# Patient Record
Sex: FEMALE | Race: WHITE | NOT HISPANIC OR LATINO | Employment: FULL TIME | URBAN - METROPOLITAN AREA
[De-identification: names, ages, dates, MRNs, and addresses within clinical notes are randomized per-mention and may not be internally consistent; named-entity substitution may affect disease eponyms.]

---

## 2017-05-23 PROBLEM — F31.9 BIPOLAR I DISORDER: Status: ACTIVE | Noted: 2017-05-23

## 2019-05-13 ENCOUNTER — OFFICE VISIT (OUTPATIENT)
Dept: FAMILY MEDICINE | Facility: CLINIC | Age: 44
End: 2019-05-13
Payer: COMMERCIAL

## 2019-05-13 VITALS
TEMPERATURE: 98 F | SYSTOLIC BLOOD PRESSURE: 94 MMHG | HEIGHT: 63 IN | OXYGEN SATURATION: 99 % | DIASTOLIC BLOOD PRESSURE: 54 MMHG | WEIGHT: 113.75 LBS | BODY MASS INDEX: 20.16 KG/M2 | HEART RATE: 79 BPM

## 2019-05-13 DIAGNOSIS — D64.9 ANEMIA, UNSPECIFIED TYPE: Primary | ICD-10-CM

## 2019-05-13 DIAGNOSIS — F31.9 BIPOLAR I DISORDER: ICD-10-CM

## 2019-05-13 DIAGNOSIS — G40.909 SEIZURE DISORDER: ICD-10-CM

## 2019-05-13 DIAGNOSIS — R53.82 CHRONIC FATIGUE: ICD-10-CM

## 2019-05-13 PROBLEM — F41.1 ANXIETY STATE: Status: ACTIVE | Noted: 2019-05-13

## 2019-05-13 PROBLEM — G25.0 ESSENTIAL TREMOR: Status: ACTIVE | Noted: 2019-05-13

## 2019-05-13 PROBLEM — G56.00 CARPAL TUNNEL SYNDROME: Status: ACTIVE | Noted: 2019-05-13

## 2019-05-13 PROBLEM — H81.399 PERIPHERAL VERTIGO: Status: ACTIVE | Noted: 2019-05-13

## 2019-05-13 PROCEDURE — 99204 OFFICE O/P NEW MOD 45 MIN: CPT | Mod: S$GLB,,, | Performed by: INTERNAL MEDICINE

## 2019-05-13 PROCEDURE — 99999 PR PBB SHADOW E&M-EST. PATIENT-LVL III: ICD-10-PCS | Mod: PBBFAC,,, | Performed by: INTERNAL MEDICINE

## 2019-05-13 PROCEDURE — 99999 PR PBB SHADOW E&M-EST. PATIENT-LVL III: CPT | Mod: PBBFAC,,, | Performed by: INTERNAL MEDICINE

## 2019-05-13 PROCEDURE — 99204 PR OFFICE/OUTPT VISIT, NEW, LEVL IV, 45-59 MIN: ICD-10-PCS | Mod: S$GLB,,, | Performed by: INTERNAL MEDICINE

## 2019-05-13 NOTE — PROGRESS NOTES
Patient ID: Angela Trascher Lejeune     Chief Complaint:   Chief Complaint   Patient presents with    Establish Primary Care    Weight Loss    Alopecia    Fatigue        HPI: New pt to me but has been seen at [facility] in past. Numerous c/o today- chief of which is fatigue and muscle weakness x 3 months coinciding w/ 30 lbs unintentional weight loss over same period (has gained 10 lbs back), cold feeling, vertigo w/ presyncope, racing heart just lying in bed, gets recurrent shooting nerve pain in B feet, diffuse muscle aches after taking a shower, hair falling out. 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 Has a very stressful job too but can't take Clonazepam due to h/o addiction. Echo for Split S2?     Review of Systems   Constitutional: Positive for fatigue.   HENT: Negative.    Eyes: Negative.    Respiratory: Negative.    Cardiovascular: Negative.    Gastrointestinal: Negative.    Endocrine: Positive for cold intolerance.   Genitourinary: Negative.    Musculoskeletal: Negative.    Skin: Negative.    Allergic/Immunologic: Negative.    Neurological: Positive for weakness.   Hematological: Negative.    Psychiatric/Behavioral: Negative.           Objective:      Physical Exam   Physical Exam   Constitutional: She is oriented to person, place, and time. She appears well-developed and well-nourished.   pale   HENT:   Head: Normocephalic and atraumatic.   Nose: Nose normal.   Mouth/Throat: Oropharynx is clear and moist.   Eyes: Pupils are equal, round, and reactive to light. Conjunctivae and EOM are normal.   Neck: Normal range of motion. Neck supple.   Cardiovascular: Normal rate, regular rhythm and intact distal pulses.   Split S2   Pulmonary/Chest: Effort normal and breath sounds normal.   Abdominal: Soft. Bowel sounds are normal.   Musculoskeletal: Normal range of motion.   Neurological: She is alert and oriented to person, place, and time.   Skin: Skin is warm and dry. Capillary refill takes less than 2 seconds.  "  Psychiatric: She has a normal mood and affect. Her behavior is normal. Judgment and thought content normal.   Nursing note and vitals reviewed.         Vitals:   Vitals:    05/13/19 1410   BP: (!) 94/54   Pulse: 79   Temp: 97.8 °F (36.6 °C)       Assessment:       Patient Active Problem List    Diagnosis Date Noted    Seizure disorder 05/13/2019    Peripheral vertigo 05/13/2019    Carpal tunnel syndrome 05/13/2019    Anxiety state 05/13/2019    Essential tremor 05/13/2019    Fatigue     Bipolar I disorder 05/23/2017          Plan:       Mikaela was seen today for establish primary care, weight loss, alopecia and fatigue.    Diagnoses and all orders for this visit:    Bipolar I disorder  Controlled     Seizure disorder  No grand mal seizures x 10 years     Chronic fatigue  -     CBC auto differential; Future  -     Comprehensive metabolic panel; Future  -     Hemoglobin A1c; Future  -     Lipid panel; Future  -     T3, free; Future  -     T4, free; Future  -     TSH; Future  -     B-12 binding capacity; Future     Update 5/16: We finally got in touch w/ pt and informed her of her critical anemia. She is doing "ok" and I don't think she needs to go to hospital. We will check iron studies and likely set up for IV Fereheme x 2. She has had a blood transfusion in past before/ after her Hyst due to blood loss, but I want to minimize her exposure to blood products.          "

## 2019-05-15 ENCOUNTER — TELEPHONE (OUTPATIENT)
Dept: FAMILY MEDICINE | Facility: CLINIC | Age: 44
End: 2019-05-15

## 2019-05-15 NOTE — TELEPHONE ENCOUNTER
[facility] [phone]    Patient with critical H&H 4.2 and 16.6

## 2019-05-17 ENCOUNTER — TELEPHONE (OUTPATIENT)
Dept: FAMILY MEDICINE | Facility: CLINIC | Age: 44
End: 2019-05-17

## 2019-05-17 NOTE — TELEPHONE ENCOUNTER
----- Message from Meagan Clifton sent at 5/17/2019 11:30 AM CDT -----  Contact: Patient  Type:  Patient Returning Call    Who Called:  Patient  Who Left Message for Patient:  Hannah  Does the patient know what this is regarding?:  Test results  Best Call Back Number:  [phone]

## 2019-05-20 ENCOUNTER — LAB VISIT (OUTPATIENT)
Dept: LAB | Facility: HOSPITAL | Age: 44
End: 2019-05-20
Attending: INTERNAL MEDICINE
Payer: COMMERCIAL

## 2019-05-20 ENCOUNTER — OFFICE VISIT (OUTPATIENT)
Dept: FAMILY MEDICINE | Facility: CLINIC | Age: 44
End: 2019-05-20
Payer: COMMERCIAL

## 2019-05-20 VITALS
HEIGHT: 63 IN | HEART RATE: 108 BPM | BODY MASS INDEX: 20.2 KG/M2 | DIASTOLIC BLOOD PRESSURE: 56 MMHG | TEMPERATURE: 98 F | SYSTOLIC BLOOD PRESSURE: 118 MMHG | OXYGEN SATURATION: 99 % | WEIGHT: 114 LBS

## 2019-05-20 DIAGNOSIS — D64.9 ANEMIA, UNSPECIFIED TYPE: Primary | ICD-10-CM

## 2019-05-20 DIAGNOSIS — R53.82 CHRONIC FATIGUE: ICD-10-CM

## 2019-05-20 DIAGNOSIS — D64.9 ANEMIA, UNSPECIFIED TYPE: ICD-10-CM

## 2019-05-20 PROCEDURE — 99213 PR OFFICE/OUTPT VISIT, EST, LEVL III, 20-29 MIN: ICD-10-PCS | Mod: S$GLB,,, | Performed by: INTERNAL MEDICINE

## 2019-05-20 PROCEDURE — 3008F PR BODY MASS INDEX (BMI) DOCUMENTED: ICD-10-PCS | Mod: CPTII,S$GLB,, | Performed by: INTERNAL MEDICINE

## 2019-05-20 PROCEDURE — 99999 PR PBB SHADOW E&M-EST. PATIENT-LVL III: CPT | Mod: PBBFAC,,, | Performed by: INTERNAL MEDICINE

## 2019-05-20 PROCEDURE — 83540 ASSAY OF IRON: CPT

## 2019-05-20 PROCEDURE — 82728 ASSAY OF FERRITIN: CPT

## 2019-05-20 PROCEDURE — 85025 COMPLETE CBC W/AUTO DIFF WBC: CPT

## 2019-05-20 PROCEDURE — 99213 OFFICE O/P EST LOW 20 MIN: CPT | Mod: S$GLB,,, | Performed by: INTERNAL MEDICINE

## 2019-05-20 PROCEDURE — 99999 PR PBB SHADOW E&M-EST. PATIENT-LVL III: ICD-10-PCS | Mod: PBBFAC,,, | Performed by: INTERNAL MEDICINE

## 2019-05-20 PROCEDURE — 36415 COLL VENOUS BLD VENIPUNCTURE: CPT | Mod: PO

## 2019-05-20 PROCEDURE — 3008F BODY MASS INDEX DOCD: CPT | Mod: CPTII,S$GLB,, | Performed by: INTERNAL MEDICINE

## 2019-05-20 NOTE — PROGRESS NOTES
Patient ID: Angela Trascher Lejeune     Chief Complaint:   Chief Complaint   Patient presents with    1 Week follow up        HPI: Follow up for fatigue. Extensive labs reviewed and she is profoundly anemic. Need to check iron studies and then likely needs Fereheme. 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 She had a colon in 2010 too. Plans to refer back too Dr. Martinez for another colon.     Review of Systems   Constitutional: Positive for fatigue.   HENT: Negative.    Eyes: Negative.    Respiratory: Negative.    Cardiovascular: Negative.    Gastrointestinal: Negative.    Endocrine: Negative.    Genitourinary: Negative.    Musculoskeletal: Negative.    Skin: Negative.    Allergic/Immunologic: Negative.    Neurological: Positive for dizziness.   Hematological: Negative.    Psychiatric/Behavioral: Negative.    All other systems reviewed and are negative.         Objective:      Physical Exam   Physical Exam   Constitutional: She is oriented to person, place, and time. She appears well-developed and well-nourished.   HENT:   Head: Normocephalic and atraumatic.   Nose: Nose normal.   Mouth/Throat: Oropharynx is clear and moist.   Eyes: Pupils are equal, round, and reactive to light. Conjunctivae and EOM are normal.   Neck: Normal range of motion. Neck supple.   Cardiovascular: Normal rate, regular rhythm, normal heart sounds and intact distal pulses.   Pulmonary/Chest: Effort normal and breath sounds normal.   Abdominal: Soft. Bowel sounds are normal.   Musculoskeletal: Normal range of motion.   Neurological: She is alert and oriented to person, place, and time.   Skin: Skin is warm and dry. Capillary refill takes less than 2 seconds.   Psychiatric: She has a normal mood and affect. Her behavior is normal. Judgment and thought content normal.   Nursing note and vitals reviewed.         Vitals:   Vitals:    05/20/19 1619   BP: (!) 118/56   Pulse: 108   Temp: 98.2 °F (36.8 °C)        Assessment:       Patient Active Problem List    Diagnosis Date Noted    Anemia 05/20/2019    Seizure disorder 05/13/2019    Peripheral vertigo 05/13/2019    Carpal tunnel syndrome 05/13/2019    Anxiety state 05/13/2019    Essential tremor 05/13/2019    Fatigue     Bipolar I disorder 05/23/2017          Plan:         Mikaela was seen today for 1 week follow up.    Diagnoses and all orders for this visit:    Anemia, unspecified type  -     Ambulatory referral to Gastroenterology  Iron studies today  May need Fereheme  Refer back to [facility]     Chronic fatigue  Monitor

## 2019-05-21 ENCOUNTER — TELEPHONE (OUTPATIENT)
Dept: FAMILY MEDICINE | Facility: CLINIC | Age: 44
End: 2019-05-21

## 2019-05-21 DIAGNOSIS — D50.8 OTHER IRON DEFICIENCY ANEMIA: Primary | ICD-10-CM

## 2019-05-21 LAB
BASOPHILS # BLD AUTO: 0.02 K/UL (ref 0–0.2)
BASOPHILS NFR BLD: 0.1 % (ref 0–1.9)
DIFFERENTIAL METHOD: ABNORMAL
EOSINOPHIL # BLD AUTO: 0.2 K/UL (ref 0–0.5)
EOSINOPHIL NFR BLD: 1.4 % (ref 0–8)
ERYTHROCYTE [DISTWIDTH] IN BLOOD BY AUTOMATED COUNT: 17.1 % (ref 11.5–14.5)
FERRITIN SERPL-MCNC: 5 NG/ML (ref 20–300)
HCT VFR BLD AUTO: 16 % (ref 37–48.5)
HGB BLD-MCNC: 4.1 G/DL (ref 12–16)
IMM GRANULOCYTES # BLD AUTO: 0.06 K/UL (ref 0–0.04)
IMM GRANULOCYTES NFR BLD AUTO: 0.4 % (ref 0–0.5)
IRON SERPL-MCNC: 15 UG/DL (ref 30–160)
LYMPHOCYTES # BLD AUTO: 1.7 K/UL (ref 1–4.8)
LYMPHOCYTES NFR BLD: 12.4 % (ref 18–48)
MCH RBC QN AUTO: 21.6 PG (ref 27–31)
MCHC RBC AUTO-ENTMCNC: 25.6 G/DL (ref 32–36)
MCV RBC AUTO: 84 FL (ref 82–98)
MONOCYTES # BLD AUTO: 0.8 K/UL (ref 0.3–1)
MONOCYTES NFR BLD: 5.8 % (ref 4–15)
NEUTROPHILS # BLD AUTO: 10.7 K/UL (ref 1.8–7.7)
NEUTROPHILS NFR BLD: 79.9 % (ref 38–73)
NRBC BLD-RTO: 0 /100 WBC
PLATELET # BLD AUTO: 507 K/UL (ref 150–350)
PMV BLD AUTO: 10.6 FL (ref 9.2–12.9)
RBC # BLD AUTO: 1.9 M/UL (ref 4–5.4)
SATURATED IRON: 4 % (ref 20–50)
TOTAL IRON BINDING CAPACITY: 411 UG/DL (ref 250–450)
TRANSFERRIN SERPL-MCNC: 278 MG/DL (ref 200–375)
WBC # BLD AUTO: 13.36 K/UL (ref 3.9–12.7)

## 2019-05-21 NOTE — TELEPHONE ENCOUNTER
I was informed by lab early this am about her critical low hb.    I spoke to patient early this am about her hb being 4.1.    Patient not interested in blood transfusion but keen on iron transfusion.      Patient is symptomatic I.e fatigue, dizziness, sob on exertion and difficulty in concentrating.

## 2019-05-21 NOTE — TELEPHONE ENCOUNTER
----- Message from Ofelia Tripp sent at 5/21/2019 12:12 PM CDT -----  Contact: patient  Type:  Patient Returning Call    Who Called:  patient  Who Left Message for Patient:  claude  Does the patient know what this is regarding?:    Best Call Back Number:  [phone]  Additional Information:

## 2019-05-22 PROBLEM — D50.8 IRON DEFICIENCY ANEMIA SECONDARY TO INADEQUATE DIETARY IRON INTAKE: Status: ACTIVE | Noted: 2019-05-22

## 2019-06-03 ENCOUNTER — OFFICE VISIT (OUTPATIENT)
Dept: FAMILY MEDICINE | Facility: CLINIC | Age: 44
End: 2019-06-03
Payer: COMMERCIAL

## 2019-06-03 VITALS
TEMPERATURE: 98 F | SYSTOLIC BLOOD PRESSURE: 102 MMHG | DIASTOLIC BLOOD PRESSURE: 70 MMHG | OXYGEN SATURATION: 99 % | HEIGHT: 63 IN | HEART RATE: 91 BPM | BODY MASS INDEX: 19.18 KG/M2 | WEIGHT: 108.25 LBS

## 2019-06-03 DIAGNOSIS — D64.9 LOW HEMATOCRIT: ICD-10-CM

## 2019-06-03 DIAGNOSIS — D64.9 LOW HEMOGLOBIN: ICD-10-CM

## 2019-06-03 DIAGNOSIS — D64.9 ANEMIA, UNSPECIFIED TYPE: Primary | ICD-10-CM

## 2019-06-03 DIAGNOSIS — D50.8 IRON DEFICIENCY ANEMIA SECONDARY TO INADEQUATE DIETARY IRON INTAKE: ICD-10-CM

## 2019-06-03 PROBLEM — N17.9 AKI (ACUTE KIDNEY INJURY): Status: ACTIVE | Noted: 2019-06-03

## 2019-06-03 PROBLEM — E87.29 HYPERCHLOREMIC METABOLIC ACIDOSIS: Status: ACTIVE | Noted: 2019-06-03

## 2019-06-03 PROBLEM — E87.6 HYPOKALEMIA: Status: ACTIVE | Noted: 2019-06-03

## 2019-06-03 PROCEDURE — 3008F PR BODY MASS INDEX (BMI) DOCUMENTED: ICD-10-PCS | Mod: CPTII,S$GLB,, | Performed by: PHYSICIAN ASSISTANT

## 2019-06-03 PROCEDURE — 99999 PR PBB SHADOW E&M-EST. PATIENT-LVL III: ICD-10-PCS | Mod: PBBFAC,,, | Performed by: PHYSICIAN ASSISTANT

## 2019-06-03 PROCEDURE — 99214 PR OFFICE/OUTPT VISIT, EST, LEVL IV, 30-39 MIN: ICD-10-PCS | Mod: S$GLB,,, | Performed by: PHYSICIAN ASSISTANT

## 2019-06-03 PROCEDURE — 99999 PR PBB SHADOW E&M-EST. PATIENT-LVL III: CPT | Mod: PBBFAC,,, | Performed by: PHYSICIAN ASSISTANT

## 2019-06-03 PROCEDURE — 99214 OFFICE O/P EST MOD 30 MIN: CPT | Mod: S$GLB,,, | Performed by: PHYSICIAN ASSISTANT

## 2019-06-03 PROCEDURE — 3008F BODY MASS INDEX DOCD: CPT | Mod: CPTII,S$GLB,, | Performed by: PHYSICIAN ASSISTANT

## 2019-06-03 RX ORDER — MECLIZINE HCL 12.5 MG 12.5 MG/1
TABLET ORAL
COMMUNITY
End: 2019-06-25 | Stop reason: ALTCHOICE

## 2019-06-03 NOTE — PROGRESS NOTES
"Subjective:      Patient ID: Angela Trascher Lejeune is a 44 y.o. female.    Chief Complaint: Anemia (patient says following 2 iron infusion she is starting to feeling like she previously felt SOB, weak, no appetite says she's lost 4 pounds in the last week, extreme thirst, muscle pains and dizziness.)    Patient is new to me, here today for symptomatic anemia   Patient with h/o iron deficiency anemia  CBC on 5/20 reveal critical Hgb and Hct  Recently treated outpatient for iron infusions on 5/22 and 5/29 and had very mild improvement for 2-3days, but now feels the same way she did prior to infusion   Today patient is feeling SOB, weak, no appetite, muscles aches and dizziness  Patient also reports significant unexpected weight loss over the past few months     Blood transfusion in 2010 following surgery    Anemia   Presents for initial visit. Symptoms include light-headedness, malaise/fatigue and weight loss (4lbs in the last week). There has been no abdominal pain, bruising/bleeding easily or fever.     Review of Systems   Constitutional: Positive for appetite change (decreased, "everything tastes bad"), malaise/fatigue, unexpected weight change (30lbs weight loss in 5mths) and weight loss (4lbs in the last week). Negative for chills, diaphoresis and fever.   HENT: Negative for congestion, rhinorrhea and sore throat.    Eyes: Positive for visual disturbance (black dots).   Respiratory: Positive for shortness of breath. Negative for cough and wheezing.    Gastrointestinal: Negative for abdominal pain, blood in stool, constipation, diarrhea, nausea and vomiting.   Endocrine: Positive for cold intolerance and polydipsia.   Genitourinary: Negative for dysuria, frequency and hematuria.   Musculoskeletal: Positive for arthralgias and myalgias.   Skin: Negative for color change, rash and wound.   Neurological: Positive for dizziness, weakness, light-headedness and headaches.   Hematological: Does not bruise/bleed easily. " "      Objective:   /70 (BP Location: Left arm, Patient Position: Sitting)   Pulse 91   Temp 97.9 °F (36.6 °C) (Oral)   Ht 5' 3" (1.6 m)   Wt 49.1 kg (108 lb 3.9 oz)   SpO2 99%   BMI 19.17 kg/m²   Physical Exam   Constitutional: She appears well-developed and well-nourished. She has a sickly appearance. She does not appear ill. No distress.   Patient frail   HENT:   Head: Normocephalic and atraumatic.   Cardiovascular: Normal rate, regular rhythm and normal heart sounds.   No murmur heard.  Pulmonary/Chest: Effort normal and breath sounds normal. No respiratory distress. She has no decreased breath sounds.   Skin: Skin is warm, dry and intact. No rash noted. There is pallor.   Psychiatric: She has a normal mood and affect. Her speech is normal and behavior is normal. Thought content normal.     Assessment:      1. Anemia, unspecified type    2. Iron deficiency anemia secondary to inadequate dietary iron intake    3. Low hemoglobin    4. Low hematocrit       Plan:   Anemia, unspecified type    Iron deficiency anemia secondary to inadequate dietary iron intake    Low hemoglobin    Low hematocrit    To ED for additional eval for symptomatic anemia  Patient's  to drive her to [facility]"

## 2019-06-04 PROBLEM — D50.9 IRON DEFICIENCY ANEMIA: Status: ACTIVE | Noted: 2019-06-04

## 2019-06-04 PROBLEM — N30.00 ACUTE CYSTITIS: Status: ACTIVE | Noted: 2019-06-04

## 2019-06-04 PROBLEM — N83.202 LEFT OVARIAN CYST: Status: ACTIVE | Noted: 2019-06-04

## 2019-06-04 PROBLEM — K59.00 CONSTIPATION: Status: ACTIVE | Noted: 2019-06-04

## 2019-06-05 ENCOUNTER — TELEPHONE (OUTPATIENT)
Dept: FAMILY MEDICINE | Facility: CLINIC | Age: 44
End: 2019-06-05

## 2019-06-05 PROBLEM — R82.71 ASYMPTOMATIC BACTERIURIA: Status: ACTIVE | Noted: 2019-06-04

## 2019-06-05 NOTE — TELEPHONE ENCOUNTER
Spoke with patient. She will keep her appointment on 6/24/19. She does not need to be seen earlier.

## 2019-06-05 NOTE — TELEPHONE ENCOUNTER
----- Message from Vivi Clements sent at 6/5/2019  3:46 PM CDT -----  Type:  Sooner Apoointment Request    Caller is requesting a sooner appointment.  Caller declined first available appointment listed below.  Caller will not accept being placed on the waitlist and is requesting a message be sent to doctor.    Name of Caller:  Cindy Lucero   When is the first available appointment? 08/07/19  Symptoms:  Needs to schedule a hospital follow up d/c 06/05/19 due to amenia  Best Call Back Number:  Patient [phone]  Additional Information:

## 2019-06-07 ENCOUNTER — PATIENT OUTREACH (OUTPATIENT)
Dept: ADMINISTRATIVE | Facility: HOSPITAL | Age: 44
End: 2019-06-07

## 2019-06-07 NOTE — PROGRESS NOTES
Health Maintenance Due   Topic Date Due    TETANUS VACCINE  03/05/1991    Mammogram  08/31/2016     Chart review complete/scrubbed 06/07/2019  Not in Links 06/07/2019  Future Appointments   Date Time Provider Department Center   6/24/2019  1:40 PM Yehuda Lund MD [place]

## 2019-06-13 ENCOUNTER — TELEPHONE (OUTPATIENT)
Dept: HEMATOLOGY/ONCOLOGY | Facility: CLINIC | Age: 44
End: 2019-06-13

## 2019-06-13 NOTE — TELEPHONE ENCOUNTER
appt made for pt   ----- Message from Reece Oliveros sent at 6/13/2019  4:49 PM CDT -----  Type:  Sooner Apoointment Request    Caller is requesting a sooner appointment.  Caller declined first available appointment listed below.  Caller will not accept being placed on the waitlist and is requesting a message be sent to doctor.    Name of Caller:  Patient  When is the first available appointment?  Na  Best Call Back Number:  [phone]  Additional Information:  New patient appointment

## 2019-06-24 ENCOUNTER — LAB VISIT (OUTPATIENT)
Dept: LAB | Facility: HOSPITAL | Age: 44
End: 2019-06-24
Attending: INTERNAL MEDICINE
Payer: COMMERCIAL

## 2019-06-24 ENCOUNTER — OFFICE VISIT (OUTPATIENT)
Dept: FAMILY MEDICINE | Facility: CLINIC | Age: 44
End: 2019-06-24
Payer: COMMERCIAL

## 2019-06-24 VITALS
SYSTOLIC BLOOD PRESSURE: 110 MMHG | WEIGHT: 110.69 LBS | OXYGEN SATURATION: 100 % | HEIGHT: 64 IN | BODY MASS INDEX: 18.9 KG/M2 | DIASTOLIC BLOOD PRESSURE: 70 MMHG | HEART RATE: 94 BPM

## 2019-06-24 DIAGNOSIS — D50.8 IRON DEFICIENCY ANEMIA SECONDARY TO INADEQUATE DIETARY IRON INTAKE: ICD-10-CM

## 2019-06-24 DIAGNOSIS — D64.9 FATIGUE ASSOCIATED WITH ANEMIA: ICD-10-CM

## 2019-06-24 DIAGNOSIS — D50.8 OTHER IRON DEFICIENCY ANEMIA: Primary | ICD-10-CM

## 2019-06-24 DIAGNOSIS — E87.29 HYPERCHLOREMIC METABOLIC ACIDOSIS: ICD-10-CM

## 2019-06-24 DIAGNOSIS — E53.8 VITAMIN B 12 DEFICIENCY: ICD-10-CM

## 2019-06-24 LAB
BASOPHILS # BLD AUTO: 0.16 K/UL (ref 0–0.2)
BASOPHILS NFR BLD: 2.1 % (ref 0–1.9)
DIFFERENTIAL METHOD: ABNORMAL
EOSINOPHIL # BLD AUTO: 0.4 K/UL (ref 0–0.5)
EOSINOPHIL NFR BLD: 5.2 % (ref 0–8)
ERYTHROCYTE [DISTWIDTH] IN BLOOD BY AUTOMATED COUNT: 18.4 % (ref 11.5–14.5)
HCT VFR BLD AUTO: 33.4 % (ref 37–48.5)
HGB BLD-MCNC: 11 G/DL (ref 12–16)
LYMPHOCYTES # BLD AUTO: 2 K/UL (ref 1–4.8)
LYMPHOCYTES NFR BLD: 27.1 % (ref 18–48)
MCH RBC QN AUTO: 30.6 PG (ref 27–31)
MCHC RBC AUTO-ENTMCNC: 32.9 G/DL (ref 32–36)
MCV RBC AUTO: 93 FL (ref 82–98)
MONOCYTES # BLD AUTO: 0.6 K/UL (ref 0.3–1)
MONOCYTES NFR BLD: 7.6 % (ref 4–15)
NEUTROPHILS # BLD AUTO: 4.4 K/UL (ref 1.8–7.7)
NEUTROPHILS NFR BLD: 58 % (ref 38–73)
PLATELET # BLD AUTO: 153 K/UL (ref 150–350)
PLATELET BLD QL SMEAR: ABNORMAL
PMV BLD AUTO: 11.7 FL (ref 9.2–12.9)
RBC # BLD AUTO: 3.6 M/UL (ref 4–5.4)
WBC # BLD AUTO: 7.53 K/UL (ref 3.9–12.7)

## 2019-06-24 PROCEDURE — 99213 OFFICE O/P EST LOW 20 MIN: CPT | Mod: 25,S$GLB,, | Performed by: INTERNAL MEDICINE

## 2019-06-24 PROCEDURE — 3008F PR BODY MASS INDEX (BMI) DOCUMENTED: ICD-10-PCS | Mod: CPTII,S$GLB,, | Performed by: INTERNAL MEDICINE

## 2019-06-24 PROCEDURE — 36415 COLL VENOUS BLD VENIPUNCTURE: CPT | Mod: PO

## 2019-06-24 PROCEDURE — 99213 PR OFFICE/OUTPT VISIT, EST, LEVL III, 20-29 MIN: ICD-10-PCS | Mod: 25,S$GLB,, | Performed by: INTERNAL MEDICINE

## 2019-06-24 PROCEDURE — 99999 PR PBB SHADOW E&M-EST. PATIENT-LVL III: ICD-10-PCS | Mod: PBBFAC,,, | Performed by: INTERNAL MEDICINE

## 2019-06-24 PROCEDURE — 99999 PR PBB SHADOW E&M-EST. PATIENT-LVL III: CPT | Mod: PBBFAC,,, | Performed by: INTERNAL MEDICINE

## 2019-06-24 PROCEDURE — 3008F BODY MASS INDEX DOCD: CPT | Mod: CPTII,S$GLB,, | Performed by: INTERNAL MEDICINE

## 2019-06-24 PROCEDURE — 85025 COMPLETE CBC W/AUTO DIFF WBC: CPT | Mod: PO

## 2019-06-24 NOTE — PROGRESS NOTES
Patient ID: Angela Trascher Lejeune     Chief Complaint:   Chief Complaint   Patient presents with    1 month follow up        HPI: F/u for iron deficiency anemia likely due to malabsorption +/- slowly bleeding PUD and h/o Crohn's. She got Feraheme x 2 and PRBC's x 1 at [email or web address] EGD= 2 non-bleeding ulcers (stomach and duodenum). Also dx'd w/ Hyperchloremic ametabolic acidosis (that wasn't present before Feraheme) and started on oral bicarb. Will get F/u labs and see Dr. Zhu soon. May need IV iron again. Still has some fatigue but looks less pale.     Review of Systems   Constitutional: Negative.    HENT: Negative.    Eyes: Negative.    Respiratory: Negative.    Cardiovascular: Negative.    Gastrointestinal: Negative.    Endocrine: Negative.    Genitourinary: Negative.    Musculoskeletal: Negative.    Skin: Negative.    Allergic/Immunologic: Negative.    Neurological: Negative.    Hematological: Negative.    Psychiatric/Behavioral: Negative.    All other systems reviewed and are negative.         Objective:      Physical Exam   Physical Exam   Constitutional: She is oriented to person, place, and time. She appears well-developed and well-nourished.   Less pale than at last OV.   HENT:   Head: Normocephalic and atraumatic.   Nose: Nose normal.   Mouth/Throat: Oropharynx is clear and moist.   Eyes: Pupils are equal, round, and reactive to light. Conjunctivae and EOM are normal.   Neck: Normal range of motion. Neck supple.   Cardiovascular: Normal rate, regular rhythm, normal heart sounds and intact distal pulses.   Pulmonary/Chest: Effort normal and breath sounds normal.   Abdominal: Soft. Bowel sounds are normal.   Musculoskeletal: Normal range of motion.   Neurological: She is alert and oriented to person, place, and time.   Skin: Skin is warm and dry. Capillary refill takes less than 2 seconds.   Psychiatric: She has a normal mood and affect. Her behavior is normal. Judgment and thought content normal.   Nursing  note and vitals reviewed.         Vitals:   Vitals:    06/24/19 1349   BP: 110/70   Pulse: 94       Assessment:       Patient Active Problem List    Diagnosis Date Noted    Vitamin B 12 deficiency 06/24/2019    Fatigue associated with anemia 06/24/2019    Constipation 06/04/2019    Iron deficiency anemia 06/04/2019    Left ovarian cyst 06/04/2019    Asymptomatic bacteriuria 06/04/2019    Symptomatic anemia 06/03/2019    Hyperchloremic metabolic acidosis 06/03/2019    JULIET (acute kidney injury) 06/03/2019    Hypokalemia 06/03/2019    Iron deficiency anemia secondary to inadequate dietary iron intake 05/22/2019    Anemia 05/20/2019    Seizure disorder 05/13/2019    Peripheral vertigo 05/13/2019    Carpal tunnel syndrome 05/13/2019    Anxiety state 05/13/2019    Essential tremor 05/13/2019    Fatigue     Bipolar I disorder 05/23/2017          Plan:        Mikaela was seen today for 1 month follow up.    Diagnoses and all orders for this visit:    Other iron deficiency anemia  Repeat labs soon. Will see Dr. Zhu soon    Vitamin B 12 deficiency  OTC Vit B 12 1000 mcg / day     Fatigue associated with anemia  Monitor     Hyperchloremic metabolic Acidosis  Cont Bicab and monitor

## 2019-06-25 ENCOUNTER — OFFICE VISIT (OUTPATIENT)
Dept: HEMATOLOGY/ONCOLOGY | Facility: CLINIC | Age: 44
End: 2019-06-25
Payer: COMMERCIAL

## 2019-06-25 VITALS
HEART RATE: 81 BPM | WEIGHT: 110.44 LBS | TEMPERATURE: 98 F | SYSTOLIC BLOOD PRESSURE: 103 MMHG | BODY MASS INDEX: 18.85 KG/M2 | HEIGHT: 64 IN | DIASTOLIC BLOOD PRESSURE: 68 MMHG | RESPIRATION RATE: 18 BRPM

## 2019-06-25 DIAGNOSIS — K27.9 PUD (PEPTIC ULCER DISEASE): ICD-10-CM

## 2019-06-25 DIAGNOSIS — D50.0 IRON DEFICIENCY ANEMIA DUE TO CHRONIC BLOOD LOSS: Primary | ICD-10-CM

## 2019-06-25 DIAGNOSIS — E53.8 B12 DEFICIENCY: ICD-10-CM

## 2019-06-25 DIAGNOSIS — G40.909 SEIZURE DISORDER: ICD-10-CM

## 2019-06-25 DIAGNOSIS — E53.8 VITAMIN B 12 DEFICIENCY: ICD-10-CM

## 2019-06-25 DIAGNOSIS — D64.9 SYMPTOMATIC ANEMIA: ICD-10-CM

## 2019-06-25 PROCEDURE — 3008F BODY MASS INDEX DOCD: CPT | Mod: CPTII,S$GLB,, | Performed by: INTERNAL MEDICINE

## 2019-06-25 PROCEDURE — 99999 PR PBB SHADOW E&M-EST. PATIENT-LVL III: ICD-10-PCS | Mod: PBBFAC,,, | Performed by: INTERNAL MEDICINE

## 2019-06-25 PROCEDURE — 99999 PR PBB SHADOW E&M-EST. PATIENT-LVL III: CPT | Mod: PBBFAC,,, | Performed by: INTERNAL MEDICINE

## 2019-06-25 PROCEDURE — 3008F PR BODY MASS INDEX (BMI) DOCUMENTED: ICD-10-PCS | Mod: CPTII,S$GLB,, | Performed by: INTERNAL MEDICINE

## 2019-06-25 PROCEDURE — 99214 OFFICE O/P EST MOD 30 MIN: CPT | Mod: S$GLB,,, | Performed by: INTERNAL MEDICINE

## 2019-06-25 PROCEDURE — 99214 PR OFFICE/OUTPT VISIT, EST, LEVL IV, 30-39 MIN: ICD-10-PCS | Mod: S$GLB,,, | Performed by: INTERNAL MEDICINE

## 2019-06-25 NOTE — LETTER
June 25, 2019      Seven Larry MD  [facility] [place] 2[facility] [facility] [address]           [facility] [place] [address] [address]  Phone: [phone]  Fax: [phone]          Patient: Angela Trascher Lejeune   MR Number: [id number]   YOB: 1975   Date of Visit: 6/25/2019       Dear Dr. Seven Larry:    Thank you for referring Angela Lejeune to me for evaluation. Attached you will find relevant portions of my assessment and plan of care.    If you have questions, please do not hesitate to call me. I look forward to following Angela Lejeune along with you.    Sincerely,    Mirtha Zhu MD    Enclosure  CC:  No Recipients    If you would like to receive this communication electronically, please contact [email or web address] or [phone] to request more information on [employer] access.    For providers and/or their staff who would like to refer a patient to [facility], please contact us through our one-stop-shop provider referral line, [facility], at [phone].    If you feel you have received this communication in error or would no longer like to receive these types of communications, please e-mail [email or web address]

## 2019-06-25 NOTE — PROGRESS NOTES
Subjective:       Patient ID: Angela Trascher Lejeune is a 44 y.o. female.    Chief Complaint: crohns dz, part of intestines removed, pt  had severe jeffrey hgb of 4.0 had 2 iron infusions before then prbc in hospital bleeding pud scope done by DR de la cruz   [facility] of [facility] after a seizure in [facility]    Hair has fallen out she feels better since hosiptal stay  HPI:     Social History     Socioeconomic History    Marital status:      Spouse name: Not on file    Number of children: Not on file    Years of education: Not on file    Highest education level: Not on file   Occupational History    Not on file   Social Needs    Financial resource strain: Not on file    Food insecurity:     Worry: Not on file     Inability: Not on file    Transportation needs:     Medical: Not on file     Non-medical: Not on file   Tobacco Use    Smoking status: Former Smoker     Packs/day: 0.50     Years: 6.00     Pack years: 3.00     Types: Cigarettes     Last attempt to quit: 5/10/2011     Years since quittin.1    Smokeless tobacco: Never Used   Substance and Sexual Activity    Alcohol use: Not Currently    Drug use: Not on file    Sexual activity: Yes     Partners: Male   Lifestyle    Physical activity:     Days per week: Not on file     Minutes per session: Not on file    Stress: Not on file   Relationships    Social connections:     Talks on phone: Not on file     Gets together: Not on file     Attends Worship service: Not on file     Active member of club or organization: Not on file     Attends meetings of clubs or organizations: Not on file     Relationship status: Not on file   Other Topics Concern    Not on file   Social History Narrative    Not on file     Family History   Adopted: Yes     Past Surgical History:   Procedure Laterality Date    APPENDECTOMY      BRAIN SURGERY  2009    fx skull due to seizure with 2 pins    CHOLECYSTECTOMY      EGD  (ESOPHAGOGASTRODUODENOSCOPY) Left 6/5/2019    Per[facility]    FEMUR FRACTURE SURGERY Right 2006    HYSTERECTOMY      laparoscopy for endometriosis      x5    OOPHORECTOMY      right ovary removed only    TONSILLECTOMY       Past Medical History:   Diagnosis Date    Abnormal Pap smear     + HPV    Bipolar 1 disorder     Crohn's disease     Encounter for blood transfusion 2010    Fatigue     H/O ETOH abuse     Iron deficiency anemia 2019    Seizures        Current Outpatient Medications:     ARIPiprazole (ABILIFY) 5 MG Tab, TAKE ONE TABLET BY MOUTH ONCE DAILY, Disp: 30 tablet, Rfl: 0    lamotrigine (LAMICTAL) 150 MG Tab, Take 150 mg by mouth once daily. , Disp: , Rfl:     pantoprazole (PROTONIX) 40 MG tablet, Take 1 tablet (40 mg total) by mouth 2 (two) times daily., Disp: 60 tablet, Rfl: 1    propranolol (INDERAL) 20 MG tablet, Take 20 mg by mouth once daily. , Disp: , Rfl:     multivitamin capsule, Take 1 capsule by mouth once daily., Disp: , Rfl:     sodium bicarbonate 650 MG tablet, Take 1 tablet (650 mg total) by mouth 2 (two) times daily., Disp: 60 tablet, Rfl: 1  Review of patient's allergies indicates:   Allergen Reactions    Demerol [meperidine]      Seizures      Ultram [tramadol] Other (See Comments)     Seizures           REVIEW OF SYSTEMS:     CONSTITUTIONAL: The patient  Has lost about 30 lbs.  There is no apparent    change in appetite, fever, night sweats, headaches, fatigue , dizziness, and   Weakness are better     SKIN: Denies rash, issues with nails, non-healing sores, bleeding, blotching    skin or abnormal bruising. Denies new moles or changes to existing moles. Hair has fallen out over time     BREASTS: There is no swelling around breasts or nipple discharge.    EYES: Denies eye pain, blurred vision, swelling, redness or discharge.      ENT AND MOUTH: Denies runny nose, stuffiness, sinus trouble or sores. Denies    nosebleeds. Denies,  hoarseness, change in voice or swelling in front of the    neck.      CARDIOVASCULAR: Denies chest pain, discomfort or palpitations. Denies neck    swelling or episodes of passing out.      RESPIRATORY: Denies cough, sputum production, blood in sputum, and denies    shortness of breath.      GI: Denies trouble swallowing, indigestion, heartburn, abdominal pain, nausea,    vomiting, diarrhea, altered bowel habits, blood in stool, discoloration of    stools, change in nature of stool, bloating, increased abdominal girth.      GENITOURINARY: No discharge. No pelvic pain or lumps. No rash around groin or  lesions. No urinary frequency, hesitation, painful urination or blood in    urine. Denies incontinence. No problems with intercourse.      MUSCULOSKELETAL: Denies neck or back pain. Denies weakness in arms or legs,    joint problems or distended inflamed veins in legs. Denies swelling or abnormal  glands.      NEUROLOGICAL: Denies tingling, numbness, altered mentation changes to nerve    function in the face, weakness to one or both of the body. Denies changes to    gait and denies multiple falls or accidents.          PHYSICAL EXAM:     Vitals:    06/25/19 0924   BP: 103/68   Pulse: 81   Resp: 18   Temp: 98.2 °F (36.8 °C)       GENERAL: Comfortable looking patient. Patient is in no distress.  Awake, alert and oriented to time, person and place.  No anxiety, or agitation.      HEENT: Normal conjunctivae and eyelids. WNL.  PERRLA 3 to 4 mm. No icterus, no pallor, no congestion, and no discharge noted.     NECK:  Supple. Trachea is central.  No crepitus.  No JVD or masses.    RESPIRATORY:  No intercostal retractions.  No dullness to percussion.  Chest is clear to auscultation.  No rales, rhonchi or wheezes.  No crepitus.  Good air entry bilaterally.    CARDIOVASCULAR:  S1 and S2 are normally heard without murmurs or gallops.  All peripheral pulses are present.    ABDOMEN:  Normal abdomen.  No hepatosplenomegaly.  No free  fluid.  Bowel sounds are present.  No hernia noted. No masses.  No rebound or tenderness.  No guarding or rigidity.  Umbilicus is midline.    LYMPHATICS:  No axillary, cervical, supraclavicular, submental, or inguinal lymphadenopathy.    SKIN/MUSCULOSKELETAL:  There is no evidence of excoriation marks or ecchmosis.  No rashes.  No cyanosis.  No clubbing.  No joint or skeletal deformities noted.  Normal range of motion.    NEUROLOGIC:  Higher functions are appropriate.  No cranial nerve deficits.  Normal emil.  Normal strength.  Motor and sensory functions are normal.  Deep tendon reflexes are normal.    GENITAL/RECTAL:  Exams are deferred.      Laboratory:     CBC:  Lab Results   Component Value Date    WBC 7.53 06/24/2019    RBC 3.60 (L) 06/24/2019    HGB 11.0 (L) 06/24/2019    HCT 33.4 (L) 06/24/2019    MCV 93 06/24/2019    MCH 30.6 06/24/2019    MCHC 32.9 06/24/2019    RDW 18.4 (H) 06/24/2019     06/24/2019    MPV 11.7 06/24/2019    GRAN 4.4 06/24/2019    GRAN 58.0 06/24/2019    LYMPH 2.0 06/24/2019    LYMPH 27.1 06/24/2019    MONO 0.6 06/24/2019    MONO 7.6 06/24/2019    EOS 0.4 06/24/2019    BASO 0.16 06/24/2019    EOSINOPHIL 5.2 06/24/2019    BASOPHIL 2.1 (H) 06/24/2019       BMP: BMP  Lab Results   Component Value Date     06/05/2019    K 4.2 06/05/2019     (H) 06/05/2019    CO2 14 (L) 06/05/2019    BUN 15 06/05/2019    CREATININE 0.86 06/05/2019    CALCIUM 8.5 06/05/2019    ANIONGAP 8 06/05/2019    ESTGFRAFRICA >60 06/05/2019    EGFRNONAA >60 06/05/2019       LFT:   Lab Results   Component Value Date    ALT 21 06/03/2019    AST 18 06/03/2019    ALKPHOS 72 06/03/2019    BILITOT 0.3 06/03/2019     Lab Results   Component Value Date    IRON 86 06/03/2019    TIBC 297 06/03/2019    FERRITIN 240 (H) 06/03/2019       Assessment/Plan:       Severe STEPHAN hgb of 4.0 s/p crohn's sx, and pud bleeding.    now improving    has recd iron prior to hospital stay and transfusion   will recheck counts in 6  weeks ,   b 12 def :supplements SL b 12 continue   discuss bone density and other vitamin/ mineral testing with pcp due to her prior GI sx   cont seizure precautions   weight loss possibly a result of her past GI sx and PUD   have the GI f/u   Advance Care Planning     Living Will  During this visit, I engaged the patient in the advance care planning process.  The patient and I reviewed the role for advance directives and their purpose in directing future healthcare if the patient's unable to speak for herself.  At this point in time, the patient does have full decision-making capacity.  We discussed different extreme health states that she could experience, and reviewed what kind of medical care she would want in those situations.  The patient communicated that if she were comatose and had little chance of a meaningful recovery, she would want her family to decide machines/life-sustaining treatments used and the length of use  The patient has not completed a living will to reflect these preferences.  The patient has not already designated a healthcare power of  to make decisions on her behalf.

## 2019-07-08 ENCOUNTER — LAB VISIT (OUTPATIENT)
Dept: LAB | Facility: HOSPITAL | Age: 44
End: 2019-07-08
Attending: INTERNAL MEDICINE
Payer: COMMERCIAL

## 2019-07-08 ENCOUNTER — TELEPHONE (OUTPATIENT)
Dept: HEMATOLOGY/ONCOLOGY | Facility: CLINIC | Age: 44
End: 2019-07-08

## 2019-07-08 DIAGNOSIS — D50.0 IRON DEFICIENCY ANEMIA DUE TO CHRONIC BLOOD LOSS: ICD-10-CM

## 2019-07-08 DIAGNOSIS — E53.8 B12 DEFICIENCY: ICD-10-CM

## 2019-07-08 PROBLEM — Z87.19 H/O CROHN'S DISEASE: Status: ACTIVE | Noted: 2019-07-08

## 2019-07-08 PROBLEM — E87.6 HYPOKALEMIA DUE TO LOSS OF POTASSIUM: Status: ACTIVE | Noted: 2019-07-08

## 2019-07-08 LAB
ALBUMIN SERPL BCP-MCNC: 4.2 G/DL (ref 3.5–5.2)
ALP SERPL-CCNC: 61 U/L (ref 55–135)
ALT SERPL W/O P-5'-P-CCNC: 8 U/L (ref 10–44)
ANION GAP SERPL CALC-SCNC: 12 MMOL/L (ref 8–16)
AST SERPL-CCNC: 15 U/L (ref 10–40)
BASOPHILS # BLD AUTO: 0.08 K/UL (ref 0–0.2)
BASOPHILS NFR BLD: 0.8 % (ref 0–1.9)
BILIRUB SERPL-MCNC: 0.1 MG/DL (ref 0.1–1)
BUN SERPL-MCNC: 21 MG/DL (ref 6–20)
CALCIUM SERPL-MCNC: 9.4 MG/DL (ref 8.7–10.5)
CHLORIDE SERPL-SCNC: 108 MMOL/L (ref 95–110)
CO2 SERPL-SCNC: 19 MMOL/L (ref 23–29)
CREAT SERPL-MCNC: 1.3 MG/DL (ref 0.5–1.4)
DIFFERENTIAL METHOD: ABNORMAL
EOSINOPHIL # BLD AUTO: 0.2 K/UL (ref 0–0.5)
EOSINOPHIL NFR BLD: 2.4 % (ref 0–8)
ERYTHROCYTE [DISTWIDTH] IN BLOOD BY AUTOMATED COUNT: 17.8 % (ref 11.5–14.5)
EST. GFR  (AFRICAN AMERICAN): 57 ML/MIN/1.73 M^2
EST. GFR  (NON AFRICAN AMERICAN): 49 ML/MIN/1.73 M^2
FERRITIN SERPL-MCNC: 29 NG/ML (ref 20–300)
GLUCOSE SERPL-MCNC: 87 MG/DL (ref 70–110)
HCT VFR BLD AUTO: 25.8 % (ref 37–48.5)
HGB BLD-MCNC: 8 G/DL (ref 12–16)
IRON SERPL-MCNC: 23 UG/DL (ref 30–160)
LYMPHOCYTES # BLD AUTO: 2.8 K/UL (ref 1–4.8)
LYMPHOCYTES NFR BLD: 29 % (ref 18–48)
MCH RBC QN AUTO: 30.5 PG (ref 27–31)
MCHC RBC AUTO-ENTMCNC: 31 G/DL (ref 32–36)
MCV RBC AUTO: 99 FL (ref 82–98)
MONOCYTES # BLD AUTO: 0.5 K/UL (ref 0.3–1)
MONOCYTES NFR BLD: 4.9 % (ref 4–15)
NEUTROPHILS # BLD AUTO: 6 K/UL (ref 1.8–7.7)
NEUTROPHILS NFR BLD: 62.4 % (ref 38–73)
NRBC BLD-RTO: 0 /100 WBC
PLATELET # BLD AUTO: 418 K/UL (ref 150–350)
PMV BLD AUTO: 11.4 FL (ref 9.2–12.9)
POTASSIUM SERPL-SCNC: 2.7 MMOL/L (ref 3.5–5.1)
PROT SERPL-MCNC: 7.4 G/DL (ref 6–8.4)
RBC # BLD AUTO: 2.62 M/UL (ref 4–5.4)
SATURATED IRON: 7 % (ref 20–50)
SODIUM SERPL-SCNC: 139 MMOL/L (ref 136–145)
TOTAL IRON BINDING CAPACITY: 340 UG/DL (ref 250–450)
TRANSFERRIN SERPL-MCNC: 230 MG/DL (ref 200–375)
VIT B12 SERPL-MCNC: 315 PG/ML (ref 210–950)
WBC # BLD AUTO: 9.64 K/UL (ref 3.9–12.7)

## 2019-07-08 PROCEDURE — 82607 VITAMIN B-12: CPT | Mod: 91

## 2019-07-08 PROCEDURE — 36415 COLL VENOUS BLD VENIPUNCTURE: CPT | Mod: PO

## 2019-07-08 PROCEDURE — 80053 COMPREHEN METABOLIC PANEL: CPT | Mod: PO

## 2019-07-08 PROCEDURE — 82728 ASSAY OF FERRITIN: CPT | Mod: 91

## 2019-07-08 PROCEDURE — 85025 COMPLETE CBC W/AUTO DIFF WBC: CPT

## 2019-07-08 PROCEDURE — 84238 ASSAY NONENDOCRINE RECEPTOR: CPT

## 2019-07-08 PROCEDURE — 83540 ASSAY OF IRON: CPT | Mod: 91

## 2019-07-08 NOTE — TELEPHONE ENCOUNTER
Per Dr. Buitrago - called patient and advised to go to ER ASAP for potassium of 2.7 today.   Patient voiced understanding and appreciation

## 2019-07-08 NOTE — TELEPHONE ENCOUNTER
Returned call to patient, no answer, left message on recorder to please call back and tell  I am expecting your call

## 2019-07-08 NOTE — TELEPHONE ENCOUNTER
Patient returned call stating she was feeling fatigued and weak and the black circles have returned under her eyes. Requests to have labs checked now as opposed to next month, rescheduled labs to today, will phone review.

## 2019-07-08 NOTE — TELEPHONE ENCOUNTER
----- Message from Jcarlos Solorio sent at 7/8/2019  8:44 AM CDT -----  Type: Needs Medical Advice    Who Called:  Self   Symptoms (please be specific):  NA   How long has patient had these symptoms: JONATHAN   Pharmacy name and phone #:    Best Call Back Number: [phone]  Additional Information: Patient requesting to get iron level checked.

## 2019-07-09 LAB — STFR SERPL-MCNC: 5.6 MG/L (ref 1.8–4.6)

## 2019-07-11 DIAGNOSIS — D50.8 IRON DEFICIENCY ANEMIA SECONDARY TO INADEQUATE DIETARY IRON INTAKE: ICD-10-CM

## 2019-07-11 DIAGNOSIS — D50.0 IRON DEFICIENCY ANEMIA DUE TO CHRONIC BLOOD LOSS: Primary | ICD-10-CM

## 2019-07-24 ENCOUNTER — LAB VISIT (OUTPATIENT)
Dept: LAB | Facility: HOSPITAL | Age: 44
End: 2019-07-24
Attending: INTERNAL MEDICINE
Payer: COMMERCIAL

## 2019-07-24 DIAGNOSIS — D50.8 IRON DEFICIENCY ANEMIA SECONDARY TO INADEQUATE DIETARY IRON INTAKE: ICD-10-CM

## 2019-07-24 DIAGNOSIS — D50.0 IRON DEFICIENCY ANEMIA DUE TO CHRONIC BLOOD LOSS: ICD-10-CM

## 2019-07-24 LAB
ALBUMIN SERPL BCP-MCNC: 4.2 G/DL (ref 3.5–5.2)
ALP SERPL-CCNC: 68 U/L (ref 55–135)
ALT SERPL W/O P-5'-P-CCNC: 8 U/L (ref 10–44)
ANION GAP SERPL CALC-SCNC: 10 MMOL/L (ref 8–16)
AST SERPL-CCNC: 14 U/L (ref 10–40)
BASOPHILS # BLD AUTO: 0.08 K/UL (ref 0–0.2)
BASOPHILS NFR BLD: 1 % (ref 0–1.9)
BILIRUB SERPL-MCNC: 0.1 MG/DL (ref 0.1–1)
BUN SERPL-MCNC: 20 MG/DL (ref 6–20)
CALCIUM SERPL-MCNC: 9 MG/DL (ref 8.7–10.5)
CHLORIDE SERPL-SCNC: 112 MMOL/L (ref 95–110)
CO2 SERPL-SCNC: 17 MMOL/L (ref 23–29)
CREAT SERPL-MCNC: 1.5 MG/DL (ref 0.5–1.4)
DIFFERENTIAL METHOD: ABNORMAL
EOSINOPHIL # BLD AUTO: 0.3 K/UL (ref 0–0.5)
EOSINOPHIL NFR BLD: 4 % (ref 0–8)
ERYTHROCYTE [DISTWIDTH] IN BLOOD BY AUTOMATED COUNT: 15.4 % (ref 11.5–14.5)
EST. GFR  (AFRICAN AMERICAN): 48 ML/MIN/1.73 M^2
EST. GFR  (NON AFRICAN AMERICAN): 41 ML/MIN/1.73 M^2
FERRITIN SERPL-MCNC: 14 NG/ML (ref 20–300)
GLUCOSE SERPL-MCNC: 133 MG/DL (ref 70–110)
HCT VFR BLD AUTO: 29.4 % (ref 37–48.5)
HGB BLD-MCNC: 9.1 G/DL (ref 12–16)
IRON SERPL-MCNC: 32 UG/DL (ref 30–160)
LYMPHOCYTES # BLD AUTO: 2.2 K/UL (ref 1–4.8)
LYMPHOCYTES NFR BLD: 26.6 % (ref 18–48)
MCH RBC QN AUTO: 29.1 PG (ref 27–31)
MCHC RBC AUTO-ENTMCNC: 31 G/DL (ref 32–36)
MCV RBC AUTO: 94 FL (ref 82–98)
MONOCYTES # BLD AUTO: 0.4 K/UL (ref 0.3–1)
MONOCYTES NFR BLD: 4.6 % (ref 4–15)
NEUTROPHILS # BLD AUTO: 5.3 K/UL (ref 1.8–7.7)
NEUTROPHILS NFR BLD: 63.8 % (ref 38–73)
PLATELET # BLD AUTO: 430 K/UL (ref 150–350)
PMV BLD AUTO: 10.2 FL (ref 9.2–12.9)
POTASSIUM SERPL-SCNC: 3.8 MMOL/L (ref 3.5–5.1)
PROT SERPL-MCNC: 7.3 G/DL (ref 6–8.4)
RBC # BLD AUTO: 3.13 M/UL (ref 4–5.4)
SATURATED IRON: 8 % (ref 20–50)
SODIUM SERPL-SCNC: 139 MMOL/L (ref 136–145)
TOTAL IRON BINDING CAPACITY: 379 UG/DL (ref 250–450)
TRANSFERRIN SERPL-MCNC: 256 MG/DL (ref 200–375)
WBC # BLD AUTO: 8.34 K/UL (ref 3.9–12.7)

## 2019-07-24 PROCEDURE — 82728 ASSAY OF FERRITIN: CPT

## 2019-07-24 PROCEDURE — 83540 ASSAY OF IRON: CPT

## 2019-07-24 PROCEDURE — 84238 ASSAY NONENDOCRINE RECEPTOR: CPT

## 2019-07-24 PROCEDURE — 36415 COLL VENOUS BLD VENIPUNCTURE: CPT | Mod: PO

## 2019-07-24 PROCEDURE — 80053 COMPREHEN METABOLIC PANEL: CPT | Mod: PO

## 2019-07-24 PROCEDURE — 85025 COMPLETE CBC W/AUTO DIFF WBC: CPT | Mod: PO

## 2019-07-25 ENCOUNTER — OFFICE VISIT (OUTPATIENT)
Dept: HEMATOLOGY/ONCOLOGY | Facility: CLINIC | Age: 44
End: 2019-07-25
Payer: COMMERCIAL

## 2019-07-25 VITALS
RESPIRATION RATE: 16 BRPM | WEIGHT: 113.13 LBS | HEART RATE: 102 BPM | OXYGEN SATURATION: 98 % | DIASTOLIC BLOOD PRESSURE: 68 MMHG | SYSTOLIC BLOOD PRESSURE: 110 MMHG | TEMPERATURE: 98 F | HEIGHT: 64 IN | BODY MASS INDEX: 19.31 KG/M2

## 2019-07-25 DIAGNOSIS — D50.0 IRON DEFICIENCY ANEMIA DUE TO CHRONIC BLOOD LOSS: Primary | ICD-10-CM

## 2019-07-25 DIAGNOSIS — G40.909 SEIZURE DISORDER: ICD-10-CM

## 2019-07-25 DIAGNOSIS — Z87.19 H/O CROHN'S DISEASE: ICD-10-CM

## 2019-07-25 PROCEDURE — 3008F PR BODY MASS INDEX (BMI) DOCUMENTED: ICD-10-PCS | Mod: CPTII,S$GLB,, | Performed by: INTERNAL MEDICINE

## 2019-07-25 PROCEDURE — 99999 PR PBB SHADOW E&M-EST. PATIENT-LVL III: CPT | Mod: PBBFAC,,, | Performed by: INTERNAL MEDICINE

## 2019-07-25 PROCEDURE — 99214 OFFICE O/P EST MOD 30 MIN: CPT | Mod: S$GLB,,, | Performed by: INTERNAL MEDICINE

## 2019-07-25 PROCEDURE — 99497 ADVNCD CARE PLAN 30 MIN: CPT | Mod: S$GLB,,, | Performed by: INTERNAL MEDICINE

## 2019-07-25 PROCEDURE — 99999 PR PBB SHADOW E&M-EST. PATIENT-LVL III: ICD-10-PCS | Mod: PBBFAC,,, | Performed by: INTERNAL MEDICINE

## 2019-07-25 PROCEDURE — 3008F BODY MASS INDEX DOCD: CPT | Mod: CPTII,S$GLB,, | Performed by: INTERNAL MEDICINE

## 2019-07-25 PROCEDURE — 99497 PR ADVNCD CARE PLAN 30 MIN: ICD-10-PCS | Mod: S$GLB,,, | Performed by: INTERNAL MEDICINE

## 2019-07-25 PROCEDURE — 99214 PR OFFICE/OUTPT VISIT, EST, LEVL IV, 30-39 MIN: ICD-10-PCS | Mod: S$GLB,,, | Performed by: INTERNAL MEDICINE

## 2019-07-25 RX ORDER — SODIUM CHLORIDE 0.9 % (FLUSH) 0.9 %
10 SYRINGE (ML) INJECTION
Status: CANCELLED | OUTPATIENT
Start: 2019-07-25

## 2019-07-25 RX ORDER — HEPARIN 100 UNIT/ML
5 SYRINGE INTRAVENOUS
Status: CANCELLED | OUTPATIENT
Start: 2019-07-25

## 2019-07-25 RX ORDER — SODIUM CHLORIDE 9 MG/ML
INJECTION, SOLUTION INTRAVENOUS CONTINUOUS
Status: CANCELLED | OUTPATIENT
Start: 2019-07-25

## 2019-07-25 NOTE — PROGRESS NOTES
Subjective:       Patient ID: Angela Trascher Lejeune is a 44 y.o. female.    Chief Complaint: crohns dz, part of intestines removed, pt  had severe jeffrey hgb of 4.0 had 2 iron infusions before then prbc in hospital bleeding pud scope done by DR de la cruz   she had menorrhagia in thepast.had fractures of femur after a seizure in the past   was hospitalized  For hypokalemia needed iron iv again and she has no IV access requierd a PICC    Hair has fallen out she feels better since hospital stay  HPI:     Social History     Socioeconomic History    Marital status:      Spouse name: Not on file    Number of children: Not on file    Years of education: Not on file    Highest education level: Not on file   Occupational History    Not on file   Social Needs    Financial resource strain: Not on file    Food insecurity:     Worry: Not on file     Inability: Not on file    Transportation needs:     Medical: Not on file     Non-medical: Not on file   Tobacco Use    Smoking status: Former Smoker     Packs/day: 0.50     Years: 6.00     Pack years: 3.00     Types: Cigarettes     Last attempt to quit: 5/10/2011     Years since quittin.2    Smokeless tobacco: Never Used   Substance and Sexual Activity    Alcohol use: Not Currently    Drug use: Not on file    Sexual activity: Yes     Partners: Male   Lifestyle    Physical activity:     Days per week: Not on file     Minutes per session: Not on file    Stress: Not on file   Relationships    Social connections:     Talks on phone: Not on file     Gets together: Not on file     Attends Jew service: Not on file     Active member of club or organization: Not on file     Attends meetings of clubs or organizations: Not on file     Relationship status: Not on file   Other Topics Concern    Not on file   Social History Narrative    Not on file     Family History   Adopted: Yes     Past Surgical History:   Procedure Laterality Date    APPENDECTOMY      BRAIN  SURGERY  2009    fx skull due to seizure with 2 pins    CHOLECYSTECTOMY      COLONOSCOPY Left 7/10/2019    [facility] by P[facility] Jr[facility]O    EGD (ESOPHAGOGASTRODUODENOSCOPY) Left 6/5/2019    Performed by [facility]ran [facility] at [facility] ENDO    FEMUR FRACTURE SURGERY Right 2006    HYSTERECTOMY      laparoscopy for endometriosis      x5    OOPHORECTOMY      right ovary removed only    TONSILLECTOMY       Past Medical History:   Diagnosis Date    Abnormal Pap smear     + HPV    Bipolar 1 disorder     Crohn's disease     Encounter for blood transfusion 2010    Fatigue     H/O ETOH abuse     Iron deficiency anemia 2019    Seizures        Current Outpatient Medications:     ARIPiprazole (ABILIFY) 5 MG Tab, TAKE ONE TABLET BY MOUTH ONCE DAILY, Disp: 30 tablet, Rfl: 0    ibuprofen (ADVIL,MOTRIN) 200 MG tablet, Take 400 mg by mouth daily as needed for Pain. For hip pain, Disp: , Rfl:     lamotrigine (LAMICTAL) 150 MG Tab, Take 150 mg by mouth once daily. , Disp: , Rfl:     pantoprazole (PROTONIX) 40 MG tablet, Take 1 tablet (40 mg total) by mouth 2 (two) times daily., Disp: 60 tablet, Rfl: 1    propranolol (INDERAL) 20 MG tablet, Take 20 mg by mouth once daily. , Disp: , Rfl:   Review of patient's allergies indicates:   Allergen Reactions    Demerol [meperidine]      Seizures      Ultram [tramadol] Other (See Comments)     Seizures           REVIEW OF SYSTEMS:     CONSTITUTIONAL: The patient  Has lost about 30 lbs.  There is no apparent    change in appetite, fever, night sweats, headaches, fatigue , dizziness, and   Weakness are better     SKIN: Denies rash, issues with nails, non-healing sores, bleeding, blotching    skin or abnormal bruising. Denies new moles or changes to existing moles. Hair has fallen out over time     BREASTS: There is no swelling around breasts or nipple discharge.    EYES: Denies eye pain, blurred vision, swelling, redness or discharge.      ENT AND MOUTH:  Denies runny nose, stuffiness, sinus trouble or sores. Denies    nosebleeds. Denies, hoarseness, change in voice or swelling in front of the    neck.      CARDIOVASCULAR: Denies chest pain, discomfort or palpitations. Denies neck    swelling or episodes of passing out.      RESPIRATORY: Denies cough, sputum production, blood in sputum, and denies    shortness of breath.      GI: Denies trouble swallowing, indigestion, heartburn, abdominal pain, nausea,    vomiting, diarrhea, altered bowel habits, blood in stool, discoloration of    stools, change in nature of stool, bloating, increased abdominal girth.      GENITOURINARY: No discharge. No pelvic pain or lumps. No rash around groin or  lesions. No urinary frequency, hesitation, painful urination or blood in    urine. Denies incontinence. No problems with intercourse.      MUSCULOSKELETAL: Denies neck or back pain. Denies weakness in arms or legs,    joint problems or distended inflamed veins in legs. Denies swelling or abnormal  glands.      NEUROLOGICAL: Denies tingling, numbness, altered mentation changes to nerve    function in the face, weakness to one or both of the body. Denies changes to    gait and denies multiple falls or accidents.          PHYSICAL EXAM:     Vitals:    07/25/19 1051   BP: 110/68   Pulse: 102   Resp: 16   Temp: 97.8 °F (36.6 °C)       GENERAL: Comfortable looking patient. Patient is in no distress.  Awake, alert and oriented to time, person and place.  No anxiety, or agitation.      HEENT: Normal conjunctivae and eyelids. WNL.  PERRLA 3 to 4 mm. No icterus, no pallor, no congestion, and no discharge noted.     NECK:  Supple. Trachea is central.  No crepitus.  No JVD or masses.    RESPIRATORY:  No intercostal retractions.  No dullness to percussion.  Chest is clear to auscultation.  No rales, rhonchi or wheezes.  No crepitus.  Good air entry bilaterally.    CARDIOVASCULAR:  S1 and S2 are normally heard without murmurs or gallops.  All  peripheral pulses are present.    ABDOMEN:  Normal abdomen.  No hepatosplenomegaly.  No free fluid.  Bowel sounds are present.  No hernia noted. No masses.  No rebound or tenderness.  No guarding or rigidity.  Umbilicus is midline.    LYMPHATICS:  No axillary, cervical, supraclavicular, submental, or inguinal lymphadenopathy.    SKIN/MUSCULOSKELETAL:  There is no evidence of excoriation marks or ecchmosis.  No rashes.  No cyanosis.  No clubbing.  No joint or skeletal deformities noted.  Normal range of motion.    NEUROLOGIC:  Higher functions are appropriate.  No cranial nerve deficits.  Normal emil.  Normal strength.  Motor and sensory functions are normal.  Deep tendon reflexes are normal.    GENITAL/RECTAL:  Exams are deferred.      Laboratory:     CBC:  Lab Results   Component Value Date    WBC 8.34 07/24/2019    RBC 3.13 (L) 07/24/2019    HGB 9.1 (L) 07/24/2019    HCT 29.4 (L) 07/24/2019    MCV 94 07/24/2019    MCH 29.1 07/24/2019    MCHC 31.0 (L) 07/24/2019    RDW 15.4 (H) 07/24/2019     (H) 07/24/2019    MPV 10.2 07/24/2019    GRAN 5.3 07/24/2019    GRAN 63.8 07/24/2019    LYMPH 2.2 07/24/2019    LYMPH 26.6 07/24/2019    MONO 0.4 07/24/2019    MONO 4.6 07/24/2019    EOS 0.3 07/24/2019    BASO 0.08 07/24/2019    EOSINOPHIL 4.0 07/24/2019    BASOPHIL 1.0 07/24/2019       BMP: BMP  Lab Results   Component Value Date     07/24/2019    K 3.8 07/24/2019     (H) 07/24/2019    CO2 17 (L) 07/24/2019    BUN 20 07/24/2019    CREATININE 1.5 (H) 07/24/2019    CALCIUM 9.0 07/24/2019    ANIONGAP 10 07/24/2019    ESTGFRAFRICA 48 (A) 07/24/2019    EGFRNONAA 41 (A) 07/24/2019       LFT:   Lab Results   Component Value Date    ALT 8 (L) 07/24/2019    AST 14 07/24/2019    ALKPHOS 68 07/24/2019    BILITOT 0.1 07/24/2019     Lab Results   Component Value Date    IRON 32 07/24/2019    TIBC 379 07/24/2019    FERRITIN 14 (L) 07/24/2019       Assessment/Plan:       Severe STEPHAN hgb of 4.0 s/p crohn's sx, and pud  bleeding.   Recurrent will give 2 more doses of injectafer as this is recurrent and she is a poor access pt requests a PORT will schedule this   has recd iron prior to hospital stay and transfusion   will recheck counts in 4 weeks ,  After injectafer  b 12 def :supplements SL b 12 continue, she admits to non compliance   discuss bone density and other vitamin/ mineral testing with pcp due to her prior GI sx   cont seizure precautions   weight loss possibly a result of her past GI sx and PUD   have the GI f/u   renal ref. ckd ? reason   Advance Care Planning     Living Will  During this visit, I engaged the patient in the advance care planning process.  The patient and I reviewed the role for advance directives and their purpose in directing future healthcare if the patient's unable to speak for herself.  At this point in time, the patient does have full decision-making capacity.  We discussed different extreme health states that she could experience, and reviewed what kind of medical care she would want in those situations.  The patient communicated that if she were comatose and had little chance of a meaningful recovery, she would want her family to decide machines/life-sustaining treatments used and the length of use  The patient has not completed a living will to reflect these preferences.  The patient has not already designated a healthcare power of  to make decisions on her behalf.  About 25 minutes spent in this discussion she is motivated to address these issues soon

## 2019-07-26 LAB — STFR SERPL-MCNC: 8.2 MG/L (ref 1.8–4.6)

## 2019-07-30 ENCOUNTER — TELEPHONE (OUTPATIENT)
Dept: HEMATOLOGY/ONCOLOGY | Facility: CLINIC | Age: 44
End: 2019-07-30

## 2019-07-30 NOTE — TELEPHONE ENCOUNTER
Spoke to patient and informed her that Lashonda is currently working on getting her port appointment.> I informed her that she will be contactedd when an appt is made.

## 2019-07-30 NOTE — TELEPHONE ENCOUNTER
----- Message from Ekta Crawford sent at 7/30/2019  3:55 PM CDT -----  Contact: self   Patient requesting to speak to nurse regarding when will her port appt be put in per patient      Please call to advice [phone] (home)

## 2019-07-31 ENCOUNTER — TELEPHONE (OUTPATIENT)
Dept: SURGERY | Facility: CLINIC | Age: 44
End: 2019-07-31

## 2019-07-31 NOTE — TELEPHONE ENCOUNTER
----- Message from Lashonda Agee LPN sent at 7/30/2019  6:19 PM CDT -----  Regarding: needs port ASAP for iron infusions, she has no veins  Per Request of Dr. Buitrago - Please contact patient for port placement asap for iron infusions, she has no veins.    Thanks,  Lashonda DELGADO LPN  [id number]

## 2019-07-31 NOTE — TELEPHONE ENCOUNTER
I called patient to set her up for an appointment on Thursday, 8/1/19 at 8:30am in [place]. Jam

## 2019-07-31 NOTE — TELEPHONE ENCOUNTER
----- Message from Johnathon Schulte sent at 7/31/2019 11:27 AM CDT -----  Contact: pt  Type:  Patient Returning Call    Who Called:  pt  Who Left Message for Patient:  Jam  Does the patient know what this is regarding?:   Setting up a port appointment  Best Call Back Number:  [phone]  Additional Information:  Please leave a detailed message if pt is unable answer.

## 2019-08-01 ENCOUNTER — OFFICE VISIT (OUTPATIENT)
Dept: SURGERY | Facility: CLINIC | Age: 44
End: 2019-08-01
Payer: COMMERCIAL

## 2019-08-01 VITALS
SYSTOLIC BLOOD PRESSURE: 121 MMHG | HEIGHT: 64 IN | TEMPERATURE: 98 F | HEART RATE: 93 BPM | BODY MASS INDEX: 19.31 KG/M2 | WEIGHT: 113.13 LBS | DIASTOLIC BLOOD PRESSURE: 77 MMHG

## 2019-08-01 DIAGNOSIS — I87.2 VENOUS INSUFFICIENCY: Primary | ICD-10-CM

## 2019-08-01 PROCEDURE — 99243 OFF/OP CNSLTJ NEW/EST LOW 30: CPT | Mod: S$GLB,,, | Performed by: SURGERY

## 2019-08-01 PROCEDURE — 99243 PR OFFICE CONSULTATION,LEVEL III: ICD-10-PCS | Mod: S$GLB,,, | Performed by: SURGERY

## 2019-08-01 PROCEDURE — 99999 PR PBB SHADOW E&M-EST. PATIENT-LVL III: CPT | Mod: PBBFAC,,, | Performed by: SURGERY

## 2019-08-01 PROCEDURE — 99999 PR PBB SHADOW E&M-EST. PATIENT-LVL III: ICD-10-PCS | Mod: PBBFAC,,, | Performed by: SURGERY

## 2019-08-01 NOTE — LETTER
August 1, 2019      Mirtha Zhu MD  [address]  [place] [address]           [facility]  [address] [facility] [address] [place] [address]  Phone: [phone]          Patient: Angela Trascher Lejeune   MR Number: [id number]   YOB: 1975   Date of Visit: 8/1/2019       Dear Dr. Mirtha Zhu:    Thank you for referring Angela Lejeune to me for evaluation. Attached you will find relevant portions of my assessment and plan of care.    If you have questions, please do not hesitate to call me. I look forward to following Angela Lejeune along with you.    Sincerely,    Miquel Sargent MD    Enclosure  CC:  No Recipients    If you would like to receive this communication electronically, please contact [email or web address] or [phone] to request more information on [employer] Link access.    For providers and/or their staff who would like to refer a patient to [facility], please contact us through our one-stop-shop provider referral line, [facility], at [phone].    If you feel you have received this communication in error or would no longer like to receive these types of communications, please e-mail [email or web address]

## 2019-08-01 NOTE — H&P (VIEW-ONLY)
Subjective:       Patient ID: Angela Trascher Lejeune is a 44 y.o. female.    Chief Complaint: Consult (Port placement)    HPI   Pleasant 45 yo F referred to me in consultation from Dr Zhu for port placement.  Pt has history of Crohn's disease and had a resection in the past.  Pt over the last severl months has had significant iron deficiency anemia requiring blood transfusion and iron infusion therapy.  PT has poor venous access and has had difficulty with each of these treatment.  Pt notes that she will likely need infusion therapy for a prolonged time and also may need infusion for her IBD.  She has no history of dialysis or chest surgery.      Review of Systems   Constitutional: Negative for activity change, appetite change, fever and unexpected weight change.   HENT: Negative for congestion and facial swelling.    Respiratory: Negative for chest tightness, shortness of breath and wheezing.    Cardiovascular: Negative for chest pain and palpitations.   Gastrointestinal: Negative for abdominal distention, abdominal pain, anal bleeding, blood in stool, constipation, diarrhea, nausea, rectal pain and vomiting.   Genitourinary: Negative for difficulty urinating, dysuria and frequency.   Skin: Negative for color change and wound.   Neurological: Negative for dizziness and light-headedness.   Hematological: Negative for adenopathy. Does not bruise/bleed easily.   Psychiatric/Behavioral: Negative for agitation and decreased concentration.       Objective:      Physical Exam   Constitutional: She is oriented to person, place, and time. She appears well-developed and well-nourished.   HENT:   Head: Normocephalic and atraumatic.   Eyes: Pupils are equal, round, and reactive to light.   Neck: Normal range of motion. Neck supple. No tracheal deviation present. No thyromegaly present.   Cardiovascular: Normal rate, regular rhythm and normal heart sounds.   No murmur heard.  Pulmonary/Chest: Effort normal and breath sounds  normal. She exhibits no tenderness.   Abdominal: Soft. Bowel sounds are normal. She exhibits no distension, no abdominal bruit, no pulsatile midline mass and no mass. There is no hepatosplenomegaly. There is no tenderness. There is no rigidity, no rebound, no guarding, no tenderness at McBurney's point and negative Monae's sign. No hernia. Hernia confirmed negative in the ventral area.   Genitourinary: Rectum normal.   Musculoskeletal: Normal range of motion.   Lymphadenopathy:     She has no cervical adenopathy.   Neurological: She is alert and oriented to person, place, and time.   Skin: Skin is warm. No rash noted. No erythema.   Psychiatric: She has a normal mood and affect.   Vitals reviewed.      Lab Results   Component Value Date    WBC 8.34 07/24/2019    HGB 9.1 (L) 07/24/2019    HCT 29.4 (L) 07/24/2019    MCV 94 07/24/2019     (H) 07/24/2019     BMP  Lab Results   Component Value Date     07/24/2019    K 3.8 07/24/2019     (H) 07/24/2019    CO2 17 (L) 07/24/2019    BUN 20 07/24/2019    CREATININE 1.5 (H) 07/24/2019    CALCIUM 9.0 07/24/2019    ANIONGAP 10 07/24/2019    ESTGFRAFRICA 48 (A) 07/24/2019    EGFRNONAA 41 (A) 07/24/2019       Assessment:     Poor venous access  No diagnosis found.    Plan:       D/w pt. I have reviewed risks and benfeits of  Port. Given that she will need prolonged treatment will proceed with port placement.  Surgery scheduled for 8/14

## 2019-08-01 NOTE — PATIENT INSTRUCTIONS
Surgery is scheduled for 08/14/19 arrival time per the preop nurse.  Preop will call from [phone]  Fasting after midnight  Someone to drive you home      THE PREOP NURSE WILL CALL, SOMETIMES AS LATE AS 4 PM IN THE AFTERNOON THE DAY BEFORE SURGERY.    Bathe the night before and the morning of your procedure with a Chlorhexidine wash such as Hibiclens, can be purchased at most Pharmacy's.    Special Instruction: none

## 2019-08-01 NOTE — PROGRESS NOTES
Subjective:       Patient ID: Angela Trascher Lejeune is a 44 y.o. female.    Chief Complaint: Consult (Port placement)    HPI   Pleasant 43 yo F referred to me in consultation from Dr Zhu for port placement.  Pt has history of Crohn's disease and had a resection in the past.  Pt over the last severl months has had significant iron deficiency anemia requiring blood transfusion and iron infusion therapy.  PT has poor venous access and has had difficulty with each of these treatment.  Pt notes that she will likely need infusion therapy for a prolonged time and also may need infusion for her IBD.  She has no history of dialysis or chest surgery.      Review of Systems   Constitutional: Negative for activity change, appetite change, fever and unexpected weight change.   HENT: Negative for congestion and facial swelling.    Respiratory: Negative for chest tightness, shortness of breath and wheezing.    Cardiovascular: Negative for chest pain and palpitations.   Gastrointestinal: Negative for abdominal distention, abdominal pain, anal bleeding, blood in stool, constipation, diarrhea, nausea, rectal pain and vomiting.   Genitourinary: Negative for difficulty urinating, dysuria and frequency.   Skin: Negative for color change and wound.   Neurological: Negative for dizziness and light-headedness.   Hematological: Negative for adenopathy. Does not bruise/bleed easily.   Psychiatric/Behavioral: Negative for agitation and decreased concentration.       Objective:      Physical Exam   Constitutional: She is oriented to person, place, and time. She appears well-developed and well-nourished.   HENT:   Head: Normocephalic and atraumatic.   Eyes: Pupils are equal, round, and reactive to light.   Neck: Normal range of motion. Neck supple. No tracheal deviation present. No thyromegaly present.   Cardiovascular: Normal rate, regular rhythm and normal heart sounds.   No murmur heard.  Pulmonary/Chest: Effort normal and breath sounds  normal. She exhibits no tenderness.   Abdominal: Soft. Bowel sounds are normal. She exhibits no distension, no abdominal bruit, no pulsatile midline mass and no mass. There is no hepatosplenomegaly. There is no tenderness. There is no rigidity, no rebound, no guarding, no tenderness at McBurney's point and negative Monae's sign. No hernia. Hernia confirmed negative in the ventral area.   Genitourinary: Rectum normal.   Musculoskeletal: Normal range of motion.   Lymphadenopathy:     She has no cervical adenopathy.   Neurological: She is alert and oriented to person, place, and time.   Skin: Skin is warm. No rash noted. No erythema.   Psychiatric: She has a normal mood and affect.   Vitals reviewed.      Lab Results   Component Value Date    WBC 8.34 07/24/2019    HGB 9.1 (L) 07/24/2019    HCT 29.4 (L) 07/24/2019    MCV 94 07/24/2019     (H) 07/24/2019     BMP  Lab Results   Component Value Date     07/24/2019    K 3.8 07/24/2019     (H) 07/24/2019    CO2 17 (L) 07/24/2019    BUN 20 07/24/2019    CREATININE 1.5 (H) 07/24/2019    CALCIUM 9.0 07/24/2019    ANIONGAP 10 07/24/2019    ESTGFRAFRICA 48 (A) 07/24/2019    EGFRNONAA 41 (A) 07/24/2019       Assessment:     Poor venous access  No diagnosis found.    Plan:       D/w pt. I have reviewed risks and benfeits of  Port. Given that she will need prolonged treatment will proceed with port placement.  Surgery scheduled for 8/14

## 2019-08-05 RX ORDER — LIDOCAINE HYDROCHLORIDE 10 MG/ML
1 INJECTION, SOLUTION EPIDURAL; INFILTRATION; INTRACAUDAL; PERINEURAL ONCE
Status: CANCELLED | OUTPATIENT
Start: 2019-08-05 | End: 2019-08-05

## 2019-08-05 RX ORDER — SODIUM CHLORIDE 9 MG/ML
INJECTION, SOLUTION INTRAVENOUS CONTINUOUS
Status: CANCELLED | OUTPATIENT
Start: 2019-08-05

## 2019-08-13 ENCOUNTER — ANESTHESIA EVENT (OUTPATIENT)
Dept: SURGERY | Facility: HOSPITAL | Age: 44
End: 2019-08-13
Payer: COMMERCIAL

## 2019-08-13 ENCOUNTER — LAB VISIT (OUTPATIENT)
Dept: LAB | Facility: HOSPITAL | Age: 44
End: 2019-08-13
Attending: INTERNAL MEDICINE
Payer: COMMERCIAL

## 2019-08-13 DIAGNOSIS — D50.0 IRON DEFICIENCY ANEMIA DUE TO CHRONIC BLOOD LOSS: ICD-10-CM

## 2019-08-13 DIAGNOSIS — I87.2 VENOUS INSUFFICIENCY: Primary | ICD-10-CM

## 2019-08-13 LAB
ALBUMIN SERPL BCP-MCNC: 3.9 G/DL (ref 3.5–5.2)
ALP SERPL-CCNC: 80 U/L (ref 55–135)
ALT SERPL W/O P-5'-P-CCNC: 8 U/L (ref 10–44)
ANION GAP SERPL CALC-SCNC: 11 MMOL/L (ref 8–16)
AST SERPL-CCNC: 16 U/L (ref 10–40)
BASOPHILS # BLD AUTO: 0.08 K/UL (ref 0–0.2)
BASOPHILS NFR BLD: 0.9 % (ref 0–1.9)
BILIRUB SERPL-MCNC: 0.1 MG/DL (ref 0.1–1)
BUN SERPL-MCNC: 20 MG/DL (ref 6–20)
CALCIUM SERPL-MCNC: 9 MG/DL (ref 8.7–10.5)
CHLORIDE SERPL-SCNC: 111 MMOL/L (ref 95–110)
CO2 SERPL-SCNC: 16 MMOL/L (ref 23–29)
CREAT SERPL-MCNC: 1.2 MG/DL (ref 0.5–1.4)
DIFFERENTIAL METHOD: ABNORMAL
EOSINOPHIL # BLD AUTO: 0.2 K/UL (ref 0–0.5)
EOSINOPHIL NFR BLD: 2.5 % (ref 0–8)
ERYTHROCYTE [DISTWIDTH] IN BLOOD BY AUTOMATED COUNT: 14.8 % (ref 11.5–14.5)
EST. GFR  (AFRICAN AMERICAN): >60 ML/MIN/1.73 M^2
EST. GFR  (NON AFRICAN AMERICAN): 54 ML/MIN/1.73 M^2
GLUCOSE SERPL-MCNC: 81 MG/DL (ref 70–110)
HCT VFR BLD AUTO: 28.6 % (ref 37–48.5)
HGB BLD-MCNC: 9.2 G/DL (ref 12–16)
LYMPHOCYTES # BLD AUTO: 1.9 K/UL (ref 1–4.8)
LYMPHOCYTES NFR BLD: 21 % (ref 18–48)
MCH RBC QN AUTO: 28.8 PG (ref 27–31)
MCHC RBC AUTO-ENTMCNC: 32.2 G/DL (ref 32–36)
MCV RBC AUTO: 90 FL (ref 82–98)
MONOCYTES # BLD AUTO: 0.8 K/UL (ref 0.3–1)
MONOCYTES NFR BLD: 8.8 % (ref 4–15)
NEUTROPHILS # BLD AUTO: 6.1 K/UL (ref 1.8–7.7)
NEUTROPHILS NFR BLD: 66.8 % (ref 38–73)
PLATELET # BLD AUTO: 364 K/UL (ref 150–350)
PMV BLD AUTO: 10.7 FL (ref 9.2–12.9)
POTASSIUM SERPL-SCNC: 3.8 MMOL/L (ref 3.5–5.1)
PROT SERPL-MCNC: 7.1 G/DL (ref 6–8.4)
RBC # BLD AUTO: 3.19 M/UL (ref 4–5.4)
SODIUM SERPL-SCNC: 138 MMOL/L (ref 136–145)
WBC # BLD AUTO: 9.1 K/UL (ref 3.9–12.7)

## 2019-08-13 PROCEDURE — 85025 COMPLETE CBC W/AUTO DIFF WBC: CPT | Mod: PO

## 2019-08-13 PROCEDURE — 80053 COMPREHEN METABOLIC PANEL: CPT | Mod: PO

## 2019-08-13 PROCEDURE — 36415 COLL VENOUS BLD VENIPUNCTURE: CPT | Mod: PO

## 2019-08-14 ENCOUNTER — HOSPITAL ENCOUNTER (OUTPATIENT)
Facility: HOSPITAL | Age: 44
Discharge: HOME | End: 2019-08-14
Attending: SURGERY | Admitting: SURGERY
Payer: COMMERCIAL

## 2019-08-14 ENCOUNTER — ANESTHESIA (OUTPATIENT)
Dept: SURGERY | Facility: HOSPITAL | Age: 44
End: 2019-08-14
Payer: COMMERCIAL

## 2019-08-14 ENCOUNTER — HOSPITAL ENCOUNTER (OUTPATIENT)
Dept: RADIOLOGY | Facility: HOSPITAL | Age: 44
Discharge: HOME | End: 2019-08-14
Attending: SURGERY | Admitting: SURGERY
Payer: COMMERCIAL

## 2019-08-14 DIAGNOSIS — I87.2 VENOUS INSUFFICIENCY: ICD-10-CM

## 2019-08-14 PROCEDURE — 77001 FLUOROGUIDE FOR VEIN DEVICE: CPT | Mod: 26,,, | Performed by: SURGERY

## 2019-08-14 PROCEDURE — 37000008 HC ANESTHESIA 1ST 15 MINUTES: Mod: PO | Performed by: SURGERY

## 2019-08-14 PROCEDURE — 71000015 HC POSTOP RECOV 1ST HR: Mod: PO | Performed by: SURGERY

## 2019-08-14 PROCEDURE — 77001 CHG FLUOROGUIDE CNTRL VEN ACCESS,PLACE,REPLACE,REMOVE: ICD-10-PCS | Mod: 26,,, | Performed by: SURGERY

## 2019-08-14 PROCEDURE — 63600175 PHARM REV CODE 636 W HCPCS: Mod: PO | Performed by: NURSE ANESTHETIST, CERTIFIED REGISTERED

## 2019-08-14 PROCEDURE — D9220A PRA ANESTHESIA: ICD-10-PCS | Mod: CRNA,,, | Performed by: NURSE ANESTHETIST, CERTIFIED REGISTERED

## 2019-08-14 PROCEDURE — 76000 FLUOROSCOPY <1 HR PHYS/QHP: CPT | Mod: TC,PO

## 2019-08-14 PROCEDURE — D9220A PRA ANESTHESIA: ICD-10-PCS | Mod: ANES,,, | Performed by: ANESTHESIOLOGY

## 2019-08-14 PROCEDURE — 71045 XR CHEST AP PORTABLE: ICD-10-PCS | Mod: 26,,, | Performed by: RADIOLOGY

## 2019-08-14 PROCEDURE — 36561 PR INSERT TUNNELED CV CATH WITH PORT: ICD-10-PCS | Mod: LT,,, | Performed by: SURGERY

## 2019-08-14 PROCEDURE — 25000003 PHARM REV CODE 250: Mod: PO | Performed by: SURGERY

## 2019-08-14 PROCEDURE — D9220A PRA ANESTHESIA: Mod: CRNA,,, | Performed by: NURSE ANESTHETIST, CERTIFIED REGISTERED

## 2019-08-14 PROCEDURE — 63600175 PHARM REV CODE 636 W HCPCS: Mod: PO | Performed by: ANESTHESIOLOGY

## 2019-08-14 PROCEDURE — C1788 PORT, INDWELLING, IMP: HCPCS | Mod: PO | Performed by: SURGERY

## 2019-08-14 PROCEDURE — D9220A PRA ANESTHESIA: Mod: ANES,,, | Performed by: ANESTHESIOLOGY

## 2019-08-14 PROCEDURE — 36561 INSERT TUNNELED CV CATH: CPT | Mod: LT,,, | Performed by: SURGERY

## 2019-08-14 PROCEDURE — 71045 X-RAY EXAM CHEST 1 VIEW: CPT | Mod: 26,,, | Performed by: RADIOLOGY

## 2019-08-14 PROCEDURE — 27200651 HC AIRWAY, LMA: Mod: PO | Performed by: NURSE ANESTHETIST, CERTIFIED REGISTERED

## 2019-08-14 PROCEDURE — 63600175 PHARM REV CODE 636 W HCPCS: Mod: PO | Performed by: SURGERY

## 2019-08-14 PROCEDURE — 71045 X-RAY EXAM CHEST 1 VIEW: CPT | Mod: TC,FY,PO

## 2019-08-14 PROCEDURE — 36000707: Mod: PO | Performed by: SURGERY

## 2019-08-14 PROCEDURE — 71000033 HC RECOVERY, INTIAL HOUR: Mod: PO | Performed by: SURGERY

## 2019-08-14 PROCEDURE — 25000003 PHARM REV CODE 250: Mod: PO | Performed by: ANESTHESIOLOGY

## 2019-08-14 PROCEDURE — 36000706: Mod: PO | Performed by: SURGERY

## 2019-08-14 PROCEDURE — 37000009 HC ANESTHESIA EA ADD 15 MINS: Mod: PO | Performed by: SURGERY

## 2019-08-14 DEVICE — KIT POWERPORT SINGLE 8FR: Type: IMPLANTABLE DEVICE | Site: CHEST | Status: FUNCTIONAL

## 2019-08-14 RX ORDER — SODIUM CHLORIDE, SODIUM LACTATE, POTASSIUM CHLORIDE, CALCIUM CHLORIDE 600; 310; 30; 20 MG/100ML; MG/100ML; MG/100ML; MG/100ML
INJECTION, SOLUTION INTRAVENOUS CONTINUOUS
Status: DISCONTINUED | OUTPATIENT
Start: 2019-08-14 | End: 2019-08-14 | Stop reason: HOSPADM

## 2019-08-14 RX ORDER — OXYCODONE AND ACETAMINOPHEN 5; 325 MG/1; MG/1
1 TABLET ORAL EVERY 4 HOURS PRN
Qty: 20 TABLET | Refills: 0 | Status: SHIPPED | OUTPATIENT
Start: 2019-08-14 | End: 2019-08-28

## 2019-08-14 RX ORDER — MIDAZOLAM HYDROCHLORIDE 1 MG/ML
INJECTION, SOLUTION INTRAMUSCULAR; INTRAVENOUS
Status: DISCONTINUED | OUTPATIENT
Start: 2019-08-14 | End: 2019-08-14

## 2019-08-14 RX ORDER — BUPIVACAINE HCL/EPINEPHRINE 0.25-.0005
VIAL (ML) INJECTION
Status: DISCONTINUED | OUTPATIENT
Start: 2019-08-14 | End: 2019-08-14 | Stop reason: HOSPADM

## 2019-08-14 RX ORDER — HYDROCODONE BITARTRATE AND ACETAMINOPHEN 5; 325 MG/1; MG/1
1 TABLET ORAL EVERY 4 HOURS PRN
Status: DISCONTINUED | OUTPATIENT
Start: 2019-08-14 | End: 2019-08-14 | Stop reason: HOSPADM

## 2019-08-14 RX ORDER — HEPARIN 100 UNIT/ML
SYRINGE INTRAVENOUS
Status: DISCONTINUED | OUTPATIENT
Start: 2019-08-14 | End: 2019-08-14 | Stop reason: HOSPADM

## 2019-08-14 RX ORDER — ALPRAZOLAM 0.5 MG/1
0.5 TABLET, ORALLY DISINTEGRATING ORAL ONCE
Status: COMPLETED | OUTPATIENT
Start: 2019-08-14 | End: 2019-08-14

## 2019-08-14 RX ORDER — ONDANSETRON 2 MG/ML
INJECTION INTRAMUSCULAR; INTRAVENOUS
Status: DISCONTINUED | OUTPATIENT
Start: 2019-08-14 | End: 2019-08-14

## 2019-08-14 RX ORDER — LIDOCAINE HYDROCHLORIDE 10 MG/ML
1 INJECTION, SOLUTION EPIDURAL; INFILTRATION; INTRACAUDAL; PERINEURAL ONCE
Status: COMPLETED | OUTPATIENT
Start: 2019-08-14 | End: 2019-08-14

## 2019-08-14 RX ORDER — DEXAMETHASONE SODIUM PHOSPHATE 4 MG/ML
8 INJECTION, SOLUTION INTRA-ARTICULAR; INTRALESIONAL; INTRAMUSCULAR; INTRAVENOUS; SOFT TISSUE
Status: COMPLETED | OUTPATIENT
Start: 2019-08-14 | End: 2019-08-14

## 2019-08-14 RX ORDER — PROPOFOL 10 MG/ML
VIAL (ML) INTRAVENOUS
Status: DISCONTINUED | OUTPATIENT
Start: 2019-08-14 | End: 2019-08-14

## 2019-08-14 RX ORDER — SODIUM CHLORIDE 9 MG/ML
INJECTION, SOLUTION INTRAVENOUS CONTINUOUS
Status: DISCONTINUED | OUTPATIENT
Start: 2019-08-14 | End: 2019-08-14 | Stop reason: HOSPADM

## 2019-08-14 RX ORDER — FENTANYL CITRATE 50 UG/ML
25 INJECTION, SOLUTION INTRAMUSCULAR; INTRAVENOUS EVERY 5 MIN PRN
Status: DISCONTINUED | OUTPATIENT
Start: 2019-08-14 | End: 2019-08-14 | Stop reason: HOSPADM

## 2019-08-14 RX ORDER — FENTANYL CITRATE 50 UG/ML
INJECTION, SOLUTION INTRAMUSCULAR; INTRAVENOUS
Status: DISCONTINUED | OUTPATIENT
Start: 2019-08-14 | End: 2019-08-14

## 2019-08-14 RX ORDER — CEFAZOLIN SODIUM 2 G/50ML
2 SOLUTION INTRAVENOUS
Status: DISCONTINUED | OUTPATIENT
Start: 2019-08-14 | End: 2019-08-14 | Stop reason: HOSPADM

## 2019-08-14 RX ORDER — LIDOCAINE HYDROCHLORIDE 10 MG/ML
1 INJECTION, SOLUTION EPIDURAL; INFILTRATION; INTRACAUDAL; PERINEURAL ONCE
Status: DISCONTINUED | OUTPATIENT
Start: 2019-08-14 | End: 2019-08-14 | Stop reason: HOSPADM

## 2019-08-14 RX ORDER — LIDOCAINE HCL/PF 100 MG/5ML
SYRINGE (ML) INTRAVENOUS
Status: DISCONTINUED | OUTPATIENT
Start: 2019-08-14 | End: 2019-08-14

## 2019-08-14 RX ORDER — ONDANSETRON 2 MG/ML
4 INJECTION INTRAMUSCULAR; INTRAVENOUS EVERY 12 HOURS PRN
Status: DISCONTINUED | OUTPATIENT
Start: 2019-08-14 | End: 2019-08-14 | Stop reason: HOSPADM

## 2019-08-14 RX ORDER — OXYCODONE HYDROCHLORIDE 5 MG/1
5 TABLET ORAL
Status: DISCONTINUED | OUTPATIENT
Start: 2019-08-14 | End: 2019-08-14 | Stop reason: HOSPADM

## 2019-08-14 RX ADMIN — LIDOCAINE HYDROCHLORIDE: 10 INJECTION, SOLUTION EPIDURAL; INFILTRATION; INTRACAUDAL; PERINEURAL at 06:08

## 2019-08-14 RX ADMIN — LIDOCAINE HYDROCHLORIDE 75 MG: 20 INJECTION PARENTERAL at 07:08

## 2019-08-14 RX ADMIN — FENTANYL CITRATE 50 MCG: 50 INJECTION, SOLUTION INTRAMUSCULAR; INTRAVENOUS at 07:08

## 2019-08-14 RX ADMIN — ALPRAZOLAM 0.5 MG: 0.5 TABLET, ORALLY DISINTEGRATING ORAL at 06:08

## 2019-08-14 RX ADMIN — PROPOFOL 150 MG: 10 INJECTION, EMULSION INTRAVENOUS at 07:08

## 2019-08-14 RX ADMIN — DEXAMETHASONE SODIUM PHOSPHATE 8 MG: 4 INJECTION, SOLUTION INTRAMUSCULAR; INTRAVENOUS at 06:08

## 2019-08-14 RX ADMIN — ONDANSETRON 4 MG: 2 INJECTION, SOLUTION INTRAMUSCULAR; INTRAVENOUS at 07:08

## 2019-08-14 RX ADMIN — OXYCODONE HYDROCHLORIDE 5 MG: 5 TABLET ORAL at 08:08

## 2019-08-14 RX ADMIN — SODIUM CHLORIDE, SODIUM LACTATE, POTASSIUM CHLORIDE, AND CALCIUM CHLORIDE: .6; .31; .03; .02 INJECTION, SOLUTION INTRAVENOUS at 06:08

## 2019-08-14 RX ADMIN — CEFAZOLIN SODIUM 2 G: 2 SOLUTION INTRAVENOUS at 07:08

## 2019-08-14 RX ADMIN — FENTANYL CITRATE 25 MCG: 50 INJECTION INTRAMUSCULAR; INTRAVENOUS at 08:08

## 2019-08-14 RX ADMIN — MIDAZOLAM HYDROCHLORIDE 2 MG: 1 INJECTION, SOLUTION INTRAMUSCULAR; INTRAVENOUS at 07:08

## 2019-08-14 NOTE — TRANSFER OF CARE
"Anesthesia Transfer of Care Note    Patient: Angela Trascher Lejeune    Procedure(s) Performed: Procedure(s) (LRB):  [id number]-Y-CATH (Left)    Patient location: PACU    Anesthesia Type: MAC    Transport from OR: Transported from OR on room air with adequate spontaneous ventilation    Post pain: adequate analgesia    Post assessment: no apparent anesthetic complications and tolerated procedure well    Post vital signs: stable    Level of consciousness: awake, alert and oriented    Nausea/Vomiting: no nausea/vomiting    Complications: none    Transfer of care protocol was followed      Last vitals:   Visit Vitals  /63 (BP Location: Left arm, Patient Position: Lying)   Pulse 76   Temp 36.5 °C (97.7 °F) (Skin)   Resp 16   Ht 5' 4" (1.626 m)   Wt 51.3 kg (113 lb 1.5 oz)   SpO2 100%   Breastfeeding? No   BMI 19.41 kg/m²     "

## 2019-08-14 NOTE — ANESTHESIA POSTPROCEDURE EVALUATION
Anesthesia Post Evaluation    Patient: Angela Trascher Lejeune    Procedure(s) Performed: Procedure(s) (LRB):  [id number]-V-CATH (Left)    Final Anesthesia Type: general  Patient location during evaluation: PACU  Patient participation: Yes- Able to Participate  Level of consciousness: sedated and awake  Post-procedure vital signs: reviewed and stable  Pain management: adequate  Airway patency: patent  PONV status at discharge: No PONV  Anesthetic complications: no      Cardiovascular status: blood pressure returned to baseline  Respiratory status: spontaneous ventilation  Hydration status: euvolemic  Follow-up not needed.          Vitals Value Taken Time   /74 8/14/2019  8:15 AM   Temp 36.2 °C (97.1 °F) 8/14/2019  7:54 AM   Pulse 74 8/14/2019  8:15 AM   Resp 18 8/14/2019  8:15 AM   SpO2 100 % 8/14/2019  8:15 AM         Event Time     Out of Recovery 07:50:00          Pain/Rufus Score: Pain Rating Prior to Med Admin: 7 (8/14/2019  8:20 AM)  Pain Rating Post Med Admin: 7 (8/14/2019  8:14 AM)  Rufus Score: 10 (8/14/2019  8:15 AM)

## 2019-08-14 NOTE — INTERVAL H&P NOTE
The patient has been examined and the H&P has been reviewed:    I concur with the findings and no changes have occurred since H&P was written.    Anesthesia/Surgery risks, benefits and alternative options discussed and understood by patient/family.          Active Hospital Problems    Diagnosis  POA    Venous insufficiency [I87.2]  Yes      Resolved Hospital Problems   No resolved problems to display.

## 2019-08-14 NOTE — ANESTHESIA PREPROCEDURE EVALUATION
08/14/2019  Angela Trascher Lejeune is a 44 y.o., female.    Anesthesia Evaluation    I have reviewed the Patient Summary Reports.    I have reviewed the Nursing Notes.   I have reviewed the Medications.     Review of Systems  Hematology/Oncology:        Hematology Comments: Iron def anemia, anemia Hgb 9.6   Hepatic/GI:   PUD, Crohn's dz   Neurological:   Seizures    Psych:   anxiety depression          Physical Exam   Airway/Jaw/Neck:  Airway Findings: Mouth Opening: Normal Tongue: Normal  General Airway Assessment: Adult  Mallampati: II  Improves to II with phonation.  Jaw/Neck Findings:  Neck ROM: Normal ROM      Dental:  Dental Findings: In tact        Mental Status:  Mental Status Findings:  Cooperative, Alert and Oriented         Anesthesia Plan  Type of Anesthesia, risks & benefits discussed:  Anesthesia Type:  general  Patient's Preference:   Intra-op Monitoring Plan: standard ASA monitors  Intra-op Monitoring Plan Comments:   Post Op Pain Control Plan:   Post Op Pain Control Plan Comments:   Induction:   IV and Inhalation  Beta Blocker:  Patient is on a Beta-Blocker and has received one dose within the past 24 hours (No further documentation required).       Informed Consent: Patient understands risks and agrees with Anesthesia plan.  Questions answered. Anesthesia consent signed with patient.  ASA Score: 2     Day of Surgery Review of History & Physical:            Ready For Surgery From Anesthesia Perspective.

## 2019-08-14 NOTE — OP NOTE
DATE OF PROCEDURE:  08/14/2019.    STAFF SURGEON:  Miquel Sargent M.D.    PREOPERATIVE DIAGNOSIS:  Venous insufficiency.    POSTOPERATIVE DIAGNOSIS:  Venous insufficiency.    PROCEDURE:  Placement of left subclavian Port-A-Cath.    ANESTHESIA:  General via an LMA.    INDICATION FOR PROCEDURE:  A pleasant 44-year-old female with Crohn's disease   and anemia, need of frequent iron transfusions and infusion for her Crohn   disease who had very poor venous access.  She was scheduled, recommended to have   a port and referred to me for evaluation.    DESCRIPTION OF PROCEDURE:  Following signing of informed consent, she received   preoperative antibiotics, taken to the OR and placed in supine position.  SCDs   were placed.  General anesthesia was administered via LMA.  Chest and neck were   prepped and draped in standard fashion and appropriate timeout procedure   performed.  I injected local into the left deltopectoral groove obtained access   to the left subclavian vein with 2 percutaneous sticks.  A guidewire was placed   and confirmed under fluoroscopic visualization.  I then placed a tunneling   sheath and dilator over the guidewire, again under fluoroscopic visualization.    I next made an incision through my insertion site incorporating the insertion   site into my incision, carried down through deep dermal tissue down to the   fascia of the pectoralis muscle raising a flap inferiorly that would easily hold   the port.  I then placed the catheter through the tunneling sheath that had   been present.  I secured the catheter to the port and secured the port down to   the chest wall with 2-0 Vicryl.  I irrigated and ensured adequate hemostasis.  I   then closed the wound in 2 layers with 3-0 Vicryl and 4-0 Monocryl.  I ensured   the port both flushes and aspirates easily, which it does.  I then closed the   wound in 2 layers with 3-0 Vicryl and 4-0 Monocryl.  The patient was awakened   and taken to Recovery Room in  stable condition.  There were no immediate   complications.  Blood loss was 10 mL.      DMITRY/KATE  dd: 08/14/2019 07:50:39 (CDT)  td: 08/14/2019 08:00:13 (CDT)  Doc ID   #[id number]  Job ID #[id number]    CC:

## 2019-08-14 NOTE — DISCHARGE INSTRUCTIONS
WOUND CARE    After Surgery    DOS:   May shower in 48 hours   May remove outer dressing in 48 hours. Leave steri-strips in place. If steri-strips get wet, pat dry. If they fall off, do not worry.   Minimal activity for 48 hours.   Advance diet as tolerated.   Resume home medications as prescribed    DONT:   Do not soak in the tub   No driving for 24 hours or while taking narcotic pain medication   DO NOT TAKE ADDITIONAL TYLENOL/ACETAMINOPHEN WHILE TAKING NARCOTIC PAIN MEDICATION THAT CONTAINS TYLENOL/ACETAMINOPHEN.    CALL PHYSICIAN FOR:   Redness, swelling or bleeding.   Fever greater then 101.   Drainage from the incision site that appears infected.   Persistent pain not relieved by pain medication.    RETURN APPOINTMENT: Call to schedule appointment in 10-14 days    FOR EMERGENCIES: Contact your doctor at [phone]    Discharge Instructions: After Your Surgery  Youve just had surgery. During surgery, you were given medicine called anesthesia to keep you relaxed and free of pain. After surgery, you may have some pain or nausea. This is common. Here are some tips for feeling better and getting well after surgery.     Stay on schedule with your medicine.   Going home  Your healthcare provider will show you how to take care of yourself when you go home. He or she will also answer your questions. Have an adult family member or friend drive you home. For the first 24 hours after your surgery:  · Do not drive or use heavy equipment.  · Do not make important decisions or sign legal papers.  · Do not drink alcohol.  · Have someone stay with you, if needed. He or she can watch for problems and help keep you safe.  Be sure to go to all follow-up visits with your healthcare provider. And rest after your surgery for as long as your healthcare provider tells you to.  Coping with pain  If you have pain after surgery, pain medicine will help you feel better. Take it as told, before pain becomes severe.  Also, ask your healthcare provider or pharmacist about other ways to control pain. This might be with heat, ice, or relaxation. And follow any other instructions your surgeon or nurse gives you.  Tips for taking pain medicine  To get the best relief possible, remember these points:  · Pain medicines can upset your stomach. Taking them with a little food may help.  · Most pain relievers taken by mouth need at least 20 to 30 minutes to start to work.  · Taking medicine on a schedule can help you remember to take it. Try to time your medicine so that you can take it before starting an activity. This might be before you get dressed, go for a walk, or sit down for dinner.  · Constipation is a common side effect of pain medicines. Call your healthcare provider before taking any medicines such as laxatives or stool softeners to help ease constipation. Also ask if you should skip any foods. Drinking lots of fluids and eating foods such as fruits and vegetables that are high in fiber can also help. Remember, do not take laxatives unless your surgeon has prescribed them.  · Drinking alcohol and taking pain medicine can cause dizziness and slow your breathing. It can even be deadly. Do not drink alcohol while taking pain medicine.  · Pain medicine can make you react more slowly to things. Do not drive or run machinery while taking pain medicine.  Your healthcare provider may tell you to take acetaminophen to help ease your pain. Ask him or her how much you are supposed to take each day. Acetaminophen or other pain relievers may interact with your prescription medicines or other over-the-counter (OTC) medicines. Some prescription medicines have acetaminophen and other ingredients. Using both prescription and OTC acetaminophen for pain can cause you to overdose. Read the labels on your OTC medicines with care. This will help you to clearly know the list of ingredients, how much to take, and any warnings. It may also help you not  take too much acetaminophen. If you have questions or do not understand the information, ask your pharmacist or healthcare provider to explain it to you before you take the OTC medicine.  Managing nausea  Some people have an upset stomach after surgery. This is often because of anesthesia, pain, or pain medicine, or the stress of surgery. These tips will help you handle nausea and eat healthy foods as you get better. If you were on a special food plan before surgery, ask your healthcare provider if you should follow it while you get better. These tips may help:  · Do not push yourself to eat. Your body will tell you when to eat and how much.  · Start off with clear liquids and soup. They are easier to digest.  · Next try semi-solid foods, such as mashed potatoes, applesauce, and gelatin, as you feel ready.  · Slowly move to solid foods. Dont eat fatty, rich, or spicy foods at first.  · Do not force yourself to have 3 large meals a day. Instead eat smaller amounts more often.  · Take pain medicines with a small amount of solid food, such as crackers or toast, to avoid nausea.     Call your surgeon if  · You still have pain an hour after taking medicine. The medicine may not be strong enough.  · You feel too sleepy, dizzy, or groggy. The medicine may be too strong.  · You have side effects like nausea, vomiting, or skin changes, such as rash, itching, or hives.       If you have obstructive sleep apnea  You were given anesthesia medicine during surgery to keep you comfortable and free of pain. After surgery, you may have more apnea spells because of this medicine and other medicines you were given. The spells may last longer than usual.   At home:  · Keep using the continuous positive airway pressure (CPAP) device when you sleep. Unless your healthcare provider tells you not to, use it when you sleep, day or night. CPAP is a common device used to treat obstructive sleep apnea.  · Talk with your provider before  taking any pain medicine, muscle relaxants, or sedatives. Your provider will tell you about the possible dangers of taking these medicines.  Date Last Reviewed: 12/1/2016  © 2107-2007 [employer]. [address] [place] [address], [place] [address]. All rights reserved. This information is not intended as a substitute for professional medical care. Always follow your healthcare professional's instructions.    .wound

## 2019-08-14 NOTE — BRIEF OP NOTE
[facility]  Brief Operative Note     SUMMARY     Surgery Date: 8/14/2019     Surgeon(s) and Role:     * Miquel Sargent MD - Primary    Assisting Surgeon: None    Pre-op Diagnosis:  Venous insufficiency [I87.2]    Post-op Diagnosis:  Post-Op Diagnosis Codes:     * Venous insufficiency [I87.2]    Procedure(s) (LRB):  IXMSGGYED-REOX-O-CATH (Left)    Anesthesia: General/MAC    Description of the findings of the procedure: Normal venous anatomy.  Two sticks required to obtain central venous access.      Findings/Key Components: see above.      Estimated Blood Loss: 10 cc's       Specimens:   Specimen (12h ago, onward)    None          Discharge Note    SUMMARY     Admit Date: 8/14/2019    Discharge Date and Time:  08/14/2019 7:47 AM    Hospital Course (synopsis of major diagnoses, care, treatment, and services provided during the course of the hospital stay): Pt presented for port placement.  Procedure and post op course were uneventful and pt was discharged to home.        Final Diagnosis: Post-Op Diagnosis Codes:     * Venous insufficiency [I87.2]    Disposition: Home or Self Care    Follow Up/Patient Instructions:     Medications:  Reconciled Home Medications:      Medication List      START taking these medications    oxyCODONE-acetaminophen 5-325 mg per tablet  Commonly known as:  PERCOCET  Take 1 tablet by mouth every 4 (four) hours as needed for Pain.        ASK your doctor about these medications    ARIPiprazole 5 MG Tab  Commonly known as:  ABILIFY  TAKE ONE TABLET BY MOUTH ONCE DAILY     ibuprofen 200 MG tablet  Commonly known as:  ADVIL,MOTRIN  Take 400 mg by mouth daily as needed for Pain. For hip pain     lamoTRIgine 150 MG Tab  Commonly known as:  LAMICTAL  Take 150 mg by mouth once daily.     pantoprazole 40 MG tablet  Commonly known as:  PROTONIX  Take 1 tablet (40 mg total) by mouth 2 (two) times daily.     propranolol 20 MG tablet  Commonly known as:  INDERAL  Take 20 mg by mouth  once daily.          Discharge Procedure Orders   Diet general     Call MD for:  extreme fatigue     Call MD for:  persistent dizziness or light-headedness     Call MD for:  hives     Call MD for:  redness, tenderness, or signs of infection (pain, swelling, redness, odor or green/yellow discharge around incision site)     Call MD for:  difficulty breathing, headache or visual disturbances     Call MD for:  severe uncontrolled pain     Call MD for:  persistent nausea and vomiting     Call MD for:  temperature >100.4     Remove dressing in 48 hours     Activity as tolerated     Follow-up Information     Miquel Sargent MD In 2 weeks.    Specialties:  General Surgery, Colon and Rectal Surgery, Surgery  Contact information:  [address]  [place] [address]  [phone]

## 2019-08-15 VITALS
HEART RATE: 74 BPM | RESPIRATION RATE: 18 BRPM | HEIGHT: 64 IN | OXYGEN SATURATION: 100 % | WEIGHT: 113.13 LBS | TEMPERATURE: 97 F | DIASTOLIC BLOOD PRESSURE: 74 MMHG | SYSTOLIC BLOOD PRESSURE: 120 MMHG | BODY MASS INDEX: 19.31 KG/M2

## 2019-08-15 DIAGNOSIS — D50.0 IRON DEFICIENCY ANEMIA DUE TO CHRONIC BLOOD LOSS: Primary | ICD-10-CM

## 2019-08-15 RX ORDER — LIDOCAINE AND PRILOCAINE 25; 25 MG/G; MG/G
CREAM TOPICAL
Qty: 30 G | Refills: 2 | Status: SHIPPED | OUTPATIENT
Start: 2019-08-15 | End: 2021-09-10

## 2019-08-15 NOTE — TELEPHONE ENCOUNTER
----- Message from Margo Isabel sent at 8/15/2019  4:00 PM CDT -----  Contact: patient  Type: Needs Medical Advice    Who Called:  patient  Symptoms (please be specific):    How long has patient had these symptoms:    Pharmacy name and phone #:  [facility] [address]  Best Call Back Number: [phone]  Additional Information: requesting a call back regarding Rx for lidocaine

## 2019-08-15 NOTE — TELEPHONE ENCOUNTER
Returned call to patient, informed that prescription has been sent to Dr. Zhu for signature to send to pharmacy

## 2019-08-19 DIAGNOSIS — D50.0 IRON DEFICIENCY ANEMIA DUE TO CHRONIC BLOOD LOSS: Primary | ICD-10-CM

## 2019-08-21 ENCOUNTER — INFUSION (OUTPATIENT)
Dept: INFUSION THERAPY | Facility: HOSPITAL | Age: 44
End: 2019-08-21
Attending: INTERNAL MEDICINE
Payer: COMMERCIAL

## 2019-08-21 VITALS
DIASTOLIC BLOOD PRESSURE: 57 MMHG | WEIGHT: 115.31 LBS | SYSTOLIC BLOOD PRESSURE: 98 MMHG | RESPIRATION RATE: 16 BRPM | BODY MASS INDEX: 19.68 KG/M2 | HEART RATE: 69 BPM | OXYGEN SATURATION: 100 % | HEIGHT: 64 IN | TEMPERATURE: 98 F

## 2019-08-21 DIAGNOSIS — D50.8 IRON DEFICIENCY ANEMIA SECONDARY TO INADEQUATE DIETARY IRON INTAKE: Primary | ICD-10-CM

## 2019-08-21 PROCEDURE — A4216 STERILE WATER/SALINE, 10 ML: HCPCS | Mod: PN | Performed by: INTERNAL MEDICINE

## 2019-08-21 PROCEDURE — 96365 THER/PROPH/DIAG IV INF INIT: CPT | Mod: PN

## 2019-08-21 PROCEDURE — 25000003 PHARM REV CODE 250: Mod: PN | Performed by: INTERNAL MEDICINE

## 2019-08-21 PROCEDURE — 63600175 PHARM REV CODE 636 W HCPCS: Mod: PN | Performed by: INTERNAL MEDICINE

## 2019-08-21 RX ORDER — HEPARIN 100 UNIT/ML
5 SYRINGE INTRAVENOUS
Status: DISCONTINUED | OUTPATIENT
Start: 2019-08-21 | End: 2019-08-21 | Stop reason: HOSPADM

## 2019-08-21 RX ORDER — SODIUM CHLORIDE 0.9 % (FLUSH) 0.9 %
10 SYRINGE (ML) INJECTION
Status: CANCELLED | OUTPATIENT
Start: 2019-08-28

## 2019-08-21 RX ORDER — HEPARIN 100 UNIT/ML
5 SYRINGE INTRAVENOUS
Status: CANCELLED | OUTPATIENT
Start: 2019-08-28

## 2019-08-21 RX ORDER — SODIUM CHLORIDE 9 MG/ML
INJECTION, SOLUTION INTRAVENOUS CONTINUOUS
Status: DISCONTINUED | OUTPATIENT
Start: 2019-08-21 | End: 2019-08-21 | Stop reason: HOSPADM

## 2019-08-21 RX ORDER — SODIUM CHLORIDE 0.9 % (FLUSH) 0.9 %
10 SYRINGE (ML) INJECTION
Status: DISCONTINUED | OUTPATIENT
Start: 2019-08-21 | End: 2019-08-21 | Stop reason: HOSPADM

## 2019-08-21 RX ORDER — SODIUM CHLORIDE 9 MG/ML
INJECTION, SOLUTION INTRAVENOUS CONTINUOUS
Status: CANCELLED | OUTPATIENT
Start: 2019-08-28

## 2019-08-21 RX ADMIN — HEPARIN 500 UNITS: 100 SYRINGE at 10:08

## 2019-08-21 RX ADMIN — SODIUM CHLORIDE: 900 INJECTION, SOLUTION INTRAVENOUS at 09:08

## 2019-08-21 RX ADMIN — Medication 10 ML: at 10:08

## 2019-08-21 RX ADMIN — FERRIC CARBOXYMALTOSE INJECTION 750 MG: 50 INJECTION, SOLUTION INTRAVENOUS at 09:08

## 2019-08-21 NOTE — PLAN OF CARE
Problem: Adult Inpatient Plan of Care  Goal: Plan of Care Review  Outcome: Ongoing (interventions implemented as appropriate)  Pt tolerated her infusion well, pt was observed for 30 minutes post her infusion, NAD, no new c/o voiced, pt given a schedule, pt ambulated out of the clinic without difficulty accompanied by her .

## 2019-08-21 NOTE — PLAN OF CARE
Problem: Adult Inpatient Plan of Care  Goal: Plan of Care Review  Outcome: Ongoing (interventions implemented as appropriate)  Pt tolerated her infusion well, NAD, no new c/o voiced, pt given a schedule, pt ambulated out of the clinic without difficulty accompanied by her .

## 2019-08-28 ENCOUNTER — INFUSION (OUTPATIENT)
Dept: INFUSION THERAPY | Facility: HOSPITAL | Age: 44
End: 2019-08-28
Attending: INTERNAL MEDICINE
Payer: COMMERCIAL

## 2019-08-28 VITALS
OXYGEN SATURATION: 98 % | HEIGHT: 64 IN | SYSTOLIC BLOOD PRESSURE: 112 MMHG | TEMPERATURE: 98 F | DIASTOLIC BLOOD PRESSURE: 77 MMHG | HEART RATE: 87 BPM | BODY MASS INDEX: 19.15 KG/M2 | RESPIRATION RATE: 16 BRPM | WEIGHT: 112.19 LBS

## 2019-08-28 DIAGNOSIS — D50.8 IRON DEFICIENCY ANEMIA SECONDARY TO INADEQUATE DIETARY IRON INTAKE: Primary | ICD-10-CM

## 2019-08-28 PROCEDURE — 96365 THER/PROPH/DIAG IV INF INIT: CPT | Mod: PN

## 2019-08-28 PROCEDURE — A4216 STERILE WATER/SALINE, 10 ML: HCPCS | Mod: PN | Performed by: INTERNAL MEDICINE

## 2019-08-28 PROCEDURE — 63600175 PHARM REV CODE 636 W HCPCS: Mod: PN | Performed by: INTERNAL MEDICINE

## 2019-08-28 PROCEDURE — 25000003 PHARM REV CODE 250: Mod: PN | Performed by: INTERNAL MEDICINE

## 2019-08-28 RX ORDER — SODIUM CHLORIDE 9 MG/ML
INJECTION, SOLUTION INTRAVENOUS CONTINUOUS
Status: CANCELLED | OUTPATIENT
Start: 2019-09-04

## 2019-08-28 RX ORDER — HEPARIN 100 UNIT/ML
5 SYRINGE INTRAVENOUS
Status: CANCELLED | OUTPATIENT
Start: 2019-09-04

## 2019-08-28 RX ORDER — SODIUM CHLORIDE 9 MG/ML
INJECTION, SOLUTION INTRAVENOUS
Status: COMPLETED | OUTPATIENT
Start: 2019-08-28 | End: 2019-08-28

## 2019-08-28 RX ORDER — SODIUM CHLORIDE 0.9 % (FLUSH) 0.9 %
10 SYRINGE (ML) INJECTION
Status: DISCONTINUED | OUTPATIENT
Start: 2019-08-28 | End: 2019-08-28 | Stop reason: HOSPADM

## 2019-08-28 RX ORDER — SODIUM CHLORIDE 0.9 % (FLUSH) 0.9 %
10 SYRINGE (ML) INJECTION
Status: CANCELLED | OUTPATIENT
Start: 2019-09-04

## 2019-08-28 RX ORDER — HEPARIN 100 UNIT/ML
5 SYRINGE INTRAVENOUS
Status: DISCONTINUED | OUTPATIENT
Start: 2019-08-28 | End: 2019-08-28 | Stop reason: HOSPADM

## 2019-08-28 RX ADMIN — HEPARIN 500 UNITS: 100 SYRINGE at 10:08

## 2019-08-28 RX ADMIN — FERRIC CARBOXYMALTOSE INJECTION 750 MG: 50 INJECTION, SOLUTION INTRAVENOUS at 09:08

## 2019-08-28 RX ADMIN — Medication 10 ML: at 10:08

## 2019-08-28 RX ADMIN — SODIUM CHLORIDE: 900 INJECTION, SOLUTION INTRAVENOUS at 09:08

## 2019-08-28 NOTE — PLAN OF CARE
Problem: Adult Inpatient Plan of Care  Goal: Plan of Care Review  Outcome: Ongoing (interventions implemented as appropriate)  Pt tolerated her infusion well, NAD, no new c/o voiced, pt was observed for 30 minutes post infusion, pt given a schedule, pt ambulated out of the clinic without difficulty.

## 2019-09-19 RX ORDER — SODIUM CHLORIDE 0.9 % (FLUSH) 0.9 %
10 SYRINGE (ML) INJECTION
Status: CANCELLED | OUTPATIENT
Start: 2019-09-30

## 2019-09-19 RX ORDER — HEPARIN 100 UNIT/ML
500 SYRINGE INTRAVENOUS
Status: CANCELLED | OUTPATIENT
Start: 2019-09-30

## 2019-09-23 ENCOUNTER — INFUSION (OUTPATIENT)
Dept: INFUSION THERAPY | Facility: HOSPITAL | Age: 44
End: 2019-09-23
Attending: INTERNAL MEDICINE
Payer: COMMERCIAL

## 2019-09-23 DIAGNOSIS — E87.6 HYPOKALEMIA DUE TO LOSS OF POTASSIUM: ICD-10-CM

## 2019-09-23 DIAGNOSIS — D50.0 IRON DEFICIENCY ANEMIA DUE TO CHRONIC BLOOD LOSS: Primary | ICD-10-CM

## 2019-09-23 DIAGNOSIS — D50.8 IRON DEFICIENCY ANEMIA SECONDARY TO INADEQUATE DIETARY IRON INTAKE: ICD-10-CM

## 2019-09-23 DIAGNOSIS — I87.2 VENOUS INSUFFICIENCY: ICD-10-CM

## 2019-09-23 LAB
FERRITIN SERPL-MCNC: 190 NG/ML (ref 7–137)
IRON SATN MFR SERPL: 56 % (ref 20–50)
IRON SERPL-MCNC: 114 UG/DL (ref 30–160)
TOTAL IRON BINDING CAPACITY: 202 UG/DL (ref 265–497)

## 2019-09-23 PROCEDURE — 83540 ASSAY OF IRON: CPT

## 2019-09-23 PROCEDURE — 25000003 PHARM REV CODE 250: Mod: PN | Performed by: INTERNAL MEDICINE

## 2019-09-23 PROCEDURE — 82728 ASSAY OF FERRITIN: CPT

## 2019-09-23 PROCEDURE — A4216 STERILE WATER/SALINE, 10 ML: HCPCS | Mod: PN | Performed by: INTERNAL MEDICINE

## 2019-09-23 PROCEDURE — 84238 ASSAY NONENDOCRINE RECEPTOR: CPT

## 2019-09-23 PROCEDURE — 63600175 PHARM REV CODE 636 W HCPCS: Mod: PN | Performed by: INTERNAL MEDICINE

## 2019-09-23 PROCEDURE — 36591 DRAW BLOOD OFF VENOUS DEVICE: CPT | Mod: PN

## 2019-09-23 PROCEDURE — 83540 ASSAY OF IRON: CPT | Mod: PN

## 2019-09-23 PROCEDURE — 82728 ASSAY OF FERRITIN: CPT | Mod: PN

## 2019-09-23 RX ORDER — HEPARIN 100 UNIT/ML
500 SYRINGE INTRAVENOUS
Status: COMPLETED | OUTPATIENT
Start: 2019-09-23 | End: 2019-09-23

## 2019-09-23 RX ORDER — SODIUM CHLORIDE 0.9 % (FLUSH) 0.9 %
10 SYRINGE (ML) INJECTION
Status: CANCELLED | OUTPATIENT
Start: 2019-09-23

## 2019-09-23 RX ORDER — SODIUM CHLORIDE 0.9 % (FLUSH) 0.9 %
10 SYRINGE (ML) INJECTION
Status: COMPLETED | OUTPATIENT
Start: 2019-09-23 | End: 2019-09-23

## 2019-09-23 RX ORDER — HEPARIN 100 UNIT/ML
500 SYRINGE INTRAVENOUS
Status: CANCELLED | OUTPATIENT
Start: 2019-09-23

## 2019-09-23 RX ADMIN — HEPARIN SODIUM (PORCINE) LOCK FLUSH IV SOLN 100 UNIT/ML 500 UNITS: 100 SOLUTION at 10:09

## 2019-09-23 RX ADMIN — Medication 10 ML: at 10:09

## 2019-09-25 LAB — STFR SERPL-MCNC: 2.9 MG/L (ref 1.8–4.6)

## 2019-09-26 ENCOUNTER — OFFICE VISIT (OUTPATIENT)
Dept: HEMATOLOGY/ONCOLOGY | Facility: CLINIC | Age: 44
End: 2019-09-26
Payer: COMMERCIAL

## 2019-09-26 VITALS
RESPIRATION RATE: 16 BRPM | HEIGHT: 64 IN | HEART RATE: 109 BPM | WEIGHT: 110 LBS | TEMPERATURE: 98 F | OXYGEN SATURATION: 100 % | SYSTOLIC BLOOD PRESSURE: 116 MMHG | DIASTOLIC BLOOD PRESSURE: 77 MMHG | BODY MASS INDEX: 18.78 KG/M2

## 2019-09-26 DIAGNOSIS — D50.0 IRON DEFICIENCY ANEMIA DUE TO CHRONIC BLOOD LOSS: Primary | ICD-10-CM

## 2019-09-26 DIAGNOSIS — D50.8 IRON DEFICIENCY ANEMIA SECONDARY TO INADEQUATE DIETARY IRON INTAKE: ICD-10-CM

## 2019-09-26 DIAGNOSIS — E53.8 B12 DEFICIENCY: ICD-10-CM

## 2019-09-26 DIAGNOSIS — F31.9 BIPOLAR I DISORDER: ICD-10-CM

## 2019-09-26 DIAGNOSIS — E53.8 VITAMIN B 12 DEFICIENCY: ICD-10-CM

## 2019-09-26 PROCEDURE — 3008F BODY MASS INDEX DOCD: CPT | Mod: CPTII,S$GLB,, | Performed by: INTERNAL MEDICINE

## 2019-09-26 PROCEDURE — 3008F PR BODY MASS INDEX (BMI) DOCUMENTED: ICD-10-PCS | Mod: CPTII,S$GLB,, | Performed by: INTERNAL MEDICINE

## 2019-09-26 PROCEDURE — 99999 PR PBB SHADOW E&M-EST. PATIENT-LVL III: ICD-10-PCS | Mod: PBBFAC,,, | Performed by: INTERNAL MEDICINE

## 2019-09-26 PROCEDURE — 99999 PR PBB SHADOW E&M-EST. PATIENT-LVL III: CPT | Mod: PBBFAC,,, | Performed by: INTERNAL MEDICINE

## 2019-09-26 PROCEDURE — 99214 PR OFFICE/OUTPT VISIT, EST, LEVL IV, 30-39 MIN: ICD-10-PCS | Mod: S$GLB,,, | Performed by: INTERNAL MEDICINE

## 2019-09-26 PROCEDURE — 99214 OFFICE O/P EST MOD 30 MIN: CPT | Mod: S$GLB,,, | Performed by: INTERNAL MEDICINE

## 2019-09-27 ENCOUNTER — LAB VISIT (OUTPATIENT)
Dept: LAB | Facility: HOSPITAL | Age: 44
End: 2019-09-27
Attending: INTERNAL MEDICINE
Payer: COMMERCIAL

## 2019-09-27 ENCOUNTER — TELEPHONE (OUTPATIENT)
Dept: HEMATOLOGY/ONCOLOGY | Facility: CLINIC | Age: 44
End: 2019-09-27

## 2019-09-27 DIAGNOSIS — D50.0 IRON DEFICIENCY ANEMIA DUE TO CHRONIC BLOOD LOSS: ICD-10-CM

## 2019-09-27 DIAGNOSIS — E53.8 B12 DEFICIENCY: ICD-10-CM

## 2019-09-27 LAB
ALBUMIN SERPL BCP-MCNC: 3.4 G/DL (ref 3.5–5.2)
ALP SERPL-CCNC: 75 U/L (ref 38–145)
ALT SERPL W/O P-5'-P-CCNC: 12 U/L (ref 10–44)
ANION GAP SERPL CALC-SCNC: 10 MMOL/L (ref 8–16)
AST SERPL-CCNC: 18 U/L (ref 14–36)
BASOPHILS # BLD AUTO: 0.06 K/UL (ref 0–0.2)
BASOPHILS NFR BLD: 0.9 % (ref 0–1.9)
BILIRUB SERPL-MCNC: 0.3 MG/DL (ref 0.2–1.3)
BUN SERPL-MCNC: 21 MG/DL (ref 7–18)
CALCIUM SERPL-MCNC: 8.7 MG/DL (ref 8.4–10.2)
CHLORIDE SERPL-SCNC: 116 MMOL/L (ref 95–110)
CO2 SERPL-SCNC: 15 MMOL/L (ref 22–31)
CREAT SERPL-MCNC: 1.26 MG/DL (ref 0.5–1.4)
DIFFERENTIAL METHOD: ABNORMAL
EOSINOPHIL # BLD AUTO: 0.3 K/UL (ref 0–0.5)
EOSINOPHIL NFR BLD: 4.7 % (ref 0–8)
ERYTHROCYTE [DISTWIDTH] IN BLOOD BY AUTOMATED COUNT: 18.1 % (ref 11.5–14.5)
EST. GFR  (AFRICAN AMERICAN): 59 ML/MIN/1.73 M^2
EST. GFR  (NON AFRICAN AMERICAN): 51 ML/MIN/1.73 M^2
GLUCOSE SERPL-MCNC: 86 MG/DL (ref 70–110)
HCT VFR BLD AUTO: 31.9 % (ref 37–48.5)
HGB BLD-MCNC: 9.9 G/DL (ref 12–16)
IMM GRANULOCYTES # BLD AUTO: 0.03 K/UL (ref 0–0.04)
IMM GRANULOCYTES NFR BLD AUTO: 0.5 % (ref 0–0.5)
LYMPHOCYTES # BLD AUTO: 1.6 K/UL (ref 1–4.8)
LYMPHOCYTES NFR BLD: 23.7 % (ref 18–48)
MCH RBC QN AUTO: 30.7 PG (ref 27–31)
MCHC RBC AUTO-ENTMCNC: 31 G/DL (ref 32–36)
MCV RBC AUTO: 99 FL (ref 82–98)
MONOCYTES # BLD AUTO: 0.4 K/UL (ref 0.3–1)
MONOCYTES NFR BLD: 5.8 % (ref 4–15)
NEUTROPHILS # BLD AUTO: 4.2 K/UL (ref 1.8–7.7)
NEUTROPHILS NFR BLD: 64.4 % (ref 38–73)
NRBC BLD-RTO: 0 /100 WBC
PLATELET # BLD AUTO: 312 K/UL (ref 150–350)
PMV BLD AUTO: 9.8 FL (ref 9.2–12.9)
POTASSIUM SERPL-SCNC: 2.9 MMOL/L (ref 3.5–5.1)
PROT SERPL-MCNC: 6.2 G/DL (ref 6–8.4)
RBC # BLD AUTO: 3.23 M/UL (ref 4–5.4)
SODIUM SERPL-SCNC: 141 MMOL/L (ref 136–145)
VIT B12 SERPL-MCNC: 334 PG/ML (ref 210–950)
WBC # BLD AUTO: 6.58 K/UL (ref 3.9–12.7)

## 2019-09-27 PROCEDURE — 82607 VITAMIN B-12: CPT | Mod: PN

## 2019-09-27 PROCEDURE — 80053 COMPREHEN METABOLIC PANEL: CPT

## 2019-09-27 PROCEDURE — 82607 VITAMIN B-12: CPT

## 2019-09-27 PROCEDURE — 80053 COMPREHEN METABOLIC PANEL: CPT | Mod: PN

## 2019-09-27 PROCEDURE — 85025 COMPLETE CBC W/AUTO DIFF WBC: CPT | Mod: PN

## 2019-09-27 PROCEDURE — 36415 COLL VENOUS BLD VENIPUNCTURE: CPT | Mod: PN

## 2019-09-27 PROCEDURE — 85025 COMPLETE CBC W/AUTO DIFF WBC: CPT

## 2019-09-27 NOTE — TELEPHONE ENCOUNTER
----- Message from Mirtha Zhu MD sent at 9/27/2019 10:05 AM CDT -----  Her K is down again   last time we sent her to ED she was upset because they didn't give anything but oral   she needs to discuss this with her pcp, its still very low, if she wants we can give 20 meq IV in infusion and she starts po or increases her dose if she is already on some. Call find out let me know

## 2019-09-27 NOTE — TELEPHONE ENCOUNTER
Spoke with Pt. Pt notified that her K+ was <3. Pt advised to go to the ER per Dr Zhu. Pt did not wasn't to go but would prefer oral K+. Dr Zhu was notified. Prescription to be called in and pt notified she will need PCP asap to recheck her K+ levels due to pt has had previous drops in her K+ levels. Pt will go to the ER as advised if she starts feeling bad this weekend. Pt verbalized understanding.     Kdur 20meq called into pt's pharmacy. Spoke with Laverne at pharmacy: Kdur 20meq q 4hrs rest of today and starting tomorrow K+ 20 meq po bid disp/30 tabs.

## 2019-09-30 ENCOUNTER — TELEPHONE (OUTPATIENT)
Dept: HEMATOLOGY/ONCOLOGY | Facility: CLINIC | Age: 44
End: 2019-09-30

## 2019-09-30 NOTE — TELEPHONE ENCOUNTER
Spoke with pt. Pt stated she is starting to feel worse. Pt was notified on Friday evening about her low K+ and advised her to go to the ER and/or pt to see her PCP. Pt stated she no longer had one but would get a new PCP. Pt was also notified about taking oral K+. Pt stated she would go to the ER over the weekend if need be but was hesitant due to the last time. Pt stated she was kept in the hospital for 5 days.  Pt advised to go to the [facility] ER due pt stating she is sob and has tingling in her fingers. Explained to pt that K+ affects the heart.  Pt stated she will get her  to take her. Dr Zhu to be notified.

## 2019-09-30 NOTE — TELEPHONE ENCOUNTER
----- Message from Ofelia Tripp sent at 9/30/2019  3:00 PM CDT -----  Contact: patient  Type: Needs Medical Advice    Who Called:  patient  Best Call Back Number: [phone]  Additional Information: states that she needs to schedule a potassium infusion because she is feeling horrible. Please give call back

## 2019-11-04 ENCOUNTER — INFUSION (OUTPATIENT)
Dept: INFUSION THERAPY | Facility: HOSPITAL | Age: 44
End: 2019-11-04
Attending: INTERNAL MEDICINE
Payer: COMMERCIAL

## 2019-11-04 DIAGNOSIS — E87.6 HYPOKALEMIA DUE TO LOSS OF POTASSIUM: ICD-10-CM

## 2019-11-04 DIAGNOSIS — D50.8 IRON DEFICIENCY ANEMIA SECONDARY TO INADEQUATE DIETARY IRON INTAKE: ICD-10-CM

## 2019-11-04 DIAGNOSIS — I87.2 VENOUS INSUFFICIENCY: Primary | ICD-10-CM

## 2019-11-04 PROCEDURE — A4216 STERILE WATER/SALINE, 10 ML: HCPCS | Mod: PN | Performed by: INTERNAL MEDICINE

## 2019-11-04 PROCEDURE — 63600175 PHARM REV CODE 636 W HCPCS: Mod: PN | Performed by: INTERNAL MEDICINE

## 2019-11-04 PROCEDURE — 25000003 PHARM REV CODE 250: Mod: PN | Performed by: INTERNAL MEDICINE

## 2019-11-04 PROCEDURE — 96523 IRRIG DRUG DELIVERY DEVICE: CPT | Mod: PN

## 2019-11-04 RX ORDER — SODIUM CHLORIDE 0.9 % (FLUSH) 0.9 %
10 SYRINGE (ML) INJECTION
Status: CANCELLED | OUTPATIENT
Start: 2019-11-04

## 2019-11-04 RX ORDER — HEPARIN 100 UNIT/ML
500 SYRINGE INTRAVENOUS
Status: CANCELLED | OUTPATIENT
Start: 2019-11-04

## 2019-11-04 RX ORDER — HEPARIN 100 UNIT/ML
500 SYRINGE INTRAVENOUS
Status: COMPLETED | OUTPATIENT
Start: 2019-11-04 | End: 2019-11-04

## 2019-11-04 RX ORDER — SODIUM CHLORIDE 0.9 % (FLUSH) 0.9 %
10 SYRINGE (ML) INJECTION
Status: COMPLETED | OUTPATIENT
Start: 2019-11-04 | End: 2019-11-04

## 2019-11-04 RX ADMIN — Medication 10 ML: at 10:11

## 2019-11-04 RX ADMIN — HEPARIN 500 UNITS: 100 SYRINGE at 10:11

## 2019-12-16 ENCOUNTER — INFUSION (OUTPATIENT)
Dept: INFUSION THERAPY | Facility: HOSPITAL | Age: 44
End: 2019-12-16
Attending: INTERNAL MEDICINE
Payer: COMMERCIAL

## 2019-12-16 DIAGNOSIS — E87.6 HYPOKALEMIA DUE TO LOSS OF POTASSIUM: ICD-10-CM

## 2019-12-16 DIAGNOSIS — D50.8 IRON DEFICIENCY ANEMIA SECONDARY TO INADEQUATE DIETARY IRON INTAKE: ICD-10-CM

## 2019-12-16 DIAGNOSIS — I87.2 VENOUS INSUFFICIENCY: Primary | ICD-10-CM

## 2019-12-16 PROCEDURE — 96523 IRRIG DRUG DELIVERY DEVICE: CPT | Mod: PN

## 2019-12-16 PROCEDURE — 63600175 PHARM REV CODE 636 W HCPCS: Mod: PN | Performed by: INTERNAL MEDICINE

## 2019-12-16 PROCEDURE — A4216 STERILE WATER/SALINE, 10 ML: HCPCS | Mod: PN | Performed by: INTERNAL MEDICINE

## 2019-12-16 PROCEDURE — 25000003 PHARM REV CODE 250: Mod: PN | Performed by: INTERNAL MEDICINE

## 2019-12-16 RX ORDER — HEPARIN 100 UNIT/ML
500 SYRINGE INTRAVENOUS
Status: CANCELLED | OUTPATIENT
Start: 2019-12-16

## 2019-12-16 RX ORDER — HEPARIN 100 UNIT/ML
500 SYRINGE INTRAVENOUS
Status: COMPLETED | OUTPATIENT
Start: 2019-12-16 | End: 2019-12-16

## 2019-12-16 RX ORDER — SODIUM CHLORIDE 0.9 % (FLUSH) 0.9 %
10 SYRINGE (ML) INJECTION
Status: CANCELLED | OUTPATIENT
Start: 2019-12-16

## 2019-12-16 RX ORDER — SODIUM CHLORIDE 0.9 % (FLUSH) 0.9 %
10 SYRINGE (ML) INJECTION
Status: COMPLETED | OUTPATIENT
Start: 2019-12-16 | End: 2019-12-16

## 2019-12-16 RX ADMIN — Medication 10 ML: at 11:12

## 2019-12-16 RX ADMIN — HEPARIN 500 UNITS: 100 SYRINGE at 11:12

## 2019-12-26 ENCOUNTER — INFUSION (OUTPATIENT)
Dept: INFUSION THERAPY | Facility: HOSPITAL | Age: 44
End: 2019-12-26
Attending: INTERNAL MEDICINE
Payer: COMMERCIAL

## 2019-12-26 DIAGNOSIS — I87.2 VENOUS INSUFFICIENCY: ICD-10-CM

## 2019-12-26 DIAGNOSIS — E87.6 HYPOKALEMIA DUE TO LOSS OF POTASSIUM: ICD-10-CM

## 2019-12-26 DIAGNOSIS — K50.119 CROHN'S COLITIS, UNSPECIFIED COMPLICATION: Primary | ICD-10-CM

## 2019-12-26 DIAGNOSIS — D50.8 IRON DEFICIENCY ANEMIA SECONDARY TO INADEQUATE DIETARY IRON INTAKE: ICD-10-CM

## 2019-12-26 LAB
ALBUMIN SERPL BCP-MCNC: 3.5 G/DL (ref 3.5–5.2)
ALP SERPL-CCNC: 69 U/L (ref 38–145)
ALT SERPL W/O P-5'-P-CCNC: 13 U/L (ref 10–44)
ANION GAP SERPL CALC-SCNC: 13 MMOL/L (ref 8–16)
AST SERPL-CCNC: 22 U/L (ref 14–36)
BASOPHILS # BLD AUTO: 0.09 K/UL (ref 0–0.2)
BASOPHILS NFR BLD: 1.3 % (ref 0–1.9)
BILIRUB SERPL-MCNC: 0.3 MG/DL (ref 0.2–1.3)
BUN SERPL-MCNC: 21 MG/DL (ref 7–18)
CALCIUM SERPL-MCNC: 9.1 MG/DL (ref 8.4–10.2)
CHLORIDE SERPL-SCNC: 112 MMOL/L (ref 95–110)
CO2 SERPL-SCNC: 16 MMOL/L (ref 22–31)
CREAT SERPL-MCNC: 1.33 MG/DL (ref 0.5–1.4)
DIFFERENTIAL METHOD: ABNORMAL
EOSINOPHIL # BLD AUTO: 0.3 K/UL (ref 0–0.5)
EOSINOPHIL NFR BLD: 4.8 % (ref 0–8)
ERYTHROCYTE [DISTWIDTH] IN BLOOD BY AUTOMATED COUNT: 13.2 % (ref 11.5–14.5)
EST. GFR  (AFRICAN AMERICAN): 55 ML/MIN/1.73 M^2
EST. GFR  (NON AFRICAN AMERICAN): 48 ML/MIN/1.73 M^2
FERRITIN SERPL-MCNC: 23 NG/ML (ref 7–137)
FOLATE SERPL-MCNC: 19.3 NG/ML (ref 4–24)
GLUCOSE SERPL-MCNC: 97 MG/DL (ref 70–110)
HCT VFR BLD AUTO: 32.8 % (ref 37–48.5)
HGB BLD-MCNC: 10.3 G/DL (ref 12–16)
IMM GRANULOCYTES # BLD AUTO: 0.04 K/UL (ref 0–0.04)
IMM GRANULOCYTES NFR BLD AUTO: 0.6 % (ref 0–0.5)
IRON SATN MFR SERPL: 35 % (ref 20–50)
IRON SERPL-MCNC: 79 UG/DL (ref 30–160)
LYMPHOCYTES # BLD AUTO: 1.7 K/UL (ref 1–4.8)
LYMPHOCYTES NFR BLD: 24.7 % (ref 18–48)
MCH RBC QN AUTO: 31.8 PG (ref 27–31)
MCHC RBC AUTO-ENTMCNC: 31.4 G/DL (ref 32–36)
MCV RBC AUTO: 101 FL (ref 82–98)
MONOCYTES # BLD AUTO: 0.4 K/UL (ref 0.3–1)
MONOCYTES NFR BLD: 6.5 % (ref 4–15)
NEUTROPHILS # BLD AUTO: 4.2 K/UL (ref 1.8–7.7)
NEUTROPHILS NFR BLD: 62.1 % (ref 38–73)
NRBC BLD-RTO: 0 /100 WBC
PLATELET # BLD AUTO: 324 K/UL (ref 150–350)
PMV BLD AUTO: 10.5 FL (ref 9.2–12.9)
POTASSIUM SERPL-SCNC: 3 MMOL/L (ref 3.5–5.1)
PROT SERPL-MCNC: 6.2 G/DL (ref 6–8.4)
RBC # BLD AUTO: 3.24 M/UL (ref 4–5.4)
SODIUM SERPL-SCNC: 141 MMOL/L (ref 136–145)
TOTAL IRON BINDING CAPACITY: 228 UG/DL (ref 265–497)
VIT B12 SERPL-MCNC: 408 PG/ML (ref 210–950)
WBC # BLD AUTO: 6.72 K/UL (ref 3.9–12.7)

## 2019-12-26 PROCEDURE — 83540 ASSAY OF IRON: CPT | Mod: PN

## 2019-12-26 PROCEDURE — 85025 COMPLETE CBC W/AUTO DIFF WBC: CPT | Mod: PN

## 2019-12-26 PROCEDURE — 82607 VITAMIN B-12: CPT | Mod: PN

## 2019-12-26 PROCEDURE — A4216 STERILE WATER/SALINE, 10 ML: HCPCS | Mod: PN | Performed by: INTERNAL MEDICINE

## 2019-12-26 PROCEDURE — 82607 VITAMIN B-12: CPT

## 2019-12-26 PROCEDURE — 25000003 PHARM REV CODE 250: Mod: PN | Performed by: INTERNAL MEDICINE

## 2019-12-26 PROCEDURE — 36591 DRAW BLOOD OFF VENOUS DEVICE: CPT | Mod: PN

## 2019-12-26 PROCEDURE — 82746 ASSAY OF FOLIC ACID SERUM: CPT

## 2019-12-26 PROCEDURE — 63600175 PHARM REV CODE 636 W HCPCS: Mod: PN | Performed by: INTERNAL MEDICINE

## 2019-12-26 PROCEDURE — 82728 ASSAY OF FERRITIN: CPT

## 2019-12-26 PROCEDURE — 82746 ASSAY OF FOLIC ACID SERUM: CPT | Mod: PN

## 2019-12-26 PROCEDURE — 85025 COMPLETE CBC W/AUTO DIFF WBC: CPT

## 2019-12-26 PROCEDURE — 83540 ASSAY OF IRON: CPT

## 2019-12-26 PROCEDURE — 80053 COMPREHEN METABOLIC PANEL: CPT

## 2019-12-26 PROCEDURE — 82728 ASSAY OF FERRITIN: CPT | Mod: PN

## 2019-12-26 PROCEDURE — 80053 COMPREHEN METABOLIC PANEL: CPT | Mod: PN

## 2019-12-26 RX ORDER — HEPARIN 100 UNIT/ML
500 SYRINGE INTRAVENOUS
Status: COMPLETED | OUTPATIENT
Start: 2019-12-26 | End: 2019-12-26

## 2019-12-26 RX ORDER — HEPARIN 100 UNIT/ML
500 SYRINGE INTRAVENOUS
Status: CANCELLED | OUTPATIENT
Start: 2019-12-26

## 2019-12-26 RX ORDER — SODIUM CHLORIDE 0.9 % (FLUSH) 0.9 %
10 SYRINGE (ML) INJECTION
Status: CANCELLED | OUTPATIENT
Start: 2019-12-26

## 2019-12-26 RX ORDER — SODIUM CHLORIDE 0.9 % (FLUSH) 0.9 %
10 SYRINGE (ML) INJECTION
Status: COMPLETED | OUTPATIENT
Start: 2019-12-26 | End: 2019-12-26

## 2019-12-26 RX ADMIN — Medication 10 ML: at 05:12

## 2019-12-26 RX ADMIN — HEPARIN 500 UNITS: 100 SYRINGE at 05:12

## 2020-01-27 ENCOUNTER — TELEPHONE (OUTPATIENT)
Dept: INFUSION THERAPY | Facility: HOSPITAL | Age: 45
End: 2020-01-27

## 2020-03-14 PROBLEM — Z79.1 NSAID LONG-TERM USE: Status: ACTIVE | Noted: 2020-03-14

## 2020-03-16 PROBLEM — D64.9 ANEMIA: Status: ACTIVE | Noted: 2020-03-16

## 2020-03-20 ENCOUNTER — TELEPHONE (OUTPATIENT)
Dept: FAMILY MEDICINE | Facility: CLINIC | Age: 45
End: 2020-03-20

## 2020-03-20 DIAGNOSIS — D50.0 IRON DEFICIENCY ANEMIA DUE TO CHRONIC BLOOD LOSS: Primary | ICD-10-CM

## 2020-03-20 NOTE — TELEPHONE ENCOUNTER
I have reviewed the records from her most recent hospital stay. Please reach out to her to see how she's feeling. She does have a CBC ordered and I think she should get it done on Monday. I think we should also check her iron levels as they were low in the past. Orders placed.

## 2020-03-20 NOTE — TELEPHONE ENCOUNTER
Call placed to patient--patient states she is feeling fine    Advised of recommendations for blood work, patient will come in on Monday for labs

## 2020-03-30 ENCOUNTER — INFUSION (OUTPATIENT)
Dept: INFUSION THERAPY | Facility: HOSPITAL | Age: 45
End: 2020-03-30
Attending: INTERNAL MEDICINE
Payer: COMMERCIAL

## 2020-03-30 DIAGNOSIS — K50.919 CROHN'S DISEASE WITH COMPLICATION, UNSPECIFIED GASTROINTESTINAL TRACT LOCATION: ICD-10-CM

## 2020-03-30 DIAGNOSIS — I87.2 VENOUS INSUFFICIENCY: ICD-10-CM

## 2020-03-30 DIAGNOSIS — E87.6 HYPOKALEMIA DUE TO LOSS OF POTASSIUM: ICD-10-CM

## 2020-03-30 DIAGNOSIS — E87.6 HYPOPOTASSEMIA: ICD-10-CM

## 2020-03-30 DIAGNOSIS — D50.9 IRON DEFICIENCY ANEMIA, UNSPECIFIED IRON DEFICIENCY ANEMIA TYPE: ICD-10-CM

## 2020-03-30 DIAGNOSIS — Z00.01 ENCOUNTER FOR GENERAL ADULT MEDICAL EXAMINATION WITH ABNORMAL FINDINGS: ICD-10-CM

## 2020-03-30 DIAGNOSIS — D50.8 IRON DEFICIENCY ANEMIA SECONDARY TO INADEQUATE DIETARY IRON INTAKE: ICD-10-CM

## 2020-03-30 DIAGNOSIS — R94.6 NONSPECIFIC ABNORMAL RESULTS OF THYROID FUNCTION STUDY: Primary | ICD-10-CM

## 2020-03-30 LAB
25(OH)D3+25(OH)D2 SERPL-MCNC: 29 NG/ML (ref 30–96)
ALBUMIN SERPL BCP-MCNC: 4 G/DL (ref 3.5–5.2)
ALP SERPL-CCNC: 88 U/L (ref 38–145)
ALT SERPL W/O P-5'-P-CCNC: 15 U/L (ref 0–35)
ANION GAP SERPL CALC-SCNC: 11 MMOL/L (ref 8–16)
AST SERPL-CCNC: 20 U/L (ref 14–36)
BASOPHILS # BLD AUTO: 0.11 K/UL (ref 0–0.2)
BASOPHILS NFR BLD: 1.5 % (ref 0–1.9)
BILIRUB SERPL-MCNC: 0.2 MG/DL (ref 0.2–1.3)
BUN SERPL-MCNC: 26 MG/DL (ref 7–18)
CALCIUM SERPL-MCNC: 8.5 MG/DL (ref 8.4–10.2)
CHLORIDE SERPL-SCNC: 113 MMOL/L (ref 95–110)
CHOLEST SERPL-MCNC: 204 MG/DL (ref 120–199)
CHOLEST/HDLC SERPL: 3.9 {RATIO} (ref 2–5)
CO2 SERPL-SCNC: 17 MMOL/L (ref 22–31)
CREAT SERPL-MCNC: 1.32 MG/DL (ref 0.5–1.4)
DIFFERENTIAL METHOD: ABNORMAL
EOSINOPHIL # BLD AUTO: 0.3 K/UL (ref 0–0.5)
EOSINOPHIL NFR BLD: 3.8 % (ref 0–8)
ERYTHROCYTE [DISTWIDTH] IN BLOOD BY AUTOMATED COUNT: 15.6 % (ref 11.5–14.5)
EST. GFR  (AFRICAN AMERICAN): 55 ML/MIN/1.73 M^2
EST. GFR  (NON AFRICAN AMERICAN): 48 ML/MIN/1.73 M^2
ESTIMATED AVG GLUCOSE: 94 MG/DL (ref 68–131)
FERRITIN SERPL-MCNC: 33 NG/ML (ref 7–137)
FOLATE SERPL-MCNC: 14.3 NG/ML (ref 4–24)
GLUCOSE SERPL-MCNC: 71 MG/DL (ref 70–110)
HBA1C MFR BLD: 4.9 % (ref 0–5.6)
HCT VFR BLD AUTO: 38 % (ref 37–48.5)
HDLC SERPL-MCNC: 52 MG/DL (ref 40–75)
HDLC SERPL: 25.5 % (ref 20–50)
HGB BLD-MCNC: 11.6 G/DL (ref 12–16)
IMM GRANULOCYTES # BLD AUTO: 0.03 K/UL (ref 0–0.04)
IMM GRANULOCYTES NFR BLD AUTO: 0.4 % (ref 0–0.5)
IRON SATN MFR SERPL: 32 % (ref 20–50)
IRON SERPL-MCNC: 100 UG/DL (ref 30–160)
LDLC SERPL CALC-MCNC: 126 MG/DL (ref 63–159)
LYMPHOCYTES # BLD AUTO: 1.7 K/UL (ref 1–4.8)
LYMPHOCYTES NFR BLD: 22.6 % (ref 18–48)
MCH RBC QN AUTO: 28.9 PG (ref 27–31)
MCHC RBC AUTO-ENTMCNC: 30.5 G/DL (ref 32–36)
MCV RBC AUTO: 95 FL (ref 82–98)
MONOCYTES # BLD AUTO: 0.5 K/UL (ref 0.3–1)
MONOCYTES NFR BLD: 6.7 % (ref 4–15)
NEUTROPHILS # BLD AUTO: 4.8 K/UL (ref 1.8–7.7)
NEUTROPHILS NFR BLD: 65 % (ref 38–73)
NONHDLC SERPL-MCNC: 152 MG/DL
NRBC BLD-RTO: 0 /100 WBC
PLATELET # BLD AUTO: 374 K/UL (ref 150–350)
PMV BLD AUTO: 9.8 FL (ref 9.2–12.9)
POTASSIUM SERPL-SCNC: 3.8 MMOL/L (ref 3.5–5.1)
PROT SERPL-MCNC: 7 G/DL (ref 6–8.4)
RBC # BLD AUTO: 4.01 M/UL (ref 4–5.4)
SODIUM SERPL-SCNC: 141 MMOL/L (ref 136–145)
T3FREE SP1 P CHAL SERPL-SCNC: 3.37 PG/ML (ref 2.77–5.27)
T4 FREE SP9 P CHAL SERPL-SCNC: 0.68 NG/DL (ref 0.78–2.19)
TOTAL IRON BINDING CAPACITY: 316 UG/DL (ref 265–497)
TRIGL SERPL-MCNC: 130 MG/DL (ref 30–150)
TSH SERPL DL<=0.005 MIU/L-ACNC: 2.12 UIU/ML (ref 0.4–4)
VIT B12 SERPL-MCNC: 526 PG/ML (ref 210–950)
WBC # BLD AUTO: 7.36 K/UL (ref 3.9–12.7)

## 2020-03-30 PROCEDURE — 84439 ASSAY OF FREE THYROXINE: CPT | Mod: PN

## 2020-03-30 PROCEDURE — 80053 COMPREHEN METABOLIC PANEL: CPT

## 2020-03-30 PROCEDURE — 85025 COMPLETE CBC W/AUTO DIFF WBC: CPT

## 2020-03-30 PROCEDURE — 36591 DRAW BLOOD OFF VENOUS DEVICE: CPT | Mod: PN

## 2020-03-30 PROCEDURE — A4216 STERILE WATER/SALINE, 10 ML: HCPCS | Mod: PN | Performed by: INTERNAL MEDICINE

## 2020-03-30 PROCEDURE — 82728 ASSAY OF FERRITIN: CPT

## 2020-03-30 PROCEDURE — 84443 ASSAY THYROID STIM HORMONE: CPT

## 2020-03-30 PROCEDURE — 82728 ASSAY OF FERRITIN: CPT | Mod: PN

## 2020-03-30 PROCEDURE — 82607 VITAMIN B-12: CPT | Mod: PN

## 2020-03-30 PROCEDURE — 80053 COMPREHEN METABOLIC PANEL: CPT | Mod: PN

## 2020-03-30 PROCEDURE — 84439 ASSAY OF FREE THYROXINE: CPT

## 2020-03-30 PROCEDURE — 83540 ASSAY OF IRON: CPT

## 2020-03-30 PROCEDURE — 84481 FREE ASSAY (FT-3): CPT

## 2020-03-30 PROCEDURE — 82306 VITAMIN D 25 HYDROXY: CPT | Mod: PN

## 2020-03-30 PROCEDURE — 83036 HEMOGLOBIN GLYCOSYLATED A1C: CPT | Mod: PN

## 2020-03-30 PROCEDURE — 83540 ASSAY OF IRON: CPT | Mod: PN

## 2020-03-30 PROCEDURE — 85025 COMPLETE CBC W/AUTO DIFF WBC: CPT | Mod: PN

## 2020-03-30 PROCEDURE — 83036 HEMOGLOBIN GLYCOSYLATED A1C: CPT

## 2020-03-30 PROCEDURE — 82746 ASSAY OF FOLIC ACID SERUM: CPT

## 2020-03-30 PROCEDURE — 25000003 PHARM REV CODE 250: Mod: PN | Performed by: INTERNAL MEDICINE

## 2020-03-30 PROCEDURE — 80061 LIPID PANEL: CPT | Mod: PN

## 2020-03-30 PROCEDURE — 84481 FREE ASSAY (FT-3): CPT | Mod: PN

## 2020-03-30 PROCEDURE — 82607 VITAMIN B-12: CPT

## 2020-03-30 PROCEDURE — 84443 ASSAY THYROID STIM HORMONE: CPT | Mod: PN

## 2020-03-30 PROCEDURE — 82306 VITAMIN D 25 HYDROXY: CPT

## 2020-03-30 PROCEDURE — 80061 LIPID PANEL: CPT

## 2020-03-30 PROCEDURE — 63600175 PHARM REV CODE 636 W HCPCS: Mod: PN | Performed by: INTERNAL MEDICINE

## 2020-03-30 PROCEDURE — 82746 ASSAY OF FOLIC ACID SERUM: CPT | Mod: PN

## 2020-03-30 RX ORDER — HEPARIN 100 UNIT/ML
500 SYRINGE INTRAVENOUS
Status: CANCELLED | OUTPATIENT
Start: 2020-03-30

## 2020-03-30 RX ORDER — SODIUM CHLORIDE 0.9 % (FLUSH) 0.9 %
10 SYRINGE (ML) INJECTION
Status: COMPLETED | OUTPATIENT
Start: 2020-03-30 | End: 2020-03-30

## 2020-03-30 RX ORDER — HEPARIN 100 UNIT/ML
500 SYRINGE INTRAVENOUS
Status: COMPLETED | OUTPATIENT
Start: 2020-03-30 | End: 2020-03-30

## 2020-03-30 RX ORDER — SODIUM CHLORIDE 0.9 % (FLUSH) 0.9 %
10 SYRINGE (ML) INJECTION
Status: CANCELLED | OUTPATIENT
Start: 2020-03-30

## 2020-03-30 RX ADMIN — HEPARIN 500 UNITS: 100 SYRINGE at 09:03

## 2020-03-30 RX ADMIN — Medication 10 ML: at 09:03

## 2020-04-13 ENCOUNTER — INFUSION (OUTPATIENT)
Dept: INFUSION THERAPY | Facility: HOSPITAL | Age: 45
End: 2020-04-13
Attending: INTERNAL MEDICINE
Payer: COMMERCIAL

## 2020-04-13 DIAGNOSIS — D50.8 IRON DEFICIENCY ANEMIA SECONDARY TO INADEQUATE DIETARY IRON INTAKE: ICD-10-CM

## 2020-04-13 DIAGNOSIS — I87.2 VENOUS INSUFFICIENCY: Primary | ICD-10-CM

## 2020-04-13 DIAGNOSIS — E87.6 HYPOKALEMIA DUE TO LOSS OF POTASSIUM: ICD-10-CM

## 2020-04-13 LAB
ALBUMIN SERPL BCP-MCNC: 3.7 G/DL (ref 3.5–5.2)
ALP SERPL-CCNC: 71 U/L (ref 38–145)
ALT SERPL W/O P-5'-P-CCNC: 8 U/L (ref 0–35)
ANION GAP SERPL CALC-SCNC: 10 MMOL/L (ref 8–16)
AST SERPL-CCNC: 18 U/L (ref 14–36)
BASOPHILS # BLD AUTO: 0.09 K/UL (ref 0–0.2)
BASOPHILS NFR BLD: 1.2 % (ref 0–1.9)
BILIRUB SERPL-MCNC: 0.1 MG/DL (ref 0.2–1.3)
BUN SERPL-MCNC: 31 MG/DL (ref 7–18)
CALCIUM SERPL-MCNC: 8.9 MG/DL (ref 8.4–10.2)
CHLORIDE SERPL-SCNC: 113 MMOL/L (ref 95–110)
CO2 SERPL-SCNC: 17 MMOL/L (ref 22–31)
CREAT SERPL-MCNC: 1.32 MG/DL (ref 0.5–1.4)
DIFFERENTIAL METHOD: ABNORMAL
EOSINOPHIL # BLD AUTO: 0.4 K/UL (ref 0–0.5)
EOSINOPHIL NFR BLD: 4.5 % (ref 0–8)
ERYTHROCYTE [DISTWIDTH] IN BLOOD BY AUTOMATED COUNT: 17 % (ref 11.5–14.5)
EST. GFR  (AFRICAN AMERICAN): 55 ML/MIN/1.73 M^2
EST. GFR  (NON AFRICAN AMERICAN): 48 ML/MIN/1.73 M^2
GLUCOSE SERPL-MCNC: 85 MG/DL (ref 70–110)
HCT VFR BLD AUTO: 36.9 % (ref 37–48.5)
HGB BLD-MCNC: 11.6 G/DL (ref 12–16)
IMM GRANULOCYTES # BLD AUTO: 0.03 K/UL (ref 0–0.04)
IMM GRANULOCYTES NFR BLD AUTO: 0.4 % (ref 0–0.5)
LYMPHOCYTES # BLD AUTO: 1.8 K/UL (ref 1–4.8)
LYMPHOCYTES NFR BLD: 23.7 % (ref 18–48)
MCH RBC QN AUTO: 29.2 PG (ref 27–31)
MCHC RBC AUTO-ENTMCNC: 31.4 G/DL (ref 32–36)
MCV RBC AUTO: 93 FL (ref 82–98)
MONOCYTES # BLD AUTO: 0.4 K/UL (ref 0.3–1)
MONOCYTES NFR BLD: 4.9 % (ref 4–15)
NEUTROPHILS # BLD AUTO: 5.1 K/UL (ref 1.8–7.7)
NEUTROPHILS NFR BLD: 65.3 % (ref 38–73)
NRBC BLD-RTO: 0 /100 WBC
PLATELET # BLD AUTO: 316 K/UL (ref 150–350)
PMV BLD AUTO: 10.6 FL (ref 9.2–12.9)
POTASSIUM SERPL-SCNC: 3.2 MMOL/L (ref 3.5–5.1)
PROT SERPL-MCNC: 6.6 G/DL (ref 6–8.4)
RBC # BLD AUTO: 3.97 M/UL (ref 4–5.4)
SODIUM SERPL-SCNC: 140 MMOL/L (ref 136–145)
WBC # BLD AUTO: 7.76 K/UL (ref 3.9–12.7)

## 2020-04-13 PROCEDURE — 63600175 PHARM REV CODE 636 W HCPCS: Mod: PN | Performed by: INTERNAL MEDICINE

## 2020-04-13 PROCEDURE — 80053 COMPREHEN METABOLIC PANEL: CPT

## 2020-04-13 PROCEDURE — 36591 DRAW BLOOD OFF VENOUS DEVICE: CPT | Mod: PN

## 2020-04-13 PROCEDURE — 80053 COMPREHEN METABOLIC PANEL: CPT | Mod: PN

## 2020-04-13 PROCEDURE — 85025 COMPLETE CBC W/AUTO DIFF WBC: CPT

## 2020-04-13 PROCEDURE — 85025 COMPLETE CBC W/AUTO DIFF WBC: CPT | Mod: PN

## 2020-04-13 PROCEDURE — 25000003 PHARM REV CODE 250: Mod: PN | Performed by: INTERNAL MEDICINE

## 2020-04-13 PROCEDURE — A4216 STERILE WATER/SALINE, 10 ML: HCPCS | Mod: PN | Performed by: INTERNAL MEDICINE

## 2020-04-13 RX ORDER — HEPARIN 100 UNIT/ML
500 SYRINGE INTRAVENOUS
Status: CANCELLED | OUTPATIENT
Start: 2020-04-13

## 2020-04-13 RX ORDER — HEPARIN 100 UNIT/ML
500 SYRINGE INTRAVENOUS
Status: COMPLETED | OUTPATIENT
Start: 2020-04-13 | End: 2020-04-13

## 2020-04-13 RX ORDER — SODIUM CHLORIDE 0.9 % (FLUSH) 0.9 %
10 SYRINGE (ML) INJECTION
Status: CANCELLED | OUTPATIENT
Start: 2020-04-13

## 2020-04-13 RX ORDER — SODIUM CHLORIDE 0.9 % (FLUSH) 0.9 %
10 SYRINGE (ML) INJECTION
Status: COMPLETED | OUTPATIENT
Start: 2020-04-13 | End: 2020-04-13

## 2020-04-13 RX ADMIN — HEPARIN SODIUM (PORCINE) LOCK FLUSH IV SOLN 100 UNIT/ML 500 UNITS: 100 SOLUTION at 04:04

## 2020-04-13 RX ADMIN — Medication 10 ML: at 04:04

## 2020-04-17 PROBLEM — K92.2 GASTRIC HEMORRHAGE: Status: ACTIVE | Noted: 2020-04-17

## 2020-05-08 ENCOUNTER — TELEPHONE (OUTPATIENT)
Dept: INFECTIOUS DISEASES | Facility: HOSPITAL | Age: 45
End: 2020-05-08

## 2020-05-08 NOTE — TELEPHONE ENCOUNTER
Pt called in regards to scheduling her iron infusion at [facility]. Pt updated that there are no active orders for iron infusions under her therapy plan. Instructed pt to reach out to her provider's office. Pt verbalized understanding of instructions.

## 2020-05-29 ENCOUNTER — LAB VISIT (OUTPATIENT)
Dept: LAB | Facility: HOSPITAL | Age: 45
End: 2020-05-29
Attending: INTERNAL MEDICINE
Payer: COMMERCIAL

## 2020-05-29 ENCOUNTER — INFUSION (OUTPATIENT)
Dept: INFUSION THERAPY | Facility: HOSPITAL | Age: 45
End: 2020-05-29
Attending: INTERNAL MEDICINE
Payer: COMMERCIAL

## 2020-05-29 ENCOUNTER — LAB VISIT (OUTPATIENT)
Dept: INFUSION THERAPY | Facility: HOSPITAL | Age: 45
End: 2020-05-29
Attending: PHYSICIAN ASSISTANT
Payer: COMMERCIAL

## 2020-05-29 DIAGNOSIS — K50.919 CROHN'S DISEASE WITH COMPLICATION, UNSPECIFIED GASTROINTESTINAL TRACT LOCATION: ICD-10-CM

## 2020-05-29 DIAGNOSIS — K50.919 CROHN'S DISEASE WITH COMPLICATION, UNSPECIFIED GASTROINTESTINAL TRACT LOCATION: Primary | ICD-10-CM

## 2020-05-29 DIAGNOSIS — D50.8 IRON DEFICIENCY ANEMIA SECONDARY TO INADEQUATE DIETARY IRON INTAKE: ICD-10-CM

## 2020-05-29 DIAGNOSIS — E87.6 HYPOKALEMIA DUE TO LOSS OF POTASSIUM: ICD-10-CM

## 2020-05-29 DIAGNOSIS — Z03.818 ENCNTR FOR OBS FOR SUSP EXPSR TO OTH BIOLG AGENTS RULED OUT: Primary | ICD-10-CM

## 2020-05-29 DIAGNOSIS — I87.2 VENOUS INSUFFICIENCY: Primary | ICD-10-CM

## 2020-05-29 LAB
ALBUMIN SERPL BCP-MCNC: 3.8 G/DL (ref 3.5–5.2)
ALP SERPL-CCNC: 84 U/L (ref 38–145)
ALT SERPL W/O P-5'-P-CCNC: 9 U/L (ref 0–35)
ANION GAP SERPL CALC-SCNC: 7 MMOL/L (ref 8–16)
AST SERPL-CCNC: 22 U/L (ref 14–36)
BASOPHILS # BLD AUTO: 0.09 K/UL (ref 0–0.2)
BASOPHILS NFR BLD: 0.9 % (ref 0–1.9)
BILIRUB SERPL-MCNC: 0.2 MG/DL (ref 0.2–1.3)
BUN SERPL-MCNC: 23 MG/DL (ref 7–18)
CALCIUM SERPL-MCNC: 9 MG/DL (ref 8.4–10.2)
CHLORIDE SERPL-SCNC: 111 MMOL/L (ref 95–110)
CO2 SERPL-SCNC: 19 MMOL/L (ref 22–31)
CREAT SERPL-MCNC: 0.91 MG/DL (ref 0.5–1.4)
DIFFERENTIAL METHOD: ABNORMAL
EOSINOPHIL # BLD AUTO: 0.3 K/UL (ref 0–0.5)
EOSINOPHIL NFR BLD: 2.9 % (ref 0–8)
ERYTHROCYTE [DISTWIDTH] IN BLOOD BY AUTOMATED COUNT: 18.6 % (ref 11.5–14.5)
EST. GFR  (AFRICAN AMERICAN): >60 ML/MIN/1.73 M^2
EST. GFR  (NON AFRICAN AMERICAN): >60 ML/MIN/1.73 M^2
GLUCOSE SERPL-MCNC: 132 MG/DL (ref 70–110)
HCT VFR BLD AUTO: 35.2 % (ref 37–48.5)
HGB BLD-MCNC: 11.2 G/DL (ref 12–16)
IMM GRANULOCYTES # BLD AUTO: 0.03 K/UL (ref 0–0.04)
IMM GRANULOCYTES NFR BLD AUTO: 0.3 % (ref 0–0.5)
LYMPHOCYTES # BLD AUTO: 2.1 K/UL (ref 1–4.8)
LYMPHOCYTES NFR BLD: 21.8 % (ref 18–48)
MCH RBC QN AUTO: 31.4 PG (ref 27–31)
MCHC RBC AUTO-ENTMCNC: 31.8 G/DL (ref 32–36)
MCV RBC AUTO: 99 FL (ref 82–98)
MONOCYTES # BLD AUTO: 0.6 K/UL (ref 0.3–1)
MONOCYTES NFR BLD: 6.3 % (ref 4–15)
NEUTROPHILS # BLD AUTO: 6.6 K/UL (ref 1.8–7.7)
NEUTROPHILS NFR BLD: 67.8 % (ref 38–73)
NRBC BLD-RTO: 0 /100 WBC
PLATELET # BLD AUTO: 446 K/UL (ref 150–350)
PMV BLD AUTO: 10.6 FL (ref 9.2–12.9)
POTASSIUM SERPL-SCNC: 3.5 MMOL/L (ref 3.5–5.1)
PROT SERPL-MCNC: 6.7 G/DL (ref 6–8.4)
RBC # BLD AUTO: 3.57 M/UL (ref 4–5.4)
SARS-COV-2 RDRP RESP QL NAA+PROBE: NEGATIVE
SODIUM SERPL-SCNC: 137 MMOL/L (ref 136–145)
WBC # BLD AUTO: 9.69 K/UL (ref 3.9–12.7)

## 2020-05-29 PROCEDURE — 85025 COMPLETE CBC W/AUTO DIFF WBC: CPT | Mod: PN

## 2020-05-29 PROCEDURE — U0002 COVID-19 LAB TEST NON-CDC: HCPCS | Mod: PN

## 2020-05-29 PROCEDURE — 63600175 PHARM REV CODE 636 W HCPCS: Mod: PN | Performed by: INTERNAL MEDICINE

## 2020-05-29 PROCEDURE — 80053 COMPREHEN METABOLIC PANEL: CPT | Mod: PN

## 2020-05-29 PROCEDURE — 96523 IRRIG DRUG DELIVERY DEVICE: CPT | Mod: PN

## 2020-05-29 PROCEDURE — 36415 COLL VENOUS BLD VENIPUNCTURE: CPT | Mod: PN

## 2020-05-29 PROCEDURE — 25000003 PHARM REV CODE 250: Mod: PN | Performed by: INTERNAL MEDICINE

## 2020-05-29 PROCEDURE — U0002 COVID-19 LAB TEST NON-CDC: HCPCS

## 2020-05-29 PROCEDURE — 85025 COMPLETE CBC W/AUTO DIFF WBC: CPT

## 2020-05-29 PROCEDURE — 80053 COMPREHEN METABOLIC PANEL: CPT

## 2020-05-29 PROCEDURE — A4216 STERILE WATER/SALINE, 10 ML: HCPCS | Mod: PN | Performed by: INTERNAL MEDICINE

## 2020-05-29 RX ORDER — HEPARIN 100 UNIT/ML
500 SYRINGE INTRAVENOUS
Status: CANCELLED | OUTPATIENT
Start: 2020-05-29

## 2020-05-29 RX ORDER — SODIUM CHLORIDE 0.9 % (FLUSH) 0.9 %
10 SYRINGE (ML) INJECTION
Status: COMPLETED | OUTPATIENT
Start: 2020-05-29 | End: 2020-05-29

## 2020-05-29 RX ORDER — HEPARIN 100 UNIT/ML
500 SYRINGE INTRAVENOUS
Status: COMPLETED | OUTPATIENT
Start: 2020-05-29 | End: 2020-05-29

## 2020-05-29 RX ORDER — SODIUM CHLORIDE 0.9 % (FLUSH) 0.9 %
10 SYRINGE (ML) INJECTION
Status: CANCELLED | OUTPATIENT
Start: 2020-05-29

## 2020-05-29 RX ADMIN — HEPARIN 500 UNITS: 100 SYRINGE at 04:05

## 2020-05-29 RX ADMIN — Medication 10 ML: at 04:05

## 2020-05-29 NOTE — PROGRESS NOTES
Verified pt with two pt identifiers. Notified pt of covid testing [address] changes [address] Pt verbalized understanding

## 2020-07-02 PROBLEM — E43 SEVERE MALNUTRITION: Status: ACTIVE | Noted: 2020-07-02

## 2020-07-03 PROBLEM — K50.019 CROHN'S DISEASE OF SMALL INTESTINE WITH COMPLICATION: Status: ACTIVE | Noted: 2020-07-03

## 2020-09-03 PROBLEM — K25.9 GASTRIC ULCER WITHOUT HEMORRHAGE OR PERFORATION: Status: ACTIVE | Noted: 2020-09-03

## 2020-10-27 ENCOUNTER — INFUSION (OUTPATIENT)
Dept: INFUSION THERAPY | Facility: HOSPITAL | Age: 45
End: 2020-10-27
Attending: INTERNAL MEDICINE
Payer: COMMERCIAL

## 2020-10-27 VITALS — TEMPERATURE: 97 F

## 2020-10-27 DIAGNOSIS — R63.4 LOSS OF WEIGHT: Primary | ICD-10-CM

## 2020-10-27 DIAGNOSIS — D50.8 IRON DEFICIENCY ANEMIA SECONDARY TO INADEQUATE DIETARY IRON INTAKE: ICD-10-CM

## 2020-10-27 DIAGNOSIS — I87.2 VENOUS INSUFFICIENCY: ICD-10-CM

## 2020-10-27 DIAGNOSIS — E87.6 HYPOKALEMIA DUE TO LOSS OF POTASSIUM: ICD-10-CM

## 2020-10-27 PROCEDURE — 63600175 PHARM REV CODE 636 W HCPCS: Mod: PN | Performed by: INTERNAL MEDICINE

## 2020-10-27 PROCEDURE — 96523 IRRIG DRUG DELIVERY DEVICE: CPT | Mod: PN

## 2020-10-27 PROCEDURE — A4216 STERILE WATER/SALINE, 10 ML: HCPCS | Mod: PN | Performed by: INTERNAL MEDICINE

## 2020-10-27 PROCEDURE — 25000003 PHARM REV CODE 250: Mod: PN | Performed by: INTERNAL MEDICINE

## 2020-10-27 RX ORDER — HEPARIN 100 UNIT/ML
500 SYRINGE INTRAVENOUS
Status: COMPLETED | OUTPATIENT
Start: 2020-10-27 | End: 2020-10-27

## 2020-10-27 RX ORDER — SODIUM CHLORIDE 0.9 % (FLUSH) 0.9 %
10 SYRINGE (ML) INJECTION
Status: COMPLETED | OUTPATIENT
Start: 2020-10-27 | End: 2020-10-27

## 2020-10-27 RX ADMIN — HEPARIN SODIUM (PORCINE) LOCK FLUSH IV SOLN 100 UNIT/ML 500 UNITS: 100 SOLUTION at 03:10

## 2020-10-27 RX ADMIN — Medication 10 ML: at 03:10

## 2020-12-27 PROBLEM — R53.1 WEAKNESS: Status: ACTIVE | Noted: 2020-12-27

## 2021-01-25 ENCOUNTER — INFUSION (OUTPATIENT)
Dept: INFUSION THERAPY | Facility: HOSPITAL | Age: 46
End: 2021-01-25
Attending: INTERNAL MEDICINE
Payer: COMMERCIAL

## 2021-01-25 ENCOUNTER — LAB VISIT (OUTPATIENT)
Dept: LAB | Facility: HOSPITAL | Age: 46
End: 2021-01-25
Attending: INTERNAL MEDICINE
Payer: COMMERCIAL

## 2021-01-25 DIAGNOSIS — D50.8 IRON DEFICIENCY ANEMIA SECONDARY TO INADEQUATE DIETARY IRON INTAKE: ICD-10-CM

## 2021-01-25 DIAGNOSIS — I87.2 VENOUS INSUFFICIENCY: Primary | ICD-10-CM

## 2021-01-25 DIAGNOSIS — E87.6 HYPOKALEMIA DUE TO LOSS OF POTASSIUM: ICD-10-CM

## 2021-01-25 DIAGNOSIS — D64.9 ANEMIA, UNSPECIFIED: Primary | ICD-10-CM

## 2021-01-25 LAB
ALBUMIN SERPL BCP-MCNC: 3.4 G/DL (ref 3.5–5.2)
ALP SERPL-CCNC: 101 U/L (ref 38–145)
ALT SERPL W/O P-5'-P-CCNC: 10 U/L (ref 0–35)
ANION GAP SERPL CALC-SCNC: 8 MMOL/L (ref 8–16)
ANISOCYTOSIS BLD QL SMEAR: SLIGHT
AST SERPL-CCNC: 25 U/L (ref 14–36)
BASOPHILS NFR BLD: 1 % (ref 0–1.9)
BILIRUB SERPL-MCNC: 0.2 MG/DL (ref 0.2–1.3)
CALCIUM SERPL-MCNC: 8.5 MG/DL (ref 8.4–10.2)
CHLORIDE SERPL-SCNC: 106 MMOL/L (ref 95–110)
CO2 SERPL-SCNC: 25 MMOL/L (ref 22–31)
CREAT SERPL-MCNC: 0.85 MG/DL (ref 0.5–1.4)
CRP SERPL-MCNC: <0.5 MG/DL (ref 0–0.9)
DACRYOCYTES BLD QL SMEAR: ABNORMAL
DIFFERENTIAL METHOD: ABNORMAL
EOSINOPHIL NFR BLD: 4 % (ref 0–8)
ERYTHROCYTE [DISTWIDTH] IN BLOOD BY AUTOMATED COUNT: 18.7 % (ref 11.5–14.5)
EST. GFR  (AFRICAN AMERICAN): >60 ML/MIN/1.73 M^2
EST. GFR  (NON AFRICAN AMERICAN): >60 ML/MIN/1.73 M^2
FERRITIN SERPL-MCNC: 340 NG/ML (ref 7–137)
FOLATE SERPL-MCNC: 8.5 NG/ML (ref 4–24)
GLUCOSE SERPL-MCNC: 78 MG/DL (ref 70–110)
HCT VFR BLD AUTO: 24.5 % (ref 37–48.5)
HGB BLD-MCNC: 7 G/DL (ref 12–16)
HYPOCHROMIA BLD QL SMEAR: ABNORMAL
IMM GRANULOCYTES # BLD AUTO: ABNORMAL K/UL (ref 0–0.04)
IMM GRANULOCYTES NFR BLD AUTO: ABNORMAL % (ref 0–0.5)
IRON SATN MFR SERPL: 24 % (ref 20–50)
IRON SERPL-MCNC: 71 UG/DL (ref 30–160)
IRON SERPL-MCNC: 71 UG/DL (ref 30–160)
LYMPHOCYTES NFR BLD: 9 % (ref 18–48)
MCH RBC QN AUTO: 27.8 PG (ref 27–31)
MCHC RBC AUTO-ENTMCNC: 28.6 G/DL (ref 32–36)
MCV RBC AUTO: 97 FL (ref 82–98)
METAMYELOCYTES NFR BLD MANUAL: 1 %
MONOCYTES NFR BLD: 3 % (ref 4–15)
NEUTROPHILS # BLD AUTO: 10.2 K/UL (ref 1.8–7.7)
NEUTROPHILS NFR BLD: 81 % (ref 38–73)
NEUTS BAND NFR BLD MANUAL: 1 %
NRBC BLD-RTO: 0 /100 WBC
OVALOCYTES BLD QL SMEAR: ABNORMAL
PLATELET # BLD AUTO: 482 K/UL (ref 150–350)
PLATELET BLD QL SMEAR: ABNORMAL
PMV BLD AUTO: 9.5 FL (ref 9.2–12.9)
POLYCHROMASIA BLD QL SMEAR: ABNORMAL
POTASSIUM SERPL-SCNC: 4.1 MMOL/L (ref 3.5–5.1)
PROT SERPL-MCNC: 5.8 G/DL (ref 6–8.4)
RBC # BLD AUTO: 2.52 M/UL (ref 4–5.4)
RETICS/RBC NFR AUTO: 7.6 % (ref 0.5–2.5)
SODIUM SERPL-SCNC: 139 MMOL/L (ref 136–145)
TOTAL IRON BINDING CAPACITY: 290 UG/DL (ref 265–497)
UUN UR-MCNC: 20 MG/DL (ref 7–18)
VIT B12 SERPL-MCNC: 355 PG/ML (ref 210–950)
WBC # BLD AUTO: 12.43 K/UL (ref 3.9–12.7)

## 2021-01-25 PROCEDURE — 85025 COMPLETE CBC W/AUTO DIFF WBC: CPT

## 2021-01-25 PROCEDURE — 82607 VITAMIN B-12: CPT | Mod: PN

## 2021-01-25 PROCEDURE — 36591 DRAW BLOOD OFF VENOUS DEVICE: CPT | Mod: PN

## 2021-01-25 PROCEDURE — 85025 COMPLETE CBC W/AUTO DIFF WBC: CPT | Mod: PN

## 2021-01-25 PROCEDURE — 82668 ASSAY OF ERYTHROPOIETIN: CPT

## 2021-01-25 PROCEDURE — 86140 C-REACTIVE PROTEIN: CPT | Mod: PN

## 2021-01-25 PROCEDURE — 82728 ASSAY OF FERRITIN: CPT

## 2021-01-25 PROCEDURE — 80053 COMPREHEN METABOLIC PANEL: CPT

## 2021-01-25 PROCEDURE — A4216 STERILE WATER/SALINE, 10 ML: HCPCS | Mod: PN | Performed by: INTERNAL MEDICINE

## 2021-01-25 PROCEDURE — 85045 AUTOMATED RETICULOCYTE COUNT: CPT | Mod: PN

## 2021-01-25 PROCEDURE — 85045 AUTOMATED RETICULOCYTE COUNT: CPT

## 2021-01-25 PROCEDURE — 83540 ASSAY OF IRON: CPT | Mod: PN

## 2021-01-25 PROCEDURE — 80053 COMPREHEN METABOLIC PANEL: CPT | Mod: PN

## 2021-01-25 PROCEDURE — 82746 ASSAY OF FOLIC ACID SERUM: CPT | Mod: PN

## 2021-01-25 PROCEDURE — 83540 ASSAY OF IRON: CPT

## 2021-01-25 PROCEDURE — 63600175 PHARM REV CODE 636 W HCPCS: Mod: PN | Performed by: INTERNAL MEDICINE

## 2021-01-25 PROCEDURE — 36415 COLL VENOUS BLD VENIPUNCTURE: CPT | Mod: PN

## 2021-01-25 PROCEDURE — 25000003 PHARM REV CODE 250: Mod: PN | Performed by: INTERNAL MEDICINE

## 2021-01-25 PROCEDURE — 83550 IRON BINDING TEST: CPT | Mod: PN

## 2021-01-25 PROCEDURE — 86140 C-REACTIVE PROTEIN: CPT

## 2021-01-25 PROCEDURE — 82607 VITAMIN B-12: CPT

## 2021-01-25 PROCEDURE — 83550 IRON BINDING TEST: CPT

## 2021-01-25 PROCEDURE — 82746 ASSAY OF FOLIC ACID SERUM: CPT

## 2021-01-25 PROCEDURE — 82728 ASSAY OF FERRITIN: CPT | Mod: PN

## 2021-01-25 RX ORDER — HEPARIN 100 UNIT/ML
500 SYRINGE INTRAVENOUS
Status: COMPLETED | OUTPATIENT
Start: 2021-01-25 | End: 2021-01-25

## 2021-01-25 RX ORDER — SODIUM CHLORIDE 0.9 % (FLUSH) 0.9 %
10 SYRINGE (ML) INJECTION
Status: CANCELLED | OUTPATIENT
Start: 2021-01-25

## 2021-01-25 RX ORDER — HEPARIN 100 UNIT/ML
500 SYRINGE INTRAVENOUS
Status: CANCELLED | OUTPATIENT
Start: 2021-01-25

## 2021-01-25 RX ORDER — SODIUM CHLORIDE 0.9 % (FLUSH) 0.9 %
10 SYRINGE (ML) INJECTION
Status: COMPLETED | OUTPATIENT
Start: 2021-01-25 | End: 2021-01-25

## 2021-01-25 RX ADMIN — Medication 10 ML: at 03:01

## 2021-01-25 RX ADMIN — HEPARIN 500 UNITS: 100 SYRINGE at 03:01

## 2021-01-27 LAB — EPO SERPL-ACNC: 60.6 MIU/ML (ref 2.6–18.5)

## 2021-02-11 ENCOUNTER — INFUSION (OUTPATIENT)
Dept: INFUSION THERAPY | Facility: HOSPITAL | Age: 46
End: 2021-02-11
Attending: INTERNAL MEDICINE
Payer: COMMERCIAL

## 2021-02-11 DIAGNOSIS — K50.919 CROHN'S DISEASE WITH COMPLICATION, UNSPECIFIED GASTROINTESTINAL TRACT LOCATION: Primary | ICD-10-CM

## 2021-02-11 DIAGNOSIS — D50.8 IRON DEFICIENCY ANEMIA SECONDARY TO INADEQUATE DIETARY IRON INTAKE: ICD-10-CM

## 2021-02-11 DIAGNOSIS — E87.6 HYPOKALEMIA DUE TO LOSS OF POTASSIUM: ICD-10-CM

## 2021-02-11 DIAGNOSIS — I87.2 VENOUS INSUFFICIENCY: ICD-10-CM

## 2021-02-11 PROCEDURE — A4216 STERILE WATER/SALINE, 10 ML: HCPCS | Mod: PN | Performed by: INTERNAL MEDICINE

## 2021-02-11 PROCEDURE — 36591 DRAW BLOOD OFF VENOUS DEVICE: CPT | Mod: PN

## 2021-02-11 PROCEDURE — 25000003 PHARM REV CODE 250: Mod: PN | Performed by: INTERNAL MEDICINE

## 2021-02-11 PROCEDURE — 63600175 PHARM REV CODE 636 W HCPCS: Mod: PN | Performed by: INTERNAL MEDICINE

## 2021-02-11 PROCEDURE — 86480 TB TEST CELL IMMUN MEASURE: CPT

## 2021-02-11 RX ORDER — SODIUM CHLORIDE 0.9 % (FLUSH) 0.9 %
10 SYRINGE (ML) INJECTION
Status: COMPLETED | OUTPATIENT
Start: 2021-02-11 | End: 2021-02-11

## 2021-02-11 RX ORDER — SODIUM CHLORIDE 0.9 % (FLUSH) 0.9 %
10 SYRINGE (ML) INJECTION
Status: CANCELLED | OUTPATIENT
Start: 2021-02-11

## 2021-02-11 RX ORDER — HEPARIN 100 UNIT/ML
500 SYRINGE INTRAVENOUS
Status: COMPLETED | OUTPATIENT
Start: 2021-02-11 | End: 2021-02-11

## 2021-02-11 RX ORDER — HEPARIN 100 UNIT/ML
500 SYRINGE INTRAVENOUS
Status: CANCELLED | OUTPATIENT
Start: 2021-02-11

## 2021-02-11 RX ADMIN — HEPARIN 500 UNITS: 100 SYRINGE at 11:02

## 2021-02-11 RX ADMIN — Medication 10 ML: at 11:02

## 2021-02-15 LAB
GAMMA INTERFERON BACKGROUND BLD IA-ACNC: 0.02 IU/ML
M TB IFN-G CD4+ BCKGRND COR BLD-ACNC: 0 IU/ML
MITOGEN IGNF BCKGRD COR BLD-ACNC: 9.18 IU/ML
TB GOLD PLUS: NEGATIVE
TB2 - NIL: 0 IU/ML

## 2021-02-22 ENCOUNTER — INFUSION (OUTPATIENT)
Dept: INFUSION THERAPY | Facility: HOSPITAL | Age: 46
End: 2021-02-22
Attending: INTERNAL MEDICINE
Payer: COMMERCIAL

## 2021-02-22 VITALS — TEMPERATURE: 99 F

## 2021-02-22 DIAGNOSIS — D50.8 IRON DEFICIENCY ANEMIA SECONDARY TO INADEQUATE DIETARY IRON INTAKE: ICD-10-CM

## 2021-02-22 DIAGNOSIS — K50.819 CROHN'S DISEASE OF SMALL AND LARGE INTESTINES WITH COMPLICATION: Primary | ICD-10-CM

## 2021-02-22 DIAGNOSIS — I87.2 VENOUS INSUFFICIENCY: ICD-10-CM

## 2021-02-22 DIAGNOSIS — E87.6 HYPOKALEMIA DUE TO LOSS OF POTASSIUM: ICD-10-CM

## 2021-02-22 LAB
BASOPHILS # BLD AUTO: 0.13 K/UL (ref 0–0.2)
BASOPHILS NFR BLD: 0.9 % (ref 0–1.9)
DIFFERENTIAL METHOD: ABNORMAL
EOSINOPHIL # BLD AUTO: 0.9 K/UL (ref 0–0.5)
EOSINOPHIL NFR BLD: 6.7 % (ref 0–8)
ERYTHROCYTE [DISTWIDTH] IN BLOOD BY AUTOMATED COUNT: 19.9 % (ref 11.5–14.5)
HCT VFR BLD AUTO: 32.1 % (ref 37–48.5)
HGB BLD-MCNC: 10.1 G/DL (ref 12–16)
IMM GRANULOCYTES # BLD AUTO: 0.11 K/UL (ref 0–0.04)
IMM GRANULOCYTES NFR BLD AUTO: 0.8 % (ref 0–0.5)
LYMPHOCYTES # BLD AUTO: 2.6 K/UL (ref 1–4.8)
LYMPHOCYTES NFR BLD: 19.2 % (ref 18–48)
MCH RBC QN AUTO: 31.2 PG (ref 27–31)
MCHC RBC AUTO-ENTMCNC: 31.5 G/DL (ref 32–36)
MCV RBC AUTO: 99 FL (ref 82–98)
MONOCYTES # BLD AUTO: 0.7 K/UL (ref 0.3–1)
MONOCYTES NFR BLD: 4.9 % (ref 4–15)
NEUTROPHILS # BLD AUTO: 9.2 K/UL (ref 1.8–7.7)
NEUTROPHILS NFR BLD: 67.5 % (ref 38–73)
NRBC BLD-RTO: 0 /100 WBC
PLATELET # BLD AUTO: 452 K/UL (ref 150–350)
PMV BLD AUTO: 10.2 FL (ref 9.2–12.9)
RBC # BLD AUTO: 3.24 M/UL (ref 4–5.4)
WBC # BLD AUTO: 13.69 K/UL (ref 3.9–12.7)

## 2021-02-22 PROCEDURE — 25000003 PHARM REV CODE 250: Mod: PN | Performed by: INTERNAL MEDICINE

## 2021-02-22 PROCEDURE — 36591 DRAW BLOOD OFF VENOUS DEVICE: CPT | Mod: PN

## 2021-02-22 PROCEDURE — 85025 COMPLETE CBC W/AUTO DIFF WBC: CPT

## 2021-02-22 PROCEDURE — 85025 COMPLETE CBC W/AUTO DIFF WBC: CPT | Mod: PN

## 2021-02-22 PROCEDURE — 63600175 PHARM REV CODE 636 W HCPCS: Mod: PN | Performed by: INTERNAL MEDICINE

## 2021-02-22 PROCEDURE — A4216 STERILE WATER/SALINE, 10 ML: HCPCS | Mod: PN | Performed by: INTERNAL MEDICINE

## 2021-02-22 RX ORDER — HEPARIN 100 UNIT/ML
500 SYRINGE INTRAVENOUS
Status: COMPLETED | OUTPATIENT
Start: 2021-02-22 | End: 2021-02-22

## 2021-02-22 RX ORDER — SODIUM CHLORIDE 0.9 % (FLUSH) 0.9 %
10 SYRINGE (ML) INJECTION
Status: COMPLETED | OUTPATIENT
Start: 2021-02-22 | End: 2021-02-22

## 2021-02-22 RX ORDER — HEPARIN 100 UNIT/ML
500 SYRINGE INTRAVENOUS
Status: CANCELLED | OUTPATIENT
Start: 2021-02-22

## 2021-02-22 RX ORDER — SODIUM CHLORIDE 0.9 % (FLUSH) 0.9 %
10 SYRINGE (ML) INJECTION
Status: CANCELLED | OUTPATIENT
Start: 2021-02-22

## 2021-02-22 RX ADMIN — Medication 10 ML: at 03:02

## 2021-02-22 RX ADMIN — HEPARIN SODIUM (PORCINE) LOCK FLUSH IV SOLN 100 UNIT/ML 500 UNITS: 100 SOLUTION at 03:02

## 2021-02-23 PROBLEM — R63.30 FEEDING DIFFICULTY: Status: ACTIVE | Noted: 2021-02-23

## 2021-02-24 PROBLEM — E53.8 VITAMIN B 12 DEFICIENCY: Status: RESOLVED | Noted: 2019-06-24 | Resolved: 2021-02-24

## 2021-02-24 PROBLEM — Z87.19 H/O CROHN'S DISEASE: Status: RESOLVED | Noted: 2019-07-08 | Resolved: 2021-02-24

## 2021-02-24 PROBLEM — K94.13 MALFUNCTION OF JEJUNOSTOMY TUBE: Status: ACTIVE | Noted: 2021-02-24

## 2021-02-24 PROBLEM — K31.84 NONDIABETIC GASTROPARESIS: Status: ACTIVE | Noted: 2021-02-24

## 2021-02-24 PROBLEM — D62 ACUTE BLOOD LOSS ANEMIA: Status: ACTIVE | Noted: 2021-02-24

## 2021-02-24 PROBLEM — Z86.39 HX OF HYPOKALEMIA: Status: ACTIVE | Noted: 2021-02-24

## 2021-03-16 ENCOUNTER — LAB VISIT (OUTPATIENT)
Dept: LAB | Facility: HOSPITAL | Age: 46
End: 2021-03-16
Attending: INTERNAL MEDICINE
Payer: COMMERCIAL

## 2021-03-16 DIAGNOSIS — K50.90 CROHN DISEASE: Primary | ICD-10-CM

## 2021-03-16 LAB
ANISOCYTOSIS BLD QL SMEAR: ABNORMAL
BASO STIPL BLD QL SMEAR: ABNORMAL
BASOPHILS # BLD AUTO: 0.07 K/UL (ref 0–0.2)
BASOPHILS NFR BLD: 0.4 % (ref 0–1.9)
DIFFERENTIAL METHOD: ABNORMAL
EOSINOPHIL # BLD AUTO: 0.4 K/UL (ref 0–0.5)
EOSINOPHIL NFR BLD: 2.1 % (ref 0–8)
ERYTHROCYTE [DISTWIDTH] IN BLOOD BY AUTOMATED COUNT: 20.2 % (ref 11.5–14.5)
HCT VFR BLD AUTO: 20.7 % (ref 37–48.5)
HGB BLD-MCNC: 7.2 G/DL (ref 12–16)
HYPOCHROMIA BLD QL SMEAR: ABNORMAL
IMM GRANULOCYTES # BLD AUTO: 0.09 K/UL (ref 0–0.04)
IMM GRANULOCYTES NFR BLD AUTO: 0.5 % (ref 0–0.5)
LYMPHOCYTES # BLD AUTO: 2.8 K/UL (ref 1–4.8)
LYMPHOCYTES NFR BLD: 16.8 % (ref 18–48)
MCH RBC QN AUTO: 32.1 PG (ref 27–31)
MCHC RBC AUTO-ENTMCNC: 34.8 G/DL (ref 32–36)
MCV RBC AUTO: 92 FL (ref 82–98)
MONOCYTES # BLD AUTO: 1 K/UL (ref 0.3–1)
MONOCYTES NFR BLD: 6.2 % (ref 4–15)
NEUTROPHILS # BLD AUTO: 12.2 K/UL (ref 1.8–7.7)
NEUTROPHILS NFR BLD: 74 % (ref 38–73)
NRBC BLD-RTO: 0 /100 WBC
PLATELET # BLD AUTO: 343 K/UL (ref 150–350)
PLATELET BLD QL SMEAR: ABNORMAL
PMV BLD AUTO: 10.5 FL (ref 9.2–12.9)
POLYCHROMASIA BLD QL SMEAR: ABNORMAL
RBC # BLD AUTO: 2.24 M/UL (ref 4–5.4)
WBC # BLD AUTO: 16.53 K/UL (ref 3.9–12.7)

## 2021-03-16 PROCEDURE — 85025 COMPLETE CBC W/AUTO DIFF WBC: CPT | Mod: PO | Performed by: INTERNAL MEDICINE

## 2021-03-16 PROCEDURE — 36415 COLL VENOUS BLD VENIPUNCTURE: CPT | Mod: PO | Performed by: INTERNAL MEDICINE

## 2021-03-24 ENCOUNTER — INFUSION (OUTPATIENT)
Dept: INFUSION THERAPY | Facility: HOSPITAL | Age: 46
End: 2021-03-24
Attending: INTERNAL MEDICINE
Payer: COMMERCIAL

## 2021-03-24 DIAGNOSIS — K50.00 CROHN'S DISEASE OF SMALL INTESTINE WITHOUT COMPLICATION: Primary | ICD-10-CM

## 2021-03-24 DIAGNOSIS — I87.2 VENOUS INSUFFICIENCY: ICD-10-CM

## 2021-03-24 DIAGNOSIS — D50.8 IRON DEFICIENCY ANEMIA SECONDARY TO INADEQUATE DIETARY IRON INTAKE: ICD-10-CM

## 2021-03-24 DIAGNOSIS — E87.6 HYPOKALEMIA DUE TO LOSS OF POTASSIUM: ICD-10-CM

## 2021-03-24 LAB
ALBUMIN SERPL BCP-MCNC: 3.7 G/DL (ref 3.5–5.2)
ANION GAP SERPL CALC-SCNC: 7 MMOL/L (ref 8–16)
BASOPHILS # BLD AUTO: 0.06 K/UL (ref 0–0.2)
BASOPHILS NFR BLD: 0.5 % (ref 0–1.9)
CALCIUM SERPL-MCNC: 8.7 MG/DL (ref 8.4–10.2)
CHLORIDE SERPL-SCNC: 108 MMOL/L (ref 95–110)
CO2 SERPL-SCNC: 22 MMOL/L (ref 22–31)
CREAT SERPL-MCNC: 0.82 MG/DL (ref 0.5–1.4)
CREAT UR-MCNC: 108.2 MG/DL (ref 15–325)
DIFFERENTIAL METHOD: ABNORMAL
EOSINOPHIL # BLD AUTO: 0.4 K/UL (ref 0–0.5)
EOSINOPHIL NFR BLD: 3 % (ref 0–8)
ERYTHROCYTE [DISTWIDTH] IN BLOOD BY AUTOMATED COUNT: 19.2 % (ref 11.5–14.5)
EST. GFR  (AFRICAN AMERICAN): >60 ML/MIN/1.73 M^2
EST. GFR  (NON AFRICAN AMERICAN): >60 ML/MIN/1.73 M^2
GLUCOSE SERPL-MCNC: 100 MG/DL (ref 70–110)
HCT VFR BLD AUTO: 34.1 % (ref 37–48.5)
HGB BLD-MCNC: 10.9 G/DL (ref 12–16)
IMM GRANULOCYTES # BLD AUTO: 0.04 K/UL (ref 0–0.04)
IMM GRANULOCYTES NFR BLD AUTO: 0.3 % (ref 0–0.5)
LYMPHOCYTES # BLD AUTO: 1.5 K/UL (ref 1–4.8)
LYMPHOCYTES NFR BLD: 12.8 % (ref 18–48)
MAGNESIUM SERPL-MCNC: 1.8 MG/DL (ref 1.6–2.6)
MCH RBC QN AUTO: 32.3 PG (ref 27–31)
MCHC RBC AUTO-ENTMCNC: 32 G/DL (ref 32–36)
MCV RBC AUTO: 101 FL (ref 82–98)
MONOCYTES # BLD AUTO: 0.7 K/UL (ref 0.3–1)
MONOCYTES NFR BLD: 6.2 % (ref 4–15)
NEUTROPHILS # BLD AUTO: 9.3 K/UL (ref 1.8–7.7)
NEUTROPHILS NFR BLD: 77.2 % (ref 38–73)
NRBC BLD-RTO: 0 /100 WBC
OSMOLALITY SERPL: 296 MOSM/KG (ref 275–295)
OSMOLALITY UR: 499 MOSM/KG (ref 50–1200)
PHOSPHATE SERPL-MCNC: 3.8 MG/DL (ref 2.7–4.5)
PLATELET # BLD AUTO: 392 K/UL (ref 150–350)
PMV BLD AUTO: 9.7 FL (ref 9.2–12.9)
POTASSIUM SERPL-SCNC: 3.7 MMOL/L (ref 3.5–5.1)
POTASSIUM UR-SCNC: 33 MMOL/L (ref 15–95)
PROT UR-MCNC: 14 MG/DL (ref 0–11)
PROT/CREAT UR: 129.4 MG/G (ref 10–107)
RBC # BLD AUTO: 3.37 M/UL (ref 4–5.4)
SODIUM SERPL-SCNC: 137 MMOL/L (ref 136–145)
URATE SERPL-MCNC: 3.7 MG/DL (ref 2.4–5.7)
UUN UR-MCNC: 21 MG/DL (ref 7–18)
WBC # BLD AUTO: 12 K/UL (ref 3.9–12.7)

## 2021-03-24 PROCEDURE — 83930 ASSAY OF BLOOD OSMOLALITY: CPT | Performed by: INTERNAL MEDICINE

## 2021-03-24 PROCEDURE — 83735 ASSAY OF MAGNESIUM: CPT | Mod: PN | Performed by: INTERNAL MEDICINE

## 2021-03-24 PROCEDURE — A4216 STERILE WATER/SALINE, 10 ML: HCPCS | Mod: PN | Performed by: INTERNAL MEDICINE

## 2021-03-24 PROCEDURE — 80069 RENAL FUNCTION PANEL: CPT | Mod: PN | Performed by: INTERNAL MEDICINE

## 2021-03-24 PROCEDURE — 83935 ASSAY OF URINE OSMOLALITY: CPT | Mod: PN | Performed by: INTERNAL MEDICINE

## 2021-03-24 PROCEDURE — 83735 ASSAY OF MAGNESIUM: CPT | Performed by: INTERNAL MEDICINE

## 2021-03-24 PROCEDURE — 84550 ASSAY OF BLOOD/URIC ACID: CPT | Performed by: INTERNAL MEDICINE

## 2021-03-24 PROCEDURE — 80069 RENAL FUNCTION PANEL: CPT | Performed by: INTERNAL MEDICINE

## 2021-03-24 PROCEDURE — 25000003 PHARM REV CODE 250: Mod: PN | Performed by: INTERNAL MEDICINE

## 2021-03-24 PROCEDURE — 82570 ASSAY OF URINE CREATININE: CPT | Mod: PN | Performed by: INTERNAL MEDICINE

## 2021-03-24 PROCEDURE — 83930 ASSAY OF BLOOD OSMOLALITY: CPT | Mod: PN | Performed by: INTERNAL MEDICINE

## 2021-03-24 PROCEDURE — 84133 ASSAY OF URINE POTASSIUM: CPT | Mod: PN | Performed by: INTERNAL MEDICINE

## 2021-03-24 PROCEDURE — 84550 ASSAY OF BLOOD/URIC ACID: CPT | Mod: PN | Performed by: INTERNAL MEDICINE

## 2021-03-24 PROCEDURE — 82570 ASSAY OF URINE CREATININE: CPT | Performed by: INTERNAL MEDICINE

## 2021-03-24 PROCEDURE — 85025 COMPLETE CBC W/AUTO DIFF WBC: CPT | Mod: PN | Performed by: INTERNAL MEDICINE

## 2021-03-24 PROCEDURE — 83935 ASSAY OF URINE OSMOLALITY: CPT | Performed by: INTERNAL MEDICINE

## 2021-03-24 PROCEDURE — 36591 DRAW BLOOD OFF VENOUS DEVICE: CPT | Mod: PN

## 2021-03-24 PROCEDURE — 84133 ASSAY OF URINE POTASSIUM: CPT | Performed by: INTERNAL MEDICINE

## 2021-03-24 PROCEDURE — 85025 COMPLETE CBC W/AUTO DIFF WBC: CPT | Performed by: INTERNAL MEDICINE

## 2021-03-24 PROCEDURE — 82436 ASSAY OF URINE CHLORIDE: CPT | Performed by: INTERNAL MEDICINE

## 2021-03-24 PROCEDURE — 63600175 PHARM REV CODE 636 W HCPCS: Mod: PN | Performed by: INTERNAL MEDICINE

## 2021-03-24 RX ORDER — SODIUM CHLORIDE 0.9 % (FLUSH) 0.9 %
10 SYRINGE (ML) INJECTION
Status: COMPLETED | OUTPATIENT
Start: 2021-03-24 | End: 2021-03-24

## 2021-03-24 RX ORDER — HEPARIN 100 UNIT/ML
500 SYRINGE INTRAVENOUS
Status: COMPLETED | OUTPATIENT
Start: 2021-03-24 | End: 2021-03-24

## 2021-03-24 RX ORDER — HEPARIN 100 UNIT/ML
500 SYRINGE INTRAVENOUS
Status: CANCELLED | OUTPATIENT
Start: 2021-03-24

## 2021-03-24 RX ORDER — SODIUM CHLORIDE 0.9 % (FLUSH) 0.9 %
10 SYRINGE (ML) INJECTION
Status: CANCELLED | OUTPATIENT
Start: 2021-03-24

## 2021-03-24 RX ADMIN — HEPARIN 500 UNITS: 100 SYRINGE at 03:03

## 2021-03-24 RX ADMIN — Medication 10 ML: at 03:03

## 2021-03-26 LAB — CHLORIDE UR-SCNC: 46 MMOL/L (ref 25–200)

## 2021-04-01 PROBLEM — K50.90 CROHN DISEASE: Status: ACTIVE | Noted: 2021-04-01

## 2021-04-27 ENCOUNTER — INFUSION (OUTPATIENT)
Dept: INFUSION THERAPY | Facility: HOSPITAL | Age: 46
End: 2021-04-27
Attending: INTERNAL MEDICINE
Payer: COMMERCIAL

## 2021-04-27 DIAGNOSIS — E87.6 HYPOKALEMIA DUE TO LOSS OF POTASSIUM: ICD-10-CM

## 2021-04-27 DIAGNOSIS — K50.819 CROHN'S DISEASE OF SMALL AND LARGE INTESTINES WITH COMPLICATION: Primary | ICD-10-CM

## 2021-04-27 DIAGNOSIS — D50.8 IRON DEFICIENCY ANEMIA SECONDARY TO INADEQUATE DIETARY IRON INTAKE: ICD-10-CM

## 2021-04-27 DIAGNOSIS — I87.2 VENOUS INSUFFICIENCY: ICD-10-CM

## 2021-04-27 LAB
BASOPHILS # BLD AUTO: 0.09 K/UL (ref 0–0.2)
BASOPHILS NFR BLD: 1.4 % (ref 0–1.9)
DIFFERENTIAL METHOD: ABNORMAL
EOSINOPHIL # BLD AUTO: 0.3 K/UL (ref 0–0.5)
EOSINOPHIL NFR BLD: 4.5 % (ref 0–8)
ERYTHROCYTE [DISTWIDTH] IN BLOOD BY AUTOMATED COUNT: 16.7 % (ref 11.5–14.5)
FERRITIN SERPL-MCNC: 32 NG/ML (ref 7–137)
HCT VFR BLD AUTO: 35.6 % (ref 37–48.5)
HGB BLD-MCNC: 11.6 G/DL (ref 12–16)
IMM GRANULOCYTES # BLD AUTO: 0.04 K/UL (ref 0–0.04)
IMM GRANULOCYTES NFR BLD AUTO: 0.6 % (ref 0–0.5)
IRON SATN MFR SERPL: 27 % (ref 20–50)
IRON SERPL-MCNC: 59 UG/DL (ref 30–160)
LYMPHOCYTES # BLD AUTO: 1.6 K/UL (ref 1–4.8)
LYMPHOCYTES NFR BLD: 25.3 % (ref 18–48)
MCH RBC QN AUTO: 33 PG (ref 27–31)
MCHC RBC AUTO-ENTMCNC: 32.6 G/DL (ref 32–36)
MCV RBC AUTO: 101 FL (ref 82–98)
MONOCYTES # BLD AUTO: 0.5 K/UL (ref 0.3–1)
MONOCYTES NFR BLD: 8.4 % (ref 4–15)
NEUTROPHILS # BLD AUTO: 3.8 K/UL (ref 1.8–7.7)
NEUTROPHILS NFR BLD: 59.8 % (ref 38–73)
NRBC BLD-RTO: 0 /100 WBC
PLATELET # BLD AUTO: 312 K/UL (ref 150–450)
PMV BLD AUTO: 10.4 FL (ref 9.2–12.9)
RBC # BLD AUTO: 3.52 M/UL (ref 4–5.4)
TOTAL IRON BINDING CAPACITY: 222 UG/DL (ref 265–497)
WBC # BLD AUTO: 6.43 K/UL (ref 3.9–12.7)

## 2021-04-27 PROCEDURE — 82728 ASSAY OF FERRITIN: CPT | Mod: PN | Performed by: INTERNAL MEDICINE

## 2021-04-27 PROCEDURE — 82728 ASSAY OF FERRITIN: CPT | Performed by: INTERNAL MEDICINE

## 2021-04-27 PROCEDURE — 80145 DRUG ASSAY ADALIMUMAB: CPT | Performed by: INTERNAL MEDICINE

## 2021-04-27 PROCEDURE — 85025 COMPLETE CBC W/AUTO DIFF WBC: CPT | Mod: PN | Performed by: INTERNAL MEDICINE

## 2021-04-27 PROCEDURE — 63600175 PHARM REV CODE 636 W HCPCS: Mod: PN | Performed by: INTERNAL MEDICINE

## 2021-04-27 PROCEDURE — 25000003 PHARM REV CODE 250: Mod: PN | Performed by: INTERNAL MEDICINE

## 2021-04-27 PROCEDURE — A4216 STERILE WATER/SALINE, 10 ML: HCPCS | Mod: PN | Performed by: INTERNAL MEDICINE

## 2021-04-27 PROCEDURE — 85025 COMPLETE CBC W/AUTO DIFF WBC: CPT | Performed by: INTERNAL MEDICINE

## 2021-04-27 PROCEDURE — 36591 DRAW BLOOD OFF VENOUS DEVICE: CPT | Mod: PN

## 2021-04-27 PROCEDURE — 83550 IRON BINDING TEST: CPT | Mod: PN | Performed by: INTERNAL MEDICINE

## 2021-04-27 PROCEDURE — 83550 IRON BINDING TEST: CPT | Performed by: INTERNAL MEDICINE

## 2021-04-27 PROCEDURE — 83540 ASSAY OF IRON: CPT | Performed by: INTERNAL MEDICINE

## 2021-04-27 RX ORDER — HEPARIN 100 UNIT/ML
500 SYRINGE INTRAVENOUS
Status: COMPLETED | OUTPATIENT
Start: 2021-04-27 | End: 2021-04-27

## 2021-04-27 RX ORDER — SODIUM CHLORIDE 0.9 % (FLUSH) 0.9 %
10 SYRINGE (ML) INJECTION
Status: CANCELLED | OUTPATIENT
Start: 2021-04-27

## 2021-04-27 RX ORDER — HEPARIN 100 UNIT/ML
500 SYRINGE INTRAVENOUS
Status: CANCELLED | OUTPATIENT
Start: 2021-04-27

## 2021-04-27 RX ORDER — SODIUM CHLORIDE 0.9 % (FLUSH) 0.9 %
10 SYRINGE (ML) INJECTION
Status: COMPLETED | OUTPATIENT
Start: 2021-04-27 | End: 2021-04-27

## 2021-04-27 RX ADMIN — HEPARIN SODIUM (PORCINE) LOCK FLUSH IV SOLN 100 UNIT/ML 500 UNITS: 100 SOLUTION at 09:04

## 2021-04-27 RX ADMIN — Medication 10 ML: at 09:04

## 2021-05-06 LAB — ADALIMUMAB CONCENTRATION & ANTI-ADALIMUMAB ANTIBODY: NORMAL

## 2021-06-23 ENCOUNTER — INFUSION (OUTPATIENT)
Dept: INFUSION THERAPY | Facility: HOSPITAL | Age: 46
End: 2021-06-23
Attending: INTERNAL MEDICINE
Payer: COMMERCIAL

## 2021-06-23 DIAGNOSIS — I87.2 VENOUS INSUFFICIENCY: ICD-10-CM

## 2021-06-23 DIAGNOSIS — D50.8 IRON DEFICIENCY ANEMIA SECONDARY TO INADEQUATE DIETARY IRON INTAKE: ICD-10-CM

## 2021-06-23 DIAGNOSIS — K50.819 CROHN'S DISEASE OF SMALL AND LARGE INTESTINES WITH COMPLICATION: Primary | ICD-10-CM

## 2021-06-23 DIAGNOSIS — E87.6 HYPOKALEMIA DUE TO LOSS OF POTASSIUM: ICD-10-CM

## 2021-06-23 PROCEDURE — A4216 STERILE WATER/SALINE, 10 ML: HCPCS | Mod: PN | Performed by: INTERNAL MEDICINE

## 2021-06-23 PROCEDURE — 36591 DRAW BLOOD OFF VENOUS DEVICE: CPT | Mod: PN

## 2021-06-23 PROCEDURE — 25000003 PHARM REV CODE 250: Mod: PN | Performed by: INTERNAL MEDICINE

## 2021-06-23 PROCEDURE — 63600175 PHARM REV CODE 636 W HCPCS: Mod: PN | Performed by: INTERNAL MEDICINE

## 2021-06-23 PROCEDURE — 82397 CHEMILUMINESCENT ASSAY: CPT | Performed by: INTERNAL MEDICINE

## 2021-06-23 RX ORDER — HEPARIN 100 UNIT/ML
500 SYRINGE INTRAVENOUS
Status: COMPLETED | OUTPATIENT
Start: 2021-06-23 | End: 2021-06-23

## 2021-06-23 RX ORDER — SODIUM CHLORIDE 0.9 % (FLUSH) 0.9 %
10 SYRINGE (ML) INJECTION
Status: CANCELLED | OUTPATIENT
Start: 2021-06-23

## 2021-06-23 RX ORDER — SODIUM CHLORIDE 0.9 % (FLUSH) 0.9 %
10 SYRINGE (ML) INJECTION
Status: COMPLETED | OUTPATIENT
Start: 2021-06-23 | End: 2021-06-23

## 2021-06-23 RX ORDER — HEPARIN 100 UNIT/ML
500 SYRINGE INTRAVENOUS
Status: CANCELLED | OUTPATIENT
Start: 2021-06-23

## 2021-06-23 RX ADMIN — Medication 10 ML: at 02:06

## 2021-06-23 RX ADMIN — HEPARIN 500 UNITS: 100 SYRINGE at 02:06

## 2021-06-24 ENCOUNTER — INFUSION (OUTPATIENT)
Dept: INFUSION THERAPY | Facility: HOSPITAL | Age: 46
End: 2021-06-24
Attending: INTERNAL MEDICINE
Payer: COMMERCIAL

## 2021-06-24 DIAGNOSIS — E87.6 HYPOKALEMIA DUE TO LOSS OF POTASSIUM: ICD-10-CM

## 2021-06-24 DIAGNOSIS — D50.8 IRON DEFICIENCY ANEMIA SECONDARY TO INADEQUATE DIETARY IRON INTAKE: ICD-10-CM

## 2021-06-24 DIAGNOSIS — I87.2 VENOUS INSUFFICIENCY: Primary | ICD-10-CM

## 2021-06-24 LAB
BASOPHILS # BLD AUTO: 0.08 K/UL (ref 0–0.2)
BASOPHILS NFR BLD: 0.9 % (ref 0–1.9)
DIFFERENTIAL METHOD: ABNORMAL
EOSINOPHIL # BLD AUTO: 0.3 K/UL (ref 0–0.5)
EOSINOPHIL NFR BLD: 2.8 % (ref 0–8)
ERYTHROCYTE [DISTWIDTH] IN BLOOD BY AUTOMATED COUNT: 14.7 % (ref 11.5–14.5)
HCT VFR BLD AUTO: 34 % (ref 37–48.5)
HGB BLD-MCNC: 10.6 G/DL (ref 12–16)
IMM GRANULOCYTES # BLD AUTO: 0.02 K/UL (ref 0–0.04)
IMM GRANULOCYTES NFR BLD AUTO: 0.2 % (ref 0–0.5)
LYMPHOCYTES # BLD AUTO: 2.3 K/UL (ref 1–4.8)
LYMPHOCYTES NFR BLD: 25.4 % (ref 18–48)
MCH RBC QN AUTO: 32.7 PG (ref 27–31)
MCHC RBC AUTO-ENTMCNC: 31.2 G/DL (ref 32–36)
MCV RBC AUTO: 105 FL (ref 82–98)
MONOCYTES # BLD AUTO: 0.8 K/UL (ref 0.3–1)
MONOCYTES NFR BLD: 9.3 % (ref 4–15)
NEUTROPHILS # BLD AUTO: 5.6 K/UL (ref 1.8–7.7)
NEUTROPHILS NFR BLD: 61.4 % (ref 38–73)
NRBC BLD-RTO: 0 /100 WBC
PLATELET # BLD AUTO: 376 K/UL (ref 150–450)
PMV BLD AUTO: 10.4 FL (ref 9.2–12.9)
RBC # BLD AUTO: 3.24 M/UL (ref 4–5.4)
WBC # BLD AUTO: 9.06 K/UL (ref 3.9–12.7)

## 2021-06-24 PROCEDURE — 85025 COMPLETE CBC W/AUTO DIFF WBC: CPT | Mod: PN | Performed by: INTERNAL MEDICINE

## 2021-06-24 PROCEDURE — A4216 STERILE WATER/SALINE, 10 ML: HCPCS | Mod: PN | Performed by: INTERNAL MEDICINE

## 2021-06-24 PROCEDURE — 25000003 PHARM REV CODE 250: Mod: PN | Performed by: INTERNAL MEDICINE

## 2021-06-24 PROCEDURE — 63600175 PHARM REV CODE 636 W HCPCS: Mod: PN | Performed by: INTERNAL MEDICINE

## 2021-06-24 PROCEDURE — 36591 DRAW BLOOD OFF VENOUS DEVICE: CPT | Mod: PN

## 2021-06-24 RX ORDER — SODIUM CHLORIDE 0.9 % (FLUSH) 0.9 %
10 SYRINGE (ML) INJECTION
Status: CANCELLED | OUTPATIENT
Start: 2021-06-24

## 2021-06-24 RX ORDER — SODIUM CHLORIDE 0.9 % (FLUSH) 0.9 %
10 SYRINGE (ML) INJECTION
Status: COMPLETED | OUTPATIENT
Start: 2021-06-24 | End: 2021-06-24

## 2021-06-24 RX ORDER — HEPARIN 100 UNIT/ML
500 SYRINGE INTRAVENOUS
Status: COMPLETED | OUTPATIENT
Start: 2021-06-24 | End: 2021-06-24

## 2021-06-24 RX ORDER — HEPARIN 100 UNIT/ML
500 SYRINGE INTRAVENOUS
Status: CANCELLED | OUTPATIENT
Start: 2021-06-24

## 2021-06-24 RX ADMIN — HEPARIN 500 UNITS: 100 SYRINGE at 01:06

## 2021-06-24 RX ADMIN — Medication 10 ML: at 01:06

## 2021-07-01 LAB — ADALIMUMAB CONCENTRATION & ANTI-ADALIMUMAB ANTIBODY: NORMAL

## 2021-08-24 PROBLEM — K56.609 SBO (SMALL BOWEL OBSTRUCTION): Status: ACTIVE | Noted: 2021-08-24

## 2021-08-30 ENCOUNTER — PATIENT MESSAGE (OUTPATIENT)
Dept: INFUSION THERAPY | Facility: HOSPITAL | Age: 46
End: 2021-08-30

## 2021-09-15 ENCOUNTER — INFUSION (OUTPATIENT)
Dept: INFUSION THERAPY | Facility: HOSPITAL | Age: 46
End: 2021-09-15
Attending: INTERNAL MEDICINE
Payer: COMMERCIAL

## 2021-09-15 DIAGNOSIS — K50.80 CROHN'S DISEASE OF BOTH SMALL AND LARGE INTESTINE WITHOUT COMPLICATION: Primary | ICD-10-CM

## 2021-09-15 DIAGNOSIS — E87.6 HYPOKALEMIA DUE TO LOSS OF POTASSIUM: ICD-10-CM

## 2021-09-15 DIAGNOSIS — D50.8 IRON DEFICIENCY ANEMIA SECONDARY TO INADEQUATE DIETARY IRON INTAKE: ICD-10-CM

## 2021-09-15 DIAGNOSIS — I87.2 VENOUS INSUFFICIENCY: ICD-10-CM

## 2021-09-15 LAB
BASOPHILS # BLD AUTO: 0.1 K/UL (ref 0–0.2)
BASOPHILS NFR BLD: 1.2 % (ref 0–1.9)
DIFFERENTIAL METHOD: ABNORMAL
EOSINOPHIL # BLD AUTO: 0.3 K/UL (ref 0–0.5)
EOSINOPHIL NFR BLD: 3.5 % (ref 0–8)
ERYTHROCYTE [DISTWIDTH] IN BLOOD BY AUTOMATED COUNT: 15 % (ref 11.5–14.5)
FOLATE SERPL-MCNC: 6.3 NG/ML (ref 4–24)
HCT VFR BLD AUTO: 31.6 % (ref 37–48.5)
HGB BLD-MCNC: 9.3 G/DL (ref 12–16)
IMM GRANULOCYTES # BLD AUTO: 0.05 K/UL (ref 0–0.04)
IMM GRANULOCYTES NFR BLD AUTO: 0.6 % (ref 0–0.5)
IRON SERPL-MCNC: 22 UG/DL (ref 30–160)
LYMPHOCYTES # BLD AUTO: 2.5 K/UL (ref 1–4.8)
LYMPHOCYTES NFR BLD: 29.3 % (ref 18–48)
MCH RBC QN AUTO: 30.1 PG (ref 27–31)
MCHC RBC AUTO-ENTMCNC: 29.4 G/DL (ref 32–36)
MCV RBC AUTO: 102 FL (ref 82–98)
MONOCYTES # BLD AUTO: 0.6 K/UL (ref 0.3–1)
MONOCYTES NFR BLD: 7.4 % (ref 4–15)
NEUTROPHILS # BLD AUTO: 5 K/UL (ref 1.8–7.7)
NEUTROPHILS NFR BLD: 58 % (ref 38–73)
NRBC BLD-RTO: 0 /100 WBC
PLATELET # BLD AUTO: 448 K/UL (ref 150–450)
PMV BLD AUTO: 9.9 FL (ref 9.2–12.9)
RBC # BLD AUTO: 3.09 M/UL (ref 4–5.4)
SATURATED IRON: 7 % (ref 20–50)
TOTAL IRON BINDING CAPACITY: 295 UG/DL (ref 250–450)
TRANSFERRIN SERPL-MCNC: 199 MG/DL (ref 200–375)
VIT B12 SERPL-MCNC: 435 PG/ML (ref 210–950)
WBC # BLD AUTO: 8.54 K/UL (ref 3.9–12.7)

## 2021-09-15 PROCEDURE — 82746 ASSAY OF FOLIC ACID SERUM: CPT | Performed by: INTERNAL MEDICINE

## 2021-09-15 PROCEDURE — 82607 VITAMIN B-12: CPT | Performed by: INTERNAL MEDICINE

## 2021-09-15 PROCEDURE — 82397 CHEMILUMINESCENT ASSAY: CPT | Performed by: INTERNAL MEDICINE

## 2021-09-15 PROCEDURE — 85025 COMPLETE CBC W/AUTO DIFF WBC: CPT | Mod: PN | Performed by: INTERNAL MEDICINE

## 2021-09-15 PROCEDURE — 84466 ASSAY OF TRANSFERRIN: CPT | Performed by: INTERNAL MEDICINE

## 2021-09-15 RX ORDER — HEPARIN 100 UNIT/ML
500 SYRINGE INTRAVENOUS
Status: DISCONTINUED | OUTPATIENT
Start: 2021-09-15 | End: 2021-09-15 | Stop reason: HOSPADM

## 2021-09-15 RX ORDER — HEPARIN 100 UNIT/ML
500 SYRINGE INTRAVENOUS
Status: CANCELLED | OUTPATIENT
Start: 2021-09-15

## 2021-09-15 RX ORDER — SODIUM CHLORIDE 0.9 % (FLUSH) 0.9 %
10 SYRINGE (ML) INJECTION
Status: DISCONTINUED | OUTPATIENT
Start: 2021-09-15 | End: 2021-09-15 | Stop reason: HOSPADM

## 2021-09-15 RX ORDER — SODIUM CHLORIDE 0.9 % (FLUSH) 0.9 %
10 SYRINGE (ML) INJECTION
Status: CANCELLED | OUTPATIENT
Start: 2021-09-15

## 2021-09-25 LAB
ADALIMUMAB AB SERPL-MCNC: 54 NG/ML
ADALIMUMAB SERPL-MCNC: 3.9 UG/ML

## 2021-10-27 ENCOUNTER — TELEPHONE (OUTPATIENT)
Dept: INFUSION THERAPY | Facility: HOSPITAL | Age: 46
End: 2021-10-27
Payer: COMMERCIAL

## 2021-12-16 ENCOUNTER — DOCUMENTATION ONLY (OUTPATIENT)
Dept: INFUSION THERAPY | Facility: HOSPITAL | Age: 46
End: 2021-12-16
Payer: COMMERCIAL

## 2021-12-16 ENCOUNTER — INFUSION (OUTPATIENT)
Dept: INFUSION THERAPY | Facility: HOSPITAL | Age: 46
End: 2021-12-16
Attending: INTERNAL MEDICINE
Payer: COMMERCIAL

## 2021-12-16 VITALS — TEMPERATURE: 98 F

## 2021-12-16 DIAGNOSIS — K50.919 CROHN'S DISEASE WITH COMPLICATION, UNSPECIFIED GASTROINTESTINAL TRACT LOCATION: Primary | ICD-10-CM

## 2021-12-16 DIAGNOSIS — I87.2 VENOUS INSUFFICIENCY: ICD-10-CM

## 2021-12-16 DIAGNOSIS — E87.6 HYPOKALEMIA DUE TO LOSS OF POTASSIUM: ICD-10-CM

## 2021-12-16 DIAGNOSIS — D50.8 IRON DEFICIENCY ANEMIA SECONDARY TO INADEQUATE DIETARY IRON INTAKE: ICD-10-CM

## 2021-12-16 LAB
ALBUMIN SERPL BCP-MCNC: 3.1 G/DL (ref 3.5–5.2)
ALP SERPL-CCNC: 73 U/L (ref 55–135)
ALT SERPL W/O P-5'-P-CCNC: 7 U/L (ref 10–44)
ANION GAP SERPL CALC-SCNC: 7 MMOL/L (ref 8–16)
AST SERPL-CCNC: 13 U/L (ref 10–40)
BASOPHILS # BLD AUTO: 0.05 K/UL (ref 0–0.2)
BASOPHILS NFR BLD: 0.6 % (ref 0–1.9)
BILIRUB SERPL-MCNC: 0.2 MG/DL (ref 0.1–1)
BUN SERPL-MCNC: 18 MG/DL (ref 6–20)
CALCIUM SERPL-MCNC: 8.5 MG/DL (ref 8.7–10.5)
CHLORIDE SERPL-SCNC: 112 MMOL/L (ref 95–110)
CO2 SERPL-SCNC: 19 MMOL/L (ref 23–29)
CREAT SERPL-MCNC: 1.1 MG/DL (ref 0.5–1.4)
DIFFERENTIAL METHOD: ABNORMAL
EOSINOPHIL # BLD AUTO: 0.2 K/UL (ref 0–0.5)
EOSINOPHIL NFR BLD: 2 % (ref 0–8)
ERYTHROCYTE [DISTWIDTH] IN BLOOD BY AUTOMATED COUNT: 17.7 % (ref 11.5–14.5)
EST. GFR  (AFRICAN AMERICAN): >60 ML/MIN/1.73 M^2
EST. GFR  (NON AFRICAN AMERICAN): 60 ML/MIN/1.73 M^2
GLUCOSE SERPL-MCNC: 118 MG/DL (ref 70–110)
HCT VFR BLD AUTO: 30.1 % (ref 37–48.5)
HGB BLD-MCNC: 9.9 G/DL (ref 12–16)
IMM GRANULOCYTES # BLD AUTO: 0.05 K/UL (ref 0–0.04)
IMM GRANULOCYTES NFR BLD AUTO: 0.6 % (ref 0–0.5)
LYMPHOCYTES # BLD AUTO: 1.4 K/UL (ref 1–4.8)
LYMPHOCYTES NFR BLD: 16.6 % (ref 18–48)
MCH RBC QN AUTO: 32.9 PG (ref 27–31)
MCHC RBC AUTO-ENTMCNC: 32.9 G/DL (ref 32–36)
MCV RBC AUTO: 100 FL (ref 82–98)
MONOCYTES # BLD AUTO: 0.7 K/UL (ref 0.3–1)
MONOCYTES NFR BLD: 8.8 % (ref 4–15)
NEUTROPHILS # BLD AUTO: 6 K/UL (ref 1.8–7.7)
NEUTROPHILS NFR BLD: 71.4 % (ref 38–73)
NRBC BLD-RTO: 0 /100 WBC
PLATELET # BLD AUTO: 345 K/UL (ref 150–450)
PMV BLD AUTO: 9.8 FL (ref 9.2–12.9)
POTASSIUM SERPL-SCNC: 2.7 MMOL/L (ref 3.5–5.1)
PROT SERPL-MCNC: 6.1 G/DL (ref 6–8.4)
RBC # BLD AUTO: 3.01 M/UL (ref 4–5.4)
SODIUM SERPL-SCNC: 138 MMOL/L (ref 136–145)
WBC # BLD AUTO: 8.38 K/UL (ref 3.9–12.7)

## 2021-12-16 PROCEDURE — A4216 STERILE WATER/SALINE, 10 ML: HCPCS | Mod: PN | Performed by: INTERNAL MEDICINE

## 2021-12-16 PROCEDURE — 85025 COMPLETE CBC W/AUTO DIFF WBC: CPT | Mod: PN | Performed by: INTERNAL MEDICINE

## 2021-12-16 PROCEDURE — 25000003 PHARM REV CODE 250: Mod: PN | Performed by: INTERNAL MEDICINE

## 2021-12-16 PROCEDURE — 80053 COMPREHEN METABOLIC PANEL: CPT | Mod: PN | Performed by: INTERNAL MEDICINE

## 2021-12-16 PROCEDURE — 36591 DRAW BLOOD OFF VENOUS DEVICE: CPT | Mod: PN

## 2021-12-16 PROCEDURE — 63600175 PHARM REV CODE 636 W HCPCS: Mod: PN | Performed by: INTERNAL MEDICINE

## 2021-12-16 RX ORDER — SODIUM CHLORIDE 0.9 % (FLUSH) 0.9 %
10 SYRINGE (ML) INJECTION
Status: CANCELLED | OUTPATIENT
Start: 2021-12-16

## 2021-12-16 RX ORDER — HEPARIN 100 UNIT/ML
500 SYRINGE INTRAVENOUS
Status: COMPLETED | OUTPATIENT
Start: 2021-12-16 | End: 2021-12-16

## 2021-12-16 RX ORDER — SODIUM CHLORIDE 0.9 % (FLUSH) 0.9 %
10 SYRINGE (ML) INJECTION
Status: COMPLETED | OUTPATIENT
Start: 2021-12-16 | End: 2021-12-16

## 2021-12-16 RX ORDER — HEPARIN 100 UNIT/ML
500 SYRINGE INTRAVENOUS
Status: CANCELLED | OUTPATIENT
Start: 2021-12-16

## 2021-12-16 RX ADMIN — HEPARIN 500 UNITS: 100 SYRINGE at 12:12

## 2021-12-16 RX ADMIN — Medication 10 ML: at 12:12

## 2021-12-18 PROBLEM — R94.31 ABNORMAL ECG: Status: ACTIVE | Noted: 2021-12-18

## 2022-01-19 ENCOUNTER — LAB VISIT (OUTPATIENT)
Dept: LAB | Facility: HOSPITAL | Age: 47
End: 2022-01-19
Attending: INTERNAL MEDICINE
Payer: COMMERCIAL

## 2022-01-19 ENCOUNTER — INFUSION (OUTPATIENT)
Dept: INFUSION THERAPY | Facility: HOSPITAL | Age: 47
End: 2022-01-19
Attending: INTERNAL MEDICINE
Payer: COMMERCIAL

## 2022-01-19 DIAGNOSIS — I87.2 VENOUS INSUFFICIENCY: Primary | ICD-10-CM

## 2022-01-19 DIAGNOSIS — K50.80 REGIONAL ENTERITIS OF SMALL INTESTINE WITH LARGE INTESTINE: Primary | ICD-10-CM

## 2022-01-19 DIAGNOSIS — D50.8 IRON DEFICIENCY ANEMIA SECONDARY TO INADEQUATE DIETARY IRON INTAKE: ICD-10-CM

## 2022-01-19 DIAGNOSIS — E87.6 HYPOKALEMIA DUE TO LOSS OF POTASSIUM: ICD-10-CM

## 2022-01-19 LAB
ALBUMIN SERPL BCP-MCNC: 2.9 G/DL (ref 3.5–5.2)
ALP SERPL-CCNC: 75 U/L (ref 55–135)
ALT SERPL W/O P-5'-P-CCNC: <5 U/L (ref 10–44)
ANION GAP SERPL CALC-SCNC: 10 MMOL/L (ref 8–16)
AST SERPL-CCNC: 12 U/L (ref 10–40)
BASOPHILS # BLD AUTO: 0.1 K/UL (ref 0–0.2)
BASOPHILS NFR BLD: 1.3 % (ref 0–1.9)
BILIRUB SERPL-MCNC: 0.3 MG/DL (ref 0.1–1)
BUN SERPL-MCNC: 17 MG/DL (ref 6–20)
CALCIUM SERPL-MCNC: 8.6 MG/DL (ref 8.7–10.5)
CHLORIDE SERPL-SCNC: 113 MMOL/L (ref 95–110)
CO2 SERPL-SCNC: 19 MMOL/L (ref 23–29)
CREAT SERPL-MCNC: 1.4 MG/DL (ref 0.5–1.4)
CRP SERPL-MCNC: 1.6 MG/L (ref 0–8.2)
DIFFERENTIAL METHOD: ABNORMAL
EOSINOPHIL # BLD AUTO: 0.3 K/UL (ref 0–0.5)
EOSINOPHIL NFR BLD: 4.1 % (ref 0–8)
ERYTHROCYTE [DISTWIDTH] IN BLOOD BY AUTOMATED COUNT: 14.2 % (ref 11.5–14.5)
ERYTHROCYTE [SEDIMENTATION RATE] IN BLOOD BY WESTERGREN METHOD: 39 MM/HR (ref 0–36)
EST. GFR  (AFRICAN AMERICAN): 51 ML/MIN/1.73 M^2
EST. GFR  (NON AFRICAN AMERICAN): 44 ML/MIN/1.73 M^2
GLUCOSE SERPL-MCNC: 73 MG/DL (ref 70–110)
HCT VFR BLD AUTO: 28.8 % (ref 37–48.5)
HGB BLD-MCNC: 9.1 G/DL (ref 12–16)
IMM GRANULOCYTES # BLD AUTO: 0.09 K/UL (ref 0–0.04)
IMM GRANULOCYTES NFR BLD AUTO: 1.1 % (ref 0–0.5)
IRON SERPL-MCNC: 67 UG/DL (ref 30–160)
LYMPHOCYTES # BLD AUTO: 1.5 K/UL (ref 1–4.8)
LYMPHOCYTES NFR BLD: 19 % (ref 18–48)
MCH RBC QN AUTO: 33.6 PG (ref 27–31)
MCHC RBC AUTO-ENTMCNC: 31.6 G/DL (ref 32–36)
MCV RBC AUTO: 106 FL (ref 82–98)
MONOCYTES # BLD AUTO: 0.5 K/UL (ref 0.3–1)
MONOCYTES NFR BLD: 6.2 % (ref 4–15)
NEUTROPHILS # BLD AUTO: 5.4 K/UL (ref 1.8–7.7)
NEUTROPHILS NFR BLD: 68.3 % (ref 38–73)
NRBC BLD-RTO: 0 /100 WBC
PLATELET # BLD AUTO: 492 K/UL (ref 150–450)
PMV BLD AUTO: 10.1 FL (ref 9.2–12.9)
POTASSIUM SERPL-SCNC: 3.1 MMOL/L (ref 3.5–5.1)
PROT SERPL-MCNC: 6 G/DL (ref 6–8.4)
RBC # BLD AUTO: 2.71 M/UL (ref 4–5.4)
RETICS/RBC NFR AUTO: 3.5 % (ref 0.5–2.5)
SATURATED IRON: 45 % (ref 20–50)
SODIUM SERPL-SCNC: 142 MMOL/L (ref 136–145)
TOTAL IRON BINDING CAPACITY: 149 UG/DL (ref 250–450)
TRANSFERRIN SERPL-MCNC: 101 MG/DL (ref 200–375)
VIT B12 SERPL-MCNC: 590 PG/ML (ref 210–950)
WBC # BLD AUTO: 7.9 K/UL (ref 3.9–12.7)

## 2022-01-19 PROCEDURE — 85045 AUTOMATED RETICULOCYTE COUNT: CPT | Mod: PN | Performed by: INTERNAL MEDICINE

## 2022-01-19 PROCEDURE — 84466 ASSAY OF TRANSFERRIN: CPT | Performed by: INTERNAL MEDICINE

## 2022-01-19 PROCEDURE — 85025 COMPLETE CBC W/AUTO DIFF WBC: CPT | Mod: PN | Performed by: INTERNAL MEDICINE

## 2022-01-19 PROCEDURE — 25000003 PHARM REV CODE 250: Mod: PN | Performed by: INTERNAL MEDICINE

## 2022-01-19 PROCEDURE — 86140 C-REACTIVE PROTEIN: CPT | Performed by: INTERNAL MEDICINE

## 2022-01-19 PROCEDURE — 85652 RBC SED RATE AUTOMATED: CPT | Performed by: INTERNAL MEDICINE

## 2022-01-19 PROCEDURE — 36591 DRAW BLOOD OFF VENOUS DEVICE: CPT | Mod: PN

## 2022-01-19 PROCEDURE — 80053 COMPREHEN METABOLIC PANEL: CPT | Mod: PN | Performed by: INTERNAL MEDICINE

## 2022-01-19 PROCEDURE — 63600175 PHARM REV CODE 636 W HCPCS: Mod: PN | Performed by: INTERNAL MEDICINE

## 2022-01-19 PROCEDURE — 82607 VITAMIN B-12: CPT | Performed by: INTERNAL MEDICINE

## 2022-01-19 PROCEDURE — A4216 STERILE WATER/SALINE, 10 ML: HCPCS | Mod: PN | Performed by: INTERNAL MEDICINE

## 2022-01-19 PROCEDURE — 36415 COLL VENOUS BLD VENIPUNCTURE: CPT | Mod: PN | Performed by: INTERNAL MEDICINE

## 2022-01-19 RX ORDER — HEPARIN 100 UNIT/ML
500 SYRINGE INTRAVENOUS
Status: COMPLETED | OUTPATIENT
Start: 2022-01-19 | End: 2022-01-19

## 2022-01-19 RX ORDER — SODIUM CHLORIDE 0.9 % (FLUSH) 0.9 %
10 SYRINGE (ML) INJECTION
Status: CANCELLED | OUTPATIENT
Start: 2022-01-19

## 2022-01-19 RX ORDER — HEPARIN 100 UNIT/ML
500 SYRINGE INTRAVENOUS
Status: CANCELLED | OUTPATIENT
Start: 2022-01-19

## 2022-01-19 RX ORDER — SODIUM CHLORIDE 0.9 % (FLUSH) 0.9 %
10 SYRINGE (ML) INJECTION
Status: COMPLETED | OUTPATIENT
Start: 2022-01-19 | End: 2022-01-19

## 2022-01-19 RX ADMIN — Medication 500 UNITS: at 09:01

## 2022-01-19 RX ADMIN — Medication 10 ML: at 09:01

## 2022-02-02 ENCOUNTER — LAB VISIT (OUTPATIENT)
Dept: LAB | Facility: HOSPITAL | Age: 47
End: 2022-02-02
Attending: INTERNAL MEDICINE
Payer: COMMERCIAL

## 2022-02-02 DIAGNOSIS — K50.80 CROHN'S DISEASE OF SMALL AND LARGE INTESTINES: Primary | ICD-10-CM

## 2022-02-02 PROCEDURE — 83993 ASSAY FOR CALPROTECTIN FECAL: CPT | Performed by: INTERNAL MEDICINE

## 2022-02-08 LAB — CALPROTECTIN STL-MCNT: 171.9 MCG/G

## 2022-03-10 ENCOUNTER — LAB VISIT (OUTPATIENT)
Dept: LAB | Facility: HOSPITAL | Age: 47
End: 2022-03-10
Attending: INTERNAL MEDICINE
Payer: COMMERCIAL

## 2022-03-10 ENCOUNTER — INFUSION (OUTPATIENT)
Dept: INFUSION THERAPY | Facility: HOSPITAL | Age: 47
End: 2022-03-10
Attending: INTERNAL MEDICINE
Payer: COMMERCIAL

## 2022-03-10 DIAGNOSIS — I87.2 VENOUS INSUFFICIENCY: Primary | ICD-10-CM

## 2022-03-10 DIAGNOSIS — D50.8 IRON DEFICIENCY ANEMIA SECONDARY TO INADEQUATE DIETARY IRON INTAKE: ICD-10-CM

## 2022-03-10 DIAGNOSIS — E87.6 HYPOKALEMIA DUE TO LOSS OF POTASSIUM: ICD-10-CM

## 2022-03-10 DIAGNOSIS — I87.2 VENOUS INSUFFICIENCY: ICD-10-CM

## 2022-03-10 LAB
ALBUMIN SERPL BCP-MCNC: 3.6 G/DL (ref 3.5–5.2)
ALP SERPL-CCNC: 77 U/L (ref 55–135)
ALT SERPL W/O P-5'-P-CCNC: 7 U/L (ref 10–44)
ANION GAP SERPL CALC-SCNC: 11 MMOL/L (ref 8–16)
AST SERPL-CCNC: 11 U/L (ref 10–40)
BASOPHILS # BLD AUTO: 0.13 K/UL (ref 0–0.2)
BASOPHILS NFR BLD: 1.4 % (ref 0–1.9)
BILIRUB SERPL-MCNC: 0.1 MG/DL (ref 0.1–1)
BUN SERPL-MCNC: 32 MG/DL (ref 6–20)
CALCIUM SERPL-MCNC: 8.8 MG/DL (ref 8.7–10.5)
CHLORIDE SERPL-SCNC: 114 MMOL/L (ref 95–110)
CO2 SERPL-SCNC: 18 MMOL/L (ref 23–29)
CREAT SERPL-MCNC: 1.6 MG/DL (ref 0.5–1.4)
DIFFERENTIAL METHOD: ABNORMAL
EOSINOPHIL # BLD AUTO: 0.5 K/UL (ref 0–0.5)
EOSINOPHIL NFR BLD: 5.7 % (ref 0–8)
ERYTHROCYTE [DISTWIDTH] IN BLOOD BY AUTOMATED COUNT: 13.2 % (ref 11.5–14.5)
EST. GFR  (AFRICAN AMERICAN): 43 ML/MIN/1.73 M^2
EST. GFR  (NON AFRICAN AMERICAN): 38 ML/MIN/1.73 M^2
FERRITIN SERPL-MCNC: 32 NG/ML (ref 20–300)
GLUCOSE SERPL-MCNC: 85 MG/DL (ref 70–110)
HCT VFR BLD AUTO: 33.3 % (ref 37–48.5)
HGB BLD-MCNC: 10.4 G/DL (ref 12–16)
IMM GRANULOCYTES # BLD AUTO: 0.04 K/UL (ref 0–0.04)
IMM GRANULOCYTES NFR BLD AUTO: 0.4 % (ref 0–0.5)
IRON SERPL-MCNC: 57 UG/DL (ref 30–160)
LYMPHOCYTES # BLD AUTO: 2 K/UL (ref 1–4.8)
LYMPHOCYTES NFR BLD: 22 % (ref 18–48)
MCH RBC QN AUTO: 33.1 PG (ref 27–31)
MCHC RBC AUTO-ENTMCNC: 31.2 G/DL (ref 32–36)
MCV RBC AUTO: 106 FL (ref 82–98)
MONOCYTES # BLD AUTO: 0.6 K/UL (ref 0.3–1)
MONOCYTES NFR BLD: 7 % (ref 4–15)
NEUTROPHILS # BLD AUTO: 5.8 K/UL (ref 1.8–7.7)
NEUTROPHILS NFR BLD: 63.5 % (ref 38–73)
NRBC BLD-RTO: 0 /100 WBC
PLATELET # BLD AUTO: 362 K/UL (ref 150–450)
PMV BLD AUTO: 10.4 FL (ref 9.2–12.9)
POTASSIUM SERPL-SCNC: 3.7 MMOL/L (ref 3.5–5.1)
PROT SERPL-MCNC: 6.6 G/DL (ref 6–8.4)
RBC # BLD AUTO: 3.14 M/UL (ref 4–5.4)
SATURATED IRON: 21 % (ref 20–50)
SODIUM SERPL-SCNC: 143 MMOL/L (ref 136–145)
TOTAL IRON BINDING CAPACITY: 277 UG/DL (ref 250–450)
TRANSFERRIN SERPL-MCNC: 187 MG/DL (ref 200–375)
WBC # BLD AUTO: 9.18 K/UL (ref 3.9–12.7)

## 2022-03-10 PROCEDURE — 63600175 PHARM REV CODE 636 W HCPCS: Mod: PN | Performed by: INTERNAL MEDICINE

## 2022-03-10 PROCEDURE — 82728 ASSAY OF FERRITIN: CPT | Performed by: INTERNAL MEDICINE

## 2022-03-10 PROCEDURE — 84466 ASSAY OF TRANSFERRIN: CPT | Performed by: INTERNAL MEDICINE

## 2022-03-10 PROCEDURE — 36415 COLL VENOUS BLD VENIPUNCTURE: CPT | Mod: PN | Performed by: INTERNAL MEDICINE

## 2022-03-10 PROCEDURE — 80053 COMPREHEN METABOLIC PANEL: CPT | Mod: PN | Performed by: INTERNAL MEDICINE

## 2022-03-10 PROCEDURE — 36591 DRAW BLOOD OFF VENOUS DEVICE: CPT | Mod: PN

## 2022-03-10 PROCEDURE — 85025 COMPLETE CBC W/AUTO DIFF WBC: CPT | Mod: PN | Performed by: INTERNAL MEDICINE

## 2022-03-10 PROCEDURE — A4216 STERILE WATER/SALINE, 10 ML: HCPCS | Mod: PN | Performed by: INTERNAL MEDICINE

## 2022-03-10 PROCEDURE — 25000003 PHARM REV CODE 250: Mod: PN | Performed by: INTERNAL MEDICINE

## 2022-03-10 RX ORDER — SODIUM CHLORIDE 0.9 % (FLUSH) 0.9 %
10 SYRINGE (ML) INJECTION
Status: CANCELLED | OUTPATIENT
Start: 2022-03-10

## 2022-03-10 RX ORDER — SODIUM CHLORIDE 0.9 % (FLUSH) 0.9 %
10 SYRINGE (ML) INJECTION
Status: COMPLETED | OUTPATIENT
Start: 2022-03-10 | End: 2022-03-10

## 2022-03-10 RX ORDER — HEPARIN 100 UNIT/ML
500 SYRINGE INTRAVENOUS
Status: COMPLETED | OUTPATIENT
Start: 2022-03-10 | End: 2022-03-10

## 2022-03-10 RX ORDER — HEPARIN 100 UNIT/ML
500 SYRINGE INTRAVENOUS
Status: CANCELLED | OUTPATIENT
Start: 2022-03-10

## 2022-03-10 RX ADMIN — Medication 10 ML: at 10:03

## 2022-03-10 RX ADMIN — Medication 500 UNITS: at 10:03

## 2022-06-07 PROBLEM — E87.3 ALKALOSIS: Status: ACTIVE | Noted: 2021-03-28

## 2022-07-14 ENCOUNTER — TELEPHONE (OUTPATIENT)
Dept: INFUSION THERAPY | Facility: HOSPITAL | Age: 47
End: 2022-07-14
Payer: COMMERCIAL

## 2022-07-18 ENCOUNTER — INFUSION (OUTPATIENT)
Dept: INFUSION THERAPY | Facility: HOSPITAL | Age: 47
End: 2022-07-18
Attending: INTERNAL MEDICINE
Payer: COMMERCIAL

## 2022-07-18 DIAGNOSIS — K50.80 CROHN'S DISEASE OF BOTH SMALL AND LARGE INTESTINE WITHOUT COMPLICATION: ICD-10-CM

## 2022-07-18 DIAGNOSIS — I87.2 VENOUS INSUFFICIENCY: Primary | ICD-10-CM

## 2022-07-18 DIAGNOSIS — D50.8 IRON DEFICIENCY ANEMIA SECONDARY TO INADEQUATE DIETARY IRON INTAKE: ICD-10-CM

## 2022-07-18 DIAGNOSIS — E87.6 HYPOKALEMIA DUE TO LOSS OF POTASSIUM: ICD-10-CM

## 2022-07-18 LAB
ALBUMIN SERPL BCP-MCNC: 3.3 G/DL (ref 3.5–5.2)
ALP SERPL-CCNC: 80 U/L (ref 55–135)
ALT SERPL W/O P-5'-P-CCNC: 9 U/L (ref 10–44)
ANION GAP SERPL CALC-SCNC: 12 MMOL/L (ref 8–16)
AST SERPL-CCNC: 13 U/L (ref 10–40)
BASOPHILS # BLD AUTO: 0.09 K/UL (ref 0–0.2)
BASOPHILS NFR BLD: 0.9 % (ref 0–1.9)
BILIRUB SERPL-MCNC: 0.2 MG/DL (ref 0.1–1)
BUN SERPL-MCNC: 20 MG/DL (ref 6–20)
CALCIUM SERPL-MCNC: 8.3 MG/DL (ref 8.7–10.5)
CHLORIDE SERPL-SCNC: 113 MMOL/L (ref 95–110)
CO2 SERPL-SCNC: 14 MMOL/L (ref 23–29)
CREAT SERPL-MCNC: 1.2 MG/DL (ref 0.5–1.4)
DIFFERENTIAL METHOD: ABNORMAL
EOSINOPHIL # BLD AUTO: 0.3 K/UL (ref 0–0.5)
EOSINOPHIL NFR BLD: 3.3 % (ref 0–8)
ERYTHROCYTE [DISTWIDTH] IN BLOOD BY AUTOMATED COUNT: 16.8 % (ref 11.5–14.5)
EST. GFR  (AFRICAN AMERICAN): >60 ML/MIN/1.73 M^2
EST. GFR  (NON AFRICAN AMERICAN): 53 ML/MIN/1.73 M^2
GLUCOSE SERPL-MCNC: 89 MG/DL (ref 70–110)
HCT VFR BLD AUTO: 27.7 % (ref 37–48.5)
HGB BLD-MCNC: 8.1 G/DL (ref 12–16)
IMM GRANULOCYTES # BLD AUTO: 0.05 K/UL (ref 0–0.04)
IMM GRANULOCYTES NFR BLD AUTO: 0.5 % (ref 0–0.5)
LYMPHOCYTES # BLD AUTO: 2 K/UL (ref 1–4.8)
LYMPHOCYTES NFR BLD: 20.4 % (ref 18–48)
MCH RBC QN AUTO: 26 PG (ref 27–31)
MCHC RBC AUTO-ENTMCNC: 29.2 G/DL (ref 32–36)
MCV RBC AUTO: 89 FL (ref 82–98)
MONOCYTES # BLD AUTO: 0.8 K/UL (ref 0.3–1)
MONOCYTES NFR BLD: 8 % (ref 4–15)
NEUTROPHILS # BLD AUTO: 6.6 K/UL (ref 1.8–7.7)
NEUTROPHILS NFR BLD: 66.9 % (ref 38–73)
NRBC BLD-RTO: 0 /100 WBC
PLATELET # BLD AUTO: 483 K/UL (ref 150–450)
PMV BLD AUTO: 9.9 FL (ref 9.2–12.9)
POTASSIUM SERPL-SCNC: 3.5 MMOL/L (ref 3.5–5.1)
PROT SERPL-MCNC: 6.5 G/DL (ref 6–8.4)
RBC # BLD AUTO: 3.12 M/UL (ref 4–5.4)
SODIUM SERPL-SCNC: 139 MMOL/L (ref 136–145)
WBC # BLD AUTO: 9.89 K/UL (ref 3.9–12.7)

## 2022-07-18 PROCEDURE — 63600175 PHARM REV CODE 636 W HCPCS: Mod: PN | Performed by: INTERNAL MEDICINE

## 2022-07-18 PROCEDURE — A4216 STERILE WATER/SALINE, 10 ML: HCPCS | Mod: PN | Performed by: INTERNAL MEDICINE

## 2022-07-18 PROCEDURE — 80053 COMPREHEN METABOLIC PANEL: CPT | Mod: PN | Performed by: INTERNAL MEDICINE

## 2022-07-18 PROCEDURE — 85025 COMPLETE CBC W/AUTO DIFF WBC: CPT | Mod: PN | Performed by: INTERNAL MEDICINE

## 2022-07-18 PROCEDURE — 25000003 PHARM REV CODE 250: Mod: PN | Performed by: INTERNAL MEDICINE

## 2022-07-18 PROCEDURE — 36591 DRAW BLOOD OFF VENOUS DEVICE: CPT | Mod: PN

## 2022-07-18 RX ORDER — SODIUM CHLORIDE 0.9 % (FLUSH) 0.9 %
10 SYRINGE (ML) INJECTION
Status: CANCELLED | OUTPATIENT
Start: 2022-07-18

## 2022-07-18 RX ORDER — HEPARIN 100 UNIT/ML
500 SYRINGE INTRAVENOUS
Status: CANCELLED | OUTPATIENT
Start: 2022-07-18

## 2022-07-18 RX ORDER — SODIUM CHLORIDE 0.9 % (FLUSH) 0.9 %
10 SYRINGE (ML) INJECTION
Status: COMPLETED | OUTPATIENT
Start: 2022-07-18 | End: 2022-07-18

## 2022-07-18 RX ORDER — HEPARIN 100 UNIT/ML
500 SYRINGE INTRAVENOUS
Status: COMPLETED | OUTPATIENT
Start: 2022-07-18 | End: 2022-07-18

## 2022-07-18 RX ADMIN — Medication 500 UNITS: at 10:07

## 2022-07-18 RX ADMIN — Medication 10 ML: at 10:07

## 2022-08-12 ENCOUNTER — INFUSION (OUTPATIENT)
Dept: INFUSION THERAPY | Facility: HOSPITAL | Age: 47
End: 2022-08-12
Attending: INTERNAL MEDICINE
Payer: COMMERCIAL

## 2022-08-12 DIAGNOSIS — D50.8 IRON DEFICIENCY ANEMIA SECONDARY TO INADEQUATE DIETARY IRON INTAKE: ICD-10-CM

## 2022-08-12 DIAGNOSIS — I87.2 VENOUS INSUFFICIENCY: Primary | ICD-10-CM

## 2022-08-12 DIAGNOSIS — E87.6 HYPOKALEMIA DUE TO LOSS OF POTASSIUM: ICD-10-CM

## 2022-08-12 LAB
ALBUMIN SERPL BCP-MCNC: 3 G/DL (ref 3.5–5.2)
ALP SERPL-CCNC: 78 U/L (ref 55–135)
ALT SERPL W/O P-5'-P-CCNC: 7 U/L (ref 10–44)
ANION GAP SERPL CALC-SCNC: 7 MMOL/L (ref 8–16)
AST SERPL-CCNC: 10 U/L (ref 10–40)
BASOPHILS # BLD AUTO: 0.07 K/UL (ref 0–0.2)
BASOPHILS NFR BLD: 0.8 % (ref 0–1.9)
BILIRUB SERPL-MCNC: 0.1 MG/DL (ref 0.1–1)
BUN SERPL-MCNC: 28 MG/DL (ref 6–20)
CALCIUM SERPL-MCNC: 8 MG/DL (ref 8.7–10.5)
CHLORIDE SERPL-SCNC: 111 MMOL/L (ref 95–110)
CO2 SERPL-SCNC: 20 MMOL/L (ref 23–29)
CREAT SERPL-MCNC: 1.4 MG/DL (ref 0.5–1.4)
DIFFERENTIAL METHOD: ABNORMAL
EOSINOPHIL # BLD AUTO: 0.4 K/UL (ref 0–0.5)
EOSINOPHIL NFR BLD: 4.3 % (ref 0–8)
ERYTHROCYTE [DISTWIDTH] IN BLOOD BY AUTOMATED COUNT: 16.3 % (ref 11.5–14.5)
EST. GFR  (NO RACE VARIABLE): 46.1 ML/MIN/1.73 M^2
GLUCOSE SERPL-MCNC: 130 MG/DL (ref 70–110)
HCT VFR BLD AUTO: 19.9 % (ref 37–48.5)
HGB BLD-MCNC: 5.6 G/DL (ref 12–16)
IMM GRANULOCYTES # BLD AUTO: 0.03 K/UL (ref 0–0.04)
IMM GRANULOCYTES NFR BLD AUTO: 0.4 % (ref 0–0.5)
LYMPHOCYTES # BLD AUTO: 1.6 K/UL (ref 1–4.8)
LYMPHOCYTES NFR BLD: 19.3 % (ref 18–48)
MCH RBC QN AUTO: 23.9 PG (ref 27–31)
MCHC RBC AUTO-ENTMCNC: 28.1 G/DL (ref 32–36)
MCV RBC AUTO: 85 FL (ref 82–98)
MONOCYTES # BLD AUTO: 0.7 K/UL (ref 0.3–1)
MONOCYTES NFR BLD: 8.7 % (ref 4–15)
NEUTROPHILS # BLD AUTO: 5.6 K/UL (ref 1.8–7.7)
NEUTROPHILS NFR BLD: 66.5 % (ref 38–73)
NRBC BLD-RTO: 0 /100 WBC
PLATELET # BLD AUTO: 410 K/UL (ref 150–450)
PLATELET BLD QL SMEAR: ABNORMAL
PMV BLD AUTO: 10.2 FL (ref 9.2–12.9)
POTASSIUM SERPL-SCNC: 4.1 MMOL/L (ref 3.5–5.1)
PROT SERPL-MCNC: 5.7 G/DL (ref 6–8.4)
RBC # BLD AUTO: 2.34 M/UL (ref 4–5.4)
SODIUM SERPL-SCNC: 138 MMOL/L (ref 136–145)
WBC # BLD AUTO: 8.4 K/UL (ref 3.9–12.7)

## 2022-08-12 PROCEDURE — 80053 COMPREHEN METABOLIC PANEL: CPT | Mod: 91,PN | Performed by: INTERNAL MEDICINE

## 2022-08-12 PROCEDURE — 36591 DRAW BLOOD OFF VENOUS DEVICE: CPT | Mod: PN

## 2022-08-12 PROCEDURE — A4216 STERILE WATER/SALINE, 10 ML: HCPCS | Mod: PN | Performed by: INTERNAL MEDICINE

## 2022-08-12 PROCEDURE — 63600175 PHARM REV CODE 636 W HCPCS: Mod: PN | Performed by: INTERNAL MEDICINE

## 2022-08-12 PROCEDURE — 85025 COMPLETE CBC W/AUTO DIFF WBC: CPT | Mod: 91,PN | Performed by: INTERNAL MEDICINE

## 2022-08-12 PROCEDURE — 25000003 PHARM REV CODE 250: Mod: PN | Performed by: INTERNAL MEDICINE

## 2022-08-12 RX ORDER — SODIUM CHLORIDE 0.9 % (FLUSH) 0.9 %
10 SYRINGE (ML) INJECTION
Status: CANCELLED | OUTPATIENT
Start: 2022-08-12

## 2022-08-12 RX ORDER — HEPARIN 100 UNIT/ML
500 SYRINGE INTRAVENOUS
Status: CANCELLED | OUTPATIENT
Start: 2022-08-12

## 2022-08-12 RX ORDER — SODIUM CHLORIDE 0.9 % (FLUSH) 0.9 %
10 SYRINGE (ML) INJECTION
Status: COMPLETED | OUTPATIENT
Start: 2022-08-12 | End: 2022-08-12

## 2022-08-12 RX ORDER — HEPARIN 100 UNIT/ML
500 SYRINGE INTRAVENOUS
Status: COMPLETED | OUTPATIENT
Start: 2022-08-12 | End: 2022-08-12

## 2022-08-12 RX ADMIN — Medication 500 UNITS: at 04:08

## 2022-08-12 RX ADMIN — Medication 10 ML: at 04:08

## 2022-08-12 NOTE — PLAN OF CARE
Problem: Adult Inpatient Plan of Care  Goal: Plan of Care Review  Outcome: Ongoing, Progressing  Goal: Patient-Specific Goal (Individualized)  Outcome: Ongoing, Progressing   Pt tolerated labs drawn from port well.   No adverse reaction noted.  PAC flushed with Heparin and de-accessed per protocol.   Pt left clinic in no acute distress.

## 2022-08-30 ENCOUNTER — INFUSION (OUTPATIENT)
Dept: INFUSION THERAPY | Facility: HOSPITAL | Age: 47
End: 2022-08-30
Attending: INTERNAL MEDICINE
Payer: COMMERCIAL

## 2022-08-30 VITALS — TEMPERATURE: 97 F

## 2022-08-30 DIAGNOSIS — D50.8 IRON DEFICIENCY ANEMIA SECONDARY TO INADEQUATE DIETARY IRON INTAKE: ICD-10-CM

## 2022-08-30 DIAGNOSIS — I87.2 VENOUS INSUFFICIENCY: Primary | ICD-10-CM

## 2022-08-30 DIAGNOSIS — E87.6 HYPOKALEMIA DUE TO LOSS OF POTASSIUM: ICD-10-CM

## 2022-08-30 LAB
ALBUMIN SERPL BCP-MCNC: 3.5 G/DL (ref 3.5–5.2)
ALP SERPL-CCNC: 112 U/L (ref 55–135)
ALT SERPL W/O P-5'-P-CCNC: 53 U/L (ref 10–44)
ANION GAP SERPL CALC-SCNC: 7 MMOL/L (ref 8–16)
AST SERPL-CCNC: 25 U/L (ref 10–40)
BASOPHILS # BLD AUTO: 0.07 K/UL (ref 0–0.2)
BASOPHILS NFR BLD: 1 % (ref 0–1.9)
BILIRUB SERPL-MCNC: 0.2 MG/DL (ref 0.1–1)
BUN SERPL-MCNC: 15 MG/DL (ref 6–20)
CALCIUM SERPL-MCNC: 8.8 MG/DL (ref 8.7–10.5)
CHLORIDE SERPL-SCNC: 112 MMOL/L (ref 95–110)
CO2 SERPL-SCNC: 21 MMOL/L (ref 23–29)
CREAT SERPL-MCNC: 1.2 MG/DL (ref 0.5–1.4)
DIFFERENTIAL METHOD: ABNORMAL
EOSINOPHIL # BLD AUTO: 0.4 K/UL (ref 0–0.5)
EOSINOPHIL NFR BLD: 5.5 % (ref 0–8)
ERYTHROCYTE [DISTWIDTH] IN BLOOD BY AUTOMATED COUNT: 18.6 % (ref 11.5–14.5)
EST. GFR  (NO RACE VARIABLE): 55.5 ML/MIN/1.73 M^2
FERRITIN SERPL-MCNC: 49 NG/ML (ref 20–300)
FOLATE SERPL-MCNC: 9.2 NG/ML (ref 4–24)
GLUCOSE SERPL-MCNC: 69 MG/DL (ref 70–110)
HCT VFR BLD AUTO: 22.7 % (ref 37–48.5)
HGB BLD-MCNC: 6.9 G/DL (ref 12–16)
IMM GRANULOCYTES # BLD AUTO: 0.05 K/UL (ref 0–0.04)
IMM GRANULOCYTES NFR BLD AUTO: 0.7 % (ref 0–0.5)
LYMPHOCYTES # BLD AUTO: 1.7 K/UL (ref 1–4.8)
LYMPHOCYTES NFR BLD: 24.7 % (ref 18–48)
MCH RBC QN AUTO: 26.4 PG (ref 27–31)
MCHC RBC AUTO-ENTMCNC: 30.4 G/DL (ref 32–36)
MCV RBC AUTO: 87 FL (ref 82–98)
MONOCYTES # BLD AUTO: 0.4 K/UL (ref 0.3–1)
MONOCYTES NFR BLD: 6.4 % (ref 4–15)
NEUTROPHILS # BLD AUTO: 4.1 K/UL (ref 1.8–7.7)
NEUTROPHILS NFR BLD: 61.7 % (ref 38–73)
NRBC BLD-RTO: 0 /100 WBC
PLATELET # BLD AUTO: 417 K/UL (ref 150–450)
PMV BLD AUTO: 10.3 FL (ref 9.2–12.9)
POTASSIUM SERPL-SCNC: 3.3 MMOL/L (ref 3.5–5.1)
PROT SERPL-MCNC: 6.4 G/DL (ref 6–8.4)
RBC # BLD AUTO: 2.61 M/UL (ref 4–5.4)
SODIUM SERPL-SCNC: 140 MMOL/L (ref 136–145)
VIT B12 SERPL-MCNC: 393 PG/ML (ref 210–950)
WBC # BLD AUTO: 6.71 K/UL (ref 3.9–12.7)

## 2022-08-30 PROCEDURE — 25000003 PHARM REV CODE 250: Mod: PN | Performed by: INTERNAL MEDICINE

## 2022-08-30 PROCEDURE — 82746 ASSAY OF FOLIC ACID SERUM: CPT | Performed by: NURSE PRACTITIONER

## 2022-08-30 PROCEDURE — 85025 COMPLETE CBC W/AUTO DIFF WBC: CPT | Mod: PN | Performed by: NURSE PRACTITIONER

## 2022-08-30 PROCEDURE — 63600175 PHARM REV CODE 636 W HCPCS: Mod: PN | Performed by: INTERNAL MEDICINE

## 2022-08-30 PROCEDURE — A4216 STERILE WATER/SALINE, 10 ML: HCPCS | Mod: PN | Performed by: INTERNAL MEDICINE

## 2022-08-30 PROCEDURE — 82728 ASSAY OF FERRITIN: CPT | Performed by: NURSE PRACTITIONER

## 2022-08-30 PROCEDURE — 80053 COMPREHEN METABOLIC PANEL: CPT | Mod: PN | Performed by: NURSE PRACTITIONER

## 2022-08-30 PROCEDURE — 82607 VITAMIN B-12: CPT | Performed by: NURSE PRACTITIONER

## 2022-08-30 PROCEDURE — 36591 DRAW BLOOD OFF VENOUS DEVICE: CPT | Mod: PN

## 2022-08-30 RX ORDER — SODIUM CHLORIDE 0.9 % (FLUSH) 0.9 %
10 SYRINGE (ML) INJECTION
Status: COMPLETED | OUTPATIENT
Start: 2022-08-30 | End: 2022-08-30

## 2022-08-30 RX ORDER — HEPARIN 100 UNIT/ML
500 SYRINGE INTRAVENOUS
Status: COMPLETED | OUTPATIENT
Start: 2022-08-30 | End: 2022-08-30

## 2022-08-30 RX ORDER — HEPARIN 100 UNIT/ML
500 SYRINGE INTRAVENOUS
Status: CANCELLED | OUTPATIENT
Start: 2022-08-30

## 2022-08-30 RX ORDER — SODIUM CHLORIDE 0.9 % (FLUSH) 0.9 %
10 SYRINGE (ML) INJECTION
Status: CANCELLED | OUTPATIENT
Start: 2022-08-30

## 2022-08-30 RX ADMIN — Medication 500 UNITS: at 12:08

## 2022-08-30 RX ADMIN — Medication 10 ML: at 12:08

## 2022-08-30 NOTE — PLAN OF CARE
Problem: Adult Inpatient Plan of Care  Goal: Plan of Care Review  Outcome: Met   Tolerated port flush well today Pt d/c to home with instructions  To private vehicle without difficulty  NAD

## 2022-08-31 ENCOUNTER — INFUSION (OUTPATIENT)
Dept: INFUSION THERAPY | Facility: HOSPITAL | Age: 47
End: 2022-08-31
Attending: INTERNAL MEDICINE
Payer: COMMERCIAL

## 2022-08-31 DIAGNOSIS — E87.6 HYPOKALEMIA DUE TO LOSS OF POTASSIUM: ICD-10-CM

## 2022-08-31 DIAGNOSIS — I87.2 VENOUS INSUFFICIENCY: ICD-10-CM

## 2022-08-31 DIAGNOSIS — D62 ACUTE BLOOD LOSS ANEMIA: Primary | ICD-10-CM

## 2022-08-31 DIAGNOSIS — D50.8 IRON DEFICIENCY ANEMIA SECONDARY TO INADEQUATE DIETARY IRON INTAKE: ICD-10-CM

## 2022-08-31 LAB
ABO + RH BLD: NORMAL
BLD GP AB SCN CELLS X3 SERPL QL: NORMAL

## 2022-08-31 PROCEDURE — 86901 BLOOD TYPING SEROLOGIC RH(D): CPT | Performed by: NURSE PRACTITIONER

## 2022-08-31 PROCEDURE — 86850 RBC ANTIBODY SCREEN: CPT | Mod: PN | Performed by: NURSE PRACTITIONER

## 2022-08-31 PROCEDURE — 36415 COLL VENOUS BLD VENIPUNCTURE: CPT | Mod: PN | Performed by: NURSE PRACTITIONER

## 2022-08-31 PROCEDURE — 36591 DRAW BLOOD OFF VENOUS DEVICE: CPT | Mod: PN

## 2022-08-31 PROCEDURE — 25000003 PHARM REV CODE 250: Mod: PN | Performed by: INTERNAL MEDICINE

## 2022-08-31 PROCEDURE — A4216 STERILE WATER/SALINE, 10 ML: HCPCS | Mod: PN | Performed by: INTERNAL MEDICINE

## 2022-08-31 PROCEDURE — 63600175 PHARM REV CODE 636 W HCPCS: Mod: PN | Performed by: INTERNAL MEDICINE

## 2022-08-31 RX ORDER — SODIUM CHLORIDE 0.9 % (FLUSH) 0.9 %
10 SYRINGE (ML) INJECTION
Status: CANCELLED | OUTPATIENT
Start: 2022-08-31

## 2022-08-31 RX ORDER — HEPARIN 100 UNIT/ML
500 SYRINGE INTRAVENOUS
Status: CANCELLED | OUTPATIENT
Start: 2022-08-31

## 2022-08-31 RX ORDER — SODIUM CHLORIDE 0.9 % (FLUSH) 0.9 %
10 SYRINGE (ML) INJECTION
Status: COMPLETED | OUTPATIENT
Start: 2022-08-31 | End: 2022-08-31

## 2022-08-31 RX ORDER — HEPARIN 100 UNIT/ML
500 SYRINGE INTRAVENOUS
Status: COMPLETED | OUTPATIENT
Start: 2022-08-31 | End: 2022-08-31

## 2022-08-31 RX ADMIN — Medication 10 ML: at 04:08

## 2022-08-31 RX ADMIN — Medication 500 UNITS: at 05:08

## 2022-09-08 ENCOUNTER — TELEPHONE (OUTPATIENT)
Dept: HEMATOLOGY/ONCOLOGY | Facility: CLINIC | Age: 47
End: 2022-09-08
Payer: COMMERCIAL

## 2022-09-08 NOTE — TELEPHONE ENCOUNTER
Received a lab order for CBC with diff to our fax in navigation. Scanned a copy into media, but also faxed to infusion as she receives port flushes with lab collection in infusion. Charge nurse notified.

## 2022-10-10 ENCOUNTER — TELEPHONE (OUTPATIENT)
Dept: ADMINISTRATIVE | Facility: OTHER | Age: 47
End: 2022-10-10
Payer: COMMERCIAL

## 2022-10-13 ENCOUNTER — OFFICE VISIT (OUTPATIENT)
Dept: GASTROENTEROLOGY | Facility: CLINIC | Age: 47
End: 2022-10-13
Payer: COMMERCIAL

## 2022-10-13 VITALS
HEIGHT: 64 IN | SYSTOLIC BLOOD PRESSURE: 107 MMHG | HEART RATE: 73 BPM | WEIGHT: 108.69 LBS | BODY MASS INDEX: 18.56 KG/M2 | DIASTOLIC BLOOD PRESSURE: 73 MMHG

## 2022-10-13 DIAGNOSIS — K50.019 CROHN'S DISEASE OF SMALL INTESTINE WITH COMPLICATION: Primary | ICD-10-CM

## 2022-10-13 PROCEDURE — 3078F DIAST BP <80 MM HG: CPT | Mod: CPTII,S$GLB,, | Performed by: STUDENT IN AN ORGANIZED HEALTH CARE EDUCATION/TRAINING PROGRAM

## 2022-10-13 PROCEDURE — 99999 PR PBB SHADOW E&M-EST. PATIENT-LVL III: CPT | Mod: PBBFAC,,, | Performed by: STUDENT IN AN ORGANIZED HEALTH CARE EDUCATION/TRAINING PROGRAM

## 2022-10-13 PROCEDURE — 99999 PR PBB SHADOW E&M-EST. PATIENT-LVL III: ICD-10-PCS | Mod: PBBFAC,,, | Performed by: STUDENT IN AN ORGANIZED HEALTH CARE EDUCATION/TRAINING PROGRAM

## 2022-10-13 PROCEDURE — 99204 OFFICE O/P NEW MOD 45 MIN: CPT | Mod: S$GLB,,, | Performed by: STUDENT IN AN ORGANIZED HEALTH CARE EDUCATION/TRAINING PROGRAM

## 2022-10-13 PROCEDURE — 3078F PR MOST RECENT DIASTOLIC BLOOD PRESSURE < 80 MM HG: ICD-10-PCS | Mod: CPTII,S$GLB,, | Performed by: STUDENT IN AN ORGANIZED HEALTH CARE EDUCATION/TRAINING PROGRAM

## 2022-10-13 PROCEDURE — 3074F SYST BP LT 130 MM HG: CPT | Mod: CPTII,S$GLB,, | Performed by: STUDENT IN AN ORGANIZED HEALTH CARE EDUCATION/TRAINING PROGRAM

## 2022-10-13 PROCEDURE — 99204 PR OFFICE/OUTPT VISIT, NEW, LEVL IV, 45-59 MIN: ICD-10-PCS | Mod: S$GLB,,, | Performed by: STUDENT IN AN ORGANIZED HEALTH CARE EDUCATION/TRAINING PROGRAM

## 2022-10-13 PROCEDURE — 3074F PR MOST RECENT SYSTOLIC BLOOD PRESSURE < 130 MM HG: ICD-10-PCS | Mod: CPTII,S$GLB,, | Performed by: STUDENT IN AN ORGANIZED HEALTH CARE EDUCATION/TRAINING PROGRAM

## 2022-10-13 NOTE — PROGRESS NOTES
[facility] Consultation Note    Reason for Consult:  Crohn's disease, non-healing duodenal ulcers     PCP:   Haven Gary   No address on file    Referring MD:  Aaareferral Self  No address on file    HPI:  This is a 47 y.o. female here for second opinion on non-healing duodenal ulcers and history of Crohn's Disease. She was diagnosed with Crohn's in the 90s which was in the terminal ileum initially. Her symptoms at that time were abdominal pain, N/V, weight loss. She had a ileocolectomy in 1998. Extraintestinal manifestations included arthropathy at that time. She was treated with prednisone on and off and several other pills that she does not remember. After her surgery, however, she did well for years off of all medications. She even had two uncomplicated pregnancies and deliveries in this time. Then, around 2019 she started to have more N/V and again started to lose weight. Never has had significnat issues with diarrhea, more constipation. 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 She has had several episodes of UGIB including a recent severe episode in 8/2022 where her Hg was down to 5. EGD at that time on 8/13/22  showed 15mm duodenal ulcer with stigmata which was treated with bipolar cautery. The acquired duodenal stenosis was also noted (this was last dilated in 2/2022 to 15mm with CRE balloon). All the pathology from these procedures were reviewed and mostly are benign ulcerated tissue but the pathology from 12/2022 showed Crohn's disease involvement. She has received 25 units of blood over this time. She was started on humira in 2020 after the EGD proved the upper GI crohn's involvement and she took this biweekly for 8-9 months. She did feel like it helped her symptoms and the ulcers were seemingly improved. However, she was documented to lose response and formed antibodies despite addition of  MTX and weekly dosing. She was started on stelara one year ago and receives q8 week doing at [facility] in [place]. She does feel this is helping her symptoms. She has gained weight back and N/V is much better. Eats a liberal diet without issue. She had a GES study in 2020 which showed delayed gastric emptying but this was thought to be secondary the the ulcerations and stenosis in the duodenum. Currently not experience symptoms consistent with delayed gastric emptying.     She also has required PEG placement for malnutrition which was placed in 7/2020. A J extention was added 9/2020. This was removed in 4/2021. Most recent abdominal imaging is from 2021 which showed evidence of prior cholecystectomy and constipation. Last CTE is from 2020 which showed short segment of mid wall thickening vs non distension of the transverse colon. Evidence of ileocolonic anastomosis was noted. Last cscope was in 2019 and showed no evidence of lower GI disease.     Of note, she states she had an EGD this Monday in [place] which we do not have the records of. She was told that one of the ulcers still had stigmata of bleeding and was again treated with cautery. There was no active bleeding on that EGD and the other ulcers sound like they appeared clean based.     ROS:  Constitutional: No fevers, chills, No weight loss  ENT: No allergies  CV: No chest pain  Pulm: No cough, No shortness of breath  Ophtho: No vision changes  GI: see HPI  Derm: No rash  Heme: No lymphadenopathy, No bruising  MSK: No arthritis  : No dysuria, No hematuria  Endo: No hot or cold intolerance  Neuro: No syncope, No seizure  Psych: No anxiety, No depression    Medical History:  has a past medical history of Abnormal Pap smear, Bipolar 1 disorder, Crohn's disease, Encounter for blood transfusion (2010), Fatigue, Gastroparesis, H/O ETOH abuse, Iron deficiency anemia (2019), and Seizures.    Surgical History:  has a past surgical history that includes  Cholecystectomy; Appendectomy; Hysterectomy; Oophorectomy; Femur fracture surgery (Right, 2006); laparoscopy for endometriosis; Tonsillectomy; Esophagogastroduodenoscopy (Left, 6/5/2019); Colonoscopy (Left, 7/10/2019); Brain surgery (2009); Insertion of tunneled central venous catheter (CVC) with subcutaneous port (Left, 8/14/2019); Esophagogastroduodenoscopy (Left, 3/16/2020); Esophagogastroduodenoscopy (N/A, 4/17/2020); Esophagogastroduodenoscopy (N/A, 7/6/2020); Esophagogastroduodenoscopy (N/A, 7/9/2020); Esophagogastroduodenoscopy (N/A, 9/3/2020); Esophagogastroduodenoscopy (N/A, 12/29/2020); Insertion or replacement of percutaneous endoscopic jejunostomy tube (12/29/2020); Esophagogastroduodenoscopy (N/A, 2/23/2021); Colon surgery; PEG tube removal (4/1/2021); Esophagogastroduodenoscopy (N/A, 2/3/2022); and Esophagogastroduodenoscopy (N/A, 8/13/2022).    Family History: family history is not on file. She was adopted..      Social History:  reports that she quit smoking about 11 years ago. Her smoking use included cigarettes. She has a 3.00 pack-year smoking history. She has never used smokeless tobacco. She reports that she does not currently use alcohol. She reports that she does not use drugs.    Review of patient's allergies indicates:   Allergen Reactions    Tramadol Other (See Comments)     Seizures      Demerol [meperidine]      Seizures      Opioids - morphine analogues     Reglan [metoclopramide]        Current Outpatient Rx   Medication Sig Dispense Refill    ARIPiprazole (ABILIFY) 5 MG Tab TAKE ONE TABLET BY MOUTH ONCE DAILY 30 tablet 5    lamotrigine (LAMICTAL) 150 MG Tab Take 150 mg by mouth once daily.       LIDOcaine (LIDODERM) 5 % Place 1 patch onto the skin once daily. Remove & Discard patch within 12 hours or as directed by MD 30 patch 2    LIDOcaine-prilocaine (EMLA) cream APPLY TOPICALLY AS NEEDED. APPLY TO AREA AS DIRECTED 30 MINUTES PRIOR TO ACCESS 30 g 2    pantoprazole (PROTONIX) 40 MG  "tablet Take 1 tablet by mouth 2 (two) times daily before meals.      propranoloL (INDERAL) 40 MG tablet Take 40 mg by mouth once daily.       STELARA 45 mg/0.5 mL Soln Inject 0.5 mLs into the muscle every 8 weeks. NEXT DOSE DUE 12-20-21      valACYclovir (VALTREX) 500 MG tablet Take 500 mg by mouth as needed (outbreak).      DOMPERIDONE, BULK, MISC Take 10 mg by mouth 3 (three) times daily.      methotrexate 2.5 MG Tab Take 1 tablet by mouth every Monday.      methyl salicylate/menthol (BENGAY TOP) Apply topically as needed (back pain).         Objective Findings:    Vital Signs:  /73 (BP Location: Right arm, Patient Position: Sitting, BP Method: Medium (Automatic))   Pulse 73   Ht 5' 4" (1.626 m)   Wt 49.3 kg (108 lb 11 oz)   LMP 08/21/2010 (LMP Unknown)   BMI 18.66 kg/m²   Body mass index is 18.66 kg/m².    Physical Exam:  General Appearance: Well appearing in no acute distress  Head:   Normocephalic, without obvious abnormality  Eyes:    No scleral icterus, EOMI  ENT: Neck supple, Lips, mucosa, and tongue normal    Lungs: CTA bilaterally in anterior and posterior fields, no wheezes, no crackles.  Heart:  Regular rate and rhythm, S1, S2 normal, no murmurs heard  Abdomen: Soft, non tender, non distended with positive bowel sounds in all four quadrants. No hepatosplenomegaly, ascites, or mass  Extremities: 2+ pulses, no clubbing, cyanosis or edema  Skin: No rash  Neurologic: no focal deficits       Labs:  Lab Results   Component Value Date    WBC 10.12 09/01/2022    HGB 10.1 (L) 09/01/2022    HCT 32.9 (L) 09/01/2022     09/01/2022    CHOL 204 (H) 03/30/2020    TRIG 130 03/30/2020    HDL 52 03/30/2020    ALT 53 (H) 08/30/2022    AST 25 08/30/2022     08/30/2022    K 3.3 (L) 08/30/2022     (H) 08/30/2022    CREATININE 1.2 08/30/2022    BUN 15 08/30/2022    CO2 21 (L) 08/30/2022    TSH 1.400 07/01/2020    INR 1.1 02/24/2021    HGBA1C 4.9 03/30/2020       Imaging:    CT 2021 "   FINDINGS:  Lung bases are clear.  The gallbladder is surgically absent.  The liver, pancreas, spleen, and adrenal glands are unremarkable.  Kidneys demonstrate no hydronephrosis or obstructing calculus.  There is subcentimeter left renal cyst present.  Appendix is surgically absent.  There is a large volume of colonic stool identified throughout the colon compatible with constipation.  There is no small bowel obstruction.  The abdominal aorta is not aneurysmal.  There is no intraperitoneal free air, free fluid, or lymphadenopathy identified.  Uterus appears surgically absent.  Urinary bladder is decompressed.  No acute displaced fractures identified.     Impression:     1. Large volume colonic stool throughout the colon compatible with constipation.    GES 2020  Impression:     1. There is significantly delayed gastric emptying.  Minimal emptying is demonstrated during the course of the study.  T1/2 is 913 minutes.    CTE 2020  Impression:     1. Short-segment area of uniform mild wall thickening versus nondistention in the transverse colon.  Otherwise no acute abnormality.  2. Metallic object is noted within the lumen of the distal ileum near the ileocecal anastomosis.  3. Additional findings as above.    UGFL 2020  FINDINGS:  Barium and air were administered and swallowed without difficulty.  Deglutition and peristalsis were within normal limits with barium flowing through the esophagus without impediment.  The gross esophageal mucosal details and caliber were within normal limits.  No hiatal hernia was elicited nor was gastroesophageal reflux observed during the course of fluoroscopy.  The stomach when filled with barium and distended with air demonstrated questionable irregular contrast pooling along the anterior distal gastric body of the stomach which raises concern for possible ulceration.  No intrinsic or extrinsic lesions of the stomach were identified.  The duodenal bulb filled without deformity or  ulceration.  The visualized proximal bowel appears to be normal in appearance and caliber.     Impression:     1. The stomach demonstrated questionable irregular contrast pooling along the anterior distal gastric body which raises concern for possible ulceration.  Consider correlation with EGD.     Endoscopy:      EGD 8/2022  Findings:        The esophagus was normal.        Diffuse mild inflammation characterized by erythema was found on the        greater curvature of the stomach.        One oozing cratered duodenal ulcer was found in the first portion of        the duodenum that was near circumferential causing stenosis that was        able to be passed.. The lesion was 15 mm in largest dimension. This        was biopsied with a cold forceps for histology. The pathology        specimen was placed into Bottle Number 2. Coagulation for hemostasis        using bipolar probe was successful.        Diffuse moderate mucosal changes characterized by atrophy,        inflammation, altered texture and a decreased vascular pattern were        found in the second portion of the duodenum. Biopsies were taken        with a cold forceps for histology. The pathology specimen was placed        into Bottle Number 1.     1. DUODENUM, BIOPSY:   - DUODENAL MUCOSA WITH NO SIGNIFICANT HISTOPATHOLOGIC FINDINGS.     2. ULCER/STRICTURE, BIOPSY:   - ACUTE ULCERATIVE DUODENITIS.     EGD 2/2022   Findings:        The examined esophagus was normal.        The Z-line was regular and was found 38 cm from the incisors.        Excessive fluid was found on the greater curvature of the stomach.        Two non-bleeding cratered duodenal ulcers with pigmented material        were found in the duodenal bulb and in the first portion of the        duodenum. The largest lesion was 8 mm in largest dimension.        An acquired benign-appearing, intrinsic moderate stenosis was found        in the second portion of the duodenum and was traversed. A TTS         dilator was passed through the scope. Dilation with a 12-13.5-15 mm        pyloric balloon dilator was performed. The dilation site was        examined following endoscope reinsertion and showed moderate        improvement in luminal narrowing. Biopsies were taken with a cold        forceps for histology.       DUODENUM, ULCERATION, BIOPSY:   - SUPERFICIAL FRAGMENTS OF ULCER DEBRIS.     EGD 2/2021                         tolerated the procedure well.   Findings:        The esophagus was normal.        The stomach was normal.        Two cratered duodenal ulcers with pigmented material were found in        the duodenal bulb and in the first portion of the duodenum. The        largest lesion was 10 mm in largest dimension. friable bleeding with        contact        Placement of an externally removable PEG with no T-fasteners was        successfully completed. The external bumper was at the 3.0 cm        marking on the tube.     No pathology     EGD 12/2020  Findings:        The examined esophagus was normal.        The Z-line was regular and was found 37 cm from the incisors.        There was evidence of a kinked, folded over PEG-J present in the        entire examined stomach.        One non-bleeding cratered gastric ulcer with no stigmata of bleeding        was found at the pylorus. The lesion was 6 mm in largest dimension.        One oozing cratered duodenal ulcer was found in the second portion        of the duodenum. The lesion was 10 mm in largest dimension. Biopsies        were taken with a cold forceps for histology.        The examined jejunum was normal.        One benign-appearing, intrinsic mild stenosis was found. The        stenosis was traversed. The scope was withdrawn. Dilation was        performed with a Bryson dilator with mild resistance at 52 Fr.     DUODENAL BIOPSIES:   - ACUTE SUPERFICIAL AND CHRONIC DEEP ULCERATION CONSISTENT WITH    INVOLVEMENT BY PATIENT'S KNOWN     CROHN'S DISEASE.      Assessment:    Crohn's disease   Non healing duodenal ulcers, thought to be upper GI Crohn's involvement   Recurrent upper GI bleeding       Recommendations:    Crohn's Diease   - Diagnosed: 1990s   - Extent: small bowel   - Complications: duodenal ulcerations, recurrent upper GI bleeding   - Extraintestinal manifestations: arthropathy   - Prior treatment: humira, prednisone, MTX   - Current treatment: stelara   - Prior surgeries: ileocecectomy   - Will plan to discuss with Dr ferro and would consider referral to surgeon for non healing ulcerations   - She appears to be doing well on stelara from symptom standpoint   - She is s/p hysterectomy, UTD on vaccinations     RTC 3 months       Order summary:         Thank you so much for allowing me to participate in the care of Angela Trascher Lejeune Kelli Morgan, MD  Gastroenterology   [facility]

## 2022-10-16 PROBLEM — K26.4 DUODENAL ULCER HEMORRHAGE: Status: ACTIVE | Noted: 2022-10-16

## 2022-10-21 ENCOUNTER — TELEPHONE (OUTPATIENT)
Dept: GASTROENTEROLOGY | Facility: CLINIC | Age: 47
End: 2022-10-21
Payer: COMMERCIAL

## 2022-10-25 ENCOUNTER — INFUSION (OUTPATIENT)
Dept: INFUSION THERAPY | Facility: HOSPITAL | Age: 47
End: 2022-10-25
Payer: COMMERCIAL

## 2022-10-25 DIAGNOSIS — E87.6 HYPOKALEMIA DUE TO LOSS OF POTASSIUM: ICD-10-CM

## 2022-10-25 DIAGNOSIS — I87.2 VENOUS INSUFFICIENCY: ICD-10-CM

## 2022-10-25 DIAGNOSIS — D62 ACUTE BLOOD LOSS ANEMIA: Primary | ICD-10-CM

## 2022-10-25 DIAGNOSIS — D50.8 IRON DEFICIENCY ANEMIA SECONDARY TO INADEQUATE DIETARY IRON INTAKE: ICD-10-CM

## 2022-10-25 LAB
ALBUMIN SERPL BCP-MCNC: 4.2 G/DL (ref 3.5–5.2)
ALP SERPL-CCNC: 128 U/L (ref 55–135)
ALT SERPL W/O P-5'-P-CCNC: 13 U/L (ref 10–44)
ANION GAP SERPL CALC-SCNC: 12 MMOL/L (ref 8–16)
AST SERPL-CCNC: 18 U/L (ref 10–40)
BASOPHILS # BLD AUTO: 0.11 K/UL (ref 0–0.2)
BASOPHILS NFR BLD: 0.8 % (ref 0–1.9)
BILIRUB SERPL-MCNC: 0.4 MG/DL (ref 0.1–1)
BUN SERPL-MCNC: 19 MG/DL (ref 6–20)
CALCIUM SERPL-MCNC: 9.3 MG/DL (ref 8.7–10.5)
CHLORIDE SERPL-SCNC: 114 MMOL/L (ref 95–110)
CO2 SERPL-SCNC: 16 MMOL/L (ref 23–29)
CREAT SERPL-MCNC: 1.1 MG/DL (ref 0.5–1.4)
DIFFERENTIAL METHOD: ABNORMAL
EOSINOPHIL # BLD AUTO: 0.2 K/UL (ref 0–0.5)
EOSINOPHIL NFR BLD: 1.3 % (ref 0–8)
ERYTHROCYTE [DISTWIDTH] IN BLOOD BY AUTOMATED COUNT: 20.9 % (ref 11.5–14.5)
EST. GFR  (NO RACE VARIABLE): >60 ML/MIN/1.73 M^2
GLUCOSE SERPL-MCNC: 90 MG/DL (ref 70–110)
HCT VFR BLD AUTO: 39.1 % (ref 37–48.5)
HGB BLD-MCNC: 12.5 G/DL (ref 12–16)
IMM GRANULOCYTES # BLD AUTO: 0.06 K/UL (ref 0–0.04)
IMM GRANULOCYTES NFR BLD AUTO: 0.4 % (ref 0–0.5)
LYMPHOCYTES # BLD AUTO: 1.5 K/UL (ref 1–4.8)
LYMPHOCYTES NFR BLD: 10.9 % (ref 18–48)
MCH RBC QN AUTO: 29.3 PG (ref 27–31)
MCHC RBC AUTO-ENTMCNC: 32 G/DL (ref 32–36)
MCV RBC AUTO: 92 FL (ref 82–98)
MONOCYTES # BLD AUTO: 1.1 K/UL (ref 0.3–1)
MONOCYTES NFR BLD: 8 % (ref 4–15)
NEUTROPHILS # BLD AUTO: 10.5 K/UL (ref 1.8–7.7)
NEUTROPHILS NFR BLD: 78.6 % (ref 38–73)
NRBC BLD-RTO: 0 /100 WBC
PLATELET # BLD AUTO: 476 K/UL (ref 150–450)
PMV BLD AUTO: 9.1 FL (ref 9.2–12.9)
POTASSIUM SERPL-SCNC: 3.5 MMOL/L (ref 3.5–5.1)
PROT SERPL-MCNC: 7.9 G/DL (ref 6–8.4)
RBC # BLD AUTO: 4.27 M/UL (ref 4–5.4)
SODIUM SERPL-SCNC: 142 MMOL/L (ref 136–145)
WBC # BLD AUTO: 13.36 K/UL (ref 3.9–12.7)

## 2022-10-25 PROCEDURE — 25000003 PHARM REV CODE 250: Mod: PN | Performed by: INTERNAL MEDICINE

## 2022-10-25 PROCEDURE — A4216 STERILE WATER/SALINE, 10 ML: HCPCS | Mod: PN | Performed by: INTERNAL MEDICINE

## 2022-10-25 PROCEDURE — 85025 COMPLETE CBC W/AUTO DIFF WBC: CPT | Mod: PN

## 2022-10-25 PROCEDURE — 80053 COMPREHEN METABOLIC PANEL: CPT | Mod: PN

## 2022-10-25 PROCEDURE — 36591 DRAW BLOOD OFF VENOUS DEVICE: CPT | Mod: PN

## 2022-10-25 RX ORDER — SODIUM CHLORIDE 0.9 % (FLUSH) 0.9 %
10 SYRINGE (ML) INJECTION
Status: COMPLETED | OUTPATIENT
Start: 2022-10-25 | End: 2022-10-25

## 2022-10-25 RX ORDER — SODIUM CHLORIDE 0.9 % (FLUSH) 0.9 %
10 SYRINGE (ML) INJECTION
Status: CANCELLED | OUTPATIENT
Start: 2022-10-25

## 2022-10-25 RX ORDER — HEPARIN 100 UNIT/ML
500 SYRINGE INTRAVENOUS
Status: CANCELLED | OUTPATIENT
Start: 2022-10-25

## 2022-10-25 RX ADMIN — Medication 10 ML: at 03:10

## 2022-10-26 ENCOUNTER — PATIENT MESSAGE (OUTPATIENT)
Dept: GASTROENTEROLOGY | Facility: CLINIC | Age: 47
End: 2022-10-26
Payer: COMMERCIAL

## 2022-10-27 ENCOUNTER — PATIENT MESSAGE (OUTPATIENT)
Dept: GASTROENTEROLOGY | Facility: CLINIC | Age: 47
End: 2022-10-27
Payer: COMMERCIAL

## 2022-11-09 PROCEDURE — 99358 PR PROLONGED SERV,NO CONTACT,1ST HR: ICD-10-PCS | Mod: S$GLB,,, | Performed by: INTERNAL MEDICINE

## 2022-11-09 PROCEDURE — 99358 PROLONG SERVICE W/O CONTACT: CPT | Mod: S$GLB,,, | Performed by: INTERNAL MEDICINE

## 2022-11-09 NOTE — PROGRESS NOTES
"          [facility]          Inflammatory Bowel Disease          New Patient Note         TODAY'S VISIT DATE:  11/10/2022    Reason for Consult:    Chief Complaint   Patient presents with    Crohn's Disease     PCP: Haven Gary      Referring MD:   Dr. Papi Martinez Jr.    History of Present Illness:    Dear. Dr. Papi Martinez Jr.    Thank you for your referral on your patient Angela Trascher Lejeune who is a 47 y.o. female seen today at the [facility] on 11/10/2022. 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      As you know, Angela Trascher Lejeune was doing well until 1990s when she began with symptoms of abdominal pain, nausea/vomiting, weight loss and diagnosed by colonoscopy with Crohn's of the terminal ileum with associated enteropathogenic arthropathy. She failed azulfidine, asacol and recurrent prednisone courses. In 1998 due to significant symptoms and medically refractory disease she underwent an ileocecectomy (9 inches of ileum removed)  followed by no medical therapy for her CD and felt well. She started with seizures in 1998 and last one 2009. From [id number] had a few colonoscopies with no recurrent CD on documented colonoscopy 8/2009 which was normal. In 10/2013 she saw Dr. Martinez for coffee-ground emesis though all records not available and EGD c/w "reflux."  In May 2015 patient had acute transaminitis related to heavy alcohol use.   She saw  9/2015 for RLQ abdominal pain and gyn felt this was likely due to adhesions.  In June 2019 patient hospitalized for symptomatic anemia (Hgb 6.1) with 30 pound " weight loss requiring the need for IV blood transfusion and EGD significant for HP neg reactive gastropathy, 1 cm gastric ulcer and 1 superficial duodenal ulcer (per records NSAID abuse). CT significant for left ovarian cyst, large amount of stool scattered throughout the colon.  Patient hospitalized in early July 2019 with severe anemia requiring blood transfusion and colonoscopy on 7/10/2019 significant for normal colon, patent ileo-ascending anastomosis with ulceration (bx with non-specific acute ulcer) and normal neoterminal ileum (biopsies of ileum normal).  She was placed on protonix 40 mg daily. CTE 10/3/2019 normal small bowel.  Patient hospitalized March 2020 and reported ibuprofen daily for right-sided hip pain, epigastric discomfort with with labs consistent with severe anemia (Hgb 3.8) requiring blood transfusion.  EGD during this admission consistent 1 cm gastric ulcer and 1 cm duodenal bulb ulcer and recommended that she take protonix 40 mg po BID x 6 weeks then daily thereafter and to avoid NSAIDs. Per notes patient had received IV iron infusions and due to low vitamin B12 was on supplementation during these episodes of anemia requiring hospitalization.  EGD April 2020 significant for 2 crater to gastric ulcers with pigmented material with largest lesion 8 mm (distorted mucosa with superficial mucosal ulceration), 6 mm first portion duodenum ulcer (bx distorted duodenal mucosal with superficial mucosal ulceration), normal jejunum (SB bx normal), moderate pyloric stenosis dilated with 12 mm balloon (bx pyloric channel ulcerative reactive gastropathy with focal intestinal metaplasia). CTE 4/28/20 showed short segment of uniform mild wall thickening vs nondistention in transverse colon with metallic object within distal ileum lumen near anastomosis.  Workup for this through and gastrin levels which per notes were normal and patient proceeded with IV iron.  In 6/2020 due to significant weight loss UGI  series done and showed questionable irregular contrast in the stomach pooling along the distal gastric body raising concern for possible ulceration.  Patient hospitalized July 2020 for hyperchloremic acidosis and underwent evaluation. GES 7/3/20 showed significant delayed gastric emptying.  CT A/P due to abd pain and elevated lipase showed normal pancreas. On 7/6/20 pt underwent EGD with PEG-J placement for malnutrition and severe gastroparesis with additional findings of 2 mm ulcer in the 2nd portion of duodenum.  After placement of feeding tube she had weight gain and took reglan for 3 mos and discontinued due to focal seizures and delay in getting domperidone from [place] due to pandemic and started it as needed in 2020 and has not taken since 2021. EGD 9/3/2020 done and PEG tube was removed due to it being clogged and replaced with an externally removable PEG. In 12/2020 hospitalized for severe symptomatic anemia with no overt GI bleeding and received blood transfusion and EGD done due to PEG-J malfunctioning G tube with exchange and found to have normal esophagus, gastric ulcer and oozing duodenal ulcer with esophageal stricture dilated with scarred pyloric channel. She then remained on protonix 40 mg po BID and in 2/2021 started Humira with MTX 2.5 mg po weekly. She was hospitalized again in February 2021 for symptomatic anemia along with hypokalemia.  On 4/27/2021 Humira levels 1.4/Abs low (50) which was followed by continuation of humira 40 mg SC q 2 weeks and oral MTX increased to 5 mg weekly.  On 6/23/21 humira levels 3.7/Abs 30.  Patient had improvement of anemia after starting Humira.  In August 2021 patient hospitalized for abdominal pain, nausea/vomiting with labs significant for Hgb 7.2 and CT A/P showed diffuse fluid-filled mildly dilated small bowel loops throughout the colon without a definite transition point.  By this time she was taking domperidone occasionally.  EGD September 2021 significant  for esophageal candidiasis, 2 friable superficial duodenal ulcers (bulb and 2nd portion duodenum), normal stomach. On 9/15/21 trough humira levels 3.9/Abs low (54).  At the end of 10/2021 humira discontinued and pt started on stelara with IV infusion followed by 90 mg SC every 8 weeks. Patient seen in ER December 2021 due to mid abdominal pain, nausea/vomiting, lightheadedness with hemoglobin 11 and CT consistent with the small nonobstructive left renal calculus, moderate to large amount of retained fecal material throughout the colon but no other additional findings.  Since 2/2022 she has discontinued all NSAIDs and was taking ibuprofen 2 pills daily for primarily hip pain and rarely headaches. On 11/2022 stool calprotectin 171.9.  In 2/2022 EGD c/w 2 cratered duodenal ulcers (bulb and first portion-8 mm) with pigmented material, moderate duodenal stricture in the second portion of the duodenum that was dilated 12-13-15 mm.  In early 2022 patient had COVID infection followed by severe weight loss, bloating and regurgitation.  EGD 7/1/2022 significant for small food residue in stomach, 2 oozing cratered duodenal ulcers with largest lesion 6mm, few localized erosion in 2nd portion of duodenum, duodenal stricture in first portion of duodenum with TTS dilator.  Patient reports that esophageal dilation helped with dysphagia symptoms and pt recalls this being done twice in 2021. She also says that duodenal stricture dilation helped with nausea. Patient hospitalized August 2022 due to symptomatic anemia and underwent blood transfusion.  EGD 8/13/2022 consistent with diffuse mild gastric inflammation, 15 mm duodenal ulcer in first portion of duodenum, diffuse inflammation in 2nd portion of duodenum (bx of stricture c/w acute inflammation).  EGD 10/10/22 significant for 8 mm oozing cratered ulcer in the first portion of duodenum and pt restarted on carafate and continued on PPI daily.  Repeat EGD 10/16/22 again showed 1 cm  cratered duodenal bulb ulcer and oozing so coagulation done with success. In 2022 patient referred to surgeon at [facility] by Dr. Martinez due to concerns of medically refractory duodenal ulcers related to Crohn's disease. Currently patient is taking stelara 90 mg SC q 8 weeks (started 10/2021, [place] , [place] ). She had bladder infection and given keflex for 5 days in the past 1 month. She has gained 20 pounds in 4-5 mos and has had improved appetite. Nausea has improved and takes zofran prn.  Rare heartburn but generally well controlled on protonix 40 mg po BID and no dysphagia.  She has 1 formed BM/d with no blood. She had umbilical stretching/pulling abdominal pain rarely after food that occurs once a week, lasting 1 hour but no aggravating or relieving factors. Today she also reports in the past year possibly coinciding with stelara use she has with walking some lightheadedness and difficulty focusing, legs shake and feels she has to sit down, lost strength of hands. She denies any new seizures, new headaches or confusion, vision problems since starting stelara.     Prior Pertinent Surgeries:    ileocectomy with 9 inches of ileum removed (report not available)    Last pertinent Endoscopy/Imagin2019 colonoscopy:  normal colon, patent ileo-ascending anastomosis with ulceration (bx with non-specific acute ulcer) and normal neoterminal ileum (biopsies of ileum normal).   20 CTE: short segment of uniform mild wall thickening vs nondistention in transverse colon with metallic object within distal ileum lumen near anastomosis.    7/3/20 GES: significant delayed gastric emptying.  CT A/P due to abd pain and elevated lipase showed normal pancreas.   2021 CT A/P:  diffuse fluid-filled mildly dilated small bowel loops throughout the colon without a definite transition point.   22 EGD:  small food residue in stomach, 2 oozing cratered duodenal ulcers with largest lesion 6mm, few localized erosion in 2nd  portion of duodenum, duodenal stricture in first portion of duodenum with TTS dilator.  10/16/22 EGD:  1 cm cratered duodenal bulb ulcer and oozing so coagulation done with success    Therapeutic Drug Monitoring Labs:  4/27/2021 trough (not confirmed trough) humira levels 1.4/ABs low (50) on humira 40 mg SC q 2 weeks, MTX 2.5 mg po weekly  6/23/21 trough (not confirmed trough) humira levels 3.7/Abs 30 on humira 40 mg SC weekly and MTX 5 mg po weekly  9/15/21 trough humira levels 3.9/Abs low (54) on humira 40 mg SC weekly and MTX 5 mg po weekly    Prior IBD Therapies:  Prednisone- effective  Azulfidine-ineffective  Asacol- ineffective   Humira 40 mg SC q 2 weeks (2/2021-9/2021)- ineffective for gastroduodenal ulcers   MTX 2.5 to 5 mg po weekly (2/2021-9/2021)- given with humira and slight increase in humira levels    Vaccinations:  Immunization History   Administered Date(s) Administered    COVID-19, MRNA, LN-S, PF (Pfizer) (Purple Cap) 07/07/2021, 07/28/2021     Flu shot: recommended   COVID vaccine/booster: recommended   PCV 20: pt had reaction to pneumonia vaccine age 8 yo  Tetanus (TdaP): recommended every 10 years  HPV:    NA  Meningococcal: NA  Hepatitis B: will check immunity     Hepatitis A:  will check immunity     MMR (live vaccine): will check immunity          Chickenpox status/Varicella (live vaccine):  will check immunity     Shingrix: recommended     Review of Systems   Constitutional:  Negative for chills, fever and weight loss.   HENT:          No oral ulcers, dysphagia, oral thrush   Eyes:  Negative for blurred vision, pain and redness.   Respiratory:  Negative for cough and shortness of breath.    Cardiovascular:  Negative for chest pain.   Gastrointestinal:  Positive for abdominal pain, heartburn and nausea. Negative for vomiting.   Genitourinary:  Negative for dysuria and hematuria.   Musculoskeletal:  Positive for back pain. Negative for joint pain.   Skin:  Negative for rash.    Psychiatric/Behavioral:  Negative for depression. The patient is nervous/anxious. The patient does not have insomnia.      Medical/Surgical History:   Past Medical History:   Diagnosis Date    Abnormal Pap smear     + HPV    Bipolar 1 disorder     Crohn's disease S/P surgery (ileum, possible gastroduodenal with ulcers)     1998-ileocectomy (9 inches ileum)    Gastric and duodenal ulcers of unclear etiology     Gastroparesis     H/O ETOH abuse     History of HSV-2 infection     Immunosuppressed status     Iron deficiency anemia, multiple blood transfusions     Seizures      Past Surgical History:   Procedure Laterality Date    APPENDECTOMY      BRAIN SURGERY  2009    fx skull due to seizure with 2 pins    CHOLECYSTECTOMY      COLON SURGERY      Partial colectomy    COLONOSCOPY Left 7/10/2019    Procedure: COLONOSCOPY;  Surgeon: Papi Martinez Jr., MD;  Location: [facility];  Service: Endoscopy;  Laterality: Left;    ESOPHAGOGASTRODUODENOSCOPY Left 6/5/2019    Procedure: EGD (ESOPHAGOGASTRODUODENOSCOPY);  Surgeon: ROLA Villagran MD;  Location: [facility];  Service: Endoscopy;  Laterality: Left;    ESOPHAGOGASTRODUODENOSCOPY Left 3/16/2020    Procedure: EGD (ESOPHAGOGASTRODUODENOSCOPY);  Surgeon: Clive Núñez MD;  Location: [facility];  Service: Endoscopy;  Laterality: Left;    ESOPHAGOGASTRODUODENOSCOPY N/A 4/17/2020    Procedure: EGD (ESOPHAGOGASTRODUODENOSCOPY);  Surgeon: Papi Martinez Jr., MD;  Location: [facility];  Service: Endoscopy;  Laterality: N/A;  with Push Enteroscopy    ESOPHAGOGASTRODUODENOSCOPY N/A 7/6/2020    Procedure: EGD (ESOPHAGOGASTRODUODENOSCOPY)   possible peg;  Surgeon: Papi Martinez Jr., MD;  Location: [facility];  Service: Endoscopy;  Laterality: N/A;    ESOPHAGOGASTRODUODENOSCOPY N/A 7/9/2020    Procedure: EGD (ESOPHAGOGASTRODUODENOSCOPY);  Surgeon: Papi Martinez Jr., MD;  Location: [facility];  Service: Endoscopy;  Laterality: N/A;  J or Peg tube     ESOPHAGOGASTRODUODENOSCOPY N/A 9/3/2020    Procedure: EGD (ESOPHAGOGASTRODUODENOSCOPY);  Surgeon: Papi Martinez Jr., MD;  Location: [facility];  Service: Endoscopy;  Laterality: N/A;  per drs order Stoma, Peds, w/single Lumen, J-Tube placement    ESOPHAGOGASTRODUODENOSCOPY N/A 12/29/2020    Procedure: EGD (ESOPHAGOGASTRODUODENOSCOPY);  Surgeon: Papi Martinez Jr., MD;  Location: [facility];  Service: Endoscopy;  Laterality: N/A;    ESOPHAGOGASTRODUODENOSCOPY N/A 2/23/2021    Procedure: EGD (ESOPHAGOGASTRODUODENOSCOPY);  Surgeon: Papi Martinez Jr., MD;  Location: [facility];  Service: Endoscopy;  Laterality: N/A;    ESOPHAGOGASTRODUODENOSCOPY N/A 2/3/2022    Procedure: EGD (ESOPHAGOGASTRODUODENOSCOPY);  Surgeon: Papi Martinez Jr., MD;  Location: [facility];  Service: Endoscopy;  Laterality: N/A;    ESOPHAGOGASTRODUODENOSCOPY N/A 8/13/2022    Procedure: EGD (ESOPHAGOGASTRODUODENOSCOPY);  Surgeon: Huang Licea MD;  Location: [facility];  Service: Gastroenterology;  Laterality: N/A;    ESOPHAGOGASTRODUODENOSCOPY N/A 10/16/2022    Procedure: EGD (ESOPHAGOGASTRODUODENOSCOPY);  Surgeon: Huang Licea MD;  Location: [facility];  Service: Gastroenterology;  Laterality: N/A;    FEMUR FRACTURE SURGERY Right 2006    HYSTERECTOMY      INSERTION OF TUNNELED CENTRAL VENOUS CATHETER (CVC) WITH SUBCUTANEOUS PORT Left 8/14/2019    Procedure: [id number];  Surgeon: Miquel Sargent MD;  Location: [facility] OR;  Service: General;  Laterality: Left;    INSERTION OR REPLACEMENT OF PERCUTANEOUS ENDOSCOPIC JEJUNOSTOMY TUBE  12/29/2020    Procedure: INSERTION OR REPLACEMENT, JEJUNOSTOMY TUBE, PERCUTANEOUS, ENDOSCOPIC;  Surgeon: Papi Martinez Jr., MD;  Location: [facility];  Service: Endoscopy;;    laparoscopy for endometriosis      x5    OOPHORECTOMY      right ovary removed only    PEG TUBE REMOVAL  4/1/2021    Procedure: REMOVAL, PEG TUBE;  Surgeon: Papi Martinez Jr., MD;  Location: [facility];  Service: Endoscopy;;     TONSILLECTOMY       Family History:   family history is not on file. She was adopted.     Social History:    reports that she quit smoking about 11 years ago. Her smoking use included cigarettes. She has a 3.00 pack-year smoking history. She has never used smokeless tobacco. She reports that she does not currently use alcohol. She reports that she does not use drugs.     Review of patient's allergies indicates:   Allergen Reactions    Tramadol Other (See Comments)     Seizures      Demerol [meperidine]      Seizures      Opioids - morphine analogues     Reglan [metoclopramide]      Current Medications:   Outpatient Medications Marked as Taking for the 11/10/22 encounter (Office Visit) with Zandra Mallory MD   Medication Sig Dispense Refill    ARIPiprazole (ABILIFY) 5 MG Tab TAKE ONE TABLET BY MOUTH ONCE DAILY (Patient taking differently: Take 5 mg by mouth once daily.) 30 tablet 5    cephALEXin (KEFLEX) 500 MG capsule Take 1 capsule (500 mg total) by mouth every 12 (twelve) hours. for 10 days 20 capsule 0    lamotrigine (LAMICTAL) 150 MG Tab Take 150 mg by mouth once daily.       ondansetron (ZOFRAN-ODT) 8 MG TbDL 1 po q 8 hours prn nausea and vomiting. 20 tablet 0    pantoprazole (PROTONIX) 40 MG tablet Take 40 mg by mouth 2 (two) times daily before meals.      propranoloL (INDERAL) 40 MG tablet Take 40 mg by mouth once daily.       temazepam (RESTORIL) 15 mg Cap Take 1 capsule (15 mg total) by mouth nightly as needed (insomnia). 30 capsule 0    tiZANidine (ZANAFLEX) 4 MG tablet Take 4 mg by mouth 3 (three) times daily.      ustekinumab (STELARA) 90 mg/mL Syrg syringe Inject 90 mg into the skin every 8 weeks.      [DISCONTINUED] sucralfate (CARAFATE) 1 gram tablet Take 1 g by mouth once daily.       Current Facility-Administered Medications for the 11/10/22 encounter (Office Visit) with Zandra Mallory MD   Medication Dose Route Frequency Provider Last Rate Last Admin    acetaminophen tablet 650 mg  650 mg Oral  1 time in Clinic/HOD RAIN Burns         Vital Signs:  /67 (BP Location: Left arm, Patient Position: Sitting)   Pulse 66   Temp 97.7 °F (36.5 °C)   Wt 49.9 kg (110 lb 0.2 oz)   LMP 08/21/2010 (LMP Unknown)   SpO2 99%   BMI 18.88 kg/m²      Physical Exam  Constitutional:       General: She is not in acute distress.  Cardiovascular:      Rate and Rhythm: Normal rate and regular rhythm.      Pulses: Normal pulses.      Heart sounds: Normal heart sounds. No murmur heard.  Pulmonary:      Effort: Pulmonary effort is normal.      Breath sounds: Normal breath sounds.   Abdominal:      General: Bowel sounds are normal. There is no distension (positive carnett sign).      Palpations: Abdomen is soft.      Tenderness: There is abdominal tenderness.     Labs: Reviewed  Lab Results   Component Value Date    CRP 1.6 01/19/2022    CALPROTECTIN 171.9 (H) 02/02/2022     Lab Results   Component Value Date    HEPBSAG Negative 05/15/2019     Lab Results   Component Value Date    TBGOLDPLUS Negative 02/11/2021     Lab Results   Component Value Date    TVEPJAGR53SM <13 (L) 08/24/2021    QHVHTSQQ01 393 08/30/2022     Lab Results   Component Value Date    WBC 12.37 11/03/2022    HGB 11.4 (L) 11/03/2022    HCT 35.9 (L) 11/03/2022    MCV 95 11/03/2022     11/03/2022     Lab Results   Component Value Date    CREATININE 0.98 11/03/2022    ALBUMIN 3.7 11/03/2022    BILITOT 0.5 11/03/2022    ALKPHOS 135 11/03/2022    AST 34 11/03/2022    ALT 32 11/03/2022     Assessment/Plan:  Angela Trascher Lejeune is a 47 y.o. 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  Treatment changed based on ongoing gastroduodenal ulcers (h/o duodenal stricture that was dilated last 7/2022) causing recurrent ER/hospitalizations for occult but not overt chronic GIB requiring multiple blood transfusions and h/o of IV iron infusions. Her last colonoscopy was in 2019 and showed anastomotic ulcer. I am going to restage her disease with colonoscopy and EGD to do biopsies of ulcers to see if any granulomas or chronicity and assess prior pyloric and duodenal strictures that were dilated. I would also like to see if the ileocolonic anastomotic ulcer seen in 2019 could be contributing to her STEPHAN. In regards to other etiologies of her gastroduodenal ulcers she has had HP neg, normal gastrin levels, stopped all NSAIDs since 2/2022 and upper CD remains in the differential.  Today we discussed that surgery still would be last resort and I would first want to optimize ulcer treatment and optimize CD treatment if the ulcers are confirmed CD ulcers. Today I restarted carafate liquid and she continues on protonix BID. We briefly discussed considering addition of misoprostol which I have had success with in the past.  When we discussed stelara risks in detail today I found that she has had some neurological symptoms that seem atypical for PRES syndrome but given pt concern I will hold her 11/22 which she sees her neurologist (she had one due to her seizures). Overall my suspicion for PRES is extremely low. Meantime I will get trough stelara levels/abs on 11/21/22.     # Crohn's disease S/P surgery (ileum, h/o ileocectomy 1998, ileocolonic anastomotic ulcer 2019):    - I had a long discussion with patient regarding epidemiology, potential etiologies, associations and triggers (avoiding NSAIDS, using antibiotics with caution,stress and smoking effects on disease state), diagnosis, management goals and treatment options   -  hold stelara until evaluation by her neurologist for neuro symptoms (see HPI) though very low suspicion for PRES   - reviewed risks of stelara with pt and written information given  - EGD/colonoscopy within next 2 weeks to restage disease  - SB imaging- last CTE 4/2020  - stopped all NSAIDs 2/2022- discussed continued cessation  - pt told to use antibiotics with caution and only take if necessary and will inform us of any infections or need for antibiotics   - pt advised to avoid raw seafood (such as raw oysters or raw sushi) while on immunosuppressants  - stool calprotectin- now  - smoking status: former smoker, quit 2011, continued smoking cessation recommended due to risk of Crohn's disease and smoking   - basic labs: CBC, CMP, CRP, HCV Ab, HIV, vitamin B12, TSH, TTG IgA/total serum IgA  - drug monitoring labs: TPMT, TB quantiferon, Hep B testing (HBsAg, HBtotalcoreAb)  - TDM:  trough stelara levels/abs 11/21/22    # Neurologic symptoms:  - in past year- walking some lightheadedness and difficulty focusing, legs shake and feels she has to sit down, lost strength of hands, no new seizures, new headaches or confusion, vision problems   - unlikely PRES related to stelara but pt to make urgent appt with neurologist  - will put stelara on hold for now    # UGI issues:  - recurrent occult bleeding of gastroduodenal ulcers (workup in past neg HP, no NSAIDs since 2/2022, gastrin normal, bx no cancer  - h/o dysphagia- no active sx today, past empiric dilation helped  - gastroparesis with h/o pyloric stenosis dilated  - duodenal stricture- last dilated 7/2022  - EGD diagnostic and dilation if needed of duodenum or pylorus  - continue protonix 40 mg po BID  - start carafate liquid TID  - in future may consider misoprostol    # Malnutrition  - good appetite with weight gain most recently    # IBD specific health maintenance:  CRC risk: sx 1990, small bowel-average risk  Skin exam: use sunblock/hats/sunprotective clothing-  yearly  Risk for osteopenia/osteoporosis- defer to PCP DEXA, postmenopausal  Pap smear-hysterectomy  Vitamin D- check vit D today  Vaccines: will check immunity to hep A, B, varicella and MMR    Follow up: 2 weeks after scope    Thank you again for sending Angela Trascher Lejeune to see Dr. Zandra Mallory today at the [facility]. Please don't hesitate to contact Dr. Mallory if there are any questions regarding this evaluation, or if you have any other patients with inflammatory bowel disease for whom you would like a consultation. You can reach Dr. Mallory at [phone] or by email at [email or web address]    Zandra Mallory MD  Department of Gastroenterology  Medical Director, Inflammatory Bowel Disease

## 2022-11-10 ENCOUNTER — OFFICE VISIT (OUTPATIENT)
Dept: GASTROENTEROLOGY | Facility: CLINIC | Age: 47
End: 2022-11-10
Payer: COMMERCIAL

## 2022-11-10 VITALS
HEART RATE: 66 BPM | SYSTOLIC BLOOD PRESSURE: 104 MMHG | DIASTOLIC BLOOD PRESSURE: 67 MMHG | OXYGEN SATURATION: 99 % | BODY MASS INDEX: 18.88 KG/M2 | TEMPERATURE: 98 F | WEIGHT: 110 LBS

## 2022-11-10 DIAGNOSIS — D84.9 IMMUNOSUPPRESSED STATUS: ICD-10-CM

## 2022-11-10 DIAGNOSIS — K26.9 DUODENAL ULCER: ICD-10-CM

## 2022-11-10 DIAGNOSIS — K50.019 CROHN'S DISEASE OF SMALL INTESTINE WITH COMPLICATION: Primary | ICD-10-CM

## 2022-11-10 DIAGNOSIS — K50.018 CROHN'S DISEASE OF SMALL INTESTINE WITH OTHER COMPLICATION: ICD-10-CM

## 2022-11-10 PROCEDURE — 99215 PR OFFICE/OUTPT VISIT, EST, LEVL V, 40-54 MIN: ICD-10-PCS | Mod: S$GLB,,, | Performed by: INTERNAL MEDICINE

## 2022-11-10 PROCEDURE — 1160F PR REVIEW ALL MEDS BY PRESCRIBER/CLIN PHARMACIST DOCUMENTED: ICD-10-PCS | Mod: CPTII,S$GLB,, | Performed by: INTERNAL MEDICINE

## 2022-11-10 PROCEDURE — 3008F PR BODY MASS INDEX (BMI) DOCUMENTED: ICD-10-PCS | Mod: CPTII,S$GLB,, | Performed by: INTERNAL MEDICINE

## 2022-11-10 PROCEDURE — 1159F PR MEDICATION LIST DOCUMENTED IN MEDICAL RECORD: ICD-10-PCS | Mod: CPTII,S$GLB,, | Performed by: INTERNAL MEDICINE

## 2022-11-10 PROCEDURE — 3074F SYST BP LT 130 MM HG: CPT | Mod: CPTII,S$GLB,, | Performed by: INTERNAL MEDICINE

## 2022-11-10 PROCEDURE — 3074F PR MOST RECENT SYSTOLIC BLOOD PRESSURE < 130 MM HG: ICD-10-PCS | Mod: CPTII,S$GLB,, | Performed by: INTERNAL MEDICINE

## 2022-11-10 PROCEDURE — 1159F MED LIST DOCD IN RCRD: CPT | Mod: CPTII,S$GLB,, | Performed by: INTERNAL MEDICINE

## 2022-11-10 PROCEDURE — 3078F DIAST BP <80 MM HG: CPT | Mod: CPTII,S$GLB,, | Performed by: INTERNAL MEDICINE

## 2022-11-10 PROCEDURE — 1160F RVW MEDS BY RX/DR IN RCRD: CPT | Mod: CPTII,S$GLB,, | Performed by: INTERNAL MEDICINE

## 2022-11-10 PROCEDURE — 3008F BODY MASS INDEX DOCD: CPT | Mod: CPTII,S$GLB,, | Performed by: INTERNAL MEDICINE

## 2022-11-10 PROCEDURE — 3078F PR MOST RECENT DIASTOLIC BLOOD PRESSURE < 80 MM HG: ICD-10-PCS | Mod: CPTII,S$GLB,, | Performed by: INTERNAL MEDICINE

## 2022-11-10 PROCEDURE — 99215 OFFICE O/P EST HI 40 MIN: CPT | Mod: S$GLB,,, | Performed by: INTERNAL MEDICINE

## 2022-11-10 RX ORDER — LIDOCAINE 50 MG/G
1 PATCH TOPICAL DAILY
COMMUNITY
Start: 2022-10-19 | End: 2022-12-09

## 2022-11-10 RX ORDER — SUCRALFATE 1 G/10ML
1 SUSPENSION ORAL 3 TIMES DAILY
Qty: 900 ML | Refills: 1 | Status: SHIPPED | OUTPATIENT
Start: 2022-11-10 | End: 2022-12-10

## 2022-11-10 NOTE — PROGRESS NOTES
IBD PATIENT INTAKE:    COVID symptoms in the last 7 days (runny nose, sore throat, congestion, cough, fever): No  PCP: Haven Gary  If not PCP-  number given to establish [phone]: N/A    ALLERGIES REVIEWED:  Yes    CHIEF COMPLAINT:    Chief Complaint   Patient presents with    Crohn's Disease       VITAL SIGNS:  /67 (BP Location: Left arm, Patient Position: Sitting)   Pulse 66   Temp 97.7 °F (36.5 °C)   Wt 49.9 kg (110 lb 0.2 oz)   LMP 08/21/2010 (LMP Unknown)   SpO2 99%   BMI 18.88 kg/m²      Change in medical, surgical, family or social history: No    IBD THERAPY (name, dose/frequency):  Stelara 45mg q8w  Last dose:  9/27    Next dose:  11/22  Infusion/Pharmacy: [facility]    NSAIDs (aspirin, ibuprofen-advil or motrin, naproxen-aleve, diclofenac-voltaren, BC powder, excedrin, goodies): No    Alternative/Complementary Medications (i.e. probiotics, turmeric, fish oil, aloe vera):      no  Name/dose:  n/a    Vitamins:   Vit D:  no     Vit B-12:  no   Folic Acid: no       Calcium: no     Iron:  no      MVI: no    Antibiotics (past 30 Days):  yes  If yes   Indication: Bladder infection  Name of antibiotic: keflex  Completion date: 11/08/2022    REVIEWED MEDICATION LIST RECONCILED INCLUDING ABOVE MEDS:  yes                  Answers submitted by the patient for this visit:  New Patient Questionnaire  (Submitted on 11/8/2022)  abdominal pain: Yes  nausea: Yes  nervous/ anxious: Yes  heartburn: Yes  back pain: Yes

## 2022-11-10 NOTE — PATIENT INSTRUCTIONS
Instructions:  - continue stelara every 8 weeks  - continue protonix 40 mg twice a day  - start carafate liquid three times/day   - [facility]  - avoid NSAIDs  - labs on 11/21/22 at an [facility] facility that can access or port  - see neurologist- let them know about symptoms and ask them if they can be related to PRES or RPLS syndrome due to stelara  - hold stelara on 11/22/22  - EGD/colonoscopy- within next 2-3 weeks   - Yearly Skin exam--wear sun block and hats  - Use antibiotics with caution and only take if necessary, please inform us of any infections or need for antibiotics   - No live vaccines if your are immunosuppressed   - Please avoid raw seafood (such as raw oysters or raw sushi) if you are immunosuppressed     Ustekinumab (Stelara)    Class: Interleukin 12 and Interleukin 23 Blocker - Biologic product    Mechanism of Action: blocks the p40 protein subunit used by both the interleukin (IL)-12 and IL-23 cytokines. IL-12 and IL-23 are naturally occurring cytokines that are involved in inflammatory and immune responses and play a role in the inflammatory process for Inflammatory Bowel Disease (IBD).    Dosage/ Route/ Frequency: One intravenous (IV) infusion and then inject under the skin in the subcutaneous tissue every 8 weeks.  - Loading Dose: Initial 1 hour IV infusion based on your weight at an infusion clinic   - Maintenance Dose: 90 mg SC every 8 weeks that can be done at home (first injection dose is 8 weeks after initial infusion)    Side effects  Please notify us if you are treated with antibiotics or experience signs of infection (ie. fevers, chills, sores, etc) given we may need to hold your medication during this time.     Common side effects (?3% or 3 in 100 people): vomiting (with the first infusion), nasopharyngitis (sore throat, runny nose), injection site redness, vaginal infection, urinary tract infection     Please call us immediately if you develop any of the below problems:    Serious  side effects:     Serious infections: This medication may increase your risk of developing viral, bacterial and/or fungal infections.     Allergic or Hypersensitivity reaction- hives (rash), difficulty breathing, chest pain/tightness, high or low blood pressure, swelling of the face and hands, fever or chills    Malignancies: This medication may increase your risk of skin cancer, yearly skin checks are recommended. Make sure you are using sun block and protective wear.    Posterior reversible encephalopathy syndrome (PRES) has been reported. Symptoms of PRES include confusion, headache, imaging changes, seizure, and visual disturbances; most cases occur within a few days to several months after initiation of therapy but may occur ?1 year. Monitor patients closely; discontinue therapy immediately if symptoms occur and administer appropriate therapy.    Lung inflammation: post marketing rare side effect reported. Happens within the first three doses of the medication and it can improve once the medication is stopped. Symptoms include shortness of breath or cough that do not go away.     Labs/Monitoring:  Prior to starting this new agent, we will be checking labs to determine the safety of taking this medication including baseline blood counts, liver and kidney function tests, TB test and viral hepatitis testing.   Once started on the therapy we will monitor your blood count and your liver and kidney function tests as needed following evidence-based guidelines. TB test and viral hepatitis tests will be repeated every year. If you have any risk of exposure or travel to high-risk areas please notify us so we can do this testing sooner. If indicated your provider may also choose to check drug levels to monitor or optimize your response to the medication.   We recommend you do not start the home injection on a Sunday for lab purposes.     Storage recommendations:  Keep refrigerated in temperature between 36°F to 46°F (2°C  to 8°C).  If necessary, can be in room temperature (86°F or 30°C) for max of 30 days in the original carton protected from light  If taken out of the fridge and left in room temperature do not put back in the fridge   Do NOT shake and do NOT freeze     Vaccine counseling:   Avoid live vaccines which include:  Intranasal Influenza LAIV4 (FluMist Quadrivalent)  Measles, Mumps, Rubella (MMR)  Rotavirus (RotaTeq, Rotarix)  Typhoid oral capsule (Vivotif)  Varicella (Varivax)  Smallpox (Vaccinia)  Yellow Fever (YF-Vax)  Adenovirus  Cholera (Vaxchora)    Avoid consumption of raw seafood such as oysters/sushi.

## 2022-11-10 NOTE — LETTER
November 14, 2022        Blanca Ardon MD  [address]  [facility] [address]             [facility] - Gastro/Inflammatory Bowel Disease  [address]  [facility] [address]  Phone: [phone]  Fax: [phone]   Patient: Angela Trascher Lejeune   MR Number: [id number]   YOB: 1975   Date of Visit: 11/10/2022       Dear Dr. Ardon:    Thank you for referring Angela Lejeune to me for evaluation. Below are the relevant portions of my assessment and plan of care.            If you have questions, please do not hesitate to call me. I look forward to following Mikaela along with you.    Sincerely,      Zandra Mallory MD           CC    No Recipients

## 2022-11-11 ENCOUNTER — TELEPHONE (OUTPATIENT)
Dept: ENDOSCOPY | Facility: HOSPITAL | Age: 47
End: 2022-11-11
Payer: COMMERCIAL

## 2022-11-11 ENCOUNTER — TELEPHONE (OUTPATIENT)
Dept: GASTROENTEROLOGY | Facility: CLINIC | Age: 47
End: 2022-11-11
Payer: COMMERCIAL

## 2022-11-11 ENCOUNTER — PATIENT MESSAGE (OUTPATIENT)
Dept: ENDOSCOPY | Facility: HOSPITAL | Age: 47
End: 2022-11-11
Payer: COMMERCIAL

## 2022-11-11 DIAGNOSIS — K50.019 CROHN'S DISEASE OF SMALL INTESTINE WITH COMPLICATION: Primary | ICD-10-CM

## 2022-11-11 DIAGNOSIS — K26.9 DUODENAL ULCER: ICD-10-CM

## 2022-11-11 DIAGNOSIS — Z12.11 COLON CANCER SCREENING: ICD-10-CM

## 2022-11-11 RX ORDER — POLYETHYLENE GLYCOL 3350, SODIUM SULFATE ANHYDROUS, SODIUM BICARBONATE, SODIUM CHLORIDE, POTASSIUM CHLORIDE 236; 22.74; 6.74; 5.86; 2.97 G/4L; G/4L; G/4L; G/4L; G/4L
4 POWDER, FOR SOLUTION ORAL ONCE
Qty: 4000 ML | Refills: 0 | Status: SHIPPED | OUTPATIENT
Start: 2022-11-11 | End: 2022-11-11

## 2022-11-11 NOTE — PLAN OF CARE
Endoscopy procedure scheduled, prep instructions reviewed (Patient states she no longer has gastroparesis and hasn't for a while), Pt verbalized understanding.     Instructions emailed to [email or web address] as requested. She and  looking into staying at [facility] for the night before and phone number provided.

## 2022-11-11 NOTE — TELEPHONE ENCOUNTER
Called & spoke to SHA Armendariz w/ [facility] infusion center  - Will draw labs at time of port flush appt on 11/21/22

## 2022-11-13 PROBLEM — D84.9 IMMUNOSUPPRESSED STATUS: Status: ACTIVE | Noted: 2022-11-13

## 2022-11-14 ENCOUNTER — TELEPHONE (OUTPATIENT)
Dept: GASTROENTEROLOGY | Facility: CLINIC | Age: 47
End: 2022-11-14
Payer: COMMERCIAL

## 2022-11-14 NOTE — PROGRESS NOTES
I spent 40 minutes on 11/9/22 reviewing Angela Trascher Lejeune medical records prior to clinic visit on 11/10/22.

## 2022-11-22 ENCOUNTER — INFUSION (OUTPATIENT)
Dept: INFUSION THERAPY | Facility: HOSPITAL | Age: 47
End: 2022-11-22
Attending: INTERNAL MEDICINE
Payer: COMMERCIAL

## 2022-11-22 VITALS — TEMPERATURE: 98 F

## 2022-11-22 DIAGNOSIS — E87.6 HYPOKALEMIA DUE TO LOSS OF POTASSIUM: ICD-10-CM

## 2022-11-22 DIAGNOSIS — I87.2 VENOUS INSUFFICIENCY: ICD-10-CM

## 2022-11-22 DIAGNOSIS — D50.8 IRON DEFICIENCY ANEMIA SECONDARY TO INADEQUATE DIETARY IRON INTAKE: ICD-10-CM

## 2022-11-22 DIAGNOSIS — K50.019 CROHN'S DISEASE OF SMALL INTESTINE WITH COMPLICATION: Primary | ICD-10-CM

## 2022-11-22 LAB
25(OH)D3+25(OH)D2 SERPL-MCNC: 21 NG/ML (ref 30–96)
ALBUMIN SERPL BCP-MCNC: 4.2 G/DL (ref 3.5–5.2)
ALP SERPL-CCNC: 122 U/L (ref 55–135)
ALT SERPL W/O P-5'-P-CCNC: 10 U/L (ref 10–44)
ANION GAP SERPL CALC-SCNC: 11 MMOL/L (ref 8–16)
AST SERPL-CCNC: 16 U/L (ref 10–40)
BASOPHILS # BLD AUTO: 0.08 K/UL (ref 0–0.2)
BASOPHILS NFR BLD: 1.2 % (ref 0–1.9)
BILIRUB SERPL-MCNC: 0.2 MG/DL (ref 0.1–1)
BUN SERPL-MCNC: 36 MG/DL (ref 6–20)
CALCIUM SERPL-MCNC: 9.4 MG/DL (ref 8.7–10.5)
CHLORIDE SERPL-SCNC: 112 MMOL/L (ref 95–110)
CO2 SERPL-SCNC: 15 MMOL/L (ref 23–29)
CREAT SERPL-MCNC: 1.3 MG/DL (ref 0.5–1.4)
CRP SERPL-MCNC: 0.3 MG/L (ref 0–8.2)
DIFFERENTIAL METHOD: ABNORMAL
EOSINOPHIL # BLD AUTO: 0.3 K/UL (ref 0–0.5)
EOSINOPHIL NFR BLD: 4.2 % (ref 0–8)
ERYTHROCYTE [DISTWIDTH] IN BLOOD BY AUTOMATED COUNT: 19.9 % (ref 11.5–14.5)
EST. GFR  (NO RACE VARIABLE): 50.4 ML/MIN/1.73 M^2
FERRITIN SERPL-MCNC: 34 NG/ML (ref 20–300)
GLUCOSE SERPL-MCNC: 93 MG/DL (ref 70–110)
HAV IGG SER QL IA: NORMAL
HBV CORE AB SERPL QL IA: NORMAL
HBV SURFACE AG SERPL QL IA: NORMAL
HCT VFR BLD AUTO: 35.9 % (ref 37–48.5)
HCV AB SERPL QL IA: NORMAL
HGB BLD-MCNC: 11.5 G/DL (ref 12–16)
IGA SERPL-MCNC: 272 MG/DL (ref 40–350)
IMM GRANULOCYTES # BLD AUTO: 0.02 K/UL (ref 0–0.04)
IMM GRANULOCYTES NFR BLD AUTO: 0.3 % (ref 0–0.5)
IRON SERPL-MCNC: 76 UG/DL (ref 30–160)
LYMPHOCYTES # BLD AUTO: 2.2 K/UL (ref 1–4.8)
LYMPHOCYTES NFR BLD: 34.8 % (ref 18–48)
MCH RBC QN AUTO: 31 PG (ref 27–31)
MCHC RBC AUTO-ENTMCNC: 32 G/DL (ref 32–36)
MCV RBC AUTO: 97 FL (ref 82–98)
MONOCYTES # BLD AUTO: 0.4 K/UL (ref 0.3–1)
MONOCYTES NFR BLD: 6.4 % (ref 4–15)
NEUTROPHILS # BLD AUTO: 3.4 K/UL (ref 1.8–7.7)
NEUTROPHILS NFR BLD: 53.1 % (ref 38–73)
NRBC BLD-RTO: 0 /100 WBC
PLATELET # BLD AUTO: 275 K/UL (ref 150–450)
PMV BLD AUTO: 9.7 FL (ref 9.2–12.9)
POTASSIUM SERPL-SCNC: 4.3 MMOL/L (ref 3.5–5.1)
PROT SERPL-MCNC: 7.6 G/DL (ref 6–8.4)
RBC # BLD AUTO: 3.71 M/UL (ref 4–5.4)
SATURATED IRON: 20 % (ref 20–50)
SODIUM SERPL-SCNC: 138 MMOL/L (ref 136–145)
TOTAL IRON BINDING CAPACITY: 388 UG/DL (ref 250–450)
TRANSFERRIN SERPL-MCNC: 262 MG/DL (ref 200–375)
TSH SERPL DL<=0.005 MIU/L-ACNC: 1.01 UIU/ML (ref 0.4–4)
VIT B12 SERPL-MCNC: 730 PG/ML (ref 210–950)
WBC # BLD AUTO: 6.41 K/UL (ref 3.9–12.7)

## 2022-11-22 PROCEDURE — 36591 DRAW BLOOD OFF VENOUS DEVICE: CPT | Mod: PN

## 2022-11-22 PROCEDURE — 86480 TB TEST CELL IMMUN MEASURE: CPT | Performed by: INTERNAL MEDICINE

## 2022-11-22 PROCEDURE — 82607 VITAMIN B-12: CPT | Performed by: INTERNAL MEDICINE

## 2022-11-22 PROCEDURE — 86704 HEP B CORE ANTIBODY TOTAL: CPT | Performed by: INTERNAL MEDICINE

## 2022-11-22 PROCEDURE — 85025 COMPLETE CBC W/AUTO DIFF WBC: CPT | Mod: PN | Performed by: INTERNAL MEDICINE

## 2022-11-22 PROCEDURE — 80053 COMPREHEN METABOLIC PANEL: CPT | Mod: PN | Performed by: INTERNAL MEDICINE

## 2022-11-22 PROCEDURE — 86762 RUBELLA ANTIBODY: CPT | Performed by: INTERNAL MEDICINE

## 2022-11-22 PROCEDURE — 86787 VARICELLA-ZOSTER ANTIBODY: CPT | Performed by: INTERNAL MEDICINE

## 2022-11-22 PROCEDURE — 86140 C-REACTIVE PROTEIN: CPT | Performed by: INTERNAL MEDICINE

## 2022-11-22 PROCEDURE — 84443 ASSAY THYROID STIM HORMONE: CPT | Performed by: INTERNAL MEDICINE

## 2022-11-22 PROCEDURE — 82728 ASSAY OF FERRITIN: CPT | Performed by: INTERNAL MEDICINE

## 2022-11-22 PROCEDURE — 87389 HIV-1 AG W/HIV-1&-2 AB AG IA: CPT | Performed by: INTERNAL MEDICINE

## 2022-11-22 PROCEDURE — 86765 RUBEOLA ANTIBODY: CPT | Performed by: INTERNAL MEDICINE

## 2022-11-22 PROCEDURE — 86803 HEPATITIS C AB TEST: CPT | Performed by: INTERNAL MEDICINE

## 2022-11-22 PROCEDURE — 82306 VITAMIN D 25 HYDROXY: CPT | Performed by: INTERNAL MEDICINE

## 2022-11-22 PROCEDURE — 87340 HEPATITIS B SURFACE AG IA: CPT | Performed by: INTERNAL MEDICINE

## 2022-11-22 PROCEDURE — 63600175 PHARM REV CODE 636 W HCPCS: Mod: PN | Performed by: INTERNAL MEDICINE

## 2022-11-22 PROCEDURE — 86790 VIRUS ANTIBODY NOS: CPT | Performed by: INTERNAL MEDICINE

## 2022-11-22 PROCEDURE — 86735 MUMPS ANTIBODY: CPT | Performed by: INTERNAL MEDICINE

## 2022-11-22 PROCEDURE — 84466 ASSAY OF TRANSFERRIN: CPT | Performed by: INTERNAL MEDICINE

## 2022-11-22 PROCEDURE — A4216 STERILE WATER/SALINE, 10 ML: HCPCS | Mod: PN | Performed by: INTERNAL MEDICINE

## 2022-11-22 PROCEDURE — 86677 HELICOBACTER PYLORI ANTIBODY: CPT | Performed by: INTERNAL MEDICINE

## 2022-11-22 PROCEDURE — 82784 ASSAY IGA/IGD/IGG/IGM EACH: CPT | Performed by: INTERNAL MEDICINE

## 2022-11-22 PROCEDURE — 25000003 PHARM REV CODE 250: Mod: PN | Performed by: INTERNAL MEDICINE

## 2022-11-22 RX ORDER — SODIUM CHLORIDE 0.9 % (FLUSH) 0.9 %
10 SYRINGE (ML) INJECTION
Status: COMPLETED | OUTPATIENT
Start: 2022-11-22 | End: 2022-11-22

## 2022-11-22 RX ORDER — HEPARIN 100 UNIT/ML
500 SYRINGE INTRAVENOUS
Status: COMPLETED | OUTPATIENT
Start: 2022-11-22 | End: 2022-11-22

## 2022-11-22 RX ORDER — SODIUM CHLORIDE 0.9 % (FLUSH) 0.9 %
10 SYRINGE (ML) INJECTION
Status: CANCELLED | OUTPATIENT
Start: 2022-11-22

## 2022-11-22 RX ORDER — HEPARIN 100 UNIT/ML
500 SYRINGE INTRAVENOUS
Status: CANCELLED | OUTPATIENT
Start: 2022-11-22

## 2022-11-22 RX ADMIN — Medication 10 ML: at 12:11

## 2022-11-22 RX ADMIN — Medication 500 UNITS: at 12:11

## 2022-11-22 NOTE — PLAN OF CARE
Tolerated port flush well today Pt d/c to home with instructions  To private vehicle without difficulty  NAD   Problem: Adult Inpatient Plan of Care  Goal: Plan of Care Review  Outcome: Ongoing, Progressing

## 2022-11-23 LAB
HIV 1+2 AB+HIV1 P24 AG SERPL QL IA: NORMAL
MUMPS IGG INTERPRETATION: POSITIVE
MUMPS IGG SCREEN: 1.65 ISR (ref 0–0.9)
RUBEOLA IGG ANTIBODY: 2.15 ISR (ref 0–0.9)
RUBEOLA INTERPRETATION: POSITIVE
RUBV IGG SER-ACNC: 12 IU/ML
RUBV IGG SER-IMP: REACTIVE
VARICELLA INTERPRETATION: POSITIVE
VARICELLA ZOSTER IGG: 2.8 ISR (ref 0–0.9)

## 2022-11-25 LAB
GAMMA INTERFERON BACKGROUND BLD IA-ACNC: 0.01 IU/ML
H PYLORI IGG SERPL QL IA: NORMAL
M TB IFN-G CD4+ BCKGRND COR BLD-ACNC: -0.01 IU/ML
MITOGEN IGNF BCKGRD COR BLD-ACNC: 9.99 IU/ML
TB GOLD PLUS: NEGATIVE
TB2 - NIL: -0 IU/ML

## 2022-11-28 ENCOUNTER — ANESTHESIA EVENT (OUTPATIENT)
Dept: ENDOSCOPY | Facility: HOSPITAL | Age: 47
End: 2022-11-28
Payer: COMMERCIAL

## 2022-11-28 ENCOUNTER — ANESTHESIA (OUTPATIENT)
Dept: ENDOSCOPY | Facility: HOSPITAL | Age: 47
End: 2022-11-28
Payer: COMMERCIAL

## 2022-11-28 ENCOUNTER — HOSPITAL ENCOUNTER (OUTPATIENT)
Facility: HOSPITAL | Age: 47
Discharge: HOME | End: 2022-11-28
Attending: INTERNAL MEDICINE | Admitting: INTERNAL MEDICINE
Payer: COMMERCIAL

## 2022-11-28 ENCOUNTER — TELEPHONE (OUTPATIENT)
Dept: ENDOSCOPY | Facility: HOSPITAL | Age: 47
End: 2022-11-28
Payer: COMMERCIAL

## 2022-11-28 VITALS
HEART RATE: 88 BPM | TEMPERATURE: 98 F | SYSTOLIC BLOOD PRESSURE: 159 MMHG | WEIGHT: 110 LBS | DIASTOLIC BLOOD PRESSURE: 69 MMHG | RESPIRATION RATE: 14 BRPM | HEIGHT: 64 IN | OXYGEN SATURATION: 97 % | BODY MASS INDEX: 18.78 KG/M2

## 2022-11-28 DIAGNOSIS — Z12.11 COLON CANCER SCREENING: Primary | ICD-10-CM

## 2022-11-28 DIAGNOSIS — K25.7 CHRONIC GASTRIC ULCER WITHOUT HEMORRHAGE AND WITHOUT PERFORATION: Primary | ICD-10-CM

## 2022-11-28 DIAGNOSIS — K26.9 DUODENAL ULCER: ICD-10-CM

## 2022-11-28 DIAGNOSIS — K50.019 CROHN'S DISEASE OF SMALL INTESTINE WITH COMPLICATION: Primary | ICD-10-CM

## 2022-11-28 PROCEDURE — 27201012 HC FORCEPS, HOT/COLD, DISP: Performed by: INTERNAL MEDICINE

## 2022-11-28 PROCEDURE — 43239 EGD BIOPSY SINGLE/MULTIPLE: CPT | Mod: ,,, | Performed by: INTERNAL MEDICINE

## 2022-11-28 PROCEDURE — E9220 PRA ENDO ANESTHESIA: HCPCS | Mod: ,,, | Performed by: NURSE ANESTHETIST, CERTIFIED REGISTERED

## 2022-11-28 PROCEDURE — 25000003 PHARM REV CODE 250: Performed by: NURSE ANESTHETIST, CERTIFIED REGISTERED

## 2022-11-28 PROCEDURE — E9220 PRA ENDO ANESTHESIA: ICD-10-PCS | Mod: ,,, | Performed by: NURSE ANESTHETIST, CERTIFIED REGISTERED

## 2022-11-28 PROCEDURE — 88342 CHG IMMUNOCYTOCHEMISTRY: ICD-10-PCS | Mod: 26,,, | Performed by: PATHOLOGY

## 2022-11-28 PROCEDURE — 88342 IMHCHEM/IMCYTCHM 1ST ANTB: CPT | Performed by: PATHOLOGY

## 2022-11-28 PROCEDURE — 63600175 PHARM REV CODE 636 W HCPCS: Performed by: NURSE ANESTHETIST, CERTIFIED REGISTERED

## 2022-11-28 PROCEDURE — 43239 PR EGD, FLEX, W/BIOPSY, SGL/MULTI: ICD-10-PCS | Mod: ,,, | Performed by: INTERNAL MEDICINE

## 2022-11-28 PROCEDURE — 88342 IMHCHEM/IMCYTCHM 1ST ANTB: CPT | Mod: 26,,, | Performed by: PATHOLOGY

## 2022-11-28 PROCEDURE — 88305 TISSUE EXAM BY PATHOLOGIST: CPT | Mod: 26,,, | Performed by: PATHOLOGY

## 2022-11-28 PROCEDURE — 63600175 PHARM REV CODE 636 W HCPCS: Performed by: INTERNAL MEDICINE

## 2022-11-28 PROCEDURE — 37000008 HC ANESTHESIA 1ST 15 MINUTES: Performed by: INTERNAL MEDICINE

## 2022-11-28 PROCEDURE — 88305 TISSUE EXAM BY PATHOLOGIST: CPT | Performed by: PATHOLOGY

## 2022-11-28 PROCEDURE — 37000009 HC ANESTHESIA EA ADD 15 MINS: Performed by: INTERNAL MEDICINE

## 2022-11-28 PROCEDURE — 88305 TISSUE EXAM BY PATHOLOGIST: ICD-10-PCS | Mod: 26,,, | Performed by: PATHOLOGY

## 2022-11-28 PROCEDURE — 63600175 PHARM REV CODE 636 W HCPCS: Performed by: ANESTHESIOLOGY

## 2022-11-28 PROCEDURE — 43239 EGD BIOPSY SINGLE/MULTIPLE: CPT | Performed by: INTERNAL MEDICINE

## 2022-11-28 RX ORDER — HEPARIN 100 UNIT/ML
5 SYRINGE INTRAVENOUS ONCE
Status: COMPLETED | OUTPATIENT
Start: 2022-11-28 | End: 2022-11-28

## 2022-11-28 RX ORDER — SODIUM CHLORIDE 9 MG/ML
INJECTION, SOLUTION INTRAVENOUS CONTINUOUS
Status: DISCONTINUED | OUTPATIENT
Start: 2022-11-28 | End: 2022-11-28 | Stop reason: HOSPADM

## 2022-11-28 RX ORDER — MIDAZOLAM HYDROCHLORIDE 1 MG/ML
INJECTION, SOLUTION INTRAMUSCULAR; INTRAVENOUS
Status: DISCONTINUED | OUTPATIENT
Start: 2022-11-28 | End: 2022-11-28

## 2022-11-28 RX ORDER — PROPOFOL 10 MG/ML
VIAL (ML) INTRAVENOUS CONTINUOUS PRN
Status: DISCONTINUED | OUTPATIENT
Start: 2022-11-28 | End: 2022-11-28

## 2022-11-28 RX ORDER — SODIUM, POTASSIUM,MAG SULFATES 17.5-3.13G
1 SOLUTION, RECONSTITUTED, ORAL ORAL DAILY
Qty: 1 KIT | Refills: 0 | Status: SHIPPED | OUTPATIENT
Start: 2022-11-28 | End: 2022-11-30

## 2022-11-28 RX ORDER — HYDROMORPHONE HYDROCHLORIDE 2 MG/ML
0.2 INJECTION, SOLUTION INTRAMUSCULAR; INTRAVENOUS; SUBCUTANEOUS EVERY 5 MIN PRN
Status: DISCONTINUED | OUTPATIENT
Start: 2022-11-28 | End: 2022-11-28 | Stop reason: HOSPADM

## 2022-11-28 RX ORDER — PROPOFOL 10 MG/ML
VIAL (ML) INTRAVENOUS
Status: DISCONTINUED | OUTPATIENT
Start: 2022-11-28 | End: 2022-11-28

## 2022-11-28 RX ORDER — ONDANSETRON 2 MG/ML
INJECTION INTRAMUSCULAR; INTRAVENOUS
Status: DISCONTINUED | OUTPATIENT
Start: 2022-11-28 | End: 2022-11-28

## 2022-11-28 RX ORDER — LIDOCAINE HYDROCHLORIDE 20 MG/ML
INJECTION INTRAVENOUS
Status: DISCONTINUED | OUTPATIENT
Start: 2022-11-28 | End: 2022-11-28

## 2022-11-28 RX ADMIN — MIDAZOLAM HYDROCHLORIDE 2 MG: 1 INJECTION, SOLUTION INTRAMUSCULAR; INTRAVENOUS at 08:11

## 2022-11-28 RX ADMIN — HEPARIN 500 UNITS: 100 SYRINGE at 09:11

## 2022-11-28 RX ADMIN — HEPARIN 500 UNITS: 100 SYRINGE at 12:11

## 2022-11-28 RX ADMIN — HYDROMORPHONE HYDROCHLORIDE 0.2 MG: 2 INJECTION, SOLUTION INTRAMUSCULAR; INTRAVENOUS; SUBCUTANEOUS at 11:11

## 2022-11-28 RX ADMIN — PROPOFOL 50 MG: 10 INJECTION, EMULSION INTRAVENOUS at 08:11

## 2022-11-28 RX ADMIN — PROPOFOL 100 MG: 10 INJECTION, EMULSION INTRAVENOUS at 08:11

## 2022-11-28 RX ADMIN — SODIUM CHLORIDE: 9 INJECTION, SOLUTION INTRAVENOUS at 08:11

## 2022-11-28 RX ADMIN — ONDANSETRON 4 MG: 2 INJECTION INTRAMUSCULAR; INTRAVENOUS at 08:11

## 2022-11-28 RX ADMIN — HYDROMORPHONE HYDROCHLORIDE 0.2 MG: 2 INJECTION, SOLUTION INTRAMUSCULAR; INTRAVENOUS; SUBCUTANEOUS at 10:11

## 2022-11-28 RX ADMIN — Medication 175 MCG/KG/MIN: at 08:11

## 2022-11-28 RX ADMIN — LIDOCAINE HYDROCHLORIDE 100 MG: 20 INJECTION INTRAVENOUS at 08:11

## 2022-11-28 NOTE — PLAN OF CARE
Pain under control. Dr ferro at bedside. Pt to reschedule colonoscopy. Pt walked to waiting room to

## 2022-11-28 NOTE — TRANSFER OF CARE
"Anesthesia Transfer of Care Note    Patient: Angela Trascher Lejeune    Procedure(s) Performed: Procedure(s) (LRB):  EGD (ESOPHAGOGASTRODUODENOSCOPY) (N/A)    Patient location: GI    Anesthesia Type: general    Transport from OR: Transported from OR on room air with adequate spontaneous ventilation    Post pain: adequate analgesia    Post assessment: no apparent anesthetic complications and tolerated procedure well    Post vital signs: stable    Level of consciousness: responds to stimulation    Nausea/Vomiting: no vomiting    Complications: none    Transfer of care protocol was followed      Last vitals:   Visit Vitals  /65 (BP Location: Left arm, Patient Position: Lying)   Pulse 91   Temp 36.7 °C (98.1 °F) (Oral)   Resp 14   Ht 5' 4" (1.626 m)   Wt 49.9 kg (110 lb)   LMP 08/21/2010 (LMP Unknown)   SpO2 100%   Breastfeeding No   BMI 18.88 kg/m²     "

## 2022-11-28 NOTE — PLAN OF CARE
After pt got dressed and sat up started having severe abd pain near belly button. Dr Mallory aware; order for IV pain med received from anesthesia. Will administer and continue to monitor.

## 2022-11-28 NOTE — ANESTHESIA PREPROCEDURE EVALUATION
11/28/2022  Angela Trascher Lejeune is a 47 y.o., female.  Past Medical History:   Diagnosis Date    Abnormal Pap smear     + HPV    Bipolar 1 disorder     Crohn's disease S/P surgery (ileum, possible gastroduodenal with ulcers)     1998-ileocectomy (9 inches ileum)    Gastric and duodenal ulcers of unclear etiology     Gastroparesis     H/O ETOH abuse     History of HSV-2 infection     Immunosuppressed status     Iron deficiency anemia, multiple blood transfusions     Seizures      Past Surgical History:   Procedure Laterality Date    APPENDECTOMY      BRAIN SURGERY  2009    fx skull due to seizure with 2 pins    CHOLECYSTECTOMY      COLON SURGERY      Partial colectomy    COLONOSCOPY Left 7/10/2019    Procedure: COLONOSCOPY;  Surgeon: Papi Martinez Jr., MD;  Location: [facility];  Service: Endoscopy;  Laterality: Left;    ESOPHAGOGASTRODUODENOSCOPY Left 6/5/2019    Procedure: EGD (ESOPHAGOGASTRODUODENOSCOPY);  Surgeon: ROLA Villagran MD;  Location: [facility];  Service: Endoscopy;  Laterality: Left;    ESOPHAGOGASTRODUODENOSCOPY Left 3/16/2020    Procedure: EGD (ESOPHAGOGASTRODUODENOSCOPY);  Surgeon: Clive Núñez MD;  Location: [facility];  Service: Endoscopy;  Laterality: Left;    ESOPHAGOGASTRODUODENOSCOPY N/A 4/17/2020    Procedure: EGD (ESOPHAGOGASTRODUODENOSCOPY);  Surgeon: Papi Martinez Jr., MD;  Location: [facility];  Service: Endoscopy;  Laterality: N/A;  with Push Enteroscopy    ESOPHAGOGASTRODUODENOSCOPY N/A 7/6/2020    Procedure: EGD (ESOPHAGOGASTRODUODENOSCOPY)   possible peg;  Surgeon: Papi Martinez Jr., MD;  Location: [facility];  Service: Endoscopy;  Laterality: N/A;    ESOPHAGOGASTRODUODENOSCOPY N/A 7/9/2020    Procedure: EGD (ESOPHAGOGASTRODUODENOSCOPY);  Surgeon: Papi Martinez Jr., MD;  Location: [facility];  Service: Endoscopy;  Laterality: N/A;  J or  Peg tube    ESOPHAGOGASTRODUODENOSCOPY N/A 9/3/2020    Procedure: EGD (ESOPHAGOGASTRODUODENOSCOPY);  Surgeon: Papi Martinez Jr., MD;  Location: [facility];  Service: Endoscopy;  Laterality: N/A;  per drs order Stoma, Peds, w/single Lumen, J-Tube placement    ESOPHAGOGASTRODUODENOSCOPY N/A 12/29/2020    Procedure: EGD (ESOPHAGOGASTRODUODENOSCOPY);  Surgeon: Papi Martinez Jr., MD;  Location: [facility];  Service: Endoscopy;  Laterality: N/A;    ESOPHAGOGASTRODUODENOSCOPY N/A 2/23/2021    Procedure: EGD (ESOPHAGOGASTRODUODENOSCOPY);  Surgeon: Papi Martinez Jr., MD;  Location: [facility];  Service: Endoscopy;  Laterality: N/A;    ESOPHAGOGASTRODUODENOSCOPY N/A 2/3/2022    Procedure: EGD (ESOPHAGOGASTRODUODENOSCOPY);  Surgeon: Papi Martinez Jr., MD;  Location: [facility];  Service: Endoscopy;  Laterality: N/A;    ESOPHAGOGASTRODUODENOSCOPY N/A 8/13/2022    Procedure: EGD (ESOPHAGOGASTRODUODENOSCOPY);  Surgeon: Huang Licea MD;  Location: [facility];  Service: Gastroenterology;  Laterality: N/A;    ESOPHAGOGASTRODUODENOSCOPY N/A 10/16/2022    Procedure: EGD (ESOPHAGOGASTRODUODENOSCOPY);  Surgeon: Huang Licea MD;  Location: [facility];  Service: Gastroenterology;  Laterality: N/A;    FEMUR FRACTURE SURGERY Right 2006    HYSTERECTOMY      INSERTION OF TUNNELED CENTRAL VENOUS CATHETER (CVC) WITH SUBCUTANEOUS PORT Left 8/14/2019    Procedure: [id number];  Surgeon: Miquel Sargent MD;  Location[facility];  Service: General;  Laterality: Left;    INSERTION OR REPLACEMENT OF PERCUTANEOUS ENDOSCOPIC JEJUNOSTOMY TUBE  12/29/2020    Procedure: INSERTION OR REPLACEMENT, JEJUNOSTOMY TUBE, PERCUTANEOUS, ENDOSCOPIC;  Surgeon: Papi Martinez Jr., MD;  Location: [facility];  Service: Endoscopy;;    laparoscopy for endometriosis      x5    OOPHORECTOMY      right ovary removed only    PEG TUBE REMOVAL  4/1/2021    Procedure: REMOVAL, PEG TUBE;  Surgeon: Papi Martinez Jr., MD;  Location: [facility];   Service: Endoscopy;;    TONSILLECTOMY           Pre-op Assessment    I have reviewed the Patient Summary Reports.     I have reviewed the Nursing Notes. I have reviewed the NPO Status.   I have reviewed the Medications.     Review of Systems  Anesthesia Hx:  No problems with previous Anesthesia  Denies Family Hx of Anesthesia complications.   Denies Personal Hx of Anesthesia complications.   Hematology/Oncology:  Hematology Normal        Cardiovascular:  Cardiovascular Normal     Renal/:   Chronic Renal Disease    Hepatic/GI:   Bowel Prep. PUD,    Musculoskeletal:  Musculoskeletal Normal    Neurological:   Neuromuscular Disease, Seizures    Dermatological:  Skin Normal    Psych:   Psychiatric History          Physical Exam  General: Well nourished    Airway:  Mallampati: II   Mouth Opening: Normal  TM Distance: Normal  Tongue: Normal  Neck ROM: Normal ROM    Dental:  Intact        Anesthesia Plan  Type of Anesthesia, risks & benefits discussed:    Anesthesia Type: Gen Natural Airway  Intra-op Monitoring Plan: Standard ASA Monitors  Informed Consent: Informed consent signed with the Patient and all parties understand the risks and agree with anesthesia plan.  All questions answered.   ASA Score: 2  Day of Surgery Review of History & Physical: H&P Update referred to the surgeon/provider.I have interviewed and examined the patient. I have reviewed the patient's H&P dated: There are no significant changes.     Ready For Surgery From Anesthesia Perspective.     .

## 2022-11-28 NOTE — ANESTHESIA POSTPROCEDURE EVALUATION
Anesthesia Post Evaluation    Patient: Angela Trascher Lejeune    Procedure(s) Performed: Procedure(s) (LRB):  EGD (ESOPHAGOGASTRODUODENOSCOPY) (N/A)    Final Anesthesia Type: general      Patient location during evaluation: PACU  Patient participation: Yes- Able to Participate  Level of consciousness: awake and alert  Post-procedure vital signs: reviewed and stable  Pain management: adequate  Airway patency: patent    PONV status at discharge: No PONV  Anesthetic complications: no      Cardiovascular status: blood pressure returned to baseline  Respiratory status: unassisted  Hydration status: euvolemic  Follow-up not needed.          Vitals Value Taken Time   /69 11/28/22 1142   Temp 36.7 °C (98.1 °F) 11/28/22 0918   Pulse 88 11/28/22 1142   Resp 14 11/28/22 1151   SpO2 97 % 11/28/22 1142         Event Time   Out of Recovery 12:20:11         Pain/Rufus Score: Pain Rating Prior to Med Admin: 5 (11/28/2022 11:51 AM)  Rufus Score: 10 (11/28/2022  9:31 AM)

## 2022-11-28 NOTE — H&P
Short Stay Endoscopy History and Physical    PCP - Haven Gary MD  Referring Physician - Zandra Mallory MD  [address] [place] [address]    Procedure - EGD  ASA - per anesthesia  Mallampati - per anesthesia  History of Anesthesia problems - no  Family history Anesthesia problems -  no   Plan of anesthesia - General    HPI  47 y.o. female  Reason for procedure:  Gastric and duodenal ulcer with duodenal stricture in setting of Crohn's disease    ROS:  Constitutional: No fevers, chills, No weight loss  CV: No chest pain  Pulm: No cough, No shortness of breath  GI: see HPI    Medical History:  has a past medical history of Abnormal Pap smear, Bipolar 1 disorder, Crohn's disease S/P surgery (ileum, possible gastroduodenal with ulcers), Gastric and duodenal ulcers of unclear etiology, Gastroparesis, H/O ETOH abuse, History of HSV-2 infection, Immunosuppressed status, Iron deficiency anemia, multiple blood transfusions, and Seizures.    Surgical History:  has a past surgical history that includes Cholecystectomy; Appendectomy; Hysterectomy; Oophorectomy; Femur fracture surgery (Right, 2006); laparoscopy for endometriosis; Tonsillectomy; Esophagogastroduodenoscopy (Left, 6/5/2019); Colonoscopy (Left, 7/10/2019); Brain surgery (2009); Insertion of tunneled central venous catheter (CVC) with subcutaneous port (Left, 8/14/2019); Esophagogastroduodenoscopy (Left, 3/16/2020); Esophagogastroduodenoscopy (N/A, 4/17/2020); Esophagogastroduodenoscopy (N/A, 7/6/2020); Esophagogastroduodenoscopy (N/A, 7/9/2020); Esophagogastroduodenoscopy (N/A, 9/3/2020); Esophagogastroduodenoscopy (N/A, 12/29/2020); Insertion or replacement of percutaneous endoscopic jejunostomy tube (12/29/2020); Esophagogastroduodenoscopy (N/A, 2/23/2021); Colon surgery; PEG tube removal (4/1/2021); Esophagogastroduodenoscopy (N/A, 2/3/2022); Esophagogastroduodenoscopy (N/A, 8/13/2022); and Esophagogastroduodenoscopy (N/A,  10/16/2022).    Family History: family history is not on file. She was adopted..    Social History:  reports that she quit smoking about 11 years ago. Her smoking use included cigarettes. She has a 3.00 pack-year smoking history. She has never used smokeless tobacco. She reports that she does not currently use alcohol. She reports that she does not use drugs.    Review of patient's allergies indicates:   Allergen Reactions    Tramadol Other (See Comments)     Seizures      Demerol [meperidine]      Seizures      Reglan [metoclopramide]        Medications:   Facility-Administered Medications Prior to Admission   Medication Dose Route Frequency Provider Last Rate Last Admin    acetaminophen tablet 650 mg  650 mg Oral 1 time in Clinic/HOD RAIN Burns         Medications Prior to Admission   Medication Sig Dispense Refill Last Dose    ARIPiprazole (ABILIFY) 5 MG Tab TAKE ONE TABLET BY MOUTH ONCE DAILY (Patient taking differently: Take 5 mg by mouth once daily.) 30 tablet 5 11/27/2022    lamotrigine (LAMICTAL) 150 MG Tab Take 150 mg by mouth once daily.    11/27/2022    LIDOcaine (LIDODERM) 5 % 1 patch once daily.   11/28/2022    ondansetron (ZOFRAN-ODT) 8 MG TbDL 1 po q 8 hours prn nausea and vomiting. 20 tablet 0 Past Week    pantoprazole (PROTONIX) 40 MG tablet Take 40 mg by mouth 2 (two) times daily before meals.   11/27/2022    propranoloL (INDERAL) 40 MG tablet Take 40 mg by mouth once daily.    11/27/2022    sucralfate (CARAFATE) 100 mg/mL suspension Take 10 mLs (1 g total) by mouth 3 (three) times daily. 900 mL 1 11/27/2022    tiZANidine (ZANAFLEX) 4 MG tablet Take 4 mg by mouth 3 (three) times daily.   11/27/2022    ustekinumab (STELARA) 90 mg/mL Syrg syringe Inject 90 mg into the skin every 8 weeks.   Past Month    valACYclovir (VALTREX) 500 MG tablet Take 500 mg by mouth as needed (outbreak).   Past Month       Physical Exam:    Vital Signs:   Vitals:    11/28/22 0809   BP: 139/82   Pulse: 99    Resp: 15   Temp: 97.7 °F (36.5 °C)       General Appearance: Well appearing in no acute distress  Abdomen: Soft, non tender, non distended with normal bowel sounds, no masses      Labs:  Lab Results   Component Value Date    WBC 6.41 11/22/2022    HGB 11.5 (L) 11/22/2022    HCT 35.9 (L) 11/22/2022     11/22/2022    CHOL 204 (H) 03/30/2020    TRIG 130 03/30/2020    HDL 52 03/30/2020    ALT 10 11/22/2022    AST 16 11/22/2022     11/22/2022    K 4.3 11/22/2022     (H) 11/22/2022    CREATININE 1.3 11/22/2022    BUN 36 (H) 11/22/2022    CO2 15 (L) 11/22/2022    TSH 1.015 11/22/2022    INR 1.0 10/15/2022    HGBA1C 4.9 03/30/2020       I have explained the risks and benefits of this endoscopic procedure to the patient including but not limited to bleeding, inflammation, infection, perforation, and death.      Zandra Mallory MD

## 2022-11-28 NOTE — PROVATION PATIENT INSTRUCTIONS
Discharge Summary/Instructions after an Endoscopic Procedure  Patient Name: Angela Lejeune  Patient MRN: [id number]  Patient YOB: 1975  Monday, November 28, 2022  Zandra Mallory MD  Dear patient,  As a result of recent federal legislation (The Federal Cures Act), you may   receive lab or pathology results from your procedure in your [facility]   account before your physician is able to contact you. Your physician or   their representative will relay the results to you with their   recommendations at their soonest availability.  Thank you,  RESTRICTIONS:  During your procedure today, you received medications for sedation.  These   medications may affect your judgment, balance and coordination.  Therefore,   for 24 hours, you have the following restrictions:   - DO NOT drive a car, operate machinery, make legal/financial decisions,   sign important papers or drink alcohol.    ACTIVITY:  Today: no heavy lifting, straining or running due to procedural   sedation/anesthesia.  The following day: return to full activity including work.  DIET:  Eat and drink normally unless instructed otherwise.     TREATMENT FOR COMMON SIDE EFFECTS:  - Mild abdominal pain, nausea, belching, bloating or excessive gas:  rest,   eat lightly and use a heating pad.  - Sore Throat: treat with throat lozenges and/or gargle with warm salt   water.  - Because air was used during the procedure, expelling large amounts of air   from your rectum or belching is normal.  - If a bowel prep was taken, you may not have a bowel movement for 1-3 days.    This is normal.  SYMPTOMS TO WATCH FOR AND REPORT TO YOUR PHYSICIAN:  1. Abdominal pain or bloating, other than gas cramps.  2. Chest pain.  3. Back pain.  4. Signs of infection such as: chills or fever occurring within 24 hours   after the procedure.  5. Rectal bleeding, which would show as bright red, maroon, or black stools.   (A tablespoon of blood from the rectum is not serious, especially  if   hemorrhoids are present.)  6. Vomiting.  7. Weakness or dizziness.  GO DIRECTLY TO THE NEAREST EMERGENCY ROOM IF YOU HAVE ANY OF THE FOLLOWING:      Difficulty breathing              Chills and/or fever over 101 F   Persistent vomiting and/or vomiting blood   Severe abdominal pain   Severe chest pain   Black, tarry stools   Bleeding- more than one tablespoon   Any other symptom or condition that you feel may need urgent attention  Your doctor recommends these additional instructions:  If any biopsies were taken, your doctors clinic will contact you in 1 to 2   weeks with any results.  - Discharge patient to home.   - Patient has a contact number available for emergencies.  The signs and   symptoms of potential delayed complications were discussed with the   patient.  Return to normal activities tomorrow.  Written discharge   instructions were provided to the patient.   - Resume previous diet.   - Continue present medications.   - Await pathology results.   - Perform a colonoscopy at appointment to be scheduled.   - Will consider starting misoprostol once biopsies are back.   - CBC same day as colonoscopy.   For questions, problems or results please call your physician - Zandra Mallory MD at Work:  [phone].  [facility], EMERGENCY ROOM PHONE NUMBER: [phone]  IF A COMPLICATION OR EMERGENCY SITUATION ARISES AND YOU ARE UNABLE TO REACH   YOUR PHYSICIAN - GO DIRECTLY TO THE EMERGENCY ROOM.  Zandra Mallory MD  11/28/2022 9:24:28 AM  This report has been verified and signed electronically.  Dear patient,  As a result of recent federal legislation (The Federal Cures Act), you may   receive lab or pathology results from your procedure in your [facility]   account before your physician is able to contact you. Your physician or   their representative will relay the results to you with their   recommendations at their soonest availability.  Thank you,  PROVATION

## 2022-12-06 ENCOUNTER — ANESTHESIA EVENT (OUTPATIENT)
Dept: ENDOSCOPY | Facility: HOSPITAL | Age: 47
End: 2022-12-06
Payer: COMMERCIAL

## 2022-12-06 ENCOUNTER — ANESTHESIA (OUTPATIENT)
Dept: ENDOSCOPY | Facility: HOSPITAL | Age: 47
End: 2022-12-06
Payer: COMMERCIAL

## 2022-12-06 ENCOUNTER — HOSPITAL ENCOUNTER (OUTPATIENT)
Facility: HOSPITAL | Age: 47
Discharge: HOME | End: 2022-12-06
Attending: INTERNAL MEDICINE | Admitting: INTERNAL MEDICINE
Payer: COMMERCIAL

## 2022-12-06 VITALS
HEIGHT: 64 IN | TEMPERATURE: 98 F | BODY MASS INDEX: 17.93 KG/M2 | HEART RATE: 59 BPM | SYSTOLIC BLOOD PRESSURE: 116 MMHG | DIASTOLIC BLOOD PRESSURE: 56 MMHG | WEIGHT: 105 LBS | OXYGEN SATURATION: 100 % | RESPIRATION RATE: 20 BRPM

## 2022-12-06 DIAGNOSIS — K50.90 CROHN'S DISEASE: ICD-10-CM

## 2022-12-06 DIAGNOSIS — D50.0 IRON DEFICIENCY ANEMIA DUE TO CHRONIC BLOOD LOSS: Primary | ICD-10-CM

## 2022-12-06 PROCEDURE — 88305 TISSUE EXAM BY PATHOLOGIST: ICD-10-PCS | Mod: 26,,, | Performed by: PATHOLOGY

## 2022-12-06 PROCEDURE — 45380 COLONOSCOPY AND BIOPSY: CPT | Performed by: INTERNAL MEDICINE

## 2022-12-06 PROCEDURE — 37000008 HC ANESTHESIA 1ST 15 MINUTES: Performed by: INTERNAL MEDICINE

## 2022-12-06 PROCEDURE — 63600175 PHARM REV CODE 636 W HCPCS: Performed by: NURSE ANESTHETIST, CERTIFIED REGISTERED

## 2022-12-06 PROCEDURE — E9220 PRA ENDO ANESTHESIA: HCPCS | Mod: ,,, | Performed by: NURSE ANESTHETIST, CERTIFIED REGISTERED

## 2022-12-06 PROCEDURE — 45380 COLONOSCOPY AND BIOPSY: CPT | Mod: ,,, | Performed by: INTERNAL MEDICINE

## 2022-12-06 PROCEDURE — 88305 TISSUE EXAM BY PATHOLOGIST: CPT | Mod: 59 | Performed by: PATHOLOGY

## 2022-12-06 PROCEDURE — 25000003 PHARM REV CODE 250: Performed by: NURSE ANESTHETIST, CERTIFIED REGISTERED

## 2022-12-06 PROCEDURE — 25000003 PHARM REV CODE 250: Performed by: INTERNAL MEDICINE

## 2022-12-06 PROCEDURE — 45380 PR COLONOSCOPY,BIOPSY: ICD-10-PCS | Mod: ,,, | Performed by: INTERNAL MEDICINE

## 2022-12-06 PROCEDURE — 37000009 HC ANESTHESIA EA ADD 15 MINS: Performed by: INTERNAL MEDICINE

## 2022-12-06 PROCEDURE — E9220 PRA ENDO ANESTHESIA: ICD-10-PCS | Mod: ,,, | Performed by: NURSE ANESTHETIST, CERTIFIED REGISTERED

## 2022-12-06 PROCEDURE — 88305 TISSUE EXAM BY PATHOLOGIST: CPT | Mod: 26,,, | Performed by: PATHOLOGY

## 2022-12-06 PROCEDURE — 27201012 HC FORCEPS, HOT/COLD, DISP: Performed by: INTERNAL MEDICINE

## 2022-12-06 RX ORDER — PROPOFOL 10 MG/ML
VIAL (ML) INTRAVENOUS
Status: DISCONTINUED | OUTPATIENT
Start: 2022-12-06 | End: 2022-12-06

## 2022-12-06 RX ORDER — PROPOFOL 10 MG/ML
VIAL (ML) INTRAVENOUS CONTINUOUS PRN
Status: DISCONTINUED | OUTPATIENT
Start: 2022-12-06 | End: 2022-12-06

## 2022-12-06 RX ORDER — LIDOCAINE HYDROCHLORIDE 20 MG/ML
INJECTION INTRAVENOUS
Status: DISCONTINUED | OUTPATIENT
Start: 2022-12-06 | End: 2022-12-06

## 2022-12-06 RX ORDER — SODIUM CHLORIDE 9 MG/ML
INJECTION, SOLUTION INTRAVENOUS CONTINUOUS
Status: DISCONTINUED | OUTPATIENT
Start: 2022-12-06 | End: 2022-12-06 | Stop reason: HOSPADM

## 2022-12-06 RX ADMIN — LIDOCAINE HYDROCHLORIDE 50 MG: 20 INJECTION INTRAVENOUS at 01:12

## 2022-12-06 RX ADMIN — PROPOFOL 70 MG: 10 INJECTION, EMULSION INTRAVENOUS at 01:12

## 2022-12-06 RX ADMIN — PROPOFOL 150 MCG/KG/MIN: 10 INJECTION, EMULSION INTRAVENOUS at 01:12

## 2022-12-06 RX ADMIN — SODIUM CHLORIDE: 0.9 INJECTION, SOLUTION INTRAVENOUS at 01:12

## 2022-12-06 NOTE — H&P
Short Stay Endoscopy History and Physical    PCP - Haven Gary MD    Procedure - Colonoscopy  ASA - per anesthesia  Mallampati - per anesthesia  History of Anesthesia problems - no  Family history Anesthesia problems - no   Plan of anesthesia - General    HPI:  This is a 47 y.o. female here for evaluation of : Crohn's disease, previously on stelara      ROS:  Constitutional: No fevers, chills, No weight loss  CV: No chest pain  Pulm: No cough, No shortness of breath  GI: see HPI  Derm: No rash    Medical History:  has a past medical history of Abnormal Pap smear, Bipolar 1 disorder, Crohn's disease S/P surgery (ileum, possible gastroduodenal with ulcers), Gastric and duodenal ulcers of unclear etiology, Gastroparesis, H/O ETOH abuse, History of HSV-2 infection, Immunosuppressed status, Iron deficiency anemia, multiple blood transfusions, and Seizures.    Surgical History:  has a past surgical history that includes Cholecystectomy; Appendectomy; Hysterectomy; Oophorectomy; Femur fracture surgery (Right, 2006); laparoscopy for endometriosis; Tonsillectomy; Esophagogastroduodenoscopy (Left, 6/5/2019); Colonoscopy (Left, 7/10/2019); Brain surgery (2009); Insertion of tunneled central venous catheter (CVC) with subcutaneous port (Left, 8/14/2019); Esophagogastroduodenoscopy (Left, 3/16/2020); Esophagogastroduodenoscopy (N/A, 4/17/2020); Esophagogastroduodenoscopy (N/A, 7/6/2020); Esophagogastroduodenoscopy (N/A, 7/9/2020); Esophagogastroduodenoscopy (N/A, 9/3/2020); Esophagogastroduodenoscopy (N/A, 12/29/2020); Insertion or replacement of percutaneous endoscopic jejunostomy tube (12/29/2020); Esophagogastroduodenoscopy (N/A, 2/23/2021); Colon surgery; PEG tube removal (4/1/2021); Esophagogastroduodenoscopy (N/A, 2/3/2022); Esophagogastroduodenoscopy (N/A, 8/13/2022); Esophagogastroduodenoscopy (N/A, 10/16/2022); and Esophagogastroduodenoscopy (N/A, 11/28/2022).    Family History: family history is not on file.  She was adopted.. Otherwise no colon cancer, inflammatory bowel disease, or GI malignancies.    Social History:  reports that she quit smoking about 11 years ago. Her smoking use included cigarettes. She has a 3.00 pack-year smoking history. She has never used smokeless tobacco. She reports that she does not currently use alcohol. She reports that she does not use drugs.    Review of patient's allergies indicates:   Allergen Reactions    Tramadol Other (See Comments)     Seizures      Demerol [meperidine]      Seizures      Reglan [metoclopramide]        Medications:   Facility-Administered Medications Prior to Admission   Medication Dose Route Frequency Provider Last Rate Last Admin    acetaminophen tablet 650 mg  650 mg Oral 1 time in Clinic/HOD RAIN Burns         Medications Prior to Admission   Medication Sig Dispense Refill Last Dose    ARIPiprazole (ABILIFY) 5 MG Tab TAKE ONE TABLET BY MOUTH ONCE DAILY (Patient taking differently: Take 5 mg by mouth once daily.) 30 tablet 5     lamotrigine (LAMICTAL) 150 MG Tab Take 150 mg by mouth once daily.        LIDOcaine (LIDODERM) 5 % 1 patch once daily.       ondansetron (ZOFRAN-ODT) 8 MG TbDL 1 po q 8 hours prn nausea and vomiting. 20 tablet 0     pantoprazole (PROTONIX) 40 MG tablet Take 40 mg by mouth 2 (two) times daily before meals.       propranoloL (INDERAL) 40 MG tablet Take 40 mg by mouth once daily.        sucralfate (CARAFATE) 100 mg/mL suspension Take 10 mLs (1 g total) by mouth 3 (three) times daily. 900 mL 1     tiZANidine (ZANAFLEX) 4 MG tablet Take 4 mg by mouth 3 (three) times daily.       ustekinumab (STELARA) 90 mg/mL Syrg syringe Inject 90 mg into the skin every 8 weeks.       valACYclovir (VALTREX) 500 MG tablet Take 500 mg by mouth as needed (outbreak).            Physical Exam:    Vital Signs: There were no vitals filed for this visit.    Gen: NAD, lying comfortably  HENT: NCAT, oropharynx clear  Eyes: anicteric sclerae, EOMI  grossly  Neck: supple, no visible masses/goiter  Cardiac: RRR  Lungs: non-labored breathing  Abd: soft, NT/ND, normoactive BS  Ext: no LE edema, warm, well perfused  Skin: skin intact on exposed body parts, no visible rashes, lesions  Neuro: A&Ox4, neuro exam grossly intact, moves all extremities  Psych: appropriate mood, affect        Labs:  Lab Results   Component Value Date    WBC 6.41 11/22/2022    HGB 11.5 (L) 11/22/2022    HCT 35.9 (L) 11/22/2022     11/22/2022    CHOL 204 (H) 03/30/2020    TRIG 130 03/30/2020    HDL 52 03/30/2020    ALT 10 11/22/2022    AST 16 11/22/2022     11/22/2022    K 4.3 11/22/2022     (H) 11/22/2022    CREATININE 1.3 11/22/2022    BUN 36 (H) 11/22/2022    CO2 15 (L) 11/22/2022    TSH 1.015 11/22/2022    INR 1.0 10/15/2022    HGBA1C 4.9 03/30/2020       Plan:  Colonoscopy for crohn's disease.    I have explained the risks and benefits of endoscopy procedures to the patient including but not limited to bleeding, perforation, infection, and death.  The patient was asked if they understand and allowed to ask any further questions to their satisfaction.    Gasper Giraldo MD

## 2022-12-06 NOTE — PROVATION PATIENT INSTRUCTIONS
Discharge Summary/Instructions after an Endoscopic Procedure  Patient Name: Angela Lejeune  Patient MRN: [id number]  Patient YOB: 1975  Tuesday, December 6, 2022  Zandra Mallory MD  Dear patient,  As a result of recent federal legislation (The Federal Cures Act), you may   receive lab or pathology results from your procedure in your [facility]   account before your physician is able to contact you. Your physician or   their representative will relay the results to you with their   recommendations at their soonest availability.  Thank you,  RESTRICTIONS:  During your procedure today, you received medications for sedation.  These   medications may affect your judgment, balance and coordination.  Therefore,   for 24 hours, you have the following restrictions:   - DO NOT drive a car, operate machinery, make legal/financial decisions,   sign important papers or drink alcohol.    ACTIVITY:  Today: no heavy lifting, straining or running due to procedural   sedation/anesthesia.  The following day: return to full activity including work.  DIET:  Eat and drink normally unless instructed otherwise.     TREATMENT FOR COMMON SIDE EFFECTS:  - Mild abdominal pain, nausea, belching, bloating or excessive gas:  rest,   eat lightly and use a heating pad.  - Sore Throat: treat with throat lozenges and/or gargle with warm salt   water.  - Because air was used during the procedure, expelling large amounts of air   from your rectum or belching is normal.  - If a bowel prep was taken, you may not have a bowel movement for 1-3 days.    This is normal.  SYMPTOMS TO WATCH FOR AND REPORT TO YOUR PHYSICIAN:  1. Abdominal pain or bloating, other than gas cramps.  2. Chest pain.  3. Back pain.  4. Signs of infection such as: chills or fever occurring within 24 hours   after the procedure.  5. Rectal bleeding, which would show as bright red, maroon, or black stools.   (A tablespoon of blood from the rectum is not serious, especially  if   hemorrhoids are present.)  6. Vomiting.  7. Weakness or dizziness.  GO DIRECTLY TO THE NEAREST EMERGENCY ROOM IF YOU HAVE ANY OF THE FOLLOWING:      Difficulty breathing              Chills and/or fever over 101 F   Persistent vomiting and/or vomiting blood   Severe abdominal pain   Severe chest pain   Black, tarry stools   Bleeding- more than one tablespoon   Any other symptom or condition that you feel may need urgent attention  Your doctor recommends these additional instructions:  If any biopsies were taken, your doctors clinic will contact you in 1 to 2   weeks with any results.  - Discharge patient to home.   - Patient has a contact number available for emergencies.  The signs and   symptoms of potential delayed complications were discussed with the   patient.  Return to normal activities tomorrow.  Written discharge   instructions were provided to the patient.   - Resume previous diet.   - Continue present medications.   - Await pathology results.   - Repeat colonoscopy every 1-2 years to assess disease activity.   - Return to GI clinic.   For questions, problems or results please call your physician - Zandra Mallory MD at Work:  [phone].  [facility], EMERGENCY ROOM PHONE NUMBER: [phone]  IF A COMPLICATION OR EMERGENCY SITUATION ARISES AND YOU ARE UNABLE TO REACH   YOUR PHYSICIAN - GO DIRECTLY TO THE EMERGENCY ROOM.  Zandra Mallory MD  12/6/2022 2:23:57 PM  This report has been verified and signed electronically.  Dear patient,  As a result of recent federal legislation (The Federal Cures Act), you may   receive lab or pathology results from your procedure in your [facility]   account before your physician is able to contact you. Your physician or   their representative will relay the results to you with their   recommendations at their soonest availability.  Thank you,  PROVATION

## 2022-12-06 NOTE — ANESTHESIA POSTPROCEDURE EVALUATION
Anesthesia Post Evaluation    Patient: Angela Trascher Lejeune    Procedure(s) Performed: Procedure(s) (LRB):  COLONOSCOPY (N/A)    Final Anesthesia Type: general      Patient location during evaluation: PACU  Patient participation: Yes- Able to Participate  Level of consciousness: awake and alert  Post-procedure vital signs: reviewed and stable  Pain management: adequate  Airway patency: patent    PONV status at discharge: No PONV  Anesthetic complications: no      Cardiovascular status: blood pressure returned to baseline  Respiratory status: unassisted  Hydration status: euvolemic  Follow-up not needed.          Vitals Value Taken Time   /56 12/06/22 1458   Temp  12/06/22 1510   Pulse 59 12/06/22 1458   Resp 20 12/06/22 1458   SpO2 100 % 12/06/22 1458         Event Time   Out of Recovery 15:02:59         Pain/Rufus Score: Rufus Score: 9 (12/6/2022  2:28 PM)

## 2022-12-06 NOTE — TRANSFER OF CARE
"Anesthesia Transfer of Care Note    Patient: Angela Trascher Lejeune    Procedure(s) Performed: Procedure(s) (LRB):  COLONOSCOPY (N/A)    Patient location: GI    Anesthesia Type: general    Transport from OR: Transported from OR on room air with adequate spontaneous ventilation    Post pain: adequate analgesia    Post assessment: no apparent anesthetic complications and tolerated procedure well    Post vital signs: stable    Level of consciousness: awake, alert and oriented    Nausea/Vomiting: no nausea/vomiting    Complications: none    Transfer of care protocol was followed      Last vitals:   Visit Vitals  BP (!) 100/56   Pulse (!) 57   Temp 36.5 °C (97.7 °F) (Temporal)   Resp 16   Ht 5' 4" (1.626 m)   Wt 47.6 kg (105 lb)   LMP 08/21/2010 (LMP Unknown)   SpO2 100%   Breastfeeding No   BMI 18.02 kg/m²     "

## 2022-12-06 NOTE — ANESTHESIA PREPROCEDURE EVALUATION
12/06/2022  Pre-operative evaluation for Procedure(s) (LRB):  COLONOSCOPY (N/A)    Angela Trascher Lejeune is a 47 y.o. female     Patient Active Problem List   Diagnosis    Bipolar I disorder    Seizure disorder    Peripheral vertigo    Carpal tunnel syndrome    Anxiety state    Essential tremor    Fatigue    Iron deficiency anemia secondary to inadequate dietary iron intake    Symptomatic anemia    Hyperchloremic metabolic acidosis    JULIET (acute kidney injury)    Hypokalemia    Constipation    Iron deficiency anemia    Left ovarian cyst    Asymptomatic bacteriuria    Fatigue associated with anemia    PUD (peptic ulcer disease)    Hypokalemia due to loss of potassium    H/O Crohn's disease    Venous insufficiency    NSAID long-term use    Anemia    Gastric hemorrhage    Severe malnutrition    Crohn's disease of small intestine with complication    Gastric ulcer without hemorrhage or perforation    Weakness    Feeding difficulty    Acute blood loss anemia    Gastroparesis    Hx of hypokalemia    Malfunction of jejunostomy tube    Crohn's disease    SBO (small bowel obstruction)    Abnormal ECG    Alkalosis    Duodenal ulcer hemorrhage    Immunosuppressed status    Duodenal ulcer       Review of patient's allergies indicates:   Allergen Reactions    Tramadol Other (See Comments)     Seizures      Demerol [meperidine]      Seizures      Reglan [metoclopramide]        No current facility-administered medications on file prior to encounter.     Current Outpatient Medications on File Prior to Encounter   Medication Sig Dispense Refill    ARIPiprazole (ABILIFY) 5 MG Tab TAKE ONE TABLET BY MOUTH ONCE DAILY (Patient taking differently: Take 5 mg by mouth once daily.) 30 tablet 5    lamotrigine (LAMICTAL) 150 MG Tab Take 150 mg by mouth once daily.       LIDOcaine  (LIDODERM) 5 % 1 patch once daily.      ondansetron (ZOFRAN-ODT) 8 MG TbDL 1 po q 8 hours prn nausea and vomiting. 20 tablet 0    pantoprazole (PROTONIX) 40 MG tablet Take 40 mg by mouth 2 (two) times daily before meals.      propranoloL (INDERAL) 40 MG tablet Take 40 mg by mouth once daily.       sucralfate (CARAFATE) 100 mg/mL suspension Take 10 mLs (1 g total) by mouth 3 (three) times daily. 900 mL 1    tiZANidine (ZANAFLEX) 4 MG tablet Take 4 mg by mouth 3 (three) times daily.      ustekinumab (STELARA) 90 mg/mL Syrg syringe Inject 90 mg into the skin every 8 weeks.      valACYclovir (VALTREX) 500 MG tablet Take 500 mg by mouth as needed (outbreak).         Past Surgical History:   Procedure Laterality Date    APPENDECTOMY      BRAIN SURGERY  2009    fx skull due to seizure with 2 pins    CHOLECYSTECTOMY      COLON SURGERY      Partial colectomy    COLONOSCOPY Left 7/10/2019    Procedure: COLONOSCOPY;  Surgeon: Papi Martinez Jr., MD;  [facility];  Service: Endoscopy;  Laterality: Left;    ESOPHAGOGASTRODUODENOSCOPY Left 6/5/2019    Procedure: EGD (ESOPHAGOGASTRODUODENOSCOPY);  Surgeon: ROLA Villagran MD;  Location: [facility];  Service: Endoscopy;  Laterality: Left;    ESOPHAGOGASTRODUODENOSCOPY Left 3/16/2020    Procedure: EGD (ESOPHAGOGASTRODUODENOSCOPY);  Surgeon: Clive Núñez MD;  Location: [facility];  Service: Endoscopy;  Laterality: Left;    ESOPHAGOGASTRODUODENOSCOPY N/A 4/17/2020    Procedure: EGD (ESOPHAGOGASTRODUODENOSCOPY);  Surgeon: Papi Martinez Jr., MD;  Location: [facility];  Service: Endoscopy;  Laterality: N/A;  with Push Enteroscopy    ESOPHAGOGASTRODUODENOSCOPY N/A 7/6/2020    Procedure: EGD (ESOPHAGOGASTRODUODENOSCOPY)   possible peg;  Surgeon: Papi Martinez Jr., MD;  Location: [facility];  Service: Endoscopy;  Laterality: N/A;    ESOPHAGOGASTRODUODENOSCOPY N/A 7/9/2020    Procedure: EGD (ESOPHAGOGASTRODUODENOSCOPY);  Surgeon: Papi Martinez Jr.,  MD;  Location: [facility];  Service: Endoscopy;  Laterality: N/A;  J or Peg tube    ESOPHAGOGASTRODUODENOSCOPY N/A 9/3/2020    Procedure: EGD (ESOPHAGOGASTRODUODENOSCOPY);  Surgeon: Papi Martinez Jr., MD;  Location: [facility];  Service: Endoscopy;  Laterality: N/A;  per drs order Stoma, Peds, w/single Lumen, J-Tube placement    ESOPHAGOGASTRODUODENOSCOPY N/A 12/29/2020    Procedure: EGD (ESOPHAGOGASTRODUODENOSCOPY);  Surgeon: Papi Martinez Jr., MD;  Location: [facility];  Service: Endoscopy;  Laterality: N/A;    ESOPHAGOGASTRODUODENOSCOPY N/A 2/23/2021    Procedure: EGD (ESOPHAGOGASTRODUODENOSCOPY);  Surgeon: Papi Martinez Jr., MD;  Location: [facility];  Service: Endoscopy;  Laterality: N/A;    ESOPHAGOGASTRODUODENOSCOPY N/A 2/3/2022    Procedure: EGD (ESOPHAGOGASTRODUODENOSCOPY);  Surgeon: Papi Martinez Jr., MD;  Location: [facility];  Service: Endoscopy;  Laterality: N/A;    ESOPHAGOGASTRODUODENOSCOPY N/A 8/13/2022    Procedure: EGD (ESOPHAGOGASTRODUODENOSCOPY);  Surgeon: Huang Licea MD;  Location: [facility];  Service: Gastroenterology;  Laterality: N/A;    ESOPHAGOGASTRODUODENOSCOPY N/A 10/16/2022    Procedure: EGD (ESOPHAGOGASTRODUODENOSCOPY);  Surgeon: Huang Licea MD;  Location: [facility];  Service: Gastroenterology;  Laterality: N/A;    ESOPHAGOGASTRODUODENOSCOPY N/A 11/28/2022    Procedure: EGD (ESOPHAGOGASTRODUODENOSCOPY);  Surgeon: Zandra Mallory MD;  Location: [facility] ([address] [place] [address]);  Service: Endoscopy;  Laterality: N/A;    FEMUR FRACTURE SURGERY Right 2006    HYSTERECTOMY      INSERTION OF TUNNELED CENTRAL VENOUS CATHETER (CVC) WITH SUBCUTANEOUS PORT Left 8/14/2019    Procedure: [id number];  Surgeon: Miquel Sargent MD;  Location: [facility];  Service: General;  Laterality: Left;    INSERTION OR REPLACEMENT OF PERCUTANEOUS ENDOSCOPIC JEJUNOSTOMY TUBE  12/29/2020    Procedure: INSERTION OR REPLACEMENT, JEJUNOSTOMY TUBE, PERCUTANEOUS, ENDOSCOPIC;  Surgeon: Papi Martinez  MD Hina;  Location: [facility];  Service: Endoscopy;;    laparoscopy for endometriosis      x5    OOPHORECTOMY      right ovary removed only    PEG TUBE REMOVAL  2021    Procedure: REMOVAL, PEG TUBE;  Surgeon: Papi Martinez Jr., MD;  Location: [facility];  Service: Endoscopy;;    TONSILLECTOMY         Social History     Socioeconomic History    Marital status:    Tobacco Use    Smoking status: Former     Packs/day: 0.50     Years: 6.00     Pack years: 3.00     Types: Cigarettes     Quit date: 5/10/2011     Years since quittin.5    Smokeless tobacco: Never   Substance and Sexual Activity    Alcohol use: Not Currently    Drug use: Never    Sexual activity: Yes     Partners: Male         CBC: No results for input(s): WBC, RBC, HGB, HCT, PLT, MCV, MCH, MCHC in the last 72 hours.    CMP: No results for input(s): NA, K, CL, CO2, BUN, CREATININE, GLU, MG, PHOS, CALCIUM, ALBUMIN, PROT, ALKPHOS, ALT, AST, BILITOT in the last 72 hours.    INR  No results for input(s): PT, INR, PROTIME, APTT in the last 72 hours.        Diagnostic Studies:      EKG:  Normal sinus rhythm   Right atrial enlargement   Borderline Abnormal ECG   When compared with ECG of 15-OCT-2022 17:50,   No significant change was found   Confirmed by Darrian GARCIA [facility] ([id number]) on 2022 1:39:02 PM     2D Echo:  No results found for this or any previous visit.        Pre-op Assessment    I have reviewed the Patient Summary Reports.     I have reviewed the Nursing Notes. I have reviewed the NPO Status.      Review of Systems  Hematology/Oncology:     Oncology Normal    -- Anemia:   EENT/Dental:EENT/Dental Normal   Cardiovascular:  Cardiovascular Normal     Pulmonary:  Pulmonary Normal    Renal/:  Renal/ Normal     Hepatic/GI:   Bowel Prep. PUD, Crohn's   Musculoskeletal:  Musculoskeletal Normal    Neurological:   Seizures    Endocrine:  Endocrine Normal    Psych:   Psychiatric History anxiety          Physical Exam  General: Well  nourished    Airway:  Mallampati: II   Mouth Opening: Normal  TM Distance: Normal  Tongue: Normal  Neck ROM: Normal ROM    Dental:  Intact        Anesthesia Plan  Type of Anesthesia, risks & benefits discussed:    Anesthesia Type: Gen Natural Airway  Intra-op Monitoring Plan: Standard ASA Monitors  Induction:  IV  Informed Consent: Informed consent signed with the Patient and all parties understand the risks and agree with anesthesia plan.  All questions answered.   ASA Score: 2  Day of Surgery Review of History & Physical: H&P Update referred to the surgeon/provider.    Ready For Surgery From Anesthesia Perspective.     .

## 2022-12-07 LAB
FINAL PATHOLOGIC DIAGNOSIS: NORMAL
GROSS: NORMAL
Lab: NORMAL

## 2022-12-09 ENCOUNTER — TELEPHONE (OUTPATIENT)
Dept: GASTROENTEROLOGY | Facility: CLINIC | Age: 47
End: 2022-12-09
Payer: COMMERCIAL

## 2022-12-09 NOTE — TELEPHONE ENCOUNTER
----- Message from Zandra Mallory MD sent at 12/9/2022  9:17 AM CST -----  Relay below message when you call pt and ask her to get CBC at [facility]- schedule please, orders are in

## 2022-12-09 NOTE — TELEPHONE ENCOUNTER
Call and spoke to patient   -discuss Dr Mallory message below   -pt expressed understanding   -all questions and concerns were answered   -lab appt schedule for Monday 12/12/22

## 2022-12-12 ENCOUNTER — INFUSION (OUTPATIENT)
Dept: INFUSION THERAPY | Facility: HOSPITAL | Age: 47
End: 2022-12-12
Attending: INTERNAL MEDICINE
Payer: COMMERCIAL

## 2022-12-12 DIAGNOSIS — E87.6 HYPOKALEMIA DUE TO LOSS OF POTASSIUM: ICD-10-CM

## 2022-12-12 DIAGNOSIS — D50.8 IRON DEFICIENCY ANEMIA SECONDARY TO INADEQUATE DIETARY IRON INTAKE: ICD-10-CM

## 2022-12-12 DIAGNOSIS — D50.0 IRON DEFICIENCY ANEMIA DUE TO CHRONIC BLOOD LOSS: Primary | ICD-10-CM

## 2022-12-12 DIAGNOSIS — I87.2 VENOUS INSUFFICIENCY: ICD-10-CM

## 2022-12-12 LAB
BASOPHILS # BLD AUTO: 0.1 K/UL (ref 0–0.2)
BASOPHILS NFR BLD: 1.4 % (ref 0–1.9)
DIFFERENTIAL METHOD: ABNORMAL
EOSINOPHIL # BLD AUTO: 0.2 K/UL (ref 0–0.5)
EOSINOPHIL NFR BLD: 3 % (ref 0–8)
ERYTHROCYTE [DISTWIDTH] IN BLOOD BY AUTOMATED COUNT: 18.1 % (ref 11.5–14.5)
HCT VFR BLD AUTO: 37.1 % (ref 37–48.5)
HGB BLD-MCNC: 12 G/DL (ref 12–16)
IMM GRANULOCYTES # BLD AUTO: 0.02 K/UL (ref 0–0.04)
IMM GRANULOCYTES NFR BLD AUTO: 0.3 % (ref 0–0.5)
LYMPHOCYTES # BLD AUTO: 2.4 K/UL (ref 1–4.8)
LYMPHOCYTES NFR BLD: 34 % (ref 18–48)
MCH RBC QN AUTO: 31.9 PG (ref 27–31)
MCHC RBC AUTO-ENTMCNC: 32.3 G/DL (ref 32–36)
MCV RBC AUTO: 99 FL (ref 82–98)
MONOCYTES # BLD AUTO: 0.5 K/UL (ref 0.3–1)
MONOCYTES NFR BLD: 7.5 % (ref 4–15)
NEUTROPHILS # BLD AUTO: 3.8 K/UL (ref 1.8–7.7)
NEUTROPHILS NFR BLD: 53.8 % (ref 38–73)
NRBC BLD-RTO: 0 /100 WBC
PLATELET # BLD AUTO: 386 K/UL (ref 150–450)
PMV BLD AUTO: 9.9 FL (ref 9.2–12.9)
RBC # BLD AUTO: 3.76 M/UL (ref 4–5.4)
WBC # BLD AUTO: 7.03 K/UL (ref 3.9–12.7)

## 2022-12-12 PROCEDURE — A4216 STERILE WATER/SALINE, 10 ML: HCPCS | Mod: PN | Performed by: INTERNAL MEDICINE

## 2022-12-12 PROCEDURE — 85025 COMPLETE CBC W/AUTO DIFF WBC: CPT | Mod: PN | Performed by: INTERNAL MEDICINE

## 2022-12-12 PROCEDURE — 25000003 PHARM REV CODE 250: Mod: PN | Performed by: INTERNAL MEDICINE

## 2022-12-12 PROCEDURE — 63600175 PHARM REV CODE 636 W HCPCS: Mod: PN | Performed by: INTERNAL MEDICINE

## 2022-12-12 PROCEDURE — 36591 DRAW BLOOD OFF VENOUS DEVICE: CPT | Mod: PN

## 2022-12-12 RX ORDER — HEPARIN 100 UNIT/ML
500 SYRINGE INTRAVENOUS
Status: CANCELLED | OUTPATIENT
Start: 2022-12-12

## 2022-12-12 RX ORDER — SODIUM CHLORIDE 0.9 % (FLUSH) 0.9 %
10 SYRINGE (ML) INJECTION
Status: CANCELLED | OUTPATIENT
Start: 2022-12-12

## 2022-12-12 RX ORDER — SODIUM CHLORIDE 0.9 % (FLUSH) 0.9 %
10 SYRINGE (ML) INJECTION
Status: COMPLETED | OUTPATIENT
Start: 2022-12-12 | End: 2022-12-12

## 2022-12-12 RX ORDER — HEPARIN 100 UNIT/ML
500 SYRINGE INTRAVENOUS
Status: COMPLETED | OUTPATIENT
Start: 2022-12-12 | End: 2022-12-12

## 2022-12-12 RX ADMIN — Medication 10 ML: at 03:12

## 2022-12-12 RX ADMIN — Medication 500 UNITS: at 04:12

## 2022-12-19 LAB
FINAL PATHOLOGIC DIAGNOSIS: NORMAL
GROSS: NORMAL
Lab: NORMAL
MICROSCOPIC EXAM: NORMAL

## 2022-12-28 ENCOUNTER — OFFICE VISIT (OUTPATIENT)
Dept: GASTROENTEROLOGY | Facility: CLINIC | Age: 47
End: 2022-12-28
Payer: COMMERCIAL

## 2022-12-28 VITALS
OXYGEN SATURATION: 100 % | HEART RATE: 77 BPM | TEMPERATURE: 98 F | RESPIRATION RATE: 16 BRPM | DIASTOLIC BLOOD PRESSURE: 71 MMHG | HEIGHT: 64 IN | BODY MASS INDEX: 18.76 KG/M2 | WEIGHT: 109.88 LBS | SYSTOLIC BLOOD PRESSURE: 101 MMHG

## 2022-12-28 DIAGNOSIS — D84.9 IMMUNOSUPPRESSED STATUS: ICD-10-CM

## 2022-12-28 DIAGNOSIS — K50.019 CROHN'S DISEASE OF SMALL INTESTINE WITH COMPLICATION: Primary | ICD-10-CM

## 2022-12-28 DIAGNOSIS — K26.9 DUODENAL ULCER: ICD-10-CM

## 2022-12-28 PROBLEM — K92.2 GASTRIC HEMORRHAGE: Status: RESOLVED | Noted: 2020-04-17 | Resolved: 2022-12-28

## 2022-12-28 PROBLEM — K94.13 MALFUNCTION OF JEJUNOSTOMY TUBE: Status: RESOLVED | Noted: 2021-02-24 | Resolved: 2022-12-28

## 2022-12-28 PROBLEM — N17.9 AKI (ACUTE KIDNEY INJURY): Status: RESOLVED | Noted: 2019-06-03 | Resolved: 2022-12-28

## 2022-12-28 PROBLEM — H81.399 PERIPHERAL VERTIGO: Status: RESOLVED | Noted: 2019-05-13 | Resolved: 2022-12-28

## 2022-12-28 PROBLEM — Z79.1 NSAID LONG-TERM USE: Status: RESOLVED | Noted: 2020-03-14 | Resolved: 2022-12-28

## 2022-12-28 PROBLEM — K59.00 CONSTIPATION: Status: RESOLVED | Noted: 2019-06-04 | Resolved: 2022-12-28

## 2022-12-28 PROBLEM — K56.609 SBO (SMALL BOWEL OBSTRUCTION): Status: RESOLVED | Noted: 2021-08-24 | Resolved: 2022-12-28

## 2022-12-28 PROBLEM — E43 SEVERE MALNUTRITION: Status: RESOLVED | Noted: 2020-07-02 | Resolved: 2022-12-28

## 2022-12-28 PROBLEM — R53.1 WEAKNESS: Status: RESOLVED | Noted: 2020-12-27 | Resolved: 2022-12-28

## 2022-12-28 PROBLEM — K26.4 DUODENAL ULCER HEMORRHAGE: Status: RESOLVED | Noted: 2022-10-16 | Resolved: 2022-12-28

## 2022-12-28 PROBLEM — K27.9 PUD (PEPTIC ULCER DISEASE): Status: RESOLVED | Noted: 2019-06-25 | Resolved: 2022-12-28

## 2022-12-28 PROBLEM — D62 ACUTE BLOOD LOSS ANEMIA: Status: RESOLVED | Noted: 2021-02-24 | Resolved: 2022-12-28

## 2022-12-28 PROBLEM — R63.30 FEEDING DIFFICULTY: Status: RESOLVED | Noted: 2021-02-23 | Resolved: 2022-12-28

## 2022-12-28 PROBLEM — D64.9 ANEMIA: Status: RESOLVED | Noted: 2020-03-16 | Resolved: 2022-12-28

## 2022-12-28 PROBLEM — E87.29 HYPERCHLOREMIC METABOLIC ACIDOSIS: Status: RESOLVED | Noted: 2019-06-03 | Resolved: 2022-12-28

## 2022-12-28 PROBLEM — E87.3 ALKALOSIS: Status: RESOLVED | Noted: 2021-03-28 | Resolved: 2022-12-28

## 2022-12-28 PROCEDURE — 1160F PR REVIEW ALL MEDS BY PRESCRIBER/CLIN PHARMACIST DOCUMENTED: ICD-10-PCS | Mod: CPTII,S$GLB,, | Performed by: INTERNAL MEDICINE

## 2022-12-28 PROCEDURE — 99215 PR OFFICE/OUTPT VISIT, EST, LEVL V, 40-54 MIN: ICD-10-PCS | Mod: S$GLB,,, | Performed by: INTERNAL MEDICINE

## 2022-12-28 PROCEDURE — 3078F DIAST BP <80 MM HG: CPT | Mod: CPTII,S$GLB,, | Performed by: INTERNAL MEDICINE

## 2022-12-28 PROCEDURE — 1159F PR MEDICATION LIST DOCUMENTED IN MEDICAL RECORD: ICD-10-PCS | Mod: CPTII,S$GLB,, | Performed by: INTERNAL MEDICINE

## 2022-12-28 PROCEDURE — 1159F MED LIST DOCD IN RCRD: CPT | Mod: CPTII,S$GLB,, | Performed by: INTERNAL MEDICINE

## 2022-12-28 PROCEDURE — 99215 OFFICE O/P EST HI 40 MIN: CPT | Mod: S$GLB,,, | Performed by: INTERNAL MEDICINE

## 2022-12-28 PROCEDURE — 3078F PR MOST RECENT DIASTOLIC BLOOD PRESSURE < 80 MM HG: ICD-10-PCS | Mod: CPTII,S$GLB,, | Performed by: INTERNAL MEDICINE

## 2022-12-28 PROCEDURE — 3008F BODY MASS INDEX DOCD: CPT | Mod: CPTII,S$GLB,, | Performed by: INTERNAL MEDICINE

## 2022-12-28 PROCEDURE — 3074F PR MOST RECENT SYSTOLIC BLOOD PRESSURE < 130 MM HG: ICD-10-PCS | Mod: CPTII,S$GLB,, | Performed by: INTERNAL MEDICINE

## 2022-12-28 PROCEDURE — 3074F SYST BP LT 130 MM HG: CPT | Mod: CPTII,S$GLB,, | Performed by: INTERNAL MEDICINE

## 2022-12-28 PROCEDURE — 1160F RVW MEDS BY RX/DR IN RCRD: CPT | Mod: CPTII,S$GLB,, | Performed by: INTERNAL MEDICINE

## 2022-12-28 PROCEDURE — 3008F PR BODY MASS INDEX (BMI) DOCUMENTED: ICD-10-PCS | Mod: CPTII,S$GLB,, | Performed by: INTERNAL MEDICINE

## 2022-12-28 RX ORDER — SUCRALFATE 1 G/1
1 TABLET ORAL 2 TIMES DAILY PRN
COMMUNITY
End: 2023-04-09 | Stop reason: CLARIF

## 2022-12-28 NOTE — PROGRESS NOTES
"     [facility]             Inflammatory Bowel Disease   Follow-up  Note              TODAY'S VISIT DATE:  12/28/2022    Chief Complaint:   Chief Complaint   Patient presents with    Crohn's Disease     PCP: Haven Gary    Previous History:  Angela Trascher Lejeune is a 47 y.o. 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  She was doing well until 1990s when she began with symptoms of abdominal pain, nausea/vomiting, weight loss and diagnosed by colonoscopy with Crohn's of the terminal ileum with associated enteropathogenic arthropathy. She failed azulfidine, asacol and recurrent prednisone courses. In 1998 due to significant symptoms and medically refractory disease she underwent an ileocecectomy (9 inches of ileum removed)  followed by no medical therapy for her CD and felt well. She started with seizures in 1998 and last one 2009. From 4022-1213 had a few colonoscopies with no recurrent CD on documented colonoscopy 8/2009 which was normal. In 10/2013 she saw Dr. Martinez for coffee-ground emesis though all records not available and EGD c/w "reflux."  In May 2015 patient had acute transaminitis related to heavy alcohol use.   She saw  9/2015 for RLQ abdominal pain and gyn felt this was likely due to adhesions.  In June 2019 patient hospitalized for symptomatic anemia (Hgb 6.1) with 30 pound weight loss requiring the need for IV blood transfusion and EGD significant for HP neg reactive gastropathy, 1 cm gastric ulcer and 1 " superficial duodenal ulcer (per records NSAID abuse). CT significant for left ovarian cyst, large amount of stool scattered throughout the colon.  Patient hospitalized in early July 2019 with severe anemia requiring blood transfusion and colonoscopy on 7/10/2019 significant for normal colon, patent ileo-ascending anastomosis with ulceration (bx with non-specific acute ulcer) and normal neoterminal ileum (biopsies of ileum normal).  She was placed on protonix 40 mg daily. CTE 10/3/2019 normal small bowel.  Patient hospitalized March 2020 and reported ibuprofen daily for right-sided hip pain, epigastric discomfort with with labs consistent with severe anemia (Hgb 3.8) requiring blood transfusion.  EGD during this admission consistent 1 cm gastric ulcer and 1 cm duodenal bulb ulcer and recommended that she take protonix 40 mg po BID x 6 weeks then daily thereafter and to avoid NSAIDs. Per notes patient had received IV iron infusions and due to low vitamin B12 was on supplementation during these episodes of anemia requiring hospitalization.  EGD April 2020 significant for 2 crater to gastric ulcers with pigmented material with largest lesion 8 mm (distorted mucosa with superficial mucosal ulceration), 6 mm first portion duodenum ulcer (bx distorted duodenal mucosal with superficial mucosal ulceration), normal jejunum (SB bx normal), moderate pyloric stenosis dilated with 12 mm balloon (bx pyloric channel ulcerative reactive gastropathy with focal intestinal metaplasia). CTE 4/28/20 showed short segment of uniform mild wall thickening vs nondistention in transverse colon with metallic object within distal ileum lumen near anastomosis.  Workup for this through and gastrin levels which per notes were normal and patient proceeded with IV iron.  In 6/2020 due to significant weight loss UGI series done and showed questionable irregular contrast in the stomach pooling along the distal gastric body raising concern for possible  ulceration.  Patient hospitalized July 2020 for hyperchloremic acidosis and underwent evaluation. GES 7/3/20 showed significant delayed gastric emptying.  CT A/P due to abd pain and elevated lipase showed normal pancreas. On 7/6/20 pt underwent EGD with PEG-J placement for malnutrition and severe gastroparesis with additional findings of 2 mm ulcer in the 2nd portion of duodenum.  After placement of feeding tube she had weight gain and took reglan for 3 mos and discontinued due to focal seizures and delay in getting domperidone from [facility] due to pandemic and started it as needed in 2020 and has not taken since 2021. EGD 9/3/2020 done and PEG tube was removed due to it being clogged and replaced with an externally removable PEG. In 12/2020 hospitalized for severe symptomatic anemia with no overt GI bleeding and received blood transfusion and EGD done due to PEG-J malfunctioning G tube with exchange and found to have normal esophagus, gastric ulcer and oozing duodenal ulcer with esophageal stricture dilated with scarred pyloric channel. She then remained on protonix 40 mg po BID and in 2/2021 started Humira with MTX 2.5 mg po weekly. She was hospitalized again in February 2021 for symptomatic anemia along with hypokalemia.  On 4/27/2021 Humira levels 1.4/Abs low (50) which was followed by continuation of humira 40 mg SC q 2 weeks and oral MTX increased to 5 mg weekly.  On 6/23/21 humira levels 3.7/Abs 30.  Patient had improvement of anemia after starting Humira.  In August 2021 patient hospitalized for abdominal pain, nausea/vomiting with labs significant for Hgb 7.2 and CT A/P showed diffuse fluid-filled mildly dilated small bowel loops throughout the colon without a definite transition point.  By this time she was taking domperidone occasionally.  EGD 9/2021 significant for esophageal candidiasis, 2 friable superficial duodenal ulcers (bulb and 2nd portion duodenum), normal stomach. On 9/15/21 trough humira levels  3.9/Abs low (54).  At the end of 10/2021 humira discontinued and pt started on stelara with IV infusion followed by 90 mg SC every 8 weeks. Patient seen in ER December 2021 due to mid abdominal pain, nausea/vomiting, lightheadedness with hemoglobin 11 and CT consistent with the small nonobstructive left renal calculus, moderate to large amount of retained fecal material throughout the colon but no other additional findings.  Since 2/2022 she has discontinued all NSAIDs and was taking ibuprofen 2 pills daily for primarily hip pain and rarely headaches. On 11/2022 stool calprotectin 171.9.  In 2/2022 EGD c/w 2 cratered duodenal ulcers (bulb and first portion-8 mm) with pigmented material, moderate duodenal stricture in the second portion of the duodenum that was dilated 12-13-15 mm.  In early 2022 patient had COVID infection followed by severe weight loss, bloating and regurgitation.  EGD 7/1/2022 significant for small food residue in stomach, 2 oozing cratered duodenal ulcers with largest lesion 6mm, few localized erosion in 2nd portion of duodenum, duodenal stricture in first portion of duodenum with TTS dilator.  Patient reports that esophageal dilation helped with dysphagia symptoms and pt recalls this being done twice in 2021. She also says that duodenal stricture dilation helped with nausea. Patient hospitalized August 2022 due to symptomatic anemia and underwent blood transfusion.  EGD 8/13/2022 consistent with diffuse mild gastric inflammation, 15 mm duodenal ulcer in first portion of duodenum, diffuse inflammation in 2nd portion of duodenum (bx of stricture c/w acute inflammation).  EGD 10/10/22 significant for 8 mm oozing cratered ulcer in the first portion of duodenum and pt restarted on carafate and continued on PPI daily.  Repeat EGD 10/16/22 again showed 1 cm cratered duodenal bulb ulcer and oozing so coagulation done with success. In 11/2022 Dr. Martinez referred patient to be seen by me for concerns of  medically refractory duodenal ulcers possibly related to CD. At that time patient continued on stelara 90 mg SC q 8 weeks, had improved appetite and weight with rare heartburn and normal BMs and continued on protonix BID. She had discontinued NSAIDs since 3/2022. Her primary issue at this visit was umbilical stretching/pulling abdominal pain rarely after food that occurs once a week, lasting 1 hour but no aggravating or relieving factors and some vague neurological symptoms leading to us holding her stelara until neurology evaluation.     Interval History:  - current IBD meds: Stelara 90 mg SC q 8 weeks (started 10/2021, LD , ND )   - 1 formed Bowel Movements/day, no blood  - at initial visit neurological sx in past year reported and wanted to be sure to no signs of RPLS- pt reported walking some lightheadedness and difficulty focusing, legs shake and feels she has to sit down, lost strength of hands, consulted with neurologist and MRI brain normal 22   - 22 EGD: healed ulcer on lesser curvature, scar tissue healthy in appearance, patchy mild inflammation with erythema in gastric antrum, mild stenosis of first portion of duodenum traversed with mild resistance and possible linear small ulcer with friability with mild blood oozing that stopped and was seen on withdrawal but not insertion, normal duodenal bulb   - 22 colonoscopy: normal colon and neoterminal ileum. Biopsies of neoterminal ileum with minimal active inflammation, transverse/descending/sigmoid/rectum normal  - no hematemesis, melena  - NSAID use:   - Narcotic use:   - Alternative/complementary meds for IBD:     Prior Pertinent Surgeries:    ileocectomy with 9 inches of ileum removed (report not available)    Last pertinent Endoscopy/Imagin/3/20 GES: significant delayed gastric emptying.  CT A/P due to abd pain and elevated lipase showed normal pancreas.   2021 CT A/P:  diffuse fluid-filled mildly dilated small bowel  loops throughout the colon without a definite transition point.   11/28/22 EGD: healed ulcer on lesser curvature, scar tissue healthy in appearance, patchy mild inflammation with erythema in gastric antrum, mild stenosis of first portion of duodenum traversed with mild resistance and possible linear small ulcer with friability with mild blood oozing that stopped and was seen on withdrawal but not insertion, normal duodenal bulb   12/6/22 colonoscopy: normal colon and neoterminal ileum. Biopsies of neoterminal ileum with minimal active inflammation, transverse/descending/sigmoid/rectum normal    Therapeutic Drug Monitoring Labs:  4/27/2021 trough (not confirmed trough) humira levels 1.4/ABs low (50) on humira 40 mg SC q 2 weeks, MTX 2.5 mg po weekly  6/23/21 trough (not confirmed trough) humira levels 3.7/Abs 30 on humira 40 mg SC weekly and MTX 5 mg po weekly  9/15/21 trough humira levels 3.9/Abs low (54) on humira 40 mg SC weekly and MTX 5 mg po weekly    Prior IBD Therapies:  Prednisone- effective  Azulfidine-ineffective  Asacol- ineffective   Humira 40 mg SC q 2 weeks (2/2021-9/2021)- ineffective for gastroduodenal ulcers   MTX 2.5 to 5 mg po weekly (2/2021-9/2021)- given with humira and slight increase in humira levels    Vaccinations:  No results found for: HEPBSAB  No results found for: HEPBSURFABQU  Lab Results   Component Value Date    HEPAIGG Non-reactive 11/22/2022     Lab Results   Component Value Date    VARICELLAZOS 2.80 (H) 11/22/2022    VARICELLAINT Positive (A) 11/22/2022     Immunization History   Administered Date(s) Administered    COVID-19, MRNA, LN-S, PF (Pfizer) (Purple Cap) 07/07/2021, 07/28/2021     Flu shot: declines  COVID vaccine/booster: recommended   Pneumonia vaccine: had reaction to this in past   Tetanus (TdaP): recommended every 10 years  HPV: NA  Meningococcal: NA  Hepatitis B:    Hepatitis A:  recommended   MMR (live vaccine):        Shingrix:     Review of Systems    Constitutional:  Negative for chills, fever and weight loss.   HENT:          No oral ulcers, dysphagia, oral thrush   Eyes:  Negative for blurred vision, pain and redness.   Respiratory:  Negative for cough and shortness of breath.    Cardiovascular:  Negative for chest pain.   Gastrointestinal:  Positive for abdominal pain, heartburn and nausea. Negative for vomiting.   Genitourinary:  Negative for dysuria and hematuria.   Musculoskeletal:  Negative for back pain and joint pain.   Skin:  Negative for rash.   Psychiatric/Behavioral:  Negative for depression. The patient is nervous/anxious. The patient does not have insomnia.      All Medical History/Surgical History/Family History/Social History/Allergies have been reviewed and updated in EMR    Review of patient's allergies indicates:   Allergen Reactions    Tramadol Other (See Comments)     Seizures      Demerol [meperidine]      Seizures      Reglan [metoclopramide]        Outpatient Medications Marked as Taking for the 12/28/22 encounter (Office Visit) with Zandra Mallory MD   Medication Sig Dispense Refill    ARIPiprazole (ABILIFY) 5 MG Tab TAKE ONE TABLET BY MOUTH ONCE DAILY (Patient taking differently: Take 5 mg by mouth once daily.) 30 tablet 5    daridorexant 50 mg Tab Take 1 tablet by mouth every evening. 30 tablet 1    lamotrigine (LAMICTAL) 150 MG Tab Take 150 mg by mouth once daily.       LIDOcaine-prilocaine (EMLA) cream Apply topically as needed (use prior to treatment with port access). 30 g 0    ondansetron (ZOFRAN-ODT) 8 MG TbDL 1 po q 8 hours prn nausea and vomiting. 20 tablet 0    pantoprazole (PROTONIX) 40 MG tablet Take 40 mg by mouth 2 (two) times daily before meals.      propranoloL (INDERAL) 40 MG tablet Take 40 mg by mouth once daily.       tiZANidine (ZANAFLEX) 4 MG tablet Take 4 mg by mouth 3 (three) times daily.      ustekinumab (STELARA) 90 mg/mL Syrg syringe Inject 90 mg into the skin every 8 weeks.      valACYclovir (VALTREX) 500  "MG tablet Take 500 mg by mouth as needed (outbreak).       Current Facility-Administered Medications for the 12/28/22 encounter (Office Visit) with Zandra Mallory MD   Medication Dose Route Frequency Provider Last Rate Last Admin    [DISCONTINUED] acetaminophen tablet 650 mg  650 mg Oral 1 time in Clinic/HOD ALEXIA Thomason, BRYANP-C         Vital Signs:  Temp 97.9 °F (36.6 °C)   Ht 5' 4" (1.626 m)   Wt 49.9 kg (109 lb 14.4 oz)   LMP 08/21/2010 (LMP Unknown)   BMI 18.86 kg/m²      Physical Exam    Labs:   Lab Results   Component Value Date    CRP 0.3 11/22/2022    CALPROTECTIN 171.9 (H) 02/02/2022     Lab Results   Component Value Date    HEPBSAG Non-reactive 11/22/2022    HEPBCAB Non-reactive 11/22/2022     Lab Results   Component Value Date    TBGOLDPLUS Negative 11/22/2022     Lab Results   Component Value Date    OHJAOGPX08CF 21 (L) 11/22/2022    PZUEZONZ86 730 11/22/2022     Lab Results   Component Value Date    WBC 7.03 12/12/2022    HGB 12.0 12/12/2022    HCT 37.1 12/12/2022    MCV 99 (H) 12/12/2022     12/12/2022     Lab Results   Component Value Date    CREATININE 1.3 11/22/2022    ALBUMIN 4.2 11/22/2022    BILITOT 0.2 11/22/2022    ALKPHOS 122 11/22/2022    AST 16 11/22/2022    ALT 10 11/22/2022     Assessment/Plan:  Angela Trascher Lejeune is a 47 y.o. 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 At her last visit she had some vague neurologic symptoms so I temporarily held her dose and had her get neuro eval with no evidence of PRES and normal MRI brain and she then resumed stelara. I did EGD which showed small linear ulcer with friability at the duodenal " stricture seen but remainder of the duodenum is normal and biopsies were normal with no previous chronicity. I still feel this is more likely NSAID ulcer healing but the previous ileocolonic anastomotic ulcer from 2019 on colonoscopy has now healed and her colon and ileum are normal.  Her CBC is stable but I think this needs to be monitored along with overt GIB symptoms to be sure things continue to heal.  She is on protonix BID and carafate BID (told to take additional up to QID if needed) and I still think misoprostol remains an option. She will continue on stelara every 8 weeks though not sure how much this is helping duodenal ulcer but o/w colonoscopy looked normal.     Her primary ongoing symptom is abdominal pain that she feels may be due to adhesions from feeding tube but I don't feel this is related to Crohn's disease. I will defer to Dr. Martinez regarding any additional workup o/w she may benefit from pain management regimen with suggestions of gabpentin, lyrica, TCAs. I would also closely monitor her CBC monthly for a few mos and then increase interval between the CBCs.     # Crohn's disease S/P surgery (ileum, h/o ileocectomy 1998, ileocolonic anastomotic ulcer 2019):    - continue stelara 90 mg SC q 8 weeks  - again reminded pt to completely avoid NSAIDs  - EGD- consider in 1 year though sooner if any worsening anemia  - Colonoscopy- repeat in 2 years though sooner if any change in symptoms  - stool calprotectin- mildly elevated but likely not a good marker  - smoking status: former smoker, quit 2011, continued smoking cessation recommended due to risk of Crohn's disease and smoking   - drug monitoring labs: CBC/CMP q 6 mos (5/2023) TPMT, TB quantiferon (neg 11/2022, next due 11/2023), Hep B testing (HBsAg, HBtotalcoreAb neg 11/2022, next due 11/2023)    # Gastroduodenal ulcers with duodenal stricture:  - Stomach ulcer healed with scarring, HP neg  - Duodenal ulcer- small linear ulcer but overall healed  and traversed without dilation  - etiology- more likely related to NSAIDs  - reminded pt complete avoidance of NSAIDs  - monitor H/H closely and for signs of overt GIB  - continue protonix 40 mg po BID  - continue carafate- pt forgets mid day dose so told pt to take BID consistently and extra doses if needed  - misoprostol can be used if needed in future  - surgery not warranted at this time    # Neurologic symptoms-walking some lightheadedness and difficulty focusing, legs shake and feels she has to sit down, lost strength of hands  - symptoms inactive   - resolved with w/u neg for PRES with MRI brain normal 11/25/22    # IBD specific health maintenance:  CRC risk- sx 1990, small bowel-average risk  Skin exam yearly   Risk for osteopenia/osteoporosis- none  Pap smear- hysterectomy  Vitamin D deficiency- advised to start vit D3 5000 IU/d  Vaccines: no live vaccines, pt will get hepatitis A vaccine     Follow up: prn and will continue to f/u with primary GI, Dr. Michelle FLANNERY spent a total of 40 minutes on the day of the visit.This includes face to face time and on-face to face time preparing to see the patient (eg, review of tests, notes), obtaining and/or reviewing separately obtained history, documenting clinical information in the electronic or other health record, independently interpreting results and communicating results to the patient/family/caregiver, and coordinating care.     Zandra Mallory MD  Department of Gastroenterology  Medical Director, Inflammatory Bowel Disease

## 2022-12-28 NOTE — PROGRESS NOTES
"IBD PATIENT INTAKE:    COVID symptoms in the last 7 days (runny nose, sore throat, congestion, cough, fever): No  PCP: Haven Gary  If not PCP-  number given to establish [phone]: Yes    ALLERGIES REVIEWED:  Yes    CHIEF COMPLAINT:    Chief Complaint   Patient presents with    Crohn's Disease       VITAL SIGNS:  /71 (BP Location: Right arm, Patient Position: Sitting)   Pulse 77   Temp 97.9 °F (36.6 °C)   Resp 16   Ht 5' 4" (1.626 m)   Wt 49.9 kg (109 lb 14.4 oz)   LMP 08/21/2010 (LMP Unknown)   SpO2 100%   BMI 18.86 kg/m²      Change in medical, surgical, family or social history: No    IBD THERAPY (name, dose/frequency):  Stelara 90 mg q 8 weeks  Last dose:  12/23/22    Next dose:  2/17/22  Infusion/Pharmacy: Other prescribed by outside MD    NSAIDs (aspirin, ibuprofen-advil or motrin, naproxen-aleve, diclofenac-voltaren, BC powder, excedrin, goodies): No    Alternative/Complementary Medications (i.e. probiotics, turmeric, fish oil, aloe vera):      no  Name/dose:  N/A    Vitamins:   Vit D:  N/A     Vit B-12:  N/A     Folic Acid: N/A         Calcium: N/A       Iron:  N/A        MVI: N/A      Antibiotics (past 30 Days):  no  If yes   Indication:  Name of antibiotic:  Completion date:     REVIEWED MEDICATION LIST RECONCILED INCLUDING ABOVE MEDS:  yes                Answers submitted by the patient for this visit:  Established Patient Questionnaire  (Submitted on 12/28/2022)  abdominal pain: Yes  nausea: Yes  nervous/ anxious: Yes  heartburn: Yes    "

## 2022-12-28 NOTE — PATIENT INSTRUCTIONS
- CBC next week  - see what happened to HBsAb from 11/2022- in process, repeat with CBC next week otherwise  - consider treating the pain- gabapentin, lyrica  - continue stelara every 8 weeks  - colonoscopy in 2 years  - EGD in 1 year if no anemia or recurrent bleeding  - misoprostol is an option to add for ulcer treatment  - continue carafate twice a day consistently and if needed you can take up to 4 times/day  - continue protonix twice a day  - labs you need CBC/CMP every 6 mos, TB yearly, HBsAg/HBcoreAb  - get 2 doses of hep A 6 mos apart (havrix brand)- go to our pharmacy downstairs  - start vit D3 5000 IU daily  - if you want shingrix vaccine let us know

## 2022-12-28 NOTE — LETTER
December 28, 2022        Papi Martinez Jr., MD  [address]  [facility] [address]             [facility] Gastro/Inflammatory Bowel Disease  [address]  [facility] [address]  Phone: [phone]  Fax: [phone]   Patient: Angela Trascher Lejeune   MR Number: [id number]   YOB: 1975   Date of Visit: 12/28/2022       Dear Dr. Martinez:    Thank you for referring Angela Lejeune to me for evaluation. Attached you will find relevant portions of my assessment and plan of care.    If you have questions, please do not hesitate to call me. I look forward to following Angela Lejeune along with you.    Sincerely,      Zandra Mallory MD            CC    No Recipients    Enclosure

## 2023-02-20 ENCOUNTER — INFUSION (OUTPATIENT)
Dept: INFUSION THERAPY | Facility: HOSPITAL | Age: 48
End: 2023-02-20
Attending: INTERNAL MEDICINE
Payer: COMMERCIAL

## 2023-02-20 DIAGNOSIS — I87.2 VENOUS INSUFFICIENCY: ICD-10-CM

## 2023-02-20 DIAGNOSIS — E87.6 HYPOKALEMIA DUE TO LOSS OF POTASSIUM: ICD-10-CM

## 2023-02-20 DIAGNOSIS — D50.8 IRON DEFICIENCY ANEMIA SECONDARY TO INADEQUATE DIETARY IRON INTAKE: ICD-10-CM

## 2023-02-20 DIAGNOSIS — K50.019 CROHN'S DISEASE OF SMALL INTESTINE WITH COMPLICATION: Primary | ICD-10-CM

## 2023-02-20 LAB
BASOPHILS # BLD AUTO: 0.07 K/UL (ref 0–0.2)
BASOPHILS NFR BLD: 1.5 % (ref 0–1.9)
DIFFERENTIAL METHOD: ABNORMAL
EOSINOPHIL # BLD AUTO: 0.1 K/UL (ref 0–0.5)
EOSINOPHIL NFR BLD: 1.9 % (ref 0–8)
ERYTHROCYTE [DISTWIDTH] IN BLOOD BY AUTOMATED COUNT: 13.2 % (ref 11.5–14.5)
HCT VFR BLD AUTO: 28.3 % (ref 37–48.5)
HGB BLD-MCNC: 9.1 G/DL (ref 12–16)
IMM GRANULOCYTES # BLD AUTO: 0.01 K/UL (ref 0–0.04)
IMM GRANULOCYTES NFR BLD AUTO: 0.2 % (ref 0–0.5)
LYMPHOCYTES # BLD AUTO: 1.4 K/UL (ref 1–4.8)
LYMPHOCYTES NFR BLD: 30.5 % (ref 18–48)
MCH RBC QN AUTO: 34.1 PG (ref 27–31)
MCHC RBC AUTO-ENTMCNC: 32.2 G/DL (ref 32–36)
MCV RBC AUTO: 106 FL (ref 82–98)
MONOCYTES # BLD AUTO: 0.4 K/UL (ref 0.3–1)
MONOCYTES NFR BLD: 7.8 % (ref 4–15)
NEUTROPHILS # BLD AUTO: 2.7 K/UL (ref 1.8–7.7)
NEUTROPHILS NFR BLD: 58.1 % (ref 38–73)
NRBC BLD-RTO: 0 /100 WBC
PLATELET # BLD AUTO: 270 K/UL (ref 150–450)
PMV BLD AUTO: 9.8 FL (ref 9.2–12.9)
RBC # BLD AUTO: 2.67 M/UL (ref 4–5.4)
WBC # BLD AUTO: 4.63 K/UL (ref 3.9–12.7)

## 2023-02-20 PROCEDURE — A4216 STERILE WATER/SALINE, 10 ML: HCPCS | Mod: PN | Performed by: INTERNAL MEDICINE

## 2023-02-20 PROCEDURE — 63600175 PHARM REV CODE 636 W HCPCS: Mod: PN | Performed by: INTERNAL MEDICINE

## 2023-02-20 PROCEDURE — 36591 DRAW BLOOD OFF VENOUS DEVICE: CPT | Mod: PN

## 2023-02-20 PROCEDURE — 85025 COMPLETE CBC W/AUTO DIFF WBC: CPT | Mod: PN | Performed by: INTERNAL MEDICINE

## 2023-02-20 PROCEDURE — 25000003 PHARM REV CODE 250: Mod: PN | Performed by: INTERNAL MEDICINE

## 2023-02-20 RX ORDER — HEPARIN 100 UNIT/ML
500 SYRINGE INTRAVENOUS
Status: COMPLETED | OUTPATIENT
Start: 2023-02-20 | End: 2023-02-20

## 2023-02-20 RX ORDER — HEPARIN 100 UNIT/ML
500 SYRINGE INTRAVENOUS
Status: CANCELLED | OUTPATIENT
Start: 2023-02-20

## 2023-02-20 RX ORDER — SODIUM CHLORIDE 0.9 % (FLUSH) 0.9 %
10 SYRINGE (ML) INJECTION
Status: CANCELLED | OUTPATIENT
Start: 2023-02-20

## 2023-02-20 RX ORDER — SODIUM CHLORIDE 0.9 % (FLUSH) 0.9 %
10 SYRINGE (ML) INJECTION
Status: COMPLETED | OUTPATIENT
Start: 2023-02-20 | End: 2023-02-20

## 2023-02-20 RX ADMIN — Medication 500 UNITS: at 10:02

## 2023-02-20 RX ADMIN — Medication 10 ML: at 10:02

## 2023-02-20 NOTE — PLAN OF CARE
.Implanted port accessed, flushed easily with adequate blood return achieved.    Lab specimens drawn and walked to laboratory.  Port flushed with NS and heparin per protocol and deaccessed.  Pt tolerated well with no questions or complaints.

## 2023-02-23 ENCOUNTER — TELEPHONE (OUTPATIENT)
Dept: GASTROENTEROLOGY | Facility: CLINIC | Age: 48
End: 2023-02-23
Payer: COMMERCIAL

## 2023-02-23 NOTE — TELEPHONE ENCOUNTER
Called & spoke to pt  - Instructed to restart protonix & carafate w/ plans for Dr. 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 Mallory

## 2023-02-23 NOTE — TELEPHONE ENCOUNTER
----- Message from Zandra Mallory MD sent at 2/22/2023  5:11 PM CST -----  Blood count dropped. Assess for GI bleeding- melena or hematochezia and confirm she is compliant with all meds she was on at time of last visit.  We can then discuss

## 2023-02-23 NOTE — TELEPHONE ENCOUNTER
- Current IBD meds: Stelara q 8 weeks (compliant; LD: 2/17/23, ND: 4/14/23)  - Self dc'd Carafate BID 12/2022  - Self dc'd Protonix a few days ago; was recommended she take BID though pt was taking once QD when she remembered   - Confirmed no NSAID use   - Reviewed lower blood count from 1 month ago  - Denies any GIB symptoms including melena or hematochezia  - Reports 1 formed BM/ QD  - 2/13/23 went to ED for LOC though thought to be related to BP issues & is scheduled for angiogram on 3/29/23  - Will update Dr. Mallory

## 2023-02-24 NOTE — TELEPHONE ENCOUNTER
Called & spoke to pt  - Recommended pt discuss protonix alternatives w/ Dr. Martinez  - Pt requested repeat CBC be drawn by Dr. Mallory  - Informed pt that recent lab work and recommendations would be faxed to Dr. Martinez's office  - Dr. Mallory will contact Dr. Martinez regarding pt & plan for lab monitoring  - Pt to contact Dr. Martinez's office for a follow-up appointment  - Pt expressed understanding

## 2023-02-28 ENCOUNTER — INFUSION (OUTPATIENT)
Dept: INFUSION THERAPY | Facility: HOSPITAL | Age: 48
End: 2023-02-28
Attending: INTERNAL MEDICINE
Payer: COMMERCIAL

## 2023-02-28 VITALS — TEMPERATURE: 98 F

## 2023-02-28 DIAGNOSIS — E87.6 HYPOKALEMIA DUE TO LOSS OF POTASSIUM: ICD-10-CM

## 2023-02-28 DIAGNOSIS — I87.2 VENOUS INSUFFICIENCY: ICD-10-CM

## 2023-02-28 DIAGNOSIS — D50.8 IRON DEFICIENCY ANEMIA SECONDARY TO INADEQUATE DIETARY IRON INTAKE: Primary | ICD-10-CM

## 2023-02-28 LAB
BASOPHILS # BLD AUTO: 0.11 K/UL (ref 0–0.2)
BASOPHILS NFR BLD: 1.2 % (ref 0–1.9)
DIFFERENTIAL METHOD: ABNORMAL
EOSINOPHIL # BLD AUTO: 0.2 K/UL (ref 0–0.5)
EOSINOPHIL NFR BLD: 1.6 % (ref 0–8)
ERYTHROCYTE [DISTWIDTH] IN BLOOD BY AUTOMATED COUNT: 13 % (ref 11.5–14.5)
FERRITIN SERPL-MCNC: 19 NG/ML (ref 20–300)
HCT VFR BLD AUTO: 33.4 % (ref 37–48.5)
HGB BLD-MCNC: 10.6 G/DL (ref 12–16)
IMM GRANULOCYTES # BLD AUTO: 0.06 K/UL (ref 0–0.04)
IMM GRANULOCYTES NFR BLD AUTO: 0.7 % (ref 0–0.5)
IRON SERPL-MCNC: 49 UG/DL (ref 30–160)
LYMPHOCYTES # BLD AUTO: 2.3 K/UL (ref 1–4.8)
LYMPHOCYTES NFR BLD: 24.8 % (ref 18–48)
MCH RBC QN AUTO: 33.5 PG (ref 27–31)
MCHC RBC AUTO-ENTMCNC: 31.7 G/DL (ref 32–36)
MCV RBC AUTO: 106 FL (ref 82–98)
MONOCYTES # BLD AUTO: 0.8 K/UL (ref 0.3–1)
MONOCYTES NFR BLD: 8.3 % (ref 4–15)
NEUTROPHILS # BLD AUTO: 5.8 K/UL (ref 1.8–7.7)
NEUTROPHILS NFR BLD: 63.4 % (ref 38–73)
NRBC BLD-RTO: 0 /100 WBC
PLATELET # BLD AUTO: 381 K/UL (ref 150–450)
PMV BLD AUTO: 9.7 FL (ref 9.2–12.9)
RBC # BLD AUTO: 3.16 M/UL (ref 4–5.4)
SATURATED IRON: 11 % (ref 20–50)
TOTAL IRON BINDING CAPACITY: 428 UG/DL (ref 250–450)
TRANSFERRIN SERPL-MCNC: 289 MG/DL (ref 200–375)
WBC # BLD AUTO: 9.2 K/UL (ref 3.9–12.7)

## 2023-02-28 PROCEDURE — 82728 ASSAY OF FERRITIN: CPT

## 2023-02-28 PROCEDURE — 25000003 PHARM REV CODE 250: Mod: PN | Performed by: INTERNAL MEDICINE

## 2023-02-28 PROCEDURE — 85025 COMPLETE CBC W/AUTO DIFF WBC: CPT | Mod: PN

## 2023-02-28 PROCEDURE — 36591 DRAW BLOOD OFF VENOUS DEVICE: CPT | Mod: PN

## 2023-02-28 PROCEDURE — 63600175 PHARM REV CODE 636 W HCPCS: Mod: PN | Performed by: INTERNAL MEDICINE

## 2023-02-28 PROCEDURE — A4216 STERILE WATER/SALINE, 10 ML: HCPCS | Mod: PN | Performed by: INTERNAL MEDICINE

## 2023-02-28 PROCEDURE — 84466 ASSAY OF TRANSFERRIN: CPT

## 2023-02-28 RX ORDER — SODIUM CHLORIDE 0.9 % (FLUSH) 0.9 %
10 SYRINGE (ML) INJECTION
Status: CANCELLED | OUTPATIENT
Start: 2023-02-28

## 2023-02-28 RX ORDER — SODIUM CHLORIDE 0.9 % (FLUSH) 0.9 %
10 SYRINGE (ML) INJECTION
Status: COMPLETED | OUTPATIENT
Start: 2023-02-28 | End: 2023-02-28

## 2023-02-28 RX ORDER — HEPARIN 100 UNIT/ML
500 SYRINGE INTRAVENOUS
Status: CANCELLED | OUTPATIENT
Start: 2023-02-28

## 2023-02-28 RX ORDER — HEPARIN 100 UNIT/ML
500 SYRINGE INTRAVENOUS
Status: COMPLETED | OUTPATIENT
Start: 2023-02-28 | End: 2023-02-28

## 2023-02-28 RX ADMIN — Medication 500 UNITS: at 12:02

## 2023-02-28 RX ADMIN — Medication 10 ML: at 12:02

## 2023-02-28 NOTE — PLAN OF CARE
Tolerated port flush  and labs from port well today Pt d/c to home with instructions  To private vehicle without difficulty  NAD

## 2023-03-14 ENCOUNTER — INFUSION (OUTPATIENT)
Dept: INFUSION THERAPY | Facility: HOSPITAL | Age: 48
End: 2023-03-14
Attending: INTERNAL MEDICINE
Payer: COMMERCIAL

## 2023-03-14 DIAGNOSIS — I87.2 VENOUS INSUFFICIENCY: ICD-10-CM

## 2023-03-14 DIAGNOSIS — D50.8 IRON DEFICIENCY ANEMIA SECONDARY TO INADEQUATE DIETARY IRON INTAKE: ICD-10-CM

## 2023-03-14 DIAGNOSIS — R42 DIZZINESS: ICD-10-CM

## 2023-03-14 DIAGNOSIS — R55 SYNCOPE AND COLLAPSE: ICD-10-CM

## 2023-03-14 DIAGNOSIS — H81.4 VERTIGO OF CENTRAL ORIGIN: Primary | ICD-10-CM

## 2023-03-14 DIAGNOSIS — E87.6 HYPOKALEMIA DUE TO LOSS OF POTASSIUM: ICD-10-CM

## 2023-03-14 PROCEDURE — 30000890 HC MISC. SEND OUT TEST

## 2023-03-14 PROCEDURE — 30000890 MAYO MISCELLANEOUS TEST (REFLEX): Performed by: NURSE PRACTITIONER

## 2023-03-14 PROCEDURE — 25000003 PHARM REV CODE 250: Mod: PN

## 2023-03-14 PROCEDURE — 84150 ASSAY OF PROSTAGLANDIN: CPT | Performed by: NURSE PRACTITIONER

## 2023-03-14 PROCEDURE — 83520 IMMUNOASSAY QUANT NOS NONAB: CPT | Performed by: NURSE PRACTITIONER

## 2023-03-14 PROCEDURE — 81273 KIT GENE ANALYS D816 VARIANT: CPT | Performed by: NURSE PRACTITIONER

## 2023-03-14 PROCEDURE — 63600175 PHARM REV CODE 636 W HCPCS: Mod: PN

## 2023-03-14 PROCEDURE — 82570 ASSAY OF URINE CREATININE: CPT

## 2023-03-14 PROCEDURE — 36591 DRAW BLOOD OFF VENOUS DEVICE: CPT | Mod: PN

## 2023-03-14 PROCEDURE — A4216 STERILE WATER/SALINE, 10 ML: HCPCS | Mod: PN

## 2023-03-14 RX ORDER — HEPARIN 100 UNIT/ML
500 SYRINGE INTRAVENOUS
Status: CANCELLED | OUTPATIENT
Start: 2023-03-14

## 2023-03-14 RX ORDER — SODIUM CHLORIDE 0.9 % (FLUSH) 0.9 %
10 SYRINGE (ML) INJECTION
Status: COMPLETED | OUTPATIENT
Start: 2023-03-14 | End: 2023-03-14

## 2023-03-14 RX ORDER — SODIUM CHLORIDE 0.9 % (FLUSH) 0.9 %
10 SYRINGE (ML) INJECTION
Status: CANCELLED | OUTPATIENT
Start: 2023-03-14

## 2023-03-14 RX ORDER — HEPARIN 100 UNIT/ML
500 SYRINGE INTRAVENOUS
Status: COMPLETED | OUTPATIENT
Start: 2023-03-14 | End: 2023-03-14

## 2023-03-14 RX ADMIN — Medication 10 ML: at 04:03

## 2023-03-14 RX ADMIN — Medication 500 UNITS: at 04:03

## 2023-03-16 LAB — TRYPTASE LEVEL: 6.6 NG/ML

## 2023-03-17 LAB
GENETICIST REVIEW: NORMAL
PATHOLOGIST NAME: NORMAL
SPECIMEN SOURCE: NORMAL

## 2023-03-24 LAB — MAYO MISCELLANEOUS RESULT (REF): NORMAL

## 2023-03-31 DIAGNOSIS — M25.572 LEFT ANKLE PAIN, UNSPECIFIED CHRONICITY: ICD-10-CM

## 2023-03-31 DIAGNOSIS — D50.8 IRON DEFICIENCY ANEMIA SECONDARY TO INADEQUATE DIETARY IRON INTAKE: Primary | ICD-10-CM

## 2023-04-06 ENCOUNTER — HOSPITAL ENCOUNTER (OUTPATIENT)
Dept: RADIOLOGY | Facility: HOSPITAL | Age: 48
Discharge: HOME | End: 2023-04-06
Attending: ORTHOPAEDIC SURGERY
Payer: COMMERCIAL

## 2023-04-06 ENCOUNTER — OFFICE VISIT (OUTPATIENT)
Dept: ORTHOPEDICS | Facility: CLINIC | Age: 48
End: 2023-04-06
Payer: COMMERCIAL

## 2023-04-06 DIAGNOSIS — M89.9 BONE DISORDER: ICD-10-CM

## 2023-04-06 DIAGNOSIS — S82.842A BIMALLEOLAR ANKLE FRACTURE, LEFT, CLOSED, INITIAL ENCOUNTER: Primary | ICD-10-CM

## 2023-04-06 DIAGNOSIS — S93.432A ANKLE SYNDESMOSIS DISRUPTION, LEFT, INITIAL ENCOUNTER: ICD-10-CM

## 2023-04-06 DIAGNOSIS — M79.672 LEFT FOOT PAIN: Primary | ICD-10-CM

## 2023-04-06 DIAGNOSIS — S82.842A CLOSED BIMALLEOLAR FRACTURE OF LEFT ANKLE, INITIAL ENCOUNTER: ICD-10-CM

## 2023-04-06 DIAGNOSIS — M79.672 LEFT FOOT PAIN: ICD-10-CM

## 2023-04-06 PROCEDURE — 73610 X-RAY EXAM OF ANKLE: CPT | Mod: TC,PO,LT

## 2023-04-06 PROCEDURE — 73630 XR FOOT COMPLETE 3 VIEW LEFT: ICD-10-PCS | Mod: 26,LT,, | Performed by: RADIOLOGY

## 2023-04-06 PROCEDURE — 73630 X-RAY EXAM OF FOOT: CPT | Mod: TC,PO,LT

## 2023-04-06 PROCEDURE — 99203 PR OFFICE/OUTPT VISIT, NEW, LEVL III, 30-44 MIN: ICD-10-PCS | Mod: S$GLB,,, | Performed by: ORTHOPAEDIC SURGERY

## 2023-04-06 PROCEDURE — 99999 PR PBB SHADOW E&M-EST. PATIENT-LVL V: CPT | Mod: PBBFAC,,, | Performed by: ORTHOPAEDIC SURGERY

## 2023-04-06 PROCEDURE — 73610 XR ANKLE COMPLETE 3 VIEW LEFT: ICD-10-PCS | Mod: 26,LT,, | Performed by: RADIOLOGY

## 2023-04-06 PROCEDURE — 99999 PR PBB SHADOW E&M-EST. PATIENT-LVL V: ICD-10-PCS | Mod: PBBFAC,,, | Performed by: ORTHOPAEDIC SURGERY

## 2023-04-06 PROCEDURE — 99203 OFFICE O/P NEW LOW 30 MIN: CPT | Mod: S$GLB,,, | Performed by: ORTHOPAEDIC SURGERY

## 2023-04-06 PROCEDURE — 73610 X-RAY EXAM OF ANKLE: CPT | Mod: 26,LT,, | Performed by: RADIOLOGY

## 2023-04-06 PROCEDURE — 73630 X-RAY EXAM OF FOOT: CPT | Mod: 26,LT,, | Performed by: RADIOLOGY

## 2023-04-06 RX ORDER — HYDROCODONE BITARTRATE AND ACETAMINOPHEN 5; 325 MG/1; MG/1
1 TABLET ORAL EVERY 6 HOURS PRN
Qty: 20 TABLET | Refills: 0 | Status: ON HOLD | OUTPATIENT
Start: 2023-04-06 | End: 2023-04-12 | Stop reason: HOSPADM

## 2023-04-06 RX ORDER — MUPIROCIN 20 MG/G
OINTMENT TOPICAL
Status: CANCELLED | OUTPATIENT
Start: 2023-04-06

## 2023-04-06 NOTE — PROGRESS NOTES
Status/Diagnosis: Displaced Left bimalleolar ankle fracture. Syndesmotic disruption.  Date of Surgery: none  Date of Injury: 04/04/2023  Return visit: 2 weeks postop  X-rays on Return: NWB 3-views Left ankle XOP    Chief Complaint:   Chief Complaint   Patient presents with    Left Ankle - Pain     Present History:  Angela Trascher Lejeune is a 48 y.o. female who presents sustained a ground level fall on 04/04.  Immediate pain, swelling, inability to bear weight about the left ankle.  Seen in the ED at which time x-rays confirmed displaced ankle fracture.  Patient underwent closed reduction under conscious sedation was placed into a short-leg splint.  Presents today for outpatient follow-up.  Still with significant persistent pain, rated as 9/10.  Denies any pain or instability prior to injury.  Past medical history significant for recurrent syncopal episodes; Crohn's disease on Stelara.  Denies tobacco use.      Past Medical History:   Diagnosis Date    Abnormal Pap smear     + HPV    Bipolar 1 disorder     Crohn's disease S/P surgery (ileum, possible gastroduodenal with ulcers)     1998-ileocectomy (9 inches ileum)    Encounter for blood transfusion     Gastric and duodenal ulcers of unclear etiology     Gastroparesis     H/O ETOH abuse     History of HSV-2 infection     Immunosuppressed status     Iron deficiency anemia, multiple blood transfusions     Seizures     states last seizure 2009       Past Surgical History:   Procedure Laterality Date    APPENDECTOMY      BRAIN SURGERY  2009    fx skull due to seizure with 2 pins    CHOLECYSTECTOMY      COLON SURGERY      Partial colectomy    COLONOSCOPY Left 7/10/2019    Procedure: COLONOSCOPY;  Surgeon: Papi Martinez Jr., MD;  Location: [facility];  Service: Endoscopy;  Laterality: Left;    COLONOSCOPY N/A 12/6/2022    Procedure: COLONOSCOPY;  Surgeon: Zandra Mallory MD;  Location: [address]);  Service: Endoscopy;  Laterality: N/A;  poor prep on  11/28/22.  per Dr Reyes 5 days prior with gastroparesis diet-3 days full liquid. also Mag citrate day before     instr handed to pt-GT    ESOPHAGOGASTRODUODENOSCOPY Left 6/5/2019    Procedure: EGD (ESOPHAGOGASTRODUODENOSCOPY);  Surgeon: ROLA Villagran MD;  Location: [facility];  Service: Endoscopy;  Laterality: Left;    ESOPHAGOGASTRODUODENOSCOPY Left 3/16/2020    Procedure: EGD (ESOPHAGOGASTRODUODENOSCOPY);  Surgeon: Clive Núñez MD;  Location: [facility];  Service: Endoscopy;  Laterality: Left;    ESOPHAGOGASTRODUODENOSCOPY N/A 4/17/2020    Procedure: EGD (ESOPHAGOGASTRODUODENOSCOPY);  Surgeon: Papi Martinez Jr., MD;  Location: [facility];  Service: Endoscopy;  Laterality: N/A;  with Push Enteroscopy    ESOPHAGOGASTRODUODENOSCOPY N/A 7/6/2020    Procedure: EGD (ESOPHAGOGASTRODUODENOSCOPY)   possible peg;  Surgeon: Papi Martinez Jr., MD;  Location: [facility];  Service: Endoscopy;  Laterality: N/A;    ESOPHAGOGASTRODUODENOSCOPY N/A 7/9/2020    Procedure: EGD (ESOPHAGOGASTRODUODENOSCOPY);  Surgeon: Papi Martinez Jr., MD;  Location: [facility];  Service: Endoscopy;  Laterality: N/A;  J or Peg tube    ESOPHAGOGASTRODUODENOSCOPY N/A 9/3/2020    Procedure: EGD (ESOPHAGOGASTRODUODENOSCOPY);  Surgeon: Papi Martinez Jr., MD;  Location: [facility];  Service: Endoscopy;  Laterality: N/A;  per drs order Stoma, Peds, w/single Lumen, J-Tube placement    ESOPHAGOGASTRODUODENOSCOPY N/A 12/29/2020    Procedure: EGD (ESOPHAGOGASTRODUODENOSCOPY);  Surgeon: Papi Martinez Jr., MD;  Location: [facility];  Service: Endoscopy;  Laterality: N/A;    ESOPHAGOGASTRODUODENOSCOPY N/A 2/23/2021    Procedure: EGD (ESOPHAGOGASTRODUODENOSCOPY);  Surgeon: Papi Martinez Jr., MD;  Location: [facility];  Service: Endoscopy;  Laterality: N/A;    ESOPHAGOGASTRODUODENOSCOPY N/A 2/3/2022    Procedure: EGD (ESOPHAGOGASTRODUODENOSCOPY);  Surgeon: Papi Martinez Jr., MD;  Location: [facility];  Service: Endoscopy;  Laterality: N/A;     ESOPHAGOGASTRODUODENOSCOPY N/A 8/13/2022    Procedure: EGD (ESOPHAGOGASTRODUODENOSCOPY);  Surgeon: Huang Licea MD;  Location: [facility] ENDO;  Service: Gastroenterology;  Laterality: N/A;    ESOPHAGOGASTRODUODENOSCOPY N/A 10/16/2022    Procedure: EGD (ESOPHAGOGASTRODUODENOSCOPY);  Surgeon: Huang Licea MD;  Location: [facility] ENDO;  Service: Gastroenterology;  Laterality: N/A;    ESOPHAGOGASTRODUODENOSCOPY N/A 11/28/2022    Procedure: EGD (ESOPHAGOGASTRODUODENOSCOPY);  Surgeon: Zandra Mallory MD;  [facility]  Service: Endoscopy;  Laterality: N/A;    FEMUR FRACTURE SURGERY Right 2006    HYSTERECTOMY      INSERTION OF TUNNELED CENTRAL VENOUS CATHETER (CVC) WITH SUBCUTANEOUS PORT Left 8/14/2019    Procedure: [id number]-CATH;  Surgeon: Miquel Sargent MD;  Location: [facility] OR;  Service: General;  Laterality: Left;    INSERTION OR REPLACEMENT OF PERCUTANEOUS ENDOSCOPIC JEJUNOSTOMY TUBE  12/29/2020    Procedure: INSERTION OR REPLACEMENT, JEJUNOSTOMY TUBE, PERCUTANEOUS, ENDOSCOPIC;  Surgeon: Papi Martinez Jr., MD;  Location: [facility];  Service: Endoscopy;;    laparoscopy for endometriosis      x5    LEFT HEART CATHETERIZATION Left 3/29/2023    Procedure: Left heart cath;  Surgeon: Riley Tipton MD;  Location: [facility] CATH;  Service: Cardiology;  Laterality: Left;    OOPHORECTOMY      right ovary removed only    PEG TUBE REMOVAL  4/1/2021    Procedure: REMOVAL, PEG TUBE;  Surgeon: Papi Martinez Jr., MD;  Location: [facility] ENDO;  Service: Endoscopy;;    TONSILLECTOMY         Current Outpatient Medications   Medication Sig    ARIPiprazole (ABILIFY) 5 MG Tab TAKE ONE TABLET BY MOUTH ONCE DAILY (Patient taking differently: Take 5 mg by mouth once daily.)    famotidine (PEPCID) 40 MG tablet Take 40 mg by mouth 2 (two) times daily.    fludrocortisone (FLORINEF) 0.1 mg Tab Take 0.1 mg by mouth Daily.    HYDROcodone-acetaminophen (NORCO)  mg per tablet Take 1 tablet by mouth every 6 (six) hours  as needed for Pain.    lamotrigine (LAMICTAL) 150 MG Tab Take 150 mg by mouth once daily.     LIDOcaine-prilocaine (EMLA) cream Apply topically as needed (use prior to treatment with port access).    ondansetron (ZOFRAN-ODT) 8 MG TbDL 1 po q 8 hours prn nausea and vomiting. (Patient taking differently: Take 8 mg by mouth every 8 (eight) hours as needed. 1 po q 8 hours prn nausea and vomiting.)    pantoprazole (PROTONIX) 40 MG tablet Take 40 mg by mouth 2 (two) times daily before meals.    propranoloL (INDERAL) 40 MG tablet Take 40 mg by mouth once daily.     tiZANidine (ZANAFLEX) 4 MG tablet Take 4 mg by mouth 3 (three) times daily.    ustekinumab (STELARA) 90 mg/mL Syrg syringe Inject 90 mg into the skin every 8 weeks.    valACYclovir (VALTREX) 500 MG tablet Take 500 mg by mouth as needed (outbreak).    midodrine HCl (MIDODRINE ORAL) Take 2 mg by mouth 3 (three) times daily as needed.    sucralfate (CARAFATE) 1 gram tablet Take 1 g by mouth 2 (two) times daily as needed.    traZODone (DESYREL) 100 MG tablet Take 1 tablet (100 mg total) by mouth nightly as needed for Insomnia.     No current facility-administered medications for this visit.       Review of patient's allergies indicates:   Allergen Reactions    Tramadol Other (See Comments)     Seizures      Demerol [meperidine]      Seizures      Reglan [metoclopramide]        Family History   Adopted: Yes       Social History     Socioeconomic History    Marital status:    Tobacco Use    Smoking status: Former     Packs/day: 0.50     Years: 6.00     Pack years: 3.00     Types: Cigarettes     Quit date: 5/10/2011     Years since quittin.9    Smokeless tobacco: Never   Substance and Sexual Activity    Alcohol use: Not Currently    Drug use: Never    Sexual activity: Yes     Partners: Male       Physical exam:  There were no vitals filed for this visit.  There is no height or weight on file to calculate BMI.  General: In no apparent distress; well  developed and well nourished.  HEENT: normocephalic; atraumatic.  Cardiovascular: regular rate.  Respiratory: no increased work of breathing.  Musculoskeletal:   Gait: unable to assess  Inspection:  Short-leg splint in place clean, dry, intact.  Patient guarded on exam.  Due to pain on presentation, deferred taking down short-leg splint today.  Gross motor function intact FHL and EHL.  Sensation intact to light touch distally.  Brisk cap refill all 5 digits.                   Imaging Studies/Outside documentation:  I have ordered/reviewed/interpreted the following images/outside documentation:  1. Weight bearing 3-views of Left foot and ankle:  Displaced bimalleolar ankle fracture.  Clear space widening present medially.  Somewhat vertical fracture of the medial malleolus.  Long spiral comminuted fracture of the lateral malleolus.        Assessment:  Angela Trascher Lejeune is a 48 y.o. female with Displaced Left bimalleolar ankle fracture. Syndesmotic disruption.     Plan:   Clinical and radiographic findings were discussed. Operative vs nonoperative treatment options were described. These include but are not limited to bleeding; infection; damage to surrounding nerves or vessels; persistent pain, stiffness; nonunion; malunion; recurrent deformity; need for additional procedures; amputation; blood clots; pulmonary embolus; cardiac events; stroke; and the general risks of anesthesia including anesthetic death.   We also reviewed postop protocol as well as limitations and expectations.   Patient understands and desires to proceed with surgical intervention.   Explained risks, benefits, and alternative to the patient. Asked if any questions--none.  Surgery to include but not limited to:   Left ankle fracture open reduction internal fixation; possible ligament repair; surgery as indicated.      This note was created using voice recognition software and may contain grammatical errors.

## 2023-04-12 PROBLEM — S82.842A BIMALLEOLAR ANKLE FRACTURE, LEFT, CLOSED, INITIAL ENCOUNTER: Status: ACTIVE | Noted: 2023-04-12

## 2023-04-12 PROBLEM — N30.00 ACUTE CYSTITIS WITHOUT HEMATURIA: Status: ACTIVE | Noted: 2023-04-12

## 2023-04-12 PROBLEM — K52.9 GASTROENTERITIS: Status: ACTIVE | Noted: 2023-04-12

## 2023-04-25 ENCOUNTER — HOSPITAL ENCOUNTER (OUTPATIENT)
Dept: RADIOLOGY | Facility: HOSPITAL | Age: 48
Discharge: HOME | End: 2023-04-25
Attending: INTERNAL MEDICINE
Payer: COMMERCIAL

## 2023-04-25 ENCOUNTER — OFFICE VISIT (OUTPATIENT)
Dept: ORTHOPEDICS | Facility: CLINIC | Age: 48
End: 2023-04-25
Payer: COMMERCIAL

## 2023-04-25 DIAGNOSIS — S82.842A CLOSED BIMALLEOLAR FRACTURE OF LEFT ANKLE, INITIAL ENCOUNTER: Primary | ICD-10-CM

## 2023-04-25 DIAGNOSIS — M25.572 LEFT ANKLE PAIN, UNSPECIFIED CHRONICITY: ICD-10-CM

## 2023-04-25 PROCEDURE — 73610 X-RAY EXAM OF ANKLE: CPT | Mod: TC,PO,LT

## 2023-04-25 PROCEDURE — 73610 X-RAY EXAM OF ANKLE: CPT | Mod: 26,LT,, | Performed by: RADIOLOGY

## 2023-04-25 PROCEDURE — 99024 PR POST-OP FOLLOW-UP VISIT: ICD-10-PCS | Mod: S$GLB,,, | Performed by: ORTHOPAEDIC SURGERY

## 2023-04-25 PROCEDURE — 1159F PR MEDICATION LIST DOCUMENTED IN MEDICAL RECORD: ICD-10-PCS | Mod: CPTII,S$GLB,, | Performed by: ORTHOPAEDIC SURGERY

## 2023-04-25 PROCEDURE — 99999 PR PBB SHADOW E&M-EST. PATIENT-LVL III: ICD-10-PCS | Mod: PBBFAC,,, | Performed by: ORTHOPAEDIC SURGERY

## 2023-04-25 PROCEDURE — 73610 XR ANKLE COMPLETE 3 VIEW LEFT: ICD-10-PCS | Mod: 26,LT,, | Performed by: RADIOLOGY

## 2023-04-25 PROCEDURE — 99024 POSTOP FOLLOW-UP VISIT: CPT | Mod: S$GLB,,, | Performed by: ORTHOPAEDIC SURGERY

## 2023-04-25 PROCEDURE — 1159F MED LIST DOCD IN RCRD: CPT | Mod: CPTII,S$GLB,, | Performed by: ORTHOPAEDIC SURGERY

## 2023-04-25 PROCEDURE — 99999 PR PBB SHADOW E&M-EST. PATIENT-LVL III: CPT | Mod: PBBFAC,,, | Performed by: ORTHOPAEDIC SURGERY

## 2023-04-25 RX ORDER — ASPIRIN 81 MG/1
81 TABLET ORAL DAILY
Qty: 90 TABLET | Refills: 1 | Status: ON HOLD | OUTPATIENT
Start: 2023-04-25 | End: 2023-09-17 | Stop reason: HOSPADM

## 2023-04-25 RX ORDER — OXYCODONE AND ACETAMINOPHEN 5; 325 MG/1; MG/1
1 TABLET ORAL EVERY 6 HOURS PRN
Qty: 28 TABLET | Refills: 0 | Status: SHIPPED | OUTPATIENT
Start: 2023-04-25 | End: 2023-04-25 | Stop reason: CLARIF

## 2023-04-25 RX ORDER — OXYCODONE AND ACETAMINOPHEN 5; 325 MG/1; MG/1
1 TABLET ORAL EVERY 6 HOURS PRN
Qty: 28 TABLET | Refills: 0 | Status: SHIPPED | OUTPATIENT
Start: 2023-04-25 | End: 2023-04-25 | Stop reason: SDUPTHER

## 2023-04-25 RX ORDER — OXYCODONE AND ACETAMINOPHEN 5; 325 MG/1; MG/1
1 TABLET ORAL EVERY 6 HOURS PRN
Qty: 28 TABLET | Refills: 0 | Status: SHIPPED | OUTPATIENT
Start: 2023-04-25 | End: 2023-05-03

## 2023-04-25 NOTE — PROGRESS NOTES
Status/Diagnosis: Displaced Left bimalleolar ankle fracture.   Date of Surgery: 04/10/2023  Date of Injury: 04/04/2023  Return visit: 2 weeks   X-rays on Return: none    Chief Complaint:   Chief Complaint   Patient presents with    Left Ankle - Pain, Post-op Evaluation     Present History:  Angela Trascher Lejeune is a 48 y.o. female who returns today for 1st postoperative clinic visit.  Overall doing well with no pain, 0/10.  Reports compliance with nonweightbearing status.  Has not been taking aspirin.  Questionable if patient ever received vitamin-D prescription given low levels.  She is anxious today to have sutures out.  Using a walker for ambulation.  Has not tried to obtain a knee scooter.  Denies any drainage, fever, chills.  Past medical history significant for recurrent syncopal episodes; Crohn's disease on Stelara.  Denies tobacco use.      Past Medical History:   Diagnosis Date    Abnormal Pap smear     + HPV    Bipolar 1 disorder     Closed bimalleolar fracture of left ankle     Crohn's disease S/P surgery (ileum, possible gastroduodenal with ulcers)     1998-ileocectomy (9 inches ileum)    Encounter for blood transfusion     Gastric and duodenal ulcers of unclear etiology     Gastroparesis     H/O ETOH abuse     History of HSV-2 infection     Immunosuppressed status     Iron deficiency anemia, multiple blood transfusions     Seizures     states last seizure 2009       Past Surgical History:   Procedure Laterality Date    APPENDECTOMY      BRAIN SURGERY  2009    fx skull due to seizure with 2 pins    CHOLECYSTECTOMY      COLON SURGERY      Partial colectomy    COLONOSCOPY Left 7/10/2019    Procedure: COLONOSCOPY;  Surgeon: Papi Martinez Jr., MD;  Location: [facility];  Service: Endoscopy;  Laterality: Left;    COLONOSCOPY N/A 12/6/2022    Procedure: COLONOSCOPY;  Surgeon: Zandra Mallory MD;  Location: [address]);  Service: Endoscopy;  Laterality: N/A;  poor prep on 11/28/22.  per   Mallory-Miralax 5 days prior with gastroparesis diet-3 days full liquid. also Mag citrate day before     instr handed to pt-GT    ESOPHAGOGASTRODUODENOSCOPY Left 6/5/2019    Procedure: EGD (ESOPHAGOGASTRODUODENOSCOPY);  Surgeon: ROLA Villagran MD;  Location: [facility];  Service: Endoscopy;  Laterality: Left;    ESOPHAGOGASTRODUODENOSCOPY Left 3/16/2020    Procedure: EGD (ESOPHAGOGASTRODUODENOSCOPY);  Surgeon: Clive Núñez MD;  Location: [facility];  Service: Endoscopy;  Laterality: Left;    ESOPHAGOGASTRODUODENOSCOPY N/A 4/17/2020    Procedure: EGD (ESOPHAGOGASTRODUODENOSCOPY);  Surgeon: Papi Martinez Jr., MD;  Location: [facility];  Service: Endoscopy;  Laterality: N/A;  with Push Enteroscopy    ESOPHAGOGASTRODUODENOSCOPY N/A 7/6/2020    Procedure: EGD (ESOPHAGOGASTRODUODENOSCOPY)   possible peg;  Surgeon: Papi Martinez Jr., MD;  Location: [facility];  Service: Endoscopy;  Laterality: N/A;    ESOPHAGOGASTRODUODENOSCOPY N/A 7/9/2020    Procedure: EGD (ESOPHAGOGASTRODUODENOSCOPY);  Surgeon: Papi Martinez Jr., MD;  Location: [facility];  Service: Endoscopy;  Laterality: N/A;  J or Peg tube    ESOPHAGOGASTRODUODENOSCOPY N/A 9/3/2020    Procedure: EGD (ESOPHAGOGASTRODUODENOSCOPY);  Surgeon: Papi Martinez Jr., MD;  Location: [facility];  Service: Endoscopy;  Laterality: N/A;  per drs order Stoma, Peds, w/single Lumen, J-Tube placement    ESOPHAGOGASTRODUODENOSCOPY N/A 12/29/2020    Procedure: EGD (ESOPHAGOGASTRODUODENOSCOPY);  Surgeon: Papi Martinez Jr., MD;  Location: [facility];  Service: Endoscopy;  Laterality: N/A;    ESOPHAGOGASTRODUODENOSCOPY N/A 2/23/2021    Procedure: EGD (ESOPHAGOGASTRODUODENOSCOPY);  Surgeon: Papi Martinez Jr., MD;  Location: [facility];  Service: Endoscopy;  Laterality: N/A;    ESOPHAGOGASTRODUODENOSCOPY N/A 2/3/2022    Procedure: EGD (ESOPHAGOGASTRODUODENOSCOPY);  Surgeon: Papi Martinez Jr., MD;  Location: [facility];  Service: Endoscopy;  Laterality: N/A;     ESOPHAGOGASTRODUODENOSCOPY N/A 8/13/2022    Procedure: EGD (ESOPHAGOGASTRODUODENOSCOPY);  Surgeon: Huang Licea MD;  Location: [facility] ENDO;  Service: Gastroenterology;  Laterality: N/A;    ESOPHAGOGASTRODUODENOSCOPY N/A 10/16/2022    Procedure: EGD (ESOPHAGOGASTRODUODENOSCOPY);  Surgeon: Huang Licea MD;  Location: [facility] ENDO;  Service: Gastroenterology;  Laterality: N/A;    ESOPHAGOGASTRODUODENOSCOPY N/A 11/28/2022    Procedure: EGD (ESOPHAGOGASTRODUODENOSCOPY);  Surgeon: Zandra Mallory MD;  [facility]  Service: Endoscopy;  Laterality: N/A;    FEMUR FRACTURE SURGERY Right 2006    HYSTERECTOMY      INSERTION OF TUNNELED CENTRAL VENOUS CATHETER (CVC) WITH SUBCUTANEOUS PORT Left 8/14/2019    Procedure: [id number];  Surgeon: Miquel Sargent MD;  Location: [facility] OR;  Service: General;  Laterality: Left;    INSERTION OR REPLACEMENT OF PERCUTANEOUS ENDOSCOPIC JEJUNOSTOMY TUBE  12/29/2020    Procedure: INSERTION OR REPLACEMENT, JEJUNOSTOMY TUBE, PERCUTANEOUS, ENDOSCOPIC;  Surgeon: Papi Martinez Jr., MD;  Location: [facility] ENDO;  Service: Endoscopy;;    laparoscopy for endometriosis      x5    LEFT HEART CATHETERIZATION Left 3/29/2023    Procedure: Left heart cath;  Surgeon: Riley Tipton MD;  Location: [facility] CATH;  Service: Cardiology;  Laterality: Left;    OOPHORECTOMY      right ovary removed only    OPEN REDUCTION AND INTERNAL FIXATION (ORIF) OF INJURY OF ANKLE Left 4/10/2023    Procedure: ORIF, ANKLE;  Surgeon: Michael Lacey MD;  Location: [facility] OR;  Service: Orthopedics;  Laterality: Left;    PEG TUBE REMOVAL  4/1/2021    Procedure: REMOVAL, PEG TUBE;  Surgeon: Papi Martinez Jr., MD;  Location: [facility] ENDO;  Service: Endoscopy;;    TONSILLECTOMY         Current Outpatient Medications   Medication Sig    ARIPiprazole (ABILIFY) 5 MG Tab TAKE ONE TABLET BY MOUTH ONCE DAILY (Patient taking differently: Take 5 mg by mouth once daily.)    ergocalciferol (ERGOCALCIFEROL) 50,000 unit  Cap Take 1 capsule (50,000 Units total) by mouth every 7 days.    famotidine (PEPCID) 40 MG tablet Take 40 mg by mouth 2 (two) times daily. Indications: increased stomach acid from systemic mastocytosis    fludrocortisone (FLORINEF) 0.1 mg Tab Take 0.1 mg by mouth Daily.    HYDROcodone-acetaminophen (NORCO)  mg per tablet Take 1 tablet by mouth every 4 (four) hours as needed for Pain.    lamotrigine (LAMICTAL) 150 MG Tab Take 150 mg by mouth 2 (two) times daily.    LIDOcaine-prilocaine (EMLA) cream Apply topically as needed (use prior to treatment with port access).    oxyCODONE-acetaminophen (PERCOCET) 7.5-325 mg per tablet Take 1 tablet by mouth every 6 (six) hours as needed for Pain.    pantoprazole (PROTONIX) 40 MG tablet Take 40 mg by mouth once daily.    promethazine (PHENERGAN) 25 MG tablet Take 1 tablet (25 mg total) by mouth every 6 (six) hours as needed for Nausea.    propranoloL (INDERAL) 40 MG tablet Take 40 mg by mouth once daily.    ustekinumab (STELARA) 90 mg/mL Syrg syringe Inject 90 mg into the skin every 8 weeks. Indications: Crohn's disease     No current facility-administered medications for this visit.       Review of patient's allergies indicates:   Allergen Reactions    Tramadol Other (See Comments)     Seizures      Demerol [meperidine]      Seizures      Hydrocodone Other (See Comments)    Reglan [metoclopramide]        Family History   Adopted: Yes       Social History     Socioeconomic History    Marital status:    Tobacco Use    Smoking status: Former     Packs/day: 0.50     Years: 6.00     Pack years: 3.00     Types: Cigarettes     Quit date: 5/10/2011     Years since quittin.9    Smokeless tobacco: Never   Substance and Sexual Activity    Alcohol use: Not Currently    Drug use: Never    Sexual activity: Yes     Partners: Male     Social Determinants of Health     Financial Resource Strain: Low Risk     Difficulty of Paying Living Expenses: Not very hard   Food  Insecurity: Food Insecurity Present    Worried About Running Out of Food in the Last Year: Never true    Ran Out of Food in the Last Year: Sometimes true   Transportation Needs: No Transportation Needs    Lack of Transportation (Medical): No    Lack of Transportation (Non-Medical): No   Physical Activity: Unknown    Days of Exercise per Week: 0 days   Stress: Stress Concern Present    Feeling of Stress : Rather much   Social Connections: Socially Isolated    Frequency of Communication with Friends and Family: Once a week    Frequency of Social Gatherings with Friends and Family: Once a week    Attends Jainism Services: Never    Active Member of Clubs or Organizations: No    Marital Status:    Housing Stability: High Risk    Unable to Pay for Housing in the Last Year: Yes    Number of Places Lived in the Last Year: 1    Unstable Housing in the Last Year: No       Physical exam:  There were no vitals filed for this visit.  There is no height or weight on file to calculate BMI.  General: In no apparent distress; well developed and well nourished.  HEENT: normocephalic; atraumatic.  Cardiovascular: regular rate.  Respiratory: no increased work of breathing.  Musculoskeletal:   Gait: unable to assess  Inspection:  Surgical sites well healed with nylon sutures in place.  Mild residual swelling with significant improvement especially at 2 weeks postop.  Gross motor function intact FHL and EHL.  Sensation intact to light touch distally.  Brisk cap refill all 5 digits.                   Imaging Studies/Outside documentation:  I have ordered/reviewed/interpreted the following images/outside documentation:  1. Now weightbearing three views left ankle:  Status post bimalleolar ankle fracture ORIF.  Overall alignment well-maintained.  Congruent ankle mortise.  No evidence of implant loosening or failure.     Assessment:  Angela Trascher Lejeune is a 48 y.o. female with Displaced Left bimalleolar ankle fracture.       Plan:   Clinical and radiographic findings were discussed.  Patient doing well in regard to pain control however has had significant pain with suture removal today.  We will prescribe a short course of p.o. Percocet.  Also baby aspirin once daily for DVT prophylaxis.  As previously discussed, patient does have low vitamin-D levels.  She did receive a 1 time dose of 51633 IU.  We will prescribe this such that patient may complete 8 week course with taking 1 tablet once a week during that time.  Continue strict nonweightbearing left lower extremity.  Patient voiced understanding.  All questions were answered.  Return to clinic in 2 weeks for repeat evaluation and wound check.  No new x-rays.        This note was created using voice recognition software and may contain grammatical errors.

## 2023-04-26 ENCOUNTER — TELEPHONE (OUTPATIENT)
Dept: UROLOGY | Facility: CLINIC | Age: 48
End: 2023-04-26
Payer: COMMERCIAL

## 2023-04-26 NOTE — TELEPHONE ENCOUNTER
----- Message from Monet Kelsey sent at 4/26/2023 10:53 AM CDT -----  Contact: Patient  Type:  RX Refill Request    Who Called:   Patient    Refill or New Rx:   Refill    RX Name and Strength:   oxyCODONE-acetaminophen (PERCOCET) 5-325 mg per tablet    How is the patient currently taking it? (ex. 1XDay):  As Needed  Is this a 30 day or 90 day RX:  30    Preferred Pharmacy with phone number:      [facility] on [address]      Local or Mail Order:  Local  Ordering Provider:  Dr Lacey    Would the patient rather a call back or a response via [facility]?   Call Back  Best Call Back Number:  [phone]    Additional Information: States pharmacy prescription was sent to yesterday said computers are down and states she was told they're not receiving prescriptions for schedule 2 drugs and for patient to contact her doctor - please call to advise - thank you

## 2023-05-09 ENCOUNTER — OFFICE VISIT (OUTPATIENT)
Dept: ORTHOPEDICS | Facility: CLINIC | Age: 48
End: 2023-05-09
Payer: COMMERCIAL

## 2023-05-09 VITALS — BODY MASS INDEX: 20.66 KG/M2 | HEIGHT: 64 IN | WEIGHT: 121 LBS

## 2023-05-09 DIAGNOSIS — S82.842A CLOSED BIMALLEOLAR FRACTURE OF LEFT ANKLE, INITIAL ENCOUNTER: Primary | ICD-10-CM

## 2023-05-09 PROCEDURE — 1159F MED LIST DOCD IN RCRD: CPT | Mod: CPTII,S$GLB,, | Performed by: ORTHOPAEDIC SURGERY

## 2023-05-09 PROCEDURE — 99999 PR PBB SHADOW E&M-EST. PATIENT-LVL III: ICD-10-PCS | Mod: PBBFAC,,, | Performed by: ORTHOPAEDIC SURGERY

## 2023-05-09 PROCEDURE — 99024 PR POST-OP FOLLOW-UP VISIT: ICD-10-PCS | Mod: S$GLB,,, | Performed by: ORTHOPAEDIC SURGERY

## 2023-05-09 PROCEDURE — 99999 PR PBB SHADOW E&M-EST. PATIENT-LVL III: CPT | Mod: PBBFAC,,, | Performed by: ORTHOPAEDIC SURGERY

## 2023-05-09 PROCEDURE — 99024 POSTOP FOLLOW-UP VISIT: CPT | Mod: S$GLB,,, | Performed by: ORTHOPAEDIC SURGERY

## 2023-05-09 PROCEDURE — 3008F PR BODY MASS INDEX (BMI) DOCUMENTED: ICD-10-PCS | Mod: CPTII,S$GLB,, | Performed by: ORTHOPAEDIC SURGERY

## 2023-05-09 PROCEDURE — 1159F PR MEDICATION LIST DOCUMENTED IN MEDICAL RECORD: ICD-10-PCS | Mod: CPTII,S$GLB,, | Performed by: ORTHOPAEDIC SURGERY

## 2023-05-09 PROCEDURE — 3008F BODY MASS INDEX DOCD: CPT | Mod: CPTII,S$GLB,, | Performed by: ORTHOPAEDIC SURGERY

## 2023-05-09 RX ORDER — TIZANIDINE 4 MG/1
4 TABLET ORAL EVERY 8 HOURS PRN
Qty: 30 TABLET | Refills: 0 | Status: SHIPPED | OUTPATIENT
Start: 2023-05-09 | End: 2023-05-20

## 2023-05-09 NOTE — PROGRESS NOTES
Status/Diagnosis: Displaced Left bimalleolar ankle fracture.   Date of Surgery: 04/10/2023  Date of Injury: 04/04/2023  Return visit: 3 weeks  X-rays on Return: NWB 3-views Left ankle    Chief Complaint:   No chief complaint on file.    Present History:  Angela Trascher Lejeune is a 48 y.o. female who returns today for routine postoperative visit.  Overall doing well.  Does not require narcotic pain medication.  Only complaint today in regard to some degree of prominence/tenderness over the medial malleolar fracture site and area of screw fixation.  No skin breakdown.  Denies any drainage, fever, chills.  Reports compliance with nonweightbearing status.  She has been using a walker at home.  On clarification, she is been taking vitamin-D once weekly as previously discussed.  Past medical history significant for recurrent syncopal episodes; Crohn's disease on Stelara.  Denies tobacco use.      Past Medical History:   Diagnosis Date    Abnormal Pap smear     + HPV    Bipolar 1 disorder     Closed bimalleolar fracture of left ankle     Crohn's disease S/P surgery (ileum, possible gastroduodenal with ulcers)     1998-ileocectomy (9 inches ileum)    Encounter for blood transfusion     Gastric and duodenal ulcers of unclear etiology     Gastroparesis     H/O ETOH abuse     History of HSV-2 infection     Immunosuppressed status     Iron deficiency anemia, multiple blood transfusions     Seizures     states last seizure 2009       Past Surgical History:   Procedure Laterality Date    APPENDECTOMY      BRAIN SURGERY  2009    fx skull due to seizure with 2 pins    CHOLECYSTECTOMY      COLON SURGERY      Partial colectomy    COLONOSCOPY Left 7/10/2019    Procedure: COLONOSCOPY;  Surgeon: Papi Martinez Jr., MD;  Location: [facility];  Service: Endoscopy;  Laterality: Left;    COLONOSCOPY N/A 12/6/2022    Procedure: COLONOSCOPY;  Surgeon: Zandra Mallory MD;  Location: [facility] ([address] [place] [address]);  Service: Endoscopy;  Laterality:  N/A;  poor prep on 11/28/22.  per Dr Reyes 5 days prior with gastroparesis diet-3 days full liquid. also Mag citrate day before     instr handed to pt-GT    ESOPHAGOGASTRODUODENOSCOPY Left 6/5/2019    Procedure: EGD (ESOPHAGOGASTRODUODENOSCOPY);  Surgeon: ROLA Villagran MD;  Location: [facility];  Service: Endoscopy;  Laterality: Left;    ESOPHAGOGASTRODUODENOSCOPY Left 3/16/2020    Procedure: EGD (ESOPHAGOGASTRODUODENOSCOPY);  Surgeon: Clive Núñez MD;  Location: [facility];  Service: Endoscopy;  Laterality: Left;    ESOPHAGOGASTRODUODENOSCOPY N/A 4/17/2020    Procedure: EGD (ESOPHAGOGASTRODUODENOSCOPY);  Surgeon: Papi Martinez Jr., MD;  Location: [facility];  Service: Endoscopy;  Laterality: N/A;  with Push Enteroscopy    ESOPHAGOGASTRODUODENOSCOPY N/A 7/6/2020    Procedure: EGD (ESOPHAGOGASTRODUODENOSCOPY)   possible peg;  Surgeon: Papi Martinez Jr., MD;  Location: [facility];  Service: Endoscopy;  Laterality: N/A;    ESOPHAGOGASTRODUODENOSCOPY N/A 7/9/2020    Procedure: EGD (ESOPHAGOGASTRODUODENOSCOPY);  Surgeon: Papi Martinez Jr., MD;  Location: [facility];  Service: Endoscopy;  Laterality: N/A;  J or Peg tube    ESOPHAGOGASTRODUODENOSCOPY N/A 9/3/2020    Procedure: EGD (ESOPHAGOGASTRODUODENOSCOPY);  Surgeon: Papi Martinez Jr., MD;  Location: [facility];  Service: Endoscopy;  Laterality: N/A;  per drs order Stoma, Peds, w/single Lumen, J-Tube placement    ESOPHAGOGASTRODUODENOSCOPY N/A 12/29/2020    Procedure: EGD (ESOPHAGOGASTRODUODENOSCOPY);  Surgeon: Papi Martinez Jr., MD;  Location: [facility];  Service: Endoscopy;  Laterality: N/A;    ESOPHAGOGASTRODUODENOSCOPY N/A 2/23/2021    Procedure: EGD (ESOPHAGOGASTRODUODENOSCOPY);  Surgeon: Papi Martinez Jr., MD;  Location: [facility];  Service: Endoscopy;  Laterality: N/A;    ESOPHAGOGASTRODUODENOSCOPY N/A 2/3/2022    Procedure: EGD (ESOPHAGOGASTRODUODENOSCOPY);  Surgeon: Papi Martinez Jr., MD;  Location: [facility];  Service: Endoscopy;   Laterality: N/A;    ESOPHAGOGASTRODUODENOSCOPY N/A 8/13/2022    Procedure: EGD (ESOPHAGOGASTRODUODENOSCOPY);  Surgeon: Huang Licea MD;  Location: [facility] ENDO;  Service: Gastroenterology;  Laterality: N/A;    ESOPHAGOGASTRODUODENOSCOPY N/A 10/16/2022    Procedure: EGD (ESOPHAGOGASTRODUODENOSCOPY);  Surgeon: Huang Licea MD;  Location: [facility] ENDO;  Service: Gastroenterology;  Laterality: N/A;    ESOPHAGOGASTRODUODENOSCOPY N/A 11/28/2022    Procedure: EGD (ESOPHAGOGASTRODUODENOSCOPY);  Surgeon: Zandra Mallory MD;  [facility]  Service: Endoscopy;  Laterality: N/A;    FEMUR FRACTURE SURGERY Right 2006    HYSTERECTOMY      INSERTION OF TUNNELED CENTRAL VENOUS CATHETER (CVC) WITH SUBCUTANEOUS PORT Left 8/14/2019    Procedure: [id number];  Surgeon: Miquel Sargent MD;  Location: [facility] OR;  Service: General;  Laterality: Left;    INSERTION OR REPLACEMENT OF PERCUTANEOUS ENDOSCOPIC JEJUNOSTOMY TUBE  12/29/2020    Procedure: INSERTION OR REPLACEMENT, JEJUNOSTOMY TUBE, PERCUTANEOUS, ENDOSCOPIC;  Surgeon: Papi Martinez Jr., MD;  Location: [facility];  Service: Endoscopy;;    laparoscopy for endometriosis      x5    LEFT HEART CATHETERIZATION Left 3/29/2023    Procedure: Left heart cath;  Surgeon: Riley Tipton MD;  Location: [facility] CATH;  Service: Cardiology;  Laterality: Left;    OOPHORECTOMY      right ovary removed only    OPEN REDUCTION AND INTERNAL FIXATION (ORIF) OF INJURY OF ANKLE Left 4/10/2023    Procedure: ORIF, ANKLE;  Surgeon: Michael Lacey MD;  Location: [facility] OR;  Service: Orthopedics;  Laterality: Left;    PEG TUBE REMOVAL  4/1/2021    Procedure: REMOVAL, PEG TUBE;  Surgeon: Papi Martinez Jr., MD;  Location: [facility] ENDO;  Service: Endoscopy;;    TONSILLECTOMY         Current Outpatient Medications   Medication Sig    ARIPiprazole (ABILIFY) 5 MG Tab TAKE ONE TABLET BY MOUTH ONCE DAILY (Patient taking differently: Take 5 mg by mouth once daily.)    aspirin (ECOTRIN) 81 MG EC  tablet Take 1 tablet (81 mg total) by mouth once daily.    ergocalciferol (ERGOCALCIFEROL) 50,000 unit Cap Take 1 capsule (50,000 Units total) by mouth every 7 days.    famotidine (PEPCID) 40 MG tablet Take 40 mg by mouth 2 (two) times daily. Indications: increased stomach acid from systemic mastocytosis    fludrocortisone (FLORINEF) 0.1 mg Tab Take 0.1 mg by mouth Daily.    lamotrigine (LAMICTAL) 150 MG Tab Take 150 mg by mouth 2 (two) times daily.    LIDOcaine-prilocaine (EMLA) cream Apply topically as needed (use prior to treatment with port access).    pantoprazole (PROTONIX) 40 MG tablet Take 40 mg by mouth once daily.    promethazine (PHENERGAN) 25 MG tablet Take 1 tablet (25 mg total) by mouth every 6 (six) hours as needed for Nausea.    propranoloL (INDERAL) 40 MG tablet Take 40 mg by mouth once daily.    ustekinumab (STELARA) 90 mg/mL Syrg syringe Inject 90 mg into the skin every 8 weeks. Indications: Crohn's disease     No current facility-administered medications for this visit.       Review of patient's allergies indicates:   Allergen Reactions    Tramadol Other (See Comments)     Seizures      Demerol [meperidine]      Seizures      Hydrocodone Other (See Comments)    Reglan [metoclopramide]        Family History   Adopted: Yes       Social History     Socioeconomic History    Marital status:    Tobacco Use    Smoking status: Former     Packs/day: 0.50     Years: 6.00     Pack years: 3.00     Types: Cigarettes     Quit date: 5/10/2011     Years since quittin.0    Smokeless tobacco: Never   Substance and Sexual Activity    Alcohol use: Not Currently    Drug use: Never    Sexual activity: Yes     Partners: Male     Social Determinants of Health     Financial Resource Strain: Low Risk     Difficulty of Paying Living Expenses: Not very hard   Food Insecurity: Food Insecurity Present    Worried About Running Out of Food in the Last Year: Never true    Ran Out of Food in the Last Year: Sometimes  true   Transportation Needs: No Transportation Needs    Lack of Transportation (Medical): No    Lack of Transportation (Non-Medical): No   Physical Activity: Unknown    Days of Exercise per Week: 0 days   Stress: Stress Concern Present    Feeling of Stress : Rather much   Social Connections: Socially Isolated    Frequency of Communication with Friends and Family: Once a week    Frequency of Social Gatherings with Friends and Family: Once a week    Attends Episcopal Services: Never    Active Member of Clubs or Organizations: No    Marital Status:    Housing Stability: High Risk    Unable to Pay for Housing in the Last Year: Yes    Number of Places Lived in the Last Year: 1    Unstable Housing in the Last Year: No       Physical exam:  There were no vitals filed for this visit.  There is no height or weight on file to calculate BMI.  General: In no apparent distress; well developed and well nourished.  HEENT: normocephalic; atraumatic.  Cardiovascular: regular rate.  Respiratory: no increased work of breathing.  Musculoskeletal:   Gait: unable to assess  Inspection:  Surgical sites are well healed.  Some degree of prominence over the medial malleolus.  But no evidence of skin tenting or breakdown is present.  No drainage or fluctuance.  No signs or symptoms of infection.  Ankle range of motion from neutral to 45° plantar flexion.  Gross motor function intact FHL and EHL.  Sensation intact to light touch distally.  Brisk cap refill all 5 digits.                   Imaging Studies/Outside documentation:  I have ordered/reviewed/interpreted the following images/outside documentation:  No new imaging today.     Assessment:  Angela Trascher Lejeune is a 48 y.o. female with Displaced Left bimalleolar ankle fracture.      Plan:   Clinical and radiographic findings were discussed.  We will transition from cast to tall boot wear.  Boot to be worn at all times except for sleep, hygiene, home ankle range of motion  exercises.  Reiterated the importance of strict nonweightbearing left lower extremity.  Continue vitamin-D supplementation as previously discussed.  We will provide a one time prescription for Zanaflex for chronic low back muscle spasms.  Patient voiced understanding.  All questions were answered.  Return to clinic in 3 weeks for repeat evaluation and x-ray.  Possible initiation of weight-bearing at that time.    We did discuss the likelihood of needing to return to the OR in 2-3 months' time for removal of prominent medial malleolar screws.        This note was created using voice recognition software and may contain grammatical errors.

## 2023-05-24 DIAGNOSIS — S82.842A CLOSED BIMALLEOLAR FRACTURE OF LEFT ANKLE, INITIAL ENCOUNTER: Primary | ICD-10-CM

## 2023-05-26 ENCOUNTER — PATIENT MESSAGE (OUTPATIENT)
Dept: ORTHOPEDICS | Facility: CLINIC | Age: 48
End: 2023-05-26
Payer: COMMERCIAL

## 2023-05-30 ENCOUNTER — OFFICE VISIT (OUTPATIENT)
Dept: ORTHOPEDICS | Facility: CLINIC | Age: 48
End: 2023-05-30
Payer: COMMERCIAL

## 2023-05-30 ENCOUNTER — HOSPITAL ENCOUNTER (OUTPATIENT)
Dept: RADIOLOGY | Facility: HOSPITAL | Age: 48
Discharge: HOME | End: 2023-05-30
Attending: ORTHOPAEDIC SURGERY
Payer: COMMERCIAL

## 2023-05-30 DIAGNOSIS — S82.842A CLOSED BIMALLEOLAR FRACTURE OF LEFT ANKLE, INITIAL ENCOUNTER: ICD-10-CM

## 2023-05-30 DIAGNOSIS — S82.842A CLOSED BIMALLEOLAR FRACTURE OF LEFT ANKLE, INITIAL ENCOUNTER: Primary | ICD-10-CM

## 2023-05-30 PROCEDURE — 1160F RVW MEDS BY RX/DR IN RCRD: CPT | Mod: CPTII,S$GLB,, | Performed by: ORTHOPAEDIC SURGERY

## 2023-05-30 PROCEDURE — 99024 PR POST-OP FOLLOW-UP VISIT: ICD-10-PCS | Mod: S$GLB,,, | Performed by: ORTHOPAEDIC SURGERY

## 2023-05-30 PROCEDURE — 1159F PR MEDICATION LIST DOCUMENTED IN MEDICAL RECORD: ICD-10-PCS | Mod: CPTII,S$GLB,, | Performed by: ORTHOPAEDIC SURGERY

## 2023-05-30 PROCEDURE — 99999 PR PBB SHADOW E&M-EST. PATIENT-LVL III: ICD-10-PCS | Mod: PBBFAC,,, | Performed by: ORTHOPAEDIC SURGERY

## 2023-05-30 PROCEDURE — 1159F MED LIST DOCD IN RCRD: CPT | Mod: CPTII,S$GLB,, | Performed by: ORTHOPAEDIC SURGERY

## 2023-05-30 PROCEDURE — 73610 X-RAY EXAM OF ANKLE: CPT | Mod: TC,PO,LT

## 2023-05-30 PROCEDURE — 1160F PR REVIEW ALL MEDS BY PRESCRIBER/CLIN PHARMACIST DOCUMENTED: ICD-10-PCS | Mod: CPTII,S$GLB,, | Performed by: ORTHOPAEDIC SURGERY

## 2023-05-30 PROCEDURE — 73610 XR ANKLE COMPLETE 3 VIEW LEFT: ICD-10-PCS | Mod: 26,LT,, | Performed by: RADIOLOGY

## 2023-05-30 PROCEDURE — 73610 X-RAY EXAM OF ANKLE: CPT | Mod: 26,LT,, | Performed by: RADIOLOGY

## 2023-05-30 PROCEDURE — 99999 PR PBB SHADOW E&M-EST. PATIENT-LVL III: CPT | Mod: PBBFAC,,, | Performed by: ORTHOPAEDIC SURGERY

## 2023-05-30 PROCEDURE — 99024 POSTOP FOLLOW-UP VISIT: CPT | Mod: S$GLB,,, | Performed by: ORTHOPAEDIC SURGERY

## 2023-05-30 NOTE — PROGRESS NOTES
Status/Diagnosis: Displaced Left bimalleolar ankle fracture.   Date of Surgery: 04/10/2023  Date of Injury: 04/04/2023  Return visit: 1 month  X-rays on Return: WB 3-views Left ankle    Chief Complaint:   Chief Complaint   Patient presents with    Left Ankle - Post-op Evaluation, Pain     Present History:  Angela Trascher Lejeune is a 48 y.o. female who returns today for routine postoperative visit.  Endorses 0/10 pain.  Still with intermittent symptomatic hardware.  Now endorses pain both medially and laterally with pressure.  Denies any new injuries.  Denies any numbness or tingling.  Reports compliance with nonweightbearing status.  She has been using a walker at home.  Taking vitamin-D once weekly as previously discussed.  Past medical history significant for recurrent syncopal episodes; Crohn's disease on Stelara.  Denies tobacco use.      Past Medical History:   Diagnosis Date    Abnormal Pap smear     + HPV    Bipolar 1 disorder     Closed bimalleolar fracture of left ankle     Crohn's disease S/P surgery (ileum, possible gastroduodenal with ulcers)     1998-ileocectomy (9 inches ileum)    Encounter for blood transfusion     Gastric and duodenal ulcers of unclear etiology     Gastroparesis     H/O ETOH abuse     History of HSV-2 infection     Immunosuppressed status     Iron deficiency anemia, multiple blood transfusions     Seizures     states last seizure 2009       Past Surgical History:   Procedure Laterality Date    APPENDECTOMY      BRAIN SURGERY  2009    fx skull due to seizure with 2 pins    CHOLECYSTECTOMY      COLON SURGERY      Partial colectomy    COLONOSCOPY Left 7/10/2019    Procedure: COLONOSCOPY;  Surgeon: Papi Martinez Jr., MD;  Location: [facility];  Service: Endoscopy;  Laterality: Left;    COLONOSCOPY N/A 12/6/2022    Procedure: COLONOSCOPY;  Surgeon: Zandra Mallory MD;  Location: [address]);  Service: Endoscopy;  Laterality: N/A;  poor prep on 11/28/22.  per Dr Reyes 5  days prior with gastroparesis diet-3 days full liquid. also Mag citrate day before     instr handed to pt-GT    ESOPHAGOGASTRODUODENOSCOPY Left 6/5/2019    Procedure: EGD (ESOPHAGOGASTRODUODENOSCOPY);  Surgeon: ROLA Villagran MD;  Location: [facility];  Service: Endoscopy;  Laterality: Left;    ESOPHAGOGASTRODUODENOSCOPY Left 3/16/2020    Procedure: EGD (ESOPHAGOGASTRODUODENOSCOPY);  Surgeon: Clive Núñez MD;  Location: [facility];  Service: Endoscopy;  Laterality: Left;    ESOPHAGOGASTRODUODENOSCOPY N/A 4/17/2020    Procedure: EGD (ESOPHAGOGASTRODUODENOSCOPY);  Surgeon: Papi Martinez Jr., MD;  Location: [facility];  Service: Endoscopy;  Laterality: N/A;  with Push Enteroscopy    ESOPHAGOGASTRODUODENOSCOPY N/A 7/6/2020    Procedure: EGD (ESOPHAGOGASTRODUODENOSCOPY)   possible peg;  Surgeon: Papi Martinez Jr., MD;  Location: [facility];  Service: Endoscopy;  Laterality: N/A;    ESOPHAGOGASTRODUODENOSCOPY N/A 7/9/2020    Procedure: EGD (ESOPHAGOGASTRODUODENOSCOPY);  Surgeon: Papi Martinez Jr., MD;  Location: [facility];  Service: Endoscopy;  Laterality: N/A;  J or Peg tube    ESOPHAGOGASTRODUODENOSCOPY N/A 9/3/2020    Procedure: EGD (ESOPHAGOGASTRODUODENOSCOPY);  Surgeon: Papi Martinez Jr., MD;  Location: [facility];  Service: Endoscopy;  Laterality: N/A;  per drs order Stoma, Peds, w/single Lumen, J-Tube placement    ESOPHAGOGASTRODUODENOSCOPY N/A 12/29/2020    Procedure: EGD (ESOPHAGOGASTRODUODENOSCOPY);  Surgeon: Papi Martinez Jr., MD;  Location: [facility];  Service: Endoscopy;  Laterality: N/A;    ESOPHAGOGASTRODUODENOSCOPY N/A 2/23/2021    Procedure: EGD (ESOPHAGOGASTRODUODENOSCOPY);  Surgeon: Papi Martinez Jr., MD;  Location: [facility];  Service: Endoscopy;  Laterality: N/A;    ESOPHAGOGASTRODUODENOSCOPY N/A 2/3/2022    Procedure: EGD (ESOPHAGOGASTRODUODENOSCOPY);  Surgeon: Papi Martinez Jr., MD;  Location: [facility];  Service: Endoscopy;  Laterality: N/A;    ESOPHAGOGASTRODUODENOSCOPY N/A  8/13/2022    Procedure: EGD (ESOPHAGOGASTRODUODENOSCOPY);  Surgeon: Huang Licea MD;  Location: [facility] ENDO;  Service: Gastroenterology;  Laterality: N/A;    ESOPHAGOGASTRODUODENOSCOPY N/A 10/16/2022    Procedure: EGD (ESOPHAGOGASTRODUODENOSCOPY);  Surgeon: Huang Licea MD;  Location: [facility] ENDO;  Service: Gastroenterology;  Laterality: N/A;    ESOPHAGOGASTRODUODENOSCOPY N/A 11/28/2022    Procedure: EGD (ESOPHAGOGASTRODUODENOSCOPY);  Surgeon: Zandra Mallory MD;  Location: [facility] ENDO (4TH FLR);  Service: Endoscopy;  Laterality: N/A;    FEMUR FRACTURE SURGERY Right 2006    HYSTERECTOMY      INSERTION OF TUNNELED CENTRAL VENOUS CATHETER (CVC) WITH SUBCUTANEOUS PORT Left 8/14/2019    Procedure: [id number];  Surgeon: Miquel Sargent MD;  Location: [facility] OR;  Service: General;  Laterality: Left;    INSERTION OR REPLACEMENT OF PERCUTANEOUS ENDOSCOPIC JEJUNOSTOMY TUBE  12/29/2020    Procedure: INSERTION OR REPLACEMENT, JEJUNOSTOMY TUBE, PERCUTANEOUS, ENDOSCOPIC;  Surgeon: Papi Martinez Jr., MD;  Location: [facility] ENDO;  Service: Endoscopy;;    laparoscopy for endometriosis      x5    LEFT HEART CATHETERIZATION Left 3/29/2023    Procedure: Left heart cath;  Surgeon: Riley Tipton MD;  Location: [facility] CATH;  Service: Cardiology;  Laterality: Left;    OOPHORECTOMY      right ovary removed only    OPEN REDUCTION AND INTERNAL FIXATION (ORIF) OF INJURY OF ANKLE Left 4/10/2023    Procedure: ORIF, ANKLE;  Surgeon: Michael Lacey MD;  Location: [facility] OR;  Service: Orthopedics;  Laterality: Left;    PEG TUBE REMOVAL  4/1/2021    Procedure: REMOVAL, PEG TUBE;  Surgeon: Papi Martinez Jr., MD;  Location: [facility] ENDO;  Service: Endoscopy;;    TONSILLECTOMY         Current Outpatient Medications   Medication Sig    ARIPiprazole (ABILIFY) 5 MG Tab TAKE ONE TABLET BY MOUTH ONCE DAILY (Patient taking differently: Take 5 mg by mouth once daily.)    aspirin (ECOTRIN) 81 MG EC tablet Take 1 tablet (81 mg total) by mouth once  daily.    ergocalciferol (ERGOCALCIFEROL) 50,000 unit Cap Take 1 capsule (50,000 Units total) by mouth every 7 days.    famotidine (PEPCID) 40 MG tablet Take 40 mg by mouth 2 (two) times daily. Indications: increased stomach acid from systemic mastocytosis    fludrocortisone (FLORINEF) 0.1 mg Tab Take 0.1 mg by mouth Daily.    lamotrigine (LAMICTAL) 150 MG Tab Take 150 mg by mouth 2 (two) times daily.    LIDOcaine-prilocaine (EMLA) cream Apply topically as needed (use prior to treatment with port access).    pantoprazole (PROTONIX) 40 MG tablet Take 40 mg by mouth once daily.    promethazine (PHENERGAN) 25 MG tablet Take 1 tablet (25 mg total) by mouth every 6 (six) hours as needed for Nausea.    propranoloL (INDERAL) 40 MG tablet Take 40 mg by mouth once daily.    ustekinumab (STELARA) 90 mg/mL Syrg syringe Inject 90 mg into the skin every 8 weeks. Indications: Crohn's disease     No current facility-administered medications for this visit.       Review of patient's allergies indicates:   Allergen Reactions    Tramadol Other (See Comments)     Seizures      Demerol [meperidine]      Seizures      Hydrocodone Other (See Comments)    Reglan [metoclopramide]        Family History   Adopted: Yes       Social History     Socioeconomic History    Marital status:    Tobacco Use    Smoking status: Former     Packs/day: 0.50     Years: 6.00     Pack years: 3.00     Types: Cigarettes     Quit date: 5/10/2011     Years since quittin.0    Smokeless tobacco: Never   Substance and Sexual Activity    Alcohol use: Not Currently    Drug use: Never    Sexual activity: Yes     Partners: Male     Social Determinants of Health     Financial Resource Strain: Low Risk     Difficulty of Paying Living Expenses: Not very hard   Food Insecurity: Food Insecurity Present    Worried About Running Out of Food in the Last Year: Never true    Ran Out of Food in the Last Year: Sometimes true   Transportation Needs: No Transportation  Needs    Lack of Transportation (Medical): No    Lack of Transportation (Non-Medical): No   Physical Activity: Unknown    Days of Exercise per Week: 0 days   Stress: Stress Concern Present    Feeling of Stress : Rather much   Social Connections: Socially Isolated    Frequency of Communication with Friends and Family: Once a week    Frequency of Social Gatherings with Friends and Family: Once a week    Attends Caodaism Services: Never    Active Member of Clubs or Organizations: No    Marital Status:    Housing Stability: High Risk    Unable to Pay for Housing in the Last Year: Yes    Number of Places Lived in the Last Year: 1    Unstable Housing in the Last Year: No       Physical exam:  There were no vitals filed for this visit.  There is no height or weight on file to calculate BMI.  General: In no apparent distress; well developed and well nourished.  HEENT: normocephalic; atraumatic.  Cardiovascular: regular rate.  Respiratory: no increased work of breathing.  Musculoskeletal:   Gait: unable to assess  Inspection:  Surgical sites are well healed.  Previously noted prominence over the medial malleolar screws but no skin tenting or breakdown present.    Today with wants to tenderness over the lateral plate and screws, more prominent proximally.  Ankle range of motion from neutral to 45° plantar flexion.  Gross motor function intact.  Sensation intact to light touch distally.  Brisk cap refill all 5 digits.                   Imaging Studies/Outside documentation:  I have ordered/reviewed/interpreted the following images/outside documentation:  NWB 3-views Left ankle: Status post bimalleolar ankle fracture ORIF.  Interval callus formation and bony bridging present.  Overall alignment well-maintained.  Congruent ankle mortise.  No evidence of implant loosening or failure.      Assessment:  Angela Trascher Lejeune is a 48 y.o. female with Displaced Left bimalleolar ankle fracture.      Plan:   Clinical and  radiographic findings were discussed.  We will initiate physical therapy per protocol for slow progressive weight-bearing to be performed while boot.  May remove for sleep, hygiene, home ankle range of motion exercises.    Specifically discussed working on improving active/passive ankle dorsiflexion motion.    Regarding complaints of symptomatic hardware, discussed timeline for this.  We will be able to attempt this out to be late August/early September.  Patient voiced understanding.  All questions were answered.  Return to clinic in 1 month for repeat evaluation and x-ray.      This note was created using voice recognition software and may contain grammatical errors.

## 2023-06-08 ENCOUNTER — CLINICAL SUPPORT (OUTPATIENT)
Dept: REHABILITATION | Facility: HOSPITAL | Age: 48
End: 2023-06-08
Attending: ORTHOPAEDIC SURGERY
Payer: COMMERCIAL

## 2023-06-08 DIAGNOSIS — S82.842A CLOSED BIMALLEOLAR FRACTURE OF LEFT ANKLE, INITIAL ENCOUNTER: ICD-10-CM

## 2023-06-08 DIAGNOSIS — M25.472 LEFT ANKLE SWELLING: ICD-10-CM

## 2023-06-08 DIAGNOSIS — R53.1 WEAKNESS: ICD-10-CM

## 2023-06-08 DIAGNOSIS — M25.672 DECREASED RANGE OF MOTION OF LEFT ANKLE: ICD-10-CM

## 2023-06-08 DIAGNOSIS — Z74.09 IMPAIRED FUNCTIONAL MOBILITY, BALANCE, GAIT, AND ENDURANCE: ICD-10-CM

## 2023-06-08 PROCEDURE — 97110 THERAPEUTIC EXERCISES: CPT | Mod: PO

## 2023-06-08 PROCEDURE — 97161 PT EVAL LOW COMPLEX 20 MIN: CPT | Mod: PO

## 2023-06-11 PROBLEM — R53.1 WEAKNESS: Status: ACTIVE | Noted: 2023-06-11

## 2023-06-11 PROBLEM — Z74.09 IMPAIRED FUNCTIONAL MOBILITY, BALANCE, GAIT, AND ENDURANCE: Status: ACTIVE | Noted: 2023-06-11

## 2023-06-11 PROBLEM — M25.472 LEFT ANKLE SWELLING: Status: ACTIVE | Noted: 2023-06-11

## 2023-06-11 PROBLEM — M25.672 DECREASED RANGE OF MOTION OF LEFT ANKLE: Status: ACTIVE | Noted: 2023-06-11

## 2023-06-12 NOTE — PLAN OF CARE
[facility]  Physical Therapy Initial Evaluation    Name: Angela Trascher Lejeune  Clinic Number: [id number]    Therapy Diagnosis:   Encounter Diagnoses   Name Primary?    Closed bimalleolar fracture of left ankle, initial encounter     Decreased range of motion of left ankle     Weakness     Left ankle swelling     Impaired functional mobility, balance, gait, and endurance        Physician: Michael Lacey MD    Physician Orders: PT Eval and Treat   Medical Diagnosis from Referral: S82.842A (ICD-10-CM) - Closed bimalleolar fracture of left ankle, initial encounter  Evaluation Date: 6/8/2023  Authorization Period Expiration: 12/31/23  Plan of Care Expiration: 8/8/23  Visit # / Visits authorized: 1/ 1    Time In: 9:40 am  Time Out: 10:15 am  Total Billable Time: 35 minutes    Precautions: Standard and seizures     Subjective   Date of onset: 4/10/23  History of current condition - Mikaela reports: Pt had L ORIF of ankle after a fall. She initially had no pain to ankle with exception to sensation of plates and screws. Physician discussed keeping walker for WBAT while donning boot. She removes boot for sleep, hygiene, and at home ROM exercises. A couple of days ago while in the shower she slipped and put her left foot down to prevent fall and has noticed some swelling and increased pain.      Medical History:   Past Medical History:   Diagnosis Date    Abnormal Pap smear     + HPV    Bipolar 1 disorder     Closed bimalleolar fracture of left ankle     Crohn's disease S/P surgery (ileum, possible gastroduodenal with ulcers)     1998-ileocectomy (9 inches ileum)    Encounter for blood transfusion     Gastric and duodenal ulcers of unclear etiology     Gastroparesis     H/O ETOH abuse     History of HSV-2 infection     Immunosuppressed status     Iron deficiency anemia, multiple blood transfusions     Seizures     states last seizure 2009       Surgical History:   Angela Trascher Lejeune  has  a past surgical history that includes Cholecystectomy; Appendectomy; Hysterectomy; Oophorectomy; Femur fracture surgery (Right, 2006); laparoscopy for endometriosis; Tonsillectomy; Esophagogastroduodenoscopy (Left, 6/5/2019); Colonoscopy (Left, 7/10/2019); Brain surgery (2009); Insertion of tunneled central venous catheter (CVC) with subcutaneous port (Left, 8/14/2019); Esophagogastroduodenoscopy (Left, 3/16/2020); Esophagogastroduodenoscopy (N/A, 4/17/2020); Esophagogastroduodenoscopy (N/A, 7/6/2020); Esophagogastroduodenoscopy (N/A, 7/9/2020); Esophagogastroduodenoscopy (N/A, 9/3/2020); Esophagogastroduodenoscopy (N/A, 12/29/2020); Insertion or replacement of percutaneous endoscopic jejunostomy tube (12/29/2020); Esophagogastroduodenoscopy (N/A, 2/23/2021); Colon surgery; PEG tube removal (4/1/2021); Esophagogastroduodenoscopy (N/A, 2/3/2022); Esophagogastroduodenoscopy (N/A, 8/13/2022); Esophagogastroduodenoscopy (N/A, 10/16/2022); Esophagogastroduodenoscopy (N/A, 11/28/2022); Colonoscopy (N/A, 12/6/2022); Left heart catheterization (Left, 3/29/2023); and Open reduction and internal fixation (ORIF) of injury of ankle (Left, 4/10/2023).    Medications:   Mikaela has a current medication list which includes the following prescription(s): aripiprazole, aspirin, ergocalciferol, famotidine, fludrocortisone, lamotrigine, lidocaine-prilocaine, pantoprazole, promethazine, propranolol, ustekinumab, and [DISCONTINUED] prazosin.    Allergies:   Review of patient's allergies indicates:   Allergen Reactions    Tramadol Other (See Comments)     Seizures      Demerol [meperidine]      Seizures      Hydrocodone Other (See Comments)    Reglan [metoclopramide]         Imaging, bone scan films: 5/10/23  Impression:     Healing bimalleolar fracture of the left ankle status post ORIF.    Prior Therapy: home health  Social History: lives with their family  Occupation: - currently working at home, in 1 month she returns to  going into house  Prior Level of Function: independent   Current Level of Function: modified independent, gait donning boot with AD    Pain:  Current 0/10, worst 7/10, best 0/10   Location: left ankle  Description: Aching and Dull  Aggravating Factors: walking, weight bearing  Easing Factors: muscle relaxer 2x/day, no longer on pain medication, takes Tylenol as needed     Pts goals: return to walking, working without restrictions.    Objective         Observation: good healing of incision. Closed incision with no significant redness or swelling. She is calm and cooperative, motivated to walk without boot or AD    Posture Alignment: shifted slightly to RLE in standing    Sensation: Light touch:intact to light touch    GAIT DEVIATIONS: Mikaela ambulates with use of FWW and donning boot on LLE, WBAT. (States she has walked without AD and without boot occasionally at home)    Ankle Range of Motion:   Ankle Right Left   Dorsiflexion 8 2   Plantarflexion 50 35   Inversion 45 28   Eversion 25 6      Strength:   Right Ankle   Left Ankle    Dorsiflexion: 5/5 Dorsiflexion: 3+/5   Plantarflexion:  5/5 Plantarflexion: 3+/5   Inversion: 4/5 Inversion: 3/5   Eversion: 4/5 Eversion: 3/5       Right LE  Left LE    Hip flexion: 4-/5 Hip flexion: 4-/5   Hip extension:  4-/5 Hip extension: 4-/5   Hip abduction: 4/5 Hip abduction: 4-/5   Hip adduction: NT Hip adduction NT   Knee extension: 4/5 Knee extension: 4/5   Knee flexion: 3+/5 Knee flexion: 3+/5     Joint Mobility: hypomobility in talocrural joint on L    Palpation: tenderness to palpation along dorsal surface of foot near talus    Flexibility: tightness in gastroc and soleus     Figure 8 on L: 48 cm  Figure 8 on R: 44 cm    Tandem on L: <5 seconds  Tandem on R: 15 seconds      CMS Impairment/Limitation/Restriction for FOTO Ankle Survey    Therapist reviewed FOTO scores for Angela Trascher Lejeune on 6/8/2023.   FOTO documents entered into [employer] - see Media section.    Limitation  Score: 43%  Category: Mobility    Current : CK = at least 40% but < 60% impaired, limited or restricted  Goal: CJ = at least 20% but < 40% impaired, limited or restricted  Discharge:          TREATMENT   Treatment Time In: 10:07 am  Treatment Time Out: 10:15 am  Total Treatment time separate from Evaluation: 8 minutes    Mikaela received therapeutic exercises to develop strength, ROM, and flexibility for 8 minutes including:    Discussed:  Ankle ABC's  AROM in ankle DF/PF  Seated heel/toe raises  Standing weight shifts      Home Exercises and Patient Education Provided    Education provided:   - HEP, starting no AD at home for short distances    Written Home Exercises Provided: yes, verbally  Exercises were reviewed and Mikaela was able to demonstrate them prior to the end of the session.  Mikaela demonstrated good  understanding of the education provided.       Assessment   Mikaela is a 48 y.o. female referred to outpatient Physical Therapy with a medical diagnosis of Closed bimalleolar fracture of left ankle, initial encounter. Pt presents 7 weeks status post ORIF of left ankle. She arrives in boot ambulating with assistance of front wheeled walker. She states she occasionally walks without walker and without boot at home for short distances. She displays no redness or significant swelling. Good heeling of incision. She presents with limited left ankle range of motion and strength, impaired balance, and gait abnormality. She would benefit from physical therapy to address deficits and return to prior level of function.    Pt prognosis is Good.   Pt will benefit from skilled outpatient Physical Therapy to address the deficits stated above and in the chart below, provide pt/family education, and to maximize pt's level of independence.     Plan of care discussed with patient: Yes  Pt's spiritual, cultural and educational needs considered and patient is agreeable to the plan of care and goals as stated below:      Anticipated Barriers for therapy: history of falls due to seizures    Medical Necessity is demonstrated by the following  History  Co-morbidities and personal factors that may impact the plan of care Co-morbidities:   Crohn's, Bi-Polar, seizures    Personal Factors:   History of falls     Complexity: low   Examination  Body Structures and Functions, activity limitations and participation restrictions that may impact the plan of care Body Regions:   lower extremities    Body Systems:    gross symmetry  ROM  strength  balance  gait    Participation Restrictions:   Ambulates with boot and AD    Activity limitations:   Learning and applying knowledge  no deficits    General Tasks and Commands  no deficits    Communication  no deficits    Mobility  walking  balance    Self care  washing oneself (bathing, drying, washing hands)  looking after one's health    Domestic Life  shopping  doing house work (cleaning house, washing dishes, laundry)    Interactions/Relationships  no deficits    Life Areas  Currently working from home    Community and Social Life  community life  recreation and leisure         Complexity: low  See FOTO outcome assessment   Clinical Presentation evolving clinical presentation with changing clinical characteristics mod   Decision Making/ Complexity Score: low     Goals:  GOALS: Short Term Goals:  4 weeks  1.Report decreased  in  pain at worse less than  <   / =  4  /10  to increase tolerance for improved functional actvities  2. Pt to improve DF range of motion by 5 to allow for improved functional mobility to allow for improvement in IADLs.   3. Pt to improve Inversion/Eversion by 10 degrees to allow improved gait pattern.  4. Increased L ankle MMT 1/2 grade  to increase tolerance for ADL and work activities.  5. Pt to ambulate without boot for 50% of the day.  6. Pt to tolerate HEP to improve ROM and independence with ADL's    Long Term Goals: 8 weeks  1.Report decreased  in  pain at worse   less than  <   / =  1 /10  to increase tolerance for improved functional actvities  2.Pt to improve range of motion within 5 degrees of R to allow for improved functional mobility to allow for improvement in IADLs.   3.Increased L ankle MMT 1 grade  to increase tolerance for ADL and work activities.  4. Pt will ambulate without boot or AD with normal gait pattern.  5. Pt to be Independent with HEP to improve ROM and independence with ADL's.  6. Pt will improve tandem balance with LLE in back for 30 seconds to reduce fall risk.   7. Pt will improve edema to </=45 cm on L figure 8.      Plan   Plan of care Certification: 6/8/2023 to 8/8/2023.    Outpatient Physical Therapy 2 times weekly for 8 weeks to include the following interventions: Gait Training, Manual Therapy, Moist Heat/ Ice, Neuromuscular Re-ed, Patient Education, Therapeutic Activities, and Therapeutic Exercise.       Lucille Hernandez, PT

## 2023-06-14 ENCOUNTER — CLINICAL SUPPORT (OUTPATIENT)
Dept: REHABILITATION | Facility: HOSPITAL | Age: 48
End: 2023-06-14
Payer: COMMERCIAL

## 2023-06-14 DIAGNOSIS — M25.672 DECREASED RANGE OF MOTION OF LEFT ANKLE: Primary | ICD-10-CM

## 2023-06-14 DIAGNOSIS — R53.1 WEAKNESS: ICD-10-CM

## 2023-06-14 DIAGNOSIS — M25.472 LEFT ANKLE SWELLING: ICD-10-CM

## 2023-06-14 DIAGNOSIS — Z74.09 IMPAIRED FUNCTIONAL MOBILITY, BALANCE, GAIT, AND ENDURANCE: ICD-10-CM

## 2023-06-14 PROCEDURE — 97110 THERAPEUTIC EXERCISES: CPT | Mod: PO,CQ

## 2023-06-14 PROCEDURE — 97140 MANUAL THERAPY 1/> REGIONS: CPT | Mod: PO,CQ

## 2023-06-14 NOTE — PROGRESS NOTES
[facility]   Physical Therapy Treatment Note     Name: Mikaela Montes De Oca Lejeune  Clinic Number: [id number]    Therapy Diagnosis:   Encounter Diagnoses   Name Primary?    Decreased range of motion of left ankle Yes    Weakness     Left ankle swelling     Impaired functional mobility, balance, gait, and endurance      Physician: Michael Lacey MD    Visit Date: 6/14/2023  Physician Orders: PT Eval and Treat   Medical Diagnosis from Referral: S82.842A (ICD-10-CM) - Closed bimalleolar fracture of left ankle, initial encounter  Evaluation Date: 6/8/2023  Authorization Period Expiration: 12/31/23  Plan of Care Expiration: 8/8/23  Visit # / Visits authorized: 2/ 1     Time In: 2:25 PM  Time Out: 3:20  Total Billable Time: 55 minutes     Precautions: Standard and seizures         SUBJECTIVE     Pt reports: that she took a mm relaxer earlier and is w/o pn at this time. She reports that she does not wear her boot at home.  She was compliant with home exercise program.  Response to previous treatment: soreness  Functional change: too soon to assess    Pain: 0/10  Location: left ankles     OBJECTIVE     Objective Measures updated at progress report unless specified.     Treatment     Mikaela received the treatments listed below:      therapeutic exercises to develop strength, endurance, ROM, and flexibility for 40 minutes including:  Stat bike x 5 min   Seated GSS with strap x 2 min  Seated Soleus stretch with strap and bolster 2 min  Towel crunch 20x  [place] pick-up 1 x  Golf ball x 3 min  Seated Heel Raise 20x  Seated toe raise 20x  Seated Alphabet 1x  DF/PF red t-band 20x each  IV/EV red t-band 20x each        manual therapy techniques: Joint mobilizations, Manual traction, and Soft tissue Mobilization were applied to the: L ankle for 15 minutes, including:  Grade II ankle talocrural mobs, gentle distraction, DF/PF mobs.     neuromuscular re-education activities to improve: Balance, Coordination,  Kinesthetic, Sense, Proprioception, and Posture for 00 minutes. The following activities were included:      therapeutic activities to improve functional performance for 00  minutes, including:      gait training to improve functional mobility and safety for 00  minutes, including:              Patient Education and Home Exercises     Home Exercises Provided and Patient Education Provided     Education provided:   - HEP for t-band ex  -Scar massage    Written Home Exercises Provided: Patient instructed to cont prior HEP. Exercises were reviewed and Mikaela was able to demonstrate them prior to the end of the session.  Mikaela demonstrated good  understanding of the education provided. See EMR under Patient Instructions for exercises provided during therapy sessions    ASSESSMENT     Mikaela rylie today's tx with ther ex and manual intervention well. She was able to perform all activities w/o difficulty or complaint. She demonstrates good AROM for L ankle DF. Her surgical incision is well healed but still very TTP. She was able to demonstrate good form and ROM with HEP exs given today.     Mikaela Is progressing well towards her goals.   Pt prognosis is Good.     Pt will continue to benefit from skilled outpatient physical therapy to address the deficits listed in the problem list box on initial evaluation, provide pt/family education and to maximize pt's level of independence in the home and community environment.     Pt's spiritual, cultural and educational needs considered and pt agreeable to plan of care and goals.     Anticipated barriers to physical therapy: history of falls due to seizures     Goals: GOALS: Short Term Goals:  4 weeks  1.Report decreased  in  pain at worse less than  <   / =  4  /10  to increase tolerance for improved functional actvities  2. Pt to improve DF range of motion by 5 to allow for improved functional mobility to allow for improvement in IADLs.   3. Pt to improve Inversion/Eversion by 10  degrees to allow improved gait pattern.  4. Increased L ankle MMT 1/2 grade  to increase tolerance for ADL and work activities.  5. Pt to ambulate without boot for 50% of the day.  6. Pt to tolerate HEP to improve ROM and independence with ADL's     Long Term Goals: 8 weeks  1.Report decreased  in  pain at worse  less than  <   / =  1 /10  to increase tolerance for improved functional actvities  2.Pt to improve range of motion within 5 degrees of R to allow for improved functional mobility to allow for improvement in IADLs.   3.Increased L ankle MMT 1 grade  to increase tolerance for ADL and work activities.  4. Pt will ambulate without boot or AD with normal gait pattern.  5. Pt to be Independent with HEP to improve ROM and independence with ADL's.  6. Pt will improve tandem balance with LLE in back for 30 seconds to reduce fall risk.   7. Pt will improve edema to </=45 cm on L figure 8    PLAN     Plan of care Certification: 6/8/2023 to 8/8/2023.     Outpatient Physical Therapy 2 times weekly for 8 weeks to include the following interventions: Gait Training, Manual Therapy, Moist Heat/ Ice, Neuromuscular Re-ed, Patient Education, Therapeutic Activities, and Therapeutic Exercise    Les Rico, PTA

## 2023-06-26 DIAGNOSIS — S82.842A CLOSED BIMALLEOLAR FRACTURE OF LEFT ANKLE, INITIAL ENCOUNTER: Primary | ICD-10-CM

## 2023-06-28 ENCOUNTER — CLINICAL SUPPORT (OUTPATIENT)
Dept: REHABILITATION | Facility: HOSPITAL | Age: 48
End: 2023-06-28
Payer: COMMERCIAL

## 2023-06-28 DIAGNOSIS — M25.472 LEFT ANKLE SWELLING: ICD-10-CM

## 2023-06-28 DIAGNOSIS — M25.672 DECREASED RANGE OF MOTION OF LEFT ANKLE: Primary | ICD-10-CM

## 2023-06-28 DIAGNOSIS — R53.1 WEAKNESS: ICD-10-CM

## 2023-06-28 DIAGNOSIS — Z74.09 IMPAIRED FUNCTIONAL MOBILITY, BALANCE, GAIT, AND ENDURANCE: ICD-10-CM

## 2023-06-28 PROCEDURE — 97140 MANUAL THERAPY 1/> REGIONS: CPT | Mod: PO,CQ

## 2023-06-28 PROCEDURE — 97110 THERAPEUTIC EXERCISES: CPT | Mod: PO,CQ

## 2023-06-28 NOTE — PROGRESS NOTES
[facility]   Physical Therapy Treatment Note     Name: Mikaela Montes De Oca Lejeune  Clinic Number: [id number]    Therapy Diagnosis:   Encounter Diagnoses   Name Primary?    Decreased range of motion of left ankle Yes    Weakness     Left ankle swelling     Impaired functional mobility, balance, gait, and endurance      Physician: Michael Lacey MD    Visit Date: 6/28/2023  Physician Orders: PT Eval and Treat   Medical Diagnosis from Referral: S82.842A (ICD-10-CM) - Closed bimalleolar fracture of left ankle, initial encounter  Evaluation Date: 6/8/2023  Authorization Period Expiration: 12/31/23  Plan of Care Expiration: 8/8/23  Visit # / Visits authorized: 3/20     Time In: 2:25 PM  Time Out: 3:15  Total Billable Time: 50 minutes     Precautions: Standard and seizures         SUBJECTIVE     Pt reports: again that she took a mm relaxer earlier and is w/o pn at this time. She reports that she does not wear her boot at home, and that pn wakes her up at night..  She was compliant with home exercise program.  Response to previous treatment: soreness  Functional change: too soon to assess    Pain: 0/10  Location: left ankles     OBJECTIVE     Objective Measures updated at progress report unless specified.     Treatment     Mikaela received the treatments listed below:      therapeutic exercises to develop strength, endurance, ROM, and flexibility for 40 minutes including:  Stat bike x 5 min   Seated GSS with strap x 2 min  Seated Soleus stretch with strap and bolster 2 min  Towel crunch 20x  [place] pick-up 2 x  Golf ball x 3 min  Seated Heel Raise 20x  Seated toe raise 20x  Seated Alphabet 1x  DF/PF red t-band 20x each  IV/EV red t-band 20x each        manual therapy techniques: Joint mobilizations, Manual traction, and Soft tissue Mobilization were applied to the: L ankle for 15 minutes, including:  Grade II ankle talocrural mobs, gentle distraction, DF/PF mobs. STM to incisions with decreasing  sensitivity over time.    neuromuscular re-education activities to improve: Balance, Coordination, Kinesthetic, Sense, Proprioception, and Posture for 00 minutes. The following activities were included:      therapeutic activities to improve functional performance for 00  minutes, including:      gait training to improve functional mobility and safety for 00  minutes, including:              Patient Education and Home Exercises     Home Exercises Provided and Patient Education Provided     Education provided:   - HEP for t-band ex  -Scar massage    Written Home Exercises Provided: Patient instructed to cont prior HEP. Exercises were reviewed and Mikaela was able to demonstrate them prior to the end of the session.  Mikaela demonstrated good  understanding of the education provided. See EMR under Patient Instructions for exercises provided during therapy sessions    ASSESSMENT     Mikaela rylie today's tx with ther ex and manual intervention well. She was able to perform all activities w/o difficulty or complaint. She demonstrates good AROM for L ankle DF. Her surgical incision is well healed but still very TTP.that improved with STM.  She was able to demonstrate good form and ROM with HEP exs given today.     Mikaela Is progressing well towards her goals.   Pt prognosis is Good.     Pt will continue to benefit from skilled outpatient physical therapy to address the deficits listed in the problem list box on initial evaluation, provide pt/family education and to maximize pt's level of independence in the home and community environment.     Pt's spiritual, cultural and educational needs considered and pt agreeable to plan of care and goals.     Anticipated barriers to physical therapy: history of falls due to seizures     Goals: GOALS: Short Term Goals:  4 weeks  1.Report decreased  in  pain at worse less than  <   / =  4  /10  to increase tolerance for improved functional actvities  2. Pt to improve DF range of motion by 5  to allow for improved functional mobility to allow for improvement in IADLs.   3. Pt to improve Inversion/Eversion by 10 degrees to allow improved gait pattern.  4. Increased L ankle MMT 1/2 grade  to increase tolerance for ADL and work activities.  5. Pt to ambulate without boot for 50% of the day.  6. Pt to tolerate HEP to improve ROM and independence with ADL's     Long Term Goals: 8 weeks  1.Report decreased  in  pain at worse  less than  <   / =  1 /10  to increase tolerance for improved functional actvities  2.Pt to improve range of motion within 5 degrees of R to allow for improved functional mobility to allow for improvement in IADLs.   3.Increased L ankle MMT 1 grade  to increase tolerance for ADL and work activities.  4. Pt will ambulate without boot or AD with normal gait pattern.  5. Pt to be Independent with HEP to improve ROM and independence with ADL's.  6. Pt will improve tandem balance with LLE in back for 30 seconds to reduce fall risk.   7. Pt will improve edema to </=45 cm on L figure 8    PLAN     Plan of care Certification: 6/8/2023 to 8/8/2023.     Outpatient Physical Therapy 2 times weekly for 8 weeks to include the following interventions: Gait Training, Manual Therapy, Moist Heat/ Ice, Neuromuscular Re-ed, Patient Education, Therapeutic Activities, and Therapeutic Exercise    Les Rico PTA

## 2023-06-30 ENCOUNTER — OFFICE VISIT (OUTPATIENT)
Dept: ORTHOPEDICS | Facility: CLINIC | Age: 48
End: 2023-06-30
Payer: COMMERCIAL

## 2023-06-30 ENCOUNTER — CLINICAL SUPPORT (OUTPATIENT)
Dept: REHABILITATION | Facility: HOSPITAL | Age: 48
End: 2023-06-30
Payer: COMMERCIAL

## 2023-06-30 ENCOUNTER — HOSPITAL ENCOUNTER (OUTPATIENT)
Dept: RADIOLOGY | Facility: HOSPITAL | Age: 48
Discharge: HOME | End: 2023-06-30
Attending: ORTHOPAEDIC SURGERY
Payer: COMMERCIAL

## 2023-06-30 VITALS — BODY MASS INDEX: 20.66 KG/M2 | WEIGHT: 121 LBS | HEIGHT: 64 IN

## 2023-06-30 DIAGNOSIS — M25.472 LEFT ANKLE SWELLING: ICD-10-CM

## 2023-06-30 DIAGNOSIS — S82.842A CLOSED BIMALLEOLAR FRACTURE OF LEFT ANKLE, INITIAL ENCOUNTER: Primary | ICD-10-CM

## 2023-06-30 DIAGNOSIS — R53.1 WEAKNESS: ICD-10-CM

## 2023-06-30 DIAGNOSIS — Z74.09 IMPAIRED FUNCTIONAL MOBILITY, BALANCE, GAIT, AND ENDURANCE: ICD-10-CM

## 2023-06-30 DIAGNOSIS — M25.672 DECREASED RANGE OF MOTION OF LEFT ANKLE: Primary | ICD-10-CM

## 2023-06-30 DIAGNOSIS — S82.842A CLOSED BIMALLEOLAR FRACTURE OF LEFT ANKLE, INITIAL ENCOUNTER: ICD-10-CM

## 2023-06-30 PROCEDURE — 1160F PR REVIEW ALL MEDS BY PRESCRIBER/CLIN PHARMACIST DOCUMENTED: ICD-10-PCS | Mod: CPTII,S$GLB,, | Performed by: ORTHOPAEDIC SURGERY

## 2023-06-30 PROCEDURE — 97110 THERAPEUTIC EXERCISES: CPT | Mod: PO,CQ

## 2023-06-30 PROCEDURE — 73610 X-RAY EXAM OF ANKLE: CPT | Mod: 26,LT,, | Performed by: RADIOLOGY

## 2023-06-30 PROCEDURE — 99999 PR PBB SHADOW E&M-EST. PATIENT-LVL III: ICD-10-PCS | Mod: PBBFAC,,, | Performed by: ORTHOPAEDIC SURGERY

## 2023-06-30 PROCEDURE — 99999 PR PBB SHADOW E&M-EST. PATIENT-LVL III: CPT | Mod: PBBFAC,,, | Performed by: ORTHOPAEDIC SURGERY

## 2023-06-30 PROCEDURE — 1160F RVW MEDS BY RX/DR IN RCRD: CPT | Mod: CPTII,S$GLB,, | Performed by: ORTHOPAEDIC SURGERY

## 2023-06-30 PROCEDURE — 3008F PR BODY MASS INDEX (BMI) DOCUMENTED: ICD-10-PCS | Mod: CPTII,S$GLB,, | Performed by: ORTHOPAEDIC SURGERY

## 2023-06-30 PROCEDURE — 99024 PR POST-OP FOLLOW-UP VISIT: ICD-10-PCS | Mod: S$GLB,,, | Performed by: ORTHOPAEDIC SURGERY

## 2023-06-30 PROCEDURE — 97140 MANUAL THERAPY 1/> REGIONS: CPT | Mod: PO,CQ

## 2023-06-30 PROCEDURE — 73610 XR ANKLE COMPLETE 3 VIEW LEFT: ICD-10-PCS | Mod: 26,LT,, | Performed by: RADIOLOGY

## 2023-06-30 PROCEDURE — 99024 POSTOP FOLLOW-UP VISIT: CPT | Mod: S$GLB,,, | Performed by: ORTHOPAEDIC SURGERY

## 2023-06-30 PROCEDURE — 1159F MED LIST DOCD IN RCRD: CPT | Mod: CPTII,S$GLB,, | Performed by: ORTHOPAEDIC SURGERY

## 2023-06-30 PROCEDURE — 73610 X-RAY EXAM OF ANKLE: CPT | Mod: TC,PO,LT

## 2023-06-30 PROCEDURE — 3008F BODY MASS INDEX DOCD: CPT | Mod: CPTII,S$GLB,, | Performed by: ORTHOPAEDIC SURGERY

## 2023-06-30 PROCEDURE — 1159F PR MEDICATION LIST DOCUMENTED IN MEDICAL RECORD: ICD-10-PCS | Mod: CPTII,S$GLB,, | Performed by: ORTHOPAEDIC SURGERY

## 2023-06-30 RX ORDER — TIZANIDINE 4 MG/1
4-8 TABLET ORAL EVERY 8 HOURS PRN
COMMUNITY
Start: 2023-06-27 | End: 2023-08-08 | Stop reason: SDUPTHER

## 2023-06-30 NOTE — PROGRESS NOTES
Status/Diagnosis: Displaced Left bimalleolar ankle fracture.   Date of Surgery: 04/10/2023  Date of Injury: 04/04/2023  Return visit: Mid-August 2023  X-rays on Return: WB 3-views Left ankle    Chief Complaint:   Chief Complaint   Patient presents with    Left Ankle - Post-op Evaluation     Present History:  Angela Trascher Lejeune is a 48 y.o. female who returns today for routine postoperative visit.  Continues to do well.  0/10 pain.  She has been walking out of the boot while at home despite PT recommendations.  Participating in physical therapy twice weekly.  No new injuries.  Denies any numbness or tingling.  Inquiring about timeline for hardware removal.  Past medical history significant for recurrent syncopal episodes; Crohn's disease on Stelara.  Denies tobacco use.      Past Medical History:   Diagnosis Date    Abnormal Pap smear     + HPV    Bipolar 1 disorder     Closed bimalleolar fracture of left ankle     Crohn's disease S/P surgery (ileum, possible gastroduodenal with ulcers)     1998-ileocectomy (9 inches ileum)    Encounter for blood transfusion     Gastric and duodenal ulcers of unclear etiology     Gastroparesis     H/O ETOH abuse     History of HSV-2 infection     Immunosuppressed status     Iron deficiency anemia, multiple blood transfusions     Seizures     states last seizure 2009       Past Surgical History:   Procedure Laterality Date    APPENDECTOMY      BRAIN SURGERY  2009    fx skull due to seizure with 2 pins    CHOLECYSTECTOMY      COLON SURGERY      Partial colectomy    COLONOSCOPY Left 7/10/2019    Procedure: COLONOSCOPY;  Surgeon: Papi Martinez Jr., MD;  Location: [facility];  Service: Endoscopy;  Laterality: Left;    COLONOSCOPY N/A 12/6/2022    Procedure: COLONOSCOPY;  Surgeon: Zandra Mallory MD;  Location: [facility] ([address] [place] [address]);  Service: Endoscopy;  Laterality: N/A;  poor prep on 11/28/22.  per Dr Mallory-Nicole 5 days prior with gastroparesis diet-3 days full liquid. also Mag  citrate day before     instr handed to pt-GT    ESOPHAGOGASTRODUODENOSCOPY Left 6/5/2019    Procedure: EGD (ESOPHAGOGASTRODUODENOSCOPY);  Surgeon: ROLA Villagran MD;  Location: [facility] ENDO;  Service: Endoscopy;  Laterality: Left;    ESOPHAGOGASTRODUODENOSCOPY Left 3/16/2020    Procedure: EGD (ESOPHAGOGASTRODUODENOSCOPY);  Surgeon: Clive Núñez MD;  Location: [facility] ENDO;  Service: Endoscopy;  Laterality: Left;    ESOPHAGOGASTRODUODENOSCOPY N/A 4/17/2020    Procedure: EGD (ESOPHAGOGASTRODUODENOSCOPY);  Surgeon: Papi Martinez Jr., MD;  Location: [facility] ENDO;  Service: Endoscopy;  Laterality: N/A;  with Push Enteroscopy    ESOPHAGOGASTRODUODENOSCOPY N/A 7/6/2020    Procedure: EGD (ESOPHAGOGASTRODUODENOSCOPY)   possible peg;  Surgeon: Papi Martinez Jr., MD;  Location: [facility];  Service: Endoscopy;  Laterality: N/A;    ESOPHAGOGASTRODUODENOSCOPY N/A 7/9/2020    Procedure: EGD (ESOPHAGOGASTRODUODENOSCOPY);  Surgeon: Papi Martinez Jr., MD;  Location: [facility];  Service: Endoscopy;  Laterality: N/A;  J or Peg tube    ESOPHAGOGASTRODUODENOSCOPY N/A 9/3/2020    Procedure: EGD (ESOPHAGOGASTRODUODENOSCOPY);  Surgeon: Papi Martinez Jr., MD;  Location: [facility];  Service: Endoscopy;  Laterality: N/A;  per drs order Stoma, Peds, w/single Lumen, J-Tube placement    ESOPHAGOGASTRODUODENOSCOPY N/A 12/29/2020    Procedure: EGD (ESOPHAGOGASTRODUODENOSCOPY);  Surgeon: Papi Martinez Jr., MD;  Location: [facility] ENDO;  Service: Endoscopy;  Laterality: N/A;    ESOPHAGOGASTRODUODENOSCOPY N/A 2/23/2021    Procedure: EGD (ESOPHAGOGASTRODUODENOSCOPY);  Surgeon: Papi Martinez Jr., MD;  Location: [facility] ENDO;  Service: Endoscopy;  Laterality: N/A;    ESOPHAGOGASTRODUODENOSCOPY N/A 2/3/2022    Procedure: EGD (ESOPHAGOGASTRODUODENOSCOPY);  Surgeon: Papi Martinez Jr., MD;  Location: [facility];  Service: Endoscopy;  Laterality: N/A;    ESOPHAGOGASTRODUODENOSCOPY N/A 8/13/2022    Procedure: EGD (ESOPHAGOGASTRODUODENOSCOPY);   Surgeon: Huang Licea MD;  Location: [facility] ENDO;  Service: Gastroenterology;  Laterality: N/A;    ESOPHAGOGASTRODUODENOSCOPY N/A 10/16/2022    Procedure: EGD (ESOPHAGOGASTRODUODENOSCOPY);  Surgeon: Huang Licea MD;  Location: [facility] ENDO;  Service: Gastroenterology;  Laterality: N/A;    ESOPHAGOGASTRODUODENOSCOPY N/A 11/28/2022    Procedure: EGD (ESOPHAGOGASTRODUODENOSCOPY);  Surgeon: Zandra Mallory MD;  Location: [facility] ENDO ([facility]);  Service: Endoscopy;  Laterality: N/A;    FEMUR FRACTURE SURGERY Right 2006    HYSTERECTOMY      INSERTION OF TUNNELED CENTRAL VENOUS CATHETER (CVC) WITH SUBCUTANEOUS PORT Left 8/14/2019    Procedure: [id number];  Surgeon: Miquel Sargent MD;  Location: [facility] OR;  Service: General;  Laterality: Left;    INSERTION OR REPLACEMENT OF PERCUTANEOUS ENDOSCOPIC JEJUNOSTOMY TUBE  12/29/2020    Procedure: INSERTION OR REPLACEMENT, JEJUNOSTOMY TUBE, PERCUTANEOUS, ENDOSCOPIC;  Surgeon: Papi Martinez Jr., MD;  Location: [facility];  Service: Endoscopy;;    laparoscopy for endometriosis      x5    LEFT HEART CATHETERIZATION Left 3/29/2023    Procedure: Left heart cath;  Surgeon: Riley Tipton MD;  Location: [facility] CATH;  Service: Cardiology;  Laterality: Left;    OOPHORECTOMY      right ovary removed only    OPEN REDUCTION AND INTERNAL FIXATION (ORIF) OF INJURY OF ANKLE Left 4/10/2023    Procedure: ORIF, ANKLE;  Surgeon: Michael Lacey MD;  Location: [facility] OR;  Service: Orthopedics;  Laterality: Left;    PEG TUBE REMOVAL  4/1/2021    Procedure: REMOVAL, PEG TUBE;  Surgeon: Papi Martinez Jr., MD;  Location: [facility] ENDO;  Service: Endoscopy;;    TONSILLECTOMY         Current Outpatient Medications   Medication Sig    ARIPiprazole (ABILIFY) 5 MG Tab TAKE ONE TABLET BY MOUTH ONCE DAILY (Patient taking differently: Take 5 mg by mouth once daily.)    aspirin (ECOTRIN) 81 MG EC tablet Take 1 tablet (81 mg total) by mouth once daily.    ergocalciferol (ERGOCALCIFEROL) 50,000 unit Cap  Take 1 capsule (50,000 Units total) by mouth every 7 days.    famotidine (PEPCID) 40 MG tablet Take 40 mg by mouth 2 (two) times daily. Indications: increased stomach acid from systemic mastocytosis    fludrocortisone (FLORINEF) 0.1 mg Tab Take 0.1 mg by mouth Daily.    lamotrigine (LAMICTAL) 150 MG Tab Take 150 mg by mouth 2 (two) times daily.    LIDOcaine-prilocaine (EMLA) cream Apply topically as needed (use prior to treatment with port access).    pantoprazole (PROTONIX) 40 MG tablet Take 40 mg by mouth once daily.    promethazine (PHENERGAN) 25 MG tablet Take 1 tablet (25 mg total) by mouth every 6 (six) hours as needed for Nausea.    propranoloL (INDERAL) 40 MG tablet Take 40 mg by mouth once daily.    tiZANidine (ZANAFLEX) 4 MG tablet Take 4-8 mg by mouth every 8 (eight) hours as needed.    ustekinumab (STELARA) 90 mg/mL Syrg syringe Inject 90 mg into the skin every 8 weeks. Indications: Crohn's disease     No current facility-administered medications for this visit.       Review of patient's allergies indicates:   Allergen Reactions    Tramadol Other (See Comments)     Seizures      Demerol [meperidine]      Seizures      Hydrocodone Other (See Comments)    Reglan [metoclopramide]        Family History   Adopted: Yes       Social History     Socioeconomic History    Marital status:    Tobacco Use    Smoking status: Former     Packs/day: 0.50     Years: 6.00     Pack years: 3.00     Types: Cigarettes     Quit date: 5/10/2011     Years since quittin.1    Smokeless tobacco: Never   Substance and Sexual Activity    Alcohol use: Not Currently    Drug use: Never    Sexual activity: Yes     Partners: Male     Social Determinants of Health     Financial Resource Strain: Low Risk     Difficulty of Paying Living Expenses: Not very hard   Food Insecurity: Food Insecurity Present    Worried About Running Out of Food in the Last Year: Never true    Ran Out of Food in the Last Year: Sometimes true    Transportation Needs: No Transportation Needs    Lack of Transportation (Medical): No    Lack of Transportation (Non-Medical): No   Physical Activity: Unknown    Days of Exercise per Week: 0 days   Stress: Stress Concern Present    Feeling of Stress : Rather much   Social Connections: Socially Isolated    Frequency of Communication with Friends and Family: Once a week    Frequency of Social Gatherings with Friends and Family: Once a week    Attends Yarsani Services: Never    Active Member of Clubs or Organizations: No    Marital Status:    Housing Stability: High Risk    Unable to Pay for Housing in the Last Year: Yes    Number of Places Lived in the Last Year: 1    Unstable Housing in the Last Year: No       Physical exam:  There were no vitals filed for this visit.  Body mass index is 20.77 kg/m².  General: In no apparent distress; well developed and well nourished.  HEENT: normocephalic; atraumatic.  Cardiovascular: regular rate.  Respiratory: no increased work of breathing.  Musculoskeletal:   Gait: unable to assess  Inspection:  Surgical sites are well healed.  Previously noted prominence over the medial malleolar screws but no skin tenting or breakdown present.    Also with positive Tinel's over the superficial peroneal nerve at the level of the lateral malleolar fracture site. Ankle range of motion from 5° dorsiflexion to 50° plantar flexion.  Gross motor function intact.  Sensation intact to light touch distally.  Brisk cap refill all 5 digits.                   Imaging Studies/Outside documentation:  I have ordered/reviewed/interpreted the following images/outside documentation:  WB 3-views Left ankle: Status post bimalleolar ankle fracture ORIF.  Interval callus formation and bony bridging present with near-complete fracture healing.  Overall alignment well-maintained.  Congruent ankle mortise.  No evidence of implant loosening or failure.      Assessment:  Angela Trascher Lejeune is a 48 y.o.  female with Displaced Left bimalleolar ankle fracture.      Plan:   Clinical and radiographic findings were discussed.  Patient continues to do well postop.  We will transition from tall boot to lace-up ankle brace wear.  May continue to weightbear as tolerated.  May discontinue aspirin use.  May decrease frequency of physical therapy from twice weekly to 1 time weekly.  Reinforced the importance of performing home exercise program in the interim.  Patient voiced understanding.  All questions were answered.  She will return to clinic in mid August for repeat evaluation and preoperative planning for hardware removal assuming complete fracture healing.  We will plan on this roughly 5 months from time of surgery.  Patient voiced understanding.  All questions were answered.        This note was created using voice recognition software and may contain grammatical errors.

## 2023-06-30 NOTE — PROGRESS NOTES
"[facility]   Physical Therapy Treatment Note     Name: Mikaela Montes De Oca Lejeune  Clinic Number: [id number]    Therapy Diagnosis:   Encounter Diagnoses   Name Primary?    Decreased range of motion of left ankle Yes    Weakness     Left ankle swelling     Impaired functional mobility, balance, gait, and endurance      Physician: Michael Lacey MD    Visit Date: 6/30/2023  Physician Orders: PT Eval and Treat   Medical Diagnosis from Referral: S82.842A (ICD-10-CM) - Closed bimalleolar fracture of left ankle, initial encounter  Evaluation Date: 6/8/2023  Authorization Period Expiration: 12/31/23  Plan of Care Expiration: 8/8/23  Visit # / Visits authorized: 4/20     Time In: 1:30 PM  Time Out: 2:15  Total Billable Time: 45 minutes     Precautions: Standard and seizures         SUBJECTIVE     Pt reports: "My ankle doesn't hurt, it's the plate and screws that hurt" .  She was compliant with home exercise program.  Response to previous treatment: soreness  Functional change: too soon to assess    Pain: 0/10  Location: left ankles     OBJECTIVE     Objective Measures updated at progress report unless specified.     Treatment     Mikaela received the treatments listed below:      therapeutic exercises to develop strength, endurance, ROM, and flexibility for 30 minutes including:  Stat bike x 5 min   Seated GSS with strap x 2 min  Seated Soleus stretch with strap and bolster 2 min  Towel crunch 20x  Berkley pick-up 2 x  Golf ball x 3 min  Seated Heel Raise 20x  Seated toe raise 20x  Seated Alphabet 1x  DF/PF red t-band 20x each  IV/EV red t-band 20x each  Seated tilt board DF/PF,IV/EV 20x each        manual therapy techniques: Joint mobilizations, Manual traction, and Soft tissue Mobilization were applied to the: L ankle for 15 minutes, including:  Grade II ankle talocrural mobs, gentle distraction, DF/PF mobs. STM to incisions with decreasing sensitivity over time.    neuromuscular re-education " activities to improve: Balance, Coordination, Kinesthetic, Sense, Proprioception, and Posture for 00 minutes. The following activities were included:      therapeutic activities to improve functional performance for 00  minutes, including:      gait training to improve functional mobility and safety for 00  minutes, including:              Patient Education and Home Exercises     Home Exercises Provided and Patient Education Provided     Education provided:   - HEP for t-band ex  -Scar massage    Written Home Exercises Provided: Patient instructed to cont prior HEP. Exercises were reviewed and Mikaela was able to demonstrate them prior to the end of the session.  Mikaela demonstrated good  understanding of the education provided. See EMR under Patient Instructions for exercises provided during therapy sessions    ASSESSMENT     Mikaela rylie today's tx with progression of ther ex and manual intervention well. Seated tilt board activities added today for ROM and control. She was able to perform all activities w/o difficulty or complaint. She demonstrates good AROM for L ankle DF. Her surgical incision is well healed but still very TTP.that improved with STM.  She was able to demonstrate good form and ROM with HEP exs given today.     Mikaela Is progressing well towards her goals.   Pt prognosis is Good.     Pt will continue to benefit from skilled outpatient physical therapy to address the deficits listed in the problem list box on initial evaluation, provide pt/family education and to maximize pt's level of independence in the home and community environment.     Pt's spiritual, cultural and educational needs considered and pt agreeable to plan of care and goals.     Anticipated barriers to physical therapy: history of falls due to seizures     Goals: GOALS: Short Term Goals:  4 weeks  1.Report decreased  in  pain at worse less than  <   / =  4  /10  to increase tolerance for improved functional actvities  2. Pt to improve  DF range of motion by 5 to allow for improved functional mobility to allow for improvement in IADLs.   3. Pt to improve Inversion/Eversion by 10 degrees to allow improved gait pattern.  4. Increased L ankle MMT 1/2 grade  to increase tolerance for ADL and work activities.  5. Pt to ambulate without boot for 50% of the day.  6. Pt to tolerate HEP to improve ROM and independence with ADL's     Long Term Goals: 8 weeks  1.Report decreased  in  pain at worse  less than  <   / =  1 /10  to increase tolerance for improved functional actvities  2.Pt to improve range of motion within 5 degrees of R to allow for improved functional mobility to allow for improvement in IADLs.   3.Increased L ankle MMT 1 grade  to increase tolerance for ADL and work activities.  4. Pt will ambulate without boot or AD with normal gait pattern.  5. Pt to be Independent with HEP to improve ROM and independence with ADL's.  6. Pt will improve tandem balance with LLE in back for 30 seconds to reduce fall risk.   7. Pt will improve edema to </=45 cm on L figure 8    PLAN     Plan of care Certification: 6/8/2023 to 8/8/2023.     Outpatient Physical Therapy 2 times weekly for 8 weeks to include the following interventions: Gait Training, Manual Therapy, Moist Heat/ Ice, Neuromuscular Re-ed, Patient Education, Therapeutic Activities, and Therapeutic Exercise    Les Rico, PTA

## 2023-07-22 PROBLEM — R10.11 RIGHT UPPER QUADRANT PAIN: Status: ACTIVE | Noted: 2023-07-22

## 2023-07-22 PROBLEM — F10.11 HISTORY OF ALCOHOL ABUSE: Status: ACTIVE | Noted: 2023-07-22

## 2023-07-22 PROBLEM — R11.14 BILIOUS VOMITING: Status: ACTIVE | Noted: 2023-07-22

## 2023-07-22 PROBLEM — I10 HYPERTENSION: Status: ACTIVE | Noted: 2023-07-22

## 2023-07-23 PROBLEM — G90.A POTS (POSTURAL ORTHOSTATIC TACHYCARDIA SYNDROME): Status: ACTIVE | Noted: 2023-07-23

## 2023-07-24 PROBLEM — S82.842D: Status: ACTIVE | Noted: 2023-04-12

## 2023-08-16 DIAGNOSIS — S82.842A CLOSED BIMALLEOLAR FRACTURE OF LEFT ANKLE, INITIAL ENCOUNTER: Primary | ICD-10-CM

## 2023-08-18 ENCOUNTER — OFFICE VISIT (OUTPATIENT)
Dept: ORTHOPEDICS | Facility: CLINIC | Age: 48
End: 2023-08-18
Payer: COMMERCIAL

## 2023-08-18 ENCOUNTER — HOSPITAL ENCOUNTER (OUTPATIENT)
Dept: RADIOLOGY | Facility: HOSPITAL | Age: 48
Discharge: HOME | End: 2023-08-18
Attending: ORTHOPAEDIC SURGERY
Payer: COMMERCIAL

## 2023-08-18 DIAGNOSIS — T84.498A MECHANICAL COMPLICATION OF INTERNAL ORTHOPEDIC IMPLANT, INITIAL ENCOUNTER: ICD-10-CM

## 2023-08-18 DIAGNOSIS — S82.842A CLOSED BIMALLEOLAR FRACTURE OF LEFT ANKLE, INITIAL ENCOUNTER: ICD-10-CM

## 2023-08-18 DIAGNOSIS — S82.842A CLOSED BIMALLEOLAR FRACTURE OF LEFT ANKLE, INITIAL ENCOUNTER: Primary | ICD-10-CM

## 2023-08-18 PROCEDURE — 99999 PR PBB SHADOW E&M-EST. PATIENT-LVL III: ICD-10-PCS | Mod: PBBFAC,,, | Performed by: ORTHOPAEDIC SURGERY

## 2023-08-18 PROCEDURE — 1159F PR MEDICATION LIST DOCUMENTED IN MEDICAL RECORD: ICD-10-PCS | Mod: CPTII,S$GLB,, | Performed by: ORTHOPAEDIC SURGERY

## 2023-08-18 PROCEDURE — 99999 PR PBB SHADOW E&M-EST. PATIENT-LVL III: CPT | Mod: PBBFAC,,, | Performed by: ORTHOPAEDIC SURGERY

## 2023-08-18 PROCEDURE — 99214 PR OFFICE/OUTPT VISIT, EST, LEVL IV, 30-39 MIN: ICD-10-PCS | Mod: S$GLB,,, | Performed by: ORTHOPAEDIC SURGERY

## 2023-08-18 PROCEDURE — 73610 XR ANKLE COMPLETE 3 VIEW LEFT: ICD-10-PCS | Mod: 26,LT,, | Performed by: RADIOLOGY

## 2023-08-18 PROCEDURE — 1159F MED LIST DOCD IN RCRD: CPT | Mod: CPTII,S$GLB,, | Performed by: ORTHOPAEDIC SURGERY

## 2023-08-18 PROCEDURE — 73610 X-RAY EXAM OF ANKLE: CPT | Mod: TC,PO,LT

## 2023-08-18 PROCEDURE — 1160F PR REVIEW ALL MEDS BY PRESCRIBER/CLIN PHARMACIST DOCUMENTED: ICD-10-PCS | Mod: CPTII,S$GLB,, | Performed by: ORTHOPAEDIC SURGERY

## 2023-08-18 PROCEDURE — 1160F RVW MEDS BY RX/DR IN RCRD: CPT | Mod: CPTII,S$GLB,, | Performed by: ORTHOPAEDIC SURGERY

## 2023-08-18 PROCEDURE — 73610 X-RAY EXAM OF ANKLE: CPT | Mod: 26,LT,, | Performed by: RADIOLOGY

## 2023-08-18 PROCEDURE — 99214 OFFICE O/P EST MOD 30 MIN: CPT | Mod: S$GLB,,, | Performed by: ORTHOPAEDIC SURGERY

## 2023-08-18 NOTE — PROGRESS NOTES
Status/Diagnosis: Displaced Left bimalleolar ankle fracture.   Date of Surgery: 04/10/2023  Date of Injury: 04/04/2023  Return visit: 2 weeks postop  X-rays on Return: WB 3-views Left ankle    Chief Complaint:   Chief Complaint   Patient presents with    Left Ankle - Post-op Evaluation, Pain     Present History:  Angela Trascher Lejeune is a 48 y.o. female who returns today for routine postoperative visit.    Patient currently weight-bearing as tolerated in normal shoe wear.  She self discontinued physical therapy as this caused increased pain.  Currently endorses 10/10 pain related to shooting pains involving the lateral> medial ankle.  Endorses this is worsening since last being seen but this was the same type of pain.  No new injuries.    It is of note that patient was admitted to the hospital on 07/22 for several days due to what she describes as a Crohn's flare-up and duodenal stricture.  Required EGD with dilation.      Past medical history significant for recurrent syncopal episodes; Crohn's disease on Stelara.  Denies tobacco use.      Past Medical History:   Diagnosis Date    Abnormal Pap smear     + HPV    Bipolar 1 disorder     Closed bimalleolar fracture of left ankle     Crohn's disease S/P surgery (ileum, possible gastroduodenal with ulcers)     1998-ileocectomy (9 inches ileum)    Encounter for blood transfusion     Gastric and duodenal ulcers of unclear etiology     Gastroparesis     H/O ETOH abuse     History of HSV-2 infection     Immunosuppressed status     Iron deficiency anemia, multiple blood transfusions     Seizures     states last seizure 2009       Past Surgical History:   Procedure Laterality Date    APPENDECTOMY      BRAIN SURGERY  2009    fx skull due to seizure with 2 pins    CHOLECYSTECTOMY      COLON SURGERY      Partial colectomy    COLONOSCOPY Left 7/10/2019    Procedure: COLONOSCOPY;  Surgeon: Papi Martinez Jr., MD;  Location: [facility];  Service: Endoscopy;  Laterality: Left;     COLONOSCOPY N/A 12/6/2022    Procedure: COLONOSCOPY;  Surgeon: Zandra Mallory MD;  [facility]  Service: Endoscopy;  Laterality: N/A;  poor prep on 11/28/22.  per Dr Mallory-Nicole 5 days prior with gastroparesis diet-3 days full liquid. also Mag citrate day before     instr handed to pt-GT    EGD, WITH BALLOON DILATION  7/24/2023    Procedure: EGD, WITH BALLOON DILATION;  Surgeon: Huang Licea MD;  Location: [facility];  Service: Endoscopy;;    ESOPHAGOGASTRODUODENOSCOPY Left 6/5/2019    Procedure: EGD (ESOPHAGOGASTRODUODENOSCOPY);  Surgeon: ROLA Villagran MD;  Location: [facility];  Service: Endoscopy;  Laterality: Left;    ESOPHAGOGASTRODUODENOSCOPY Left 3/16/2020    Procedure: EGD (ESOPHAGOGASTRODUODENOSCOPY);  Surgeon: Clive Núñez MD;  Location: [facility];  Service: Endoscopy;  Laterality: Left;    ESOPHAGOGASTRODUODENOSCOPY N/A 4/17/2020    Procedure: EGD (ESOPHAGOGASTRODUODENOSCOPY);  Surgeon: Papi Martinez Jr., MD;  Location: [facility];  Service: Endoscopy;  Laterality: N/A;  with Push Enteroscopy    ESOPHAGOGASTRODUODENOSCOPY N/A 7/6/2020    Procedure: EGD (ESOPHAGOGASTRODUODENOSCOPY)   possible peg;  Surgeon: Papi Martinez Jr., MD;  Location: [facility];  Service: Endoscopy;  Laterality: N/A;    ESOPHAGOGASTRODUODENOSCOPY N/A 7/9/2020    Procedure: EGD (ESOPHAGOGASTRODUODENOSCOPY);  Surgeon: Papi Martinez Jr., MD;  Location: [facility];  Service: Endoscopy;  Laterality: N/A;  J or Peg tube    ESOPHAGOGASTRODUODENOSCOPY N/A 9/3/2020    Procedure: EGD (ESOPHAGOGASTRODUODENOSCOPY);  Surgeon: Papi Martinez Jr., MD;  Location: [facility];  Service: Endoscopy;  Laterality: N/A;  per drs order Stoma, Peds, w/single Lumen, J-Tube placement    ESOPHAGOGASTRODUODENOSCOPY N/A 12/29/2020    Procedure: EGD (ESOPHAGOGASTRODUODENOSCOPY);  Surgeon: Papi Martinez Jr., MD;  Location: [facility];  Service: Endoscopy;  Laterality: N/A;    ESOPHAGOGASTRODUODENOSCOPY N/A 2/23/2021     Procedure: EGD (ESOPHAGOGASTRODUODENOSCOPY);  Surgeon: Papi Martinez Jr., MD;  Location: [facility] ENDO;  Service: Endoscopy;  Laterality: N/A;    ESOPHAGOGASTRODUODENOSCOPY N/A 2/3/2022    Procedure: EGD (ESOPHAGOGASTRODUODENOSCOPY);  Surgeon: Papi Martinez Jr., MD;  Location: [facility];  Service: Endoscopy;  Laterality: N/A;    ESOPHAGOGASTRODUODENOSCOPY N/A 8/13/2022    Procedure: EGD (ESOPHAGOGASTRODUODENOSCOPY);  Surgeon: Huang Licea MD;  Location: [facility];  Service: Gastroenterology;  Laterality: N/A;    ESOPHAGOGASTRODUODENOSCOPY N/A 10/16/2022    Procedure: EGD (ESOPHAGOGASTRODUODENOSCOPY);  Surgeon: Huang Licea MD;  Location: [facility];  Service: Gastroenterology;  Laterality: N/A;    ESOPHAGOGASTRODUODENOSCOPY N/A 11/28/2022    Procedure: EGD (ESOPHAGOGASTRODUODENOSCOPY);  Surgeon: Zandra Mallory MD;  Location: [facility] ([address] [place] [address]);  Service: Endoscopy;  Laterality: N/A;    ESOPHAGOGASTRODUODENOSCOPY N/A 7/24/2023    Procedure: EGD (ESOPHAGOGASTRODUODENOSCOPY);  Surgeon: Huang Licea MD;  Location: [facility];  Service: Endoscopy;  Laterality: N/A;    FEMUR FRACTURE SURGERY Right 2006    HYSTERECTOMY      INSERTION OF TUNNELED CENTRAL VENOUS CATHETER (CVC) WITH SUBCUTANEOUS PORT Left 8/14/2019    Procedure: [id number];  Surgeon: Miquel Sargent MD;  Location: [facility] OR;  Service: General;  Laterality: Left;    INSERTION OR REPLACEMENT OF PERCUTANEOUS ENDOSCOPIC JEJUNOSTOMY TUBE  12/29/2020    Procedure: INSERTION OR REPLACEMENT, JEJUNOSTOMY TUBE, PERCUTANEOUS, ENDOSCOPIC;  Surgeon: Papi Martinez Jr., MD;  Location: [facility];  Service: Endoscopy;;    laparoscopy for endometriosis      x5    LEFT HEART CATHETERIZATION Left 3/29/2023    Procedure: Left heart cath;  Surgeon: Riley Tipton MD;  Location: [facility] CATH;  Service: Cardiology;  Laterality: Left;    OOPHORECTOMY      right ovary removed only    OPEN REDUCTION AND INTERNAL FIXATION (ORIF) OF INJURY OF ANKLE Left 4/10/2023     Procedure: ORIF, ANKLE;  Surgeon: Michael Lacey MD;  Location: [facility] OR;  Service: Orthopedics;  Laterality: Left;    PEG TUBE REMOVAL  4/1/2021    Procedure: REMOVAL, PEG TUBE;  Surgeon: Papi Martinez Jr., MD;  Location: [facility] ENDO;  Service: Endoscopy;;    TONSILLECTOMY         Current Outpatient Medications   Medication Sig    ARIPiprazole (ABILIFY) 5 MG Tab Take 1 tablet (5 mg total) by mouth once daily.    ergocalciferol (ERGOCALCIFEROL) 50,000 unit Cap Take 1 capsule (50,000 Units total) by mouth every 7 days.    famotidine (PEPCID) 40 MG tablet Take 40 mg by mouth 2 (two) times daily. Indications: increased stomach acid from systemic mastocytosis    fludrocortisone (FLORINEF) 0.1 mg Tab Take 0.1 mg by mouth Daily.    lamotrigine (LAMICTAL) 150 MG Tab Take 150 mg by mouth 2 (two) times daily.    LIDOcaine-prilocaine (EMLA) cream Apply topically as needed (use prior to treatment with port access).    LORazepam (ATIVAN) 0.5 MG tablet Take 1 tablet (0.5 mg total) by mouth every 6 (six) hours as needed for Anxiety.    pantoprazole (PROTONIX) 40 MG tablet Take 1 tablet (40 mg total) by mouth 2 (two) times daily.    promethazine (PHENERGAN) 25 MG tablet Take 1 tablet (25 mg total) by mouth every 6 (six) hours as needed for Nausea.    propranoloL (INDERAL) 40 MG tablet Take 40 mg by mouth once daily.    sucralfate (CARAFATE) 1 gram tablet Take 1 tablet (1 g total) by mouth 4 (four) times daily before meals and nightly.    tiZANidine (ZANAFLEX) 4 MG tablet 1-2 tid prn    ustekinumab (STELARA) 90 mg/mL Syrg syringe Inject 90 mg into the skin every 8 weeks. Indications: Crohn's disease    aspirin (ECOTRIN) 81 MG EC tablet Take 1 tablet (81 mg total) by mouth once daily.     No current facility-administered medications for this visit.       Review of patient's allergies indicates:   Allergen Reactions    Tramadol Other (See Comments)     Seizures      Demerol [meperidine]      Seizures      Hydrocodone Other (See  Comments)    Reglan [metoclopramide]        Family History   Adopted: Yes       Social History     Socioeconomic History    Marital status:    Tobacco Use    Smoking status: Former     Current packs/day: 0.00     Average packs/day: 0.5 packs/day for 6.0 years (3.0 ttl pk-yrs)     Types: Cigarettes     Start date: 5/10/2005     Quit date: 5/10/2011     Years since quittin.2    Smokeless tobacco: Never   Substance and Sexual Activity    Alcohol use: Yes    Drug use: Never    Sexual activity: Yes     Partners: Male     Social Determinants of Health     Financial Resource Strain: Low Risk  (4/10/2023)    Overall Financial Resource Strain (CARDIA)     Difficulty of Paying Living Expenses: Not very hard   Food Insecurity: Food Insecurity Present (4/10/2023)    Hunger Vital Sign     Worried About Running Out of Food in the Last Year: Never true     Ran Out of Food in the Last Year: Sometimes true   Transportation Needs: No Transportation Needs (4/10/2023)    PRAPARE - Transportation     Lack of Transportation (Medical): No     Lack of Transportation (Non-Medical): No   Physical Activity: Unknown (4/10/2023)    Exercise Vital Sign     Days of Exercise per Week: 0 days   Stress: Stress Concern Present (4/10/2023)    [employer] - Occupational Stress Questionnaire     Feeling of Stress : Rather much   Social Connections: Socially Isolated (4/10/2023)    Social Connection and Isolation Panel [NHANES]     Frequency of Communication with Friends and Family: Once a week     Frequency of Social Gatherings with Friends and Family: Once a week     Attends Congregation Services: Never     Active Member of Clubs or Organizations: No     Marital Status:    Housing Stability: High Risk (4/10/2023)    Housing Stability Vital Sign     Unable to Pay for Housing in the Last Year: Yes     Number of Places Lived in the Last Year: 1     Unstable Housing in the Last Year: No       Physical  exam:  There were no vitals filed for this visit.  There is no height or weight on file to calculate BMI.  General: In no apparent distress; well developed and well nourished.  HEENT: normocephalic; atraumatic.  Cardiovascular: regular rate.  Respiratory: no increased work of breathing.  Musculoskeletal:   Gait: unable to assess  Inspection:  Surgical sites are well healed.  Previously noted prominence over the medial malleolar screws but no skin tenting or breakdown present.    Also with positive Tinel's over the superficial peroneal nerve at the level of the lateral malleolar fracture site and less so over the medial malleolar screw site. Ankle range of motion from 5° dorsiflexion to 60° plantar flexion.  Gross motor function intact.  Sensation intact to light touch distally except as above.  Brisk cap refill all 5 digits.                   Imaging Studies/Outside documentation:  I have ordered/reviewed/interpreted the following images/outside documentation:  WB 3-views Left ankle: Status post bimalleolar ankle fracture ORIF.  Now with complete medial and lateral malleolar fracture healing.  Overall alignment well-maintained.  Congruent ankle mortise.  No evidence of implant loosening or failure.      Assessment:  Angela Trascher Lejeune is a 48 y.o. female with Displaced Left bimalleolar ankle fracture.      Plan:   Clinical and radiographic findings were discussed.  Patient endorses significant persistent pain involving symptomatic hardware, specifically with positive Tinel's over the superficial peroneal nerve> saphenous nerve.  Reports that pain is affecting her quality of life.   Operative vs nonoperative treatment options were described. These include but are not limited to bleeding; infection; damage to surrounding nerves or vessels; persistent pain, stiffness; refracture; recurrent deformity; need for additional procedures; amputation; blood clots; pulmonary embolus; cardiac events; stroke; and the general  risks of anesthesia including anesthetic death.   We also reviewed postop protocol as well as limitations and expectations. Patient's symptoms have persisted despite conservative management to include but not limited to shoe wear and activity modifications; anti-inflammatories; physical therapy; and continue to affect the patient's quality of life. Patient understands and desires to proceed with surgical intervention.   Explained risks, benefits, and alternative to the patient. Asked if any questions--none.  Surgery to include but not limited to:   Left ankle hardware removal; surgery as indicated.      Due to removal of hardware prior to normal 6 months out from surgery, patient will require extended boot immobilization.  She was given strict instructions to bring this the day of surgery.  We will plan on preoperative regional nerve block however tendon patient pain level, may require extended outpatient stay.  We will plan to perform this on 09/06/2023 [facility].        This note was created using voice recognition software and may contain grammatical errors.

## 2023-08-20 RX ORDER — MUPIROCIN 20 MG/G
OINTMENT TOPICAL
Status: CANCELLED | OUTPATIENT
Start: 2023-08-20

## 2023-08-30 PROBLEM — R11.2 N&V (NAUSEA AND VOMITING): Status: ACTIVE | Noted: 2023-08-30

## 2023-08-30 PROBLEM — K31.5 DUODENAL STRICTURE: Status: ACTIVE | Noted: 2023-08-30

## 2023-09-07 PROBLEM — T84.498A MECHANICAL COMPLICATION OF INTERNAL ORTHOPEDIC IMPLANT: Status: ACTIVE | Noted: 2023-09-07

## 2023-09-18 DIAGNOSIS — T84.498A MECHANICAL COMPLICATION OF INTERNAL ORTHOPEDIC IMPLANT, INITIAL ENCOUNTER: Primary | ICD-10-CM

## 2023-09-18 DIAGNOSIS — S82.842D CLOSED BIMALLEOLAR FRACTURE OF LEFT ANKLE WITH ROUTINE HEALING, SUBSEQUENT ENCOUNTER: ICD-10-CM

## 2023-09-21 ENCOUNTER — HOSPITAL ENCOUNTER (OUTPATIENT)
Dept: RADIOLOGY | Facility: HOSPITAL | Age: 48
Discharge: HOME | End: 2023-09-21
Attending: ORTHOPAEDIC SURGERY
Payer: COMMERCIAL

## 2023-09-21 ENCOUNTER — OFFICE VISIT (OUTPATIENT)
Dept: ORTHOPEDICS | Facility: CLINIC | Age: 48
End: 2023-09-21
Payer: COMMERCIAL

## 2023-09-21 DIAGNOSIS — T84.498A MECHANICAL COMPLICATION OF INTERNAL ORTHOPEDIC IMPLANT, INITIAL ENCOUNTER: ICD-10-CM

## 2023-09-21 DIAGNOSIS — T84.498A MECHANICAL COMPLICATION OF INTERNAL ORTHOPEDIC IMPLANT, INITIAL ENCOUNTER: Primary | ICD-10-CM

## 2023-09-21 DIAGNOSIS — S82.842D CLOSED BIMALLEOLAR FRACTURE OF LEFT ANKLE WITH ROUTINE HEALING, SUBSEQUENT ENCOUNTER: ICD-10-CM

## 2023-09-21 PROCEDURE — 1159F PR MEDICATION LIST DOCUMENTED IN MEDICAL RECORD: ICD-10-PCS | Mod: CPTII,S$GLB,, | Performed by: ORTHOPAEDIC SURGERY

## 2023-09-21 PROCEDURE — 73610 X-RAY EXAM OF ANKLE: CPT | Mod: 26,LT,, | Performed by: RADIOLOGY

## 2023-09-21 PROCEDURE — 73610 XR ANKLE COMPLETE 3 VIEW LEFT: ICD-10-PCS | Mod: 26,LT,, | Performed by: RADIOLOGY

## 2023-09-21 PROCEDURE — 99024 POSTOP FOLLOW-UP VISIT: CPT | Mod: S$GLB,,, | Performed by: ORTHOPAEDIC SURGERY

## 2023-09-21 PROCEDURE — 1160F PR REVIEW ALL MEDS BY PRESCRIBER/CLIN PHARMACIST DOCUMENTED: ICD-10-PCS | Mod: CPTII,S$GLB,, | Performed by: ORTHOPAEDIC SURGERY

## 2023-09-21 PROCEDURE — 1160F RVW MEDS BY RX/DR IN RCRD: CPT | Mod: CPTII,S$GLB,, | Performed by: ORTHOPAEDIC SURGERY

## 2023-09-21 PROCEDURE — 1159F MED LIST DOCD IN RCRD: CPT | Mod: CPTII,S$GLB,, | Performed by: ORTHOPAEDIC SURGERY

## 2023-09-21 PROCEDURE — 99999 PR PBB SHADOW E&M-EST. PATIENT-LVL I: CPT | Mod: PBBFAC,,, | Performed by: ORTHOPAEDIC SURGERY

## 2023-09-21 PROCEDURE — 99024 PR POST-OP FOLLOW-UP VISIT: ICD-10-PCS | Mod: S$GLB,,, | Performed by: ORTHOPAEDIC SURGERY

## 2023-09-21 PROCEDURE — 73610 X-RAY EXAM OF ANKLE: CPT | Mod: TC,PO,LT

## 2023-09-21 PROCEDURE — 99999 PR PBB SHADOW E&M-EST. PATIENT-LVL I: ICD-10-PCS | Mod: PBBFAC,,, | Performed by: ORTHOPAEDIC SURGERY

## 2023-09-21 NOTE — PROGRESS NOTES
Status/Diagnosis: Displaced Left bimalleolar ankle fracture.   Date of Surgery: 04/10/2023  Date of Injury: 04/04/2023  Return visit: PRN  X-rays on Return: pending patient complaint    Chief Complaint:   Chief Complaint   Patient presents with    Left Ankle - Post-op Evaluation     Present History:  Angela Trascher Lejeune is a 48 y.o. female who returns today for her 1st postop visit after hardware removal of the left ankle.  Overall doing well.  Questionable compliance with boot wear.  Still with irritation over the medial based surgical site.  Moderate eschar.  Denies any drainage, fever, chills.  Lateral based surgical site is well healed with no area of irritation noted.  Of note, patient has had progressively worsening episodes of her ulcerative colitis.  Reports she will have to have a bowel resection in the coming weeks.    Past medical history significant for recurrent syncopal episodes; Crohn's disease on Stelara.  Denies tobacco use.      Past Medical History:   Diagnosis Date    Abnormal Pap smear     + HPV    Bipolar 1 disorder     Closed bimalleolar fracture of left ankle     Crohn's disease S/P surgery (ileum, possible gastroduodenal with ulcers)     1998-ileocectomy (9 inches ileum)    Encounter for blood transfusion     Gastric and duodenal ulcers of unclear etiology     Gastroparesis     H/O ETOH abuse     History of HSV-2 infection     Immunosuppressed status     Iron deficiency anemia, multiple blood transfusions     POTS (postural orthostatic tachycardia syndrome)     Seizures     states last seizure 2009       Past Surgical History:   Procedure Laterality Date    ANKLE HARDWARE REMOVAL Left 9/6/2023    Procedure: REMOVAL, HARDWARE, ANKLE;  Surgeon: Michael Lacey MD;  Location: [facility];  Service: Orthopedics;  Laterality: Left;    APPENDECTOMY      BRAIN SURGERY  2009    fx skull due to seizure with 2 pins    CHOLECYSTECTOMY      COLON SURGERY      Partial colectomy    COLONOSCOPY Left  7/10/2019    Procedure: COLONOSCOPY;  Surgeon: Papi Martinez Jr., MD;  Location: [facility];  Service: Endoscopy;  Laterality: Left;    COLONOSCOPY N/A 12/6/2022    Procedure: COLONOSCOPY;  Surgeon: Zandra Mallory MD;  [facility]  Service: Endoscopy;  Laterality: N/A;  poor prep on 11/28/22.  per Dr Mallory-Miralax 5 days prior with gastroparesis diet-3 days full liquid. also Mag citrate day before     instr handed to pt-GT    EGD, WITH BALLOON DILATION  7/24/2023    Procedure: EGD, WITH BALLOON DILATION;  Surgeon: Huang Licea MD;  Location: [facility];  Service: Endoscopy;;    ESOPHAGOGASTRODUODENOSCOPY Left 6/5/2019    Procedure: EGD (ESOPHAGOGASTRODUODENOSCOPY);  Surgeon: ROLA Villagran MD;  Location: [facility];  Service: Endoscopy;  Laterality: Left;    ESOPHAGOGASTRODUODENOSCOPY Left 3/16/2020    Procedure: EGD (ESOPHAGOGASTRODUODENOSCOPY);  Surgeon: Clive Núñez MD;  Location: [facility];  Service: Endoscopy;  Laterality: Left;    ESOPHAGOGASTRODUODENOSCOPY N/A 4/17/2020    Procedure: EGD (ESOPHAGOGASTRODUODENOSCOPY);  Surgeon: Papi Martinez Jr., MD;  Location: [facility];  Service: Endoscopy;  Laterality: N/A;  with Push Enteroscopy    ESOPHAGOGASTRODUODENOSCOPY N/A 7/6/2020    Procedure: EGD (ESOPHAGOGASTRODUODENOSCOPY)   possible peg;  Surgeon: Papi Martinez Jr., MD;  Location: [facility];  Service: Endoscopy;  Laterality: N/A;    ESOPHAGOGASTRODUODENOSCOPY N/A 7/9/2020    Procedure: EGD (ESOPHAGOGASTRODUODENOSCOPY);  Surgeon: Papi Martinez Jr., MD;  Location: [facility];  Service: Endoscopy;  Laterality: N/A;  J or Peg tube    ESOPHAGOGASTRODUODENOSCOPY N/A 9/3/2020    Procedure: EGD (ESOPHAGOGASTRODUODENOSCOPY);  Surgeon: Papi Martinez Jr., MD;  Location: [facility];  Service: Endoscopy;  Laterality: N/A;  per drs order Stoma, Peds, w/single Lumen, J-Tube placement    ESOPHAGOGASTRODUODENOSCOPY N/A 12/29/2020    Procedure: EGD (ESOPHAGOGASTRODUODENOSCOPY);   Surgeon: Papi Martinez Jr., MD;  Location: [facility];  Service: Endoscopy;  Laterality: N/A;    ESOPHAGOGASTRODUODENOSCOPY N/A 2/23/2021    Procedure: EGD (ESOPHAGOGASTRODUODENOSCOPY);  Surgeon: Papi Martinez Jr., MD;  Location: [facility];  Service: Endoscopy;  Laterality: N/A;    ESOPHAGOGASTRODUODENOSCOPY N/A 2/3/2022    Procedure: EGD (ESOPHAGOGASTRODUODENOSCOPY);  Surgeon: Papi Martinez Jr., MD;  Location: [facility];  Service: Endoscopy;  Laterality: N/A;    ESOPHAGOGASTRODUODENOSCOPY N/A 8/13/2022    Procedure: EGD (ESOPHAGOGASTRODUODENOSCOPY);  Surgeon: Huang Licea MD;  Location: [facility];  Service: Gastroenterology;  Laterality: N/A;    ESOPHAGOGASTRODUODENOSCOPY N/A 10/16/2022    Procedure: EGD (ESOPHAGOGASTRODUODENOSCOPY);  Surgeon: Huang Licea MD;  Location: [facility];  Service: Gastroenterology;  Laterality: N/A;    ESOPHAGOGASTRODUODENOSCOPY N/A 11/28/2022    Procedure: EGD (ESOPHAGOGASTRODUODENOSCOPY);  Surgeon: Zandra Mallory MD;  Location: [address]);  Service: Endoscopy;  Laterality: N/A;    ESOPHAGOGASTRODUODENOSCOPY N/A 7/24/2023    Procedure: EGD (ESOPHAGOGASTRODUODENOSCOPY);  Surgeon: Huang Licea MD;  Location: [facility];  Service: Endoscopy;  Laterality: N/A;    ESOPHAGOGASTRODUODENOSCOPY N/A 9/15/2023    Procedure: EGD (ESOPHAGOGASTRODUODENOSCOPY);  Surgeon: ROLA Villagran MD;  Location: [facility];  Service: Endoscopy;  Laterality: N/A;    FEMUR FRACTURE SURGERY Right 2006    HYSTERECTOMY      INSERTION OF TUNNELED CENTRAL VENOUS CATHETER (CVC) WITH SUBCUTANEOUS PORT Left 8/14/2019    Procedure: [id number];  Surgeon: Miquel Sargent MD;  Location: [facility];  Service: General;  Laterality: Left;    INSERTION OR REPLACEMENT OF PERCUTANEOUS ENDOSCOPIC JEJUNOSTOMY TUBE  12/29/2020    Procedure: INSERTION OR REPLACEMENT, JEJUNOSTOMY TUBE, PERCUTANEOUS, ENDOSCOPIC;  Surgeon: Papi Martinez Jr., MD;  Location: [facility];  Service: Endoscopy;;     laparoscopy for endometriosis      x5    LEFT HEART CATHETERIZATION Left 3/29/2023    Procedure: Left heart cath;  Surgeon: Riley Tipton MD;  Location: [facility] CATH;  Service: Cardiology;  Laterality: Left;    OOPHORECTOMY      right ovary removed only    OPEN REDUCTION AND INTERNAL FIXATION (ORIF) OF INJURY OF ANKLE Left 4/10/2023    Procedure: ORIF, ANKLE;  Surgeon: Michael Lacey MD;  Location: [facility] OR;  Service: Orthopedics;  Laterality: Left;    PEG TUBE REMOVAL  4/1/2021    Procedure: REMOVAL, PEG TUBE;  Surgeon: Papi Martinez Jr., MD;  Location: [facility] ENDO;  Service: Endoscopy;;    TONSILLECTOMY         Current Outpatient Medications   Medication Sig    ARIPiprazole (ABILIFY) 5 MG Tab Take 1 tablet (5 mg total) by mouth once daily.    fludrocortisone (FLORINEF) 0.1 mg Tab Take 0.1 mg by mouth Daily.    HYDROmorphone (DILAUDID) 2 MG tablet Take 1 tablet (2 mg total) by mouth every 4 (four) hours as needed for Pain.    lamotrigine (LAMICTAL) 150 MG Tab Take 150 mg by mouth 2 (two) times daily.    LIDOcaine-prilocaine (EMLA) cream Apply topically as needed (use prior to treatment with port access).    LORazepam (ATIVAN) 0.5 MG tablet Take 1 tablet (0.5 mg total) by mouth every 6 (six) hours as needed for Anxiety.    midodrine (PROAMATINE) 5 MG Tab Take 5 mg by mouth 3 (three) times daily with meals.    mirtazapine (REMERON) 7.5 MG Tab Take 1 tablet (7.5 mg total) by mouth every evening.    pantoprazole (PROTONIX) 40 MG tablet Take 1 tablet (40 mg total) by mouth 2 (two) times daily.    promethazine (PHENERGAN) 25 MG tablet Take 1 tablet (25 mg total) by mouth every 6 (six) hours as needed for Nausea.    propranoloL (INDERAL) 40 MG tablet Take 40 mg by mouth once daily.    sodium chloride 1,000 mg TbSO oral tablet Take 1,000 mg by mouth 2 (two) times a day.    sucralfate (CARAFATE) 1 gram tablet Take 1 tablet (1 g total) by mouth 4 (four) times daily before meals and nightly.    tiZANidine (ZANAFLEX) 4 MG  tablet 1-2 tid prn    ustekinumab (STELARA) 90 mg/mL Syrg syringe Inject 90 mg into the skin every 8 weeks. Indications: Crohn's disease     No current facility-administered medications for this visit.       Review of patient's allergies indicates:   Allergen Reactions    Tramadol Other (See Comments)     Seizures      Demerol [meperidine]      Seizures      Hydrocodone Hallucinations    Reglan [metoclopramide]        Family History   Adopted: Yes       Social History     Socioeconomic History    Marital status:    Tobacco Use    Smoking status: Former     Current packs/day: 0.00     Average packs/day: 0.5 packs/day for 6.0 years (3.0 ttl pk-yrs)     Types: Cigarettes     Start date: 5/10/2005     Quit date: 5/10/2011     Years since quittin.3    Smokeless tobacco: Never   Substance and Sexual Activity    Alcohol use: Yes     Comment: socially    Drug use: Never    Sexual activity: Yes     Partners: Male     Social Determinants of Health     Financial Resource Strain: Medium Risk (2023)    Overall Financial Resource Strain (CARDIA)     Difficulty of Paying Living Expenses: Somewhat hard   Food Insecurity: Food Insecurity Present (2023)    Hunger Vital Sign     Worried About Running Out of Food in the Last Year: Sometimes true     Ran Out of Food in the Last Year: Sometimes true   Transportation Needs: No Transportation Needs (2023)    PRAPARE - Transportation     Lack of Transportation (Medical): No     Lack of Transportation (Non-Medical): No   Physical Activity: Inactive (2023)    Exercise Vital Sign     Days of Exercise per Week: 0 days     Minutes of Exercise per Session: 0 min   Stress: Stress Concern Present (2023)    [employer] [employer] - Occupational Stress Questionnaire     Feeling of Stress : Rather much   Social Connections: Moderately Isolated (2023)    Social Connection and Isolation Panel [NHANES]     Frequency of Communication with Friends  and Family: More than three times a week     Frequency of Social Gatherings with Friends and Family: More than three times a week     Attends L[employer] day Services: Never     Active Member of Clubs or Organizations: No     Attends Club or Organization Meetings: Never     Marital Status:    Housing Stability: High Risk (8/29/2023)    Housing Stability Vital Sign     Unable to Pay for Housing in the Last Year: Yes     Number of Places Lived in the Last Year: 1     Unstable Housing in the Last Year: No       Physical exam:  There were no vitals filed for this visit.  There is no height or weight on file to calculate BMI.  General: In no apparent distress; well developed and well nourished.  HEENT: normocephalic; atraumatic.  Cardiovascular: regular rate.  Respiratory: no increased work of breathing.  Musculoskeletal:   Gait: unable to assess  Inspection:  Medial and lateral based surgical sites well healed with nylon sutures in place.  Mild-to-moderate residual eschar medially with area of skin irritation.  No adjacent warmth or erythema to suggest infection.  No drainage or fluctuance.  Moderate tenderness due to irritation as noted above due to patient's body habitus.  Gross motor and sensory function intact.  Palpable pedal pulse.     Imaging Studies/Outside documentation:  I have ordered/reviewed/interpreted the following images/outside documentation:  WB 3-views Left ankle:   Previously noted ankle fracture hardware is now removed.  Overall alignment well-maintained.  Ankle mortise remains congruent.  No significant degenerative joint changes.     Assessment:  Angela Trascher Lejeune is a 48 y.o. female with Displaced Left bimalleolar ankle fracture.      Plan:   Clinical and radiographic findings were discussed.  Patient with what she describes as persistent irritation over the medial malleolar surgical site due to boot wear.  Sutures removed and Steri-Strips placed.  We will also place a small amount of  Betadine paint today over the medial surgical site to help dry this out and patient will keep an eye on this going forward in regard to wound healing.  Due to irritation as outlined above, patient may transition to normal shoe wear.  Recommend she be careful.  No high impact activities-running, jumping, etc..  Patient voiced understanding.  All questions were answered.  She will follow up on an as-needed basis.        This note was created using voice recognition software and may contain grammatical errors.

## 2023-09-25 ENCOUNTER — TELEPHONE (OUTPATIENT)
Dept: HEMATOLOGY/ONCOLOGY | Facility: CLINIC | Age: 48
End: 2023-09-25
Payer: COMMERCIAL

## 2023-09-25 NOTE — NURSING
"Spoke with patient and scheduled appointment for 09/26/2023 with Dr. Mays; She insisted on the very 1st appointment due to her increasing pain and anxiety over her "blockage from scar tissue" worsening.  Provided patient with appointment time and date, address of [facility] facility and direct line to navigator. All questions and concerns addressed.       "

## 2023-09-26 ENCOUNTER — OFFICE VISIT (OUTPATIENT)
Dept: SURGERY | Facility: CLINIC | Age: 48
End: 2023-09-26
Payer: COMMERCIAL

## 2023-09-26 VITALS
HEART RATE: 75 BPM | DIASTOLIC BLOOD PRESSURE: 69 MMHG | HEIGHT: 64 IN | SYSTOLIC BLOOD PRESSURE: 112 MMHG | WEIGHT: 120.81 LBS | BODY MASS INDEX: 20.63 KG/M2 | OXYGEN SATURATION: 100 %

## 2023-09-26 DIAGNOSIS — K31.1 GASTRIC OUT LET OBSTRUCTION: Primary | ICD-10-CM

## 2023-09-26 DIAGNOSIS — K50.90 CROHN'S DISEASE, UNSPECIFIED, WITHOUT COMPLICATIONS: ICD-10-CM

## 2023-09-26 PROCEDURE — 3078F DIAST BP <80 MM HG: CPT | Mod: CPTII,S$GLB,, | Performed by: SURGERY

## 2023-09-26 PROCEDURE — 3078F PR MOST RECENT DIASTOLIC BLOOD PRESSURE < 80 MM HG: ICD-10-PCS | Mod: CPTII,S$GLB,, | Performed by: SURGERY

## 2023-09-26 PROCEDURE — 1111F DSCHRG MED/CURRENT MED MERGE: CPT | Mod: CPTII,S$GLB,, | Performed by: SURGERY

## 2023-09-26 PROCEDURE — 99205 OFFICE O/P NEW HI 60 MIN: CPT | Mod: S$GLB,,, | Performed by: SURGERY

## 2023-09-26 PROCEDURE — 99205 PR OFFICE/OUTPT VISIT, NEW, LEVL V, 60-74 MIN: ICD-10-PCS | Mod: S$GLB,,, | Performed by: SURGERY

## 2023-09-26 PROCEDURE — 3008F PR BODY MASS INDEX (BMI) DOCUMENTED: ICD-10-PCS | Mod: CPTII,S$GLB,, | Performed by: SURGERY

## 2023-09-26 PROCEDURE — 3074F PR MOST RECENT SYSTOLIC BLOOD PRESSURE < 130 MM HG: ICD-10-PCS | Mod: CPTII,S$GLB,, | Performed by: SURGERY

## 2023-09-26 PROCEDURE — 1111F PR DISCHARGE MEDS RECONCILED W/ CURRENT OUTPATIENT MED LIST: ICD-10-PCS | Mod: CPTII,S$GLB,, | Performed by: SURGERY

## 2023-09-26 PROCEDURE — 99999 PR PBB SHADOW E&M-EST. PATIENT-LVL IV: ICD-10-PCS | Mod: PBBFAC,,, | Performed by: SURGERY

## 2023-09-26 PROCEDURE — 3008F BODY MASS INDEX DOCD: CPT | Mod: CPTII,S$GLB,, | Performed by: SURGERY

## 2023-09-26 PROCEDURE — 3074F SYST BP LT 130 MM HG: CPT | Mod: CPTII,S$GLB,, | Performed by: SURGERY

## 2023-09-26 PROCEDURE — 99999 PR PBB SHADOW E&M-EST. PATIENT-LVL IV: CPT | Mod: PBBFAC,,, | Performed by: SURGERY

## 2023-09-26 NOTE — PROGRESS NOTES
SURGICAL ONCOLOGY  Clinic Note      SUBJECTIVE:     HISTORY OF PRESENT ILLNESS:  Angela Trascher Lejeune is a 48 y.o. female with PMH Crohn's disease, POTS, gastric varices, alcohol abuse, bipolar disorder who presents to clinic for evaluation of a gastric outlet obstruction. She has been seen and evaluated for intractable nausea and vomiting. She has had past dilations of a duodenal stricture from her Crohn's, last on 8/22/23. Pathology revealed acute peptic duodenitis, antral bx negative for H pylori.     She is followed by Dr. Zendejas (GI) for her Crohn's, currently on q8 week Stelara injections. She underwent an ileocecetomy in 1998, which resolved her symptoms for 20 years. She notes in that period she was not on any medications for Crohn's, feeling well with a tolerance of a regular diet. However 4 years ago, increasing nausea/vomiting and oral intolerance. She underwent an EGD which revealed a duodenal stricture. She has been managed with balloon dilations, which resolve her symptoms for only a few days with subsequent recurrence. She has been hospitalized multiple times due to this abdominal pain from this duodenal stricture, last discharged on 9/13/23. Recent CT A/P. She notes a 30 lb weight loss this year, weight as low as 85 lbs due to this abdominal pain.     MEDICATIONS:  Home Medications:  Current Outpatient Medications on File Prior to Visit   Medication Sig Dispense Refill    ARIPiprazole (ABILIFY) 5 MG Tab Take 1 tablet (5 mg total) by mouth once daily. 30 tablet 11    fludrocortisone (FLORINEF) 0.1 mg Tab Take 0.1 mg by mouth Daily.      HYDROmorphone (DILAUDID) 2 MG tablet Take 1 tablet (2 mg total) by mouth every 4 (four) hours as needed for Pain. 20 tablet 0    lamotrigine (LAMICTAL) 150 MG Tab Take 150 mg by mouth 2 (two) times daily.      LIDOcaine-prilocaine (EMLA) cream Apply topically as needed (use prior to treatment with port access). 30 g 0    LORazepam (ATIVAN) 0.5 MG tablet Take 1  tablet (0.5 mg total) by mouth every 6 (six) hours as needed for Anxiety. 30 tablet 2    midodrine (PROAMATINE) 5 MG Tab Take 5 mg by mouth 3 (three) times daily with meals.      mirtazapine (REMERON) 7.5 MG Tab Take 1 tablet (7.5 mg total) by mouth every evening. 30 tablet 11    pantoprazole (PROTONIX) 40 MG tablet Take 1 tablet (40 mg total) by mouth 2 (two) times daily. 60 tablet 1    promethazine (PHENERGAN) 25 MG tablet Take 1 tablet (25 mg total) by mouth every 6 (six) hours as needed for Nausea. 30 tablet 5    propranoloL (INDERAL) 40 MG tablet Take 40 mg by mouth once daily.      sodium chloride 1,000 mg TbSO oral tablet Take 1,000 mg by mouth 2 (two) times a day.      sucralfate (CARAFATE) 1 gram tablet Take 1 tablet (1 g total) by mouth 4 (four) times daily before meals and nightly. 28 tablet 0    tiZANidine (ZANAFLEX) 4 MG tablet 1-2 tid prn 60 tablet 5    ustekinumab (STELARA) 90 mg/mL Syrg syringe Inject 90 mg into the skin every 8 weeks. Indications: Crohn's disease      [DISCONTINUED] prazosin (MINIPRESS) 1 MG Cap        No current facility-administered medications on file prior to visit.       ALLERGIES:    Review of patient's allergies indicates:   Allergen Reactions    Tramadol Other (See Comments)     Seizures      Demerol [meperidine]      Seizures      Hydrocodone Hallucinations    Reglan [metoclopramide]        PAST MEDICAL HISTORY:    Past Medical History:   Diagnosis Date    Abnormal Pap smear     + HPV    Bipolar 1 disorder     Closed bimalleolar fracture of left ankle     Crohn's disease S/P surgery (ileum, possible gastroduodenal with ulcers)     1998-ileocectomy (9 inches ileum)    Encounter for blood transfusion     Gastric and duodenal ulcers of unclear etiology     Gastroparesis     H/O ETOH abuse     History of HSV-2 infection     Immunosuppressed status     Iron deficiency anemia, multiple blood transfusions     POTS (postural orthostatic tachycardia syndrome)     Seizures     states  last seizure 2009       SURGICAL HISTORY:  Past Surgical History:   Procedure Laterality Date    ANKLE HARDWARE REMOVAL Left 9/6/2023    Procedure: REMOVAL, HARDWARE, ANKLE;  Surgeon: Michael Lacey MD;  Location: [facility];  Service: Orthopedics;  Laterality: Left;    APPENDECTOMY      BRAIN SURGERY  2009    fx skull due to seizure with 2 pins    CHOLECYSTECTOMY      COLON SURGERY      Partial colectomy    COLONOSCOPY Left 7/10/2019    Procedure: COLONOSCOPY;  Surgeon: Papi Martinez Jr., MD;  Location: [facility];  Service: Endoscopy;  Laterality: Left;    COLONOSCOPY N/A 12/6/2022    Procedure: COLONOSCOPY;  Surgeon: Zandra Mallory MD;  Location: [facility] (4TH FLR);  Service: Endoscopy;  Laterality: N/A;  poor prep on 11/28/22.  per Dr Mallory-Miralax 5 days prior with gastroparesis diet-3 days full liquid. also Mag citrate day before     instr handed to pt-GT    EGD, WITH BALLOON DILATION  7/24/2023    Procedure: EGD, WITH BALLOON DILATION;  Surgeon: Huang Licea MD;  Location: [facility];  Service: Endoscopy;;    ESOPHAGOGASTRODUODENOSCOPY Left 6/5/2019    Procedure: EGD (ESOPHAGOGASTRODUODENOSCOPY);  Surgeon: ROLA Villagran MD;  Location: [facility];  Service: Endoscopy;  Laterality: Left;    ESOPHAGOGASTRODUODENOSCOPY Left 3/16/2020    Procedure: EGD (ESOPHAGOGASTRODUODENOSCOPY);  Surgeon: Clive Núñez MD;  Location: [facility];  Service: Endoscopy;  Laterality: Left;    ESOPHAGOGASTRODUODENOSCOPY N/A 4/17/2020    Procedure: EGD (ESOPHAGOGASTRODUODENOSCOPY);  Surgeon: Papi Martinez Jr., MD;  Location: [facility];  Service: Endoscopy;  Laterality: N/A;  with Push Enteroscopy    ESOPHAGOGASTRODUODENOSCOPY N/A 7/6/2020    Procedure: EGD (ESOPHAGOGASTRODUODENOSCOPY)   possible peg;  Surgeon: Papi Martinez Jr., MD;  Location: [facility];  Service: Endoscopy;  Laterality: N/A;    ESOPHAGOGASTRODUODENOSCOPY N/A 7/9/2020    Procedure: EGD (ESOPHAGOGASTRODUODENOSCOPY);  Surgeon: Papi Martinez Jr.  MD;  Location: [facility];  Service: Endoscopy;  Laterality: N/A;  J or Peg tube    ESOPHAGOGASTRODUODENOSCOPY N/A 9/3/2020    Procedure: EGD (ESOPHAGOGASTRODUODENOSCOPY);  Surgeon: Papi Martinez Jr., MD;  Location: [facility];  Service: Endoscopy;  Laterality: N/A;  per drs order Stoma, Peds, w/single Lumen, J-Tube placement    ESOPHAGOGASTRODUODENOSCOPY N/A 12/29/2020    Procedure: EGD (ESOPHAGOGASTRODUODENOSCOPY);  Surgeon: Papi Martinez Jr., MD;  Location: [facility];  Service: Endoscopy;  Laterality: N/A;    ESOPHAGOGASTRODUODENOSCOPY N/A 2/23/2021    Procedure: EGD (ESOPHAGOGASTRODUODENOSCOPY);  Surgeon: Papi Martinez Jr., MD;  Location: [facility];  Service: Endoscopy;  Laterality: N/A;    ESOPHAGOGASTRODUODENOSCOPY N/A 2/3/2022    Procedure: EGD (ESOPHAGOGASTRODUODENOSCOPY);  Surgeon: Papi Martinez Jr., MD;  Location: [facility];  Service: Endoscopy;  Laterality: N/A;    ESOPHAGOGASTRODUODENOSCOPY N/A 8/13/2022    Procedure: EGD (ESOPHAGOGASTRODUODENOSCOPY);  Surgeon: Huang Licea MD;  Location: [facility];  Service: Gastroenterology;  Laterality: N/A;    ESOPHAGOGASTRODUODENOSCOPY N/A 10/16/2022    Procedure: EGD (ESOPHAGOGASTRODUODENOSCOPY);  Surgeon: Huang Licea MD;  Location: [facility];  Service: Gastroenterology;  Laterality: N/A;    ESOPHAGOGASTRODUODENOSCOPY N/A 11/28/2022    Procedure: EGD (ESOPHAGOGASTRODUODENOSCOPY);  Surgeon: Zandra Mallory MD;  Location: [address]);  Service: Endoscopy;  Laterality: N/A;    ESOPHAGOGASTRODUODENOSCOPY N/A 7/24/2023    Procedure: EGD (ESOPHAGOGASTRODUODENOSCOPY);  Surgeon: Huang Licea MD;  Location: [facility];  Service: Endoscopy;  Laterality: N/A;    ESOPHAGOGASTRODUODENOSCOPY N/A 9/15/2023    Procedure: EGD (ESOPHAGOGASTRODUODENOSCOPY);  Surgeon: ROLA Villagran MD;  Location: [facility];  Service: Endoscopy;  Laterality: N/A;    FEMUR FRACTURE SURGERY Right 2006    HYSTERECTOMY      INSERTION OF TUNNELED CENTRAL VENOUS CATHETER  (CVC) WITH SUBCUTANEOUS PORT Left 2019    Procedure: [id number];  Surgeon: Miquel Sargent MD;  Location: [facility] OR;  Service: General;  Laterality: Left;    INSERTION OR REPLACEMENT OF PERCUTANEOUS ENDOSCOPIC JEJUNOSTOMY TUBE  2020    Procedure: INSERTION OR REPLACEMENT, JEJUNOSTOMY TUBE, PERCUTANEOUS, ENDOSCOPIC;  Surgeon: Papi Martinez Jr., MD;  Location: [facility] ENDO;  Service: Endoscopy;;    laparoscopy for endometriosis      x5    LEFT HEART CATHETERIZATION Left 3/29/2023    Procedure: Left heart cath;  Surgeon: Riley Tipton MD;  Location: [facility] CATH;  Service: Cardiology;  Laterality: Left;    OOPHORECTOMY      right ovary removed only    OPEN REDUCTION AND INTERNAL FIXATION (ORIF) OF INJURY OF ANKLE Left 4/10/2023    Procedure: ORIF, ANKLE;  Surgeon: Michael Lacey MD;  Location: [facility] OR;  Service: Orthopedics;  Laterality: Left;    PEG TUBE REMOVAL  2021    Procedure: REMOVAL, PEG TUBE;  Surgeon: Papi Martinez Jr., MD;  Location: [facility] ENDO;  Service: Endoscopy;;    TONSILLECTOMY         FAMILY HISTORY:  Family History   Adopted: Yes       SOCIAL HISTORY:  Social History     Tobacco Use    Smoking status: Former     Current packs/day: 0.00     Average packs/day: 0.5 packs/day for 6.0 years (3.0 ttl pk-yrs)     Types: Cigarettes     Start date: 5/10/2005     Quit date: 5/10/2011     Years since quittin.3    Smokeless tobacco: Never   Substance Use Topics    Alcohol use: Yes     Comment: socially    Drug use: Never        REVIEW OF SYSTEMS:  Review of Systems   Constitutional:  Negative for activity change, appetite change, chills, fatigue and fever.   Eyes:  Negative for discharge and itching.   Respiratory:  Negative for cough and shortness of breath.    Cardiovascular:  Negative for chest pain and palpitations.   Gastrointestinal:  Positive for abdominal pain, nausea and vomiting. Negative for abdominal distention and diarrhea.   Endocrine: Negative for cold intolerance  and heat intolerance.         OBJECTIVE:     Most Recent Vitals:         PHYSICAL EXAM:  Physical Exam  Vitals and nursing note reviewed.   Constitutional:       General: She is not in acute distress.  Cardiovascular:      Rate and Rhythm: Normal rate.      Pulses: Normal pulses.   Abdominal:      General: Abdomen is flat. There is no distension.      Tenderness: There is no abdominal tenderness.      Comments: Midline laparotomy incision healed   Neurological:      Mental Status: She is alert.           LABORATORY VALUES:  Lab Results   Component Value Date    WBC 7.71 09/24/2023    HGB 10.3 (L) 09/24/2023    HCT 31.9 (L) 09/24/2023     09/24/2023    HGBA1C 4.9 03/30/2020     Lab Results   Component Value Date     09/24/2023    K 2.9 (L) 09/24/2023     09/24/2023    CO2 23 09/24/2023    BUN 19 (H) 09/24/2023    CREATININE 1.07 09/24/2023     09/24/2023    CALCIUM 9.0 09/24/2023    CAION 1.11 08/21/2021    MG 1.9 01/05/2023    PHOS 1.7 (L) 12/28/2021    AST 31 09/24/2023    ALT 21 09/24/2023    ALKPHOS 137 09/24/2023    BILITOT 0.3 09/24/2023    BILIDIR 0.5 (H) 04/19/2017    PROT 6.6 09/24/2023    ALBUMIN 3.6 09/24/2023    AMYLASE 138 (H) 08/29/2023     Lab Results   Component Value Date    INR 1.0 10/15/2022     Lab Results   Component Value Date    TSH 0.483 04/20/2023    FREET4 1.14 01/05/2023         DIAGNOSTIC STUDIES:  CT A/P 9/13/23    ASSESSMENT:     Angela Trascher Lejeune is a 48 y.o. female with medically refractory Crohn's (on Stelara) complicated by a symptomatic duodenal stricture. Discussed with patient would need to obtain a CT enterography as well as nutrition labs, return to clinic in 1 week to discuss results.       PLAN:  - CT enterography  - nutrition labs  - RTC in 1 week     -- Ines Mcclain M.D  General Surgery PGY5  Pager: [phone]

## 2023-09-27 ENCOUNTER — HOSPITAL ENCOUNTER (OUTPATIENT)
Dept: RADIOLOGY | Facility: HOSPITAL | Age: 48
Discharge: HOME | DRG: 326 | End: 2023-09-27
Attending: SURGERY
Payer: COMMERCIAL

## 2023-09-27 DIAGNOSIS — K50.90 CROHN'S DISEASE, UNSPECIFIED, WITHOUT COMPLICATIONS: ICD-10-CM

## 2023-09-27 PROCEDURE — 74177 CT ENTEROGRAPHY ABD_PELVIS WITH CONTRAST: ICD-10-PCS | Mod: 26,,, | Performed by: STUDENT IN AN ORGANIZED HEALTH CARE EDUCATION/TRAINING PROGRAM

## 2023-09-27 PROCEDURE — 74177 CT ABD & PELVIS W/CONTRAST: CPT | Mod: TC

## 2023-09-27 PROCEDURE — A9698 NON-RAD CONTRAST MATERIALNOC: HCPCS | Performed by: SURGERY

## 2023-09-27 PROCEDURE — 25500020 PHARM REV CODE 255: Performed by: SURGERY

## 2023-09-27 PROCEDURE — 74177 CT ABD & PELVIS W/CONTRAST: CPT | Mod: 26,,, | Performed by: STUDENT IN AN ORGANIZED HEALTH CARE EDUCATION/TRAINING PROGRAM

## 2023-09-27 RX ADMIN — IOHEXOL 75 ML: 350 INJECTION, SOLUTION INTRAVENOUS at 04:09

## 2023-09-27 RX ADMIN — BARIUM SULFATE 1350 ML: 1 SUSPENSION ORAL at 04:09

## 2023-09-28 ENCOUNTER — HOSPITAL ENCOUNTER (INPATIENT)
Facility: HOSPITAL | Age: 48
LOS: 13 days | Discharge: HOME HEALTH | DRG: 326 | End: 2023-10-11
Attending: SURGERY | Admitting: SURGERY
Payer: COMMERCIAL

## 2023-09-28 DIAGNOSIS — K50.90 EXACERBATION OF CROHN'S DISEASE: ICD-10-CM

## 2023-09-28 DIAGNOSIS — T50.905A MEDICATION ADVERSE EFFECT: ICD-10-CM

## 2023-09-28 DIAGNOSIS — K31.5 DUODENAL STRICTURE: Primary | ICD-10-CM

## 2023-09-28 DIAGNOSIS — R00.0 HEART RATE FAST: ICD-10-CM

## 2023-09-28 DIAGNOSIS — Z99.11 ENCOUNTER FOR WEANING FROM VENTILATOR: ICD-10-CM

## 2023-09-28 DIAGNOSIS — E87.6 HYPOKALEMIA: ICD-10-CM

## 2023-09-28 PROCEDURE — C9113 INJ PANTOPRAZOLE SODIUM, VIA: HCPCS | Performed by: STUDENT IN AN ORGANIZED HEALTH CARE EDUCATION/TRAINING PROGRAM

## 2023-09-28 PROCEDURE — 20600001 HC STEP DOWN PRIVATE ROOM

## 2023-09-28 PROCEDURE — 63600175 PHARM REV CODE 636 W HCPCS: Performed by: STUDENT IN AN ORGANIZED HEALTH CARE EDUCATION/TRAINING PROGRAM

## 2023-09-28 PROCEDURE — 25000003 PHARM REV CODE 250: Performed by: STUDENT IN AN ORGANIZED HEALTH CARE EDUCATION/TRAINING PROGRAM

## 2023-09-28 RX ORDER — TALC
6 POWDER (GRAM) TOPICAL NIGHTLY PRN
Status: DISCONTINUED | OUTPATIENT
Start: 2023-09-28 | End: 2023-10-04

## 2023-09-28 RX ORDER — LABETALOL HCL 20 MG/4 ML
10 SYRINGE (ML) INTRAVENOUS EVERY 4 HOURS PRN
Status: DISCONTINUED | OUTPATIENT
Start: 2023-09-28 | End: 2023-09-30

## 2023-09-28 RX ORDER — MIRTAZAPINE 7.5 MG/1
7.5 TABLET, FILM COATED ORAL NIGHTLY
Status: DISCONTINUED | OUTPATIENT
Start: 2023-09-28 | End: 2023-09-30

## 2023-09-28 RX ORDER — ONDANSETRON 8 MG/1
8 TABLET, ORALLY DISINTEGRATING ORAL EVERY 8 HOURS PRN
Status: DISCONTINUED | OUTPATIENT
Start: 2023-09-28 | End: 2023-09-28

## 2023-09-28 RX ORDER — HYDROMORPHONE HYDROCHLORIDE 1 MG/ML
0.5 INJECTION, SOLUTION INTRAMUSCULAR; INTRAVENOUS; SUBCUTANEOUS EVERY 6 HOURS PRN
Status: DISCONTINUED | OUTPATIENT
Start: 2023-09-29 | End: 2023-09-29

## 2023-09-28 RX ORDER — ENOXAPARIN SODIUM 100 MG/ML
40 INJECTION SUBCUTANEOUS EVERY 24 HOURS
Status: DISCONTINUED | OUTPATIENT
Start: 2023-09-29 | End: 2023-10-05

## 2023-09-28 RX ORDER — ONDANSETRON 2 MG/ML
4 INJECTION INTRAMUSCULAR; INTRAVENOUS EVERY 6 HOURS PRN
Status: DISCONTINUED | OUTPATIENT
Start: 2023-09-28 | End: 2023-09-29

## 2023-09-28 RX ORDER — PROPRANOLOL HYDROCHLORIDE 20 MG/1
40 TABLET ORAL DAILY
Status: DISCONTINUED | OUTPATIENT
Start: 2023-09-29 | End: 2023-09-30

## 2023-09-28 RX ORDER — PANTOPRAZOLE SODIUM 40 MG/10ML
40 INJECTION, POWDER, LYOPHILIZED, FOR SOLUTION INTRAVENOUS 2 TIMES DAILY
Status: DISCONTINUED | OUTPATIENT
Start: 2023-09-28 | End: 2023-10-09

## 2023-09-28 RX ORDER — DEXTROSE MONOHYDRATE, SODIUM CHLORIDE, AND POTASSIUM CHLORIDE 50; 1.49; 4.5 G/1000ML; G/1000ML; G/1000ML
INJECTION, SOLUTION INTRAVENOUS CONTINUOUS
Status: DISPENSED | OUTPATIENT
Start: 2023-09-29 | End: 2023-09-29

## 2023-09-28 RX ORDER — SODIUM CHLORIDE 0.9 % (FLUSH) 0.9 %
10 SYRINGE (ML) INJECTION
Status: DISCONTINUED | OUTPATIENT
Start: 2023-09-28 | End: 2023-10-11 | Stop reason: HOSPADM

## 2023-09-28 RX ORDER — LORAZEPAM 2 MG/ML
0.5 INJECTION INTRAMUSCULAR EVERY 6 HOURS PRN
Status: DISCONTINUED | OUTPATIENT
Start: 2023-09-28 | End: 2023-09-30

## 2023-09-28 RX ORDER — PROCHLORPERAZINE EDISYLATE 5 MG/ML
2.5 INJECTION INTRAMUSCULAR; INTRAVENOUS EVERY 6 HOURS PRN
Status: DISCONTINUED | OUTPATIENT
Start: 2023-09-28 | End: 2023-09-29

## 2023-09-28 RX ORDER — LIDOCAINE HYDROCHLORIDE 10 MG/ML
1 INJECTION, SOLUTION EPIDURAL; INFILTRATION; INTRACAUDAL; PERINEURAL ONCE AS NEEDED
Status: DISCONTINUED | OUTPATIENT
Start: 2023-09-28 | End: 2023-10-11 | Stop reason: HOSPADM

## 2023-09-28 RX ADMIN — HYDROMORPHONE HYDROCHLORIDE 0.5 MG: 1 INJECTION, SOLUTION INTRAMUSCULAR; INTRAVENOUS; SUBCUTANEOUS at 11:09

## 2023-09-28 RX ADMIN — PANTOPRAZOLE SODIUM 40 MG: 40 INJECTION, POWDER, FOR SOLUTION INTRAVENOUS at 10:09

## 2023-09-28 RX ADMIN — LORAZEPAM 0.5 MG: 2 INJECTION INTRAMUSCULAR; INTRAVENOUS at 11:09

## 2023-09-28 RX ADMIN — PROCHLORPERAZINE EDISYLATE 2.5 MG: 5 INJECTION INTRAMUSCULAR; INTRAVENOUS at 11:09

## 2023-09-28 RX ADMIN — MIRTAZAPINE 7.5 MG: 7.5 TABLET, FILM COATED ORAL at 11:09

## 2023-09-29 LAB
ALBUMIN SERPL BCP-MCNC: 3.6 G/DL (ref 3.5–5.2)
ALP SERPL-CCNC: 125 U/L (ref 55–135)
ALT SERPL W/O P-5'-P-CCNC: 14 U/L (ref 10–44)
ANION GAP SERPL CALC-SCNC: 12 MMOL/L (ref 8–16)
ANION GAP SERPL CALC-SCNC: 5 MMOL/L (ref 8–16)
AST SERPL-CCNC: 24 U/L (ref 10–40)
BASOPHILS # BLD AUTO: 0.09 K/UL (ref 0–0.2)
BASOPHILS NFR BLD: 1.8 % (ref 0–1.9)
BILIRUB SERPL-MCNC: 0.6 MG/DL (ref 0.1–1)
BUN SERPL-MCNC: 4 MG/DL (ref 6–20)
BUN SERPL-MCNC: 6 MG/DL (ref 6–20)
CALCIUM SERPL-MCNC: 8.5 MG/DL (ref 8.7–10.5)
CALCIUM SERPL-MCNC: 8.9 MG/DL (ref 8.7–10.5)
CHLORIDE SERPL-SCNC: 111 MMOL/L (ref 95–110)
CHLORIDE SERPL-SCNC: 114 MMOL/L (ref 95–110)
CO2 SERPL-SCNC: 24 MMOL/L (ref 23–29)
CO2 SERPL-SCNC: 26 MMOL/L (ref 23–29)
CREAT SERPL-MCNC: 0.8 MG/DL (ref 0.5–1.4)
CREAT SERPL-MCNC: 0.8 MG/DL (ref 0.5–1.4)
DIFFERENTIAL METHOD: ABNORMAL
EOSINOPHIL # BLD AUTO: 0.2 K/UL (ref 0–0.5)
EOSINOPHIL NFR BLD: 3.7 % (ref 0–8)
ERYTHROCYTE [DISTWIDTH] IN BLOOD BY AUTOMATED COUNT: 14.2 % (ref 11.5–14.5)
EST. GFR  (NO RACE VARIABLE): >60 ML/MIN/1.73 M^2
EST. GFR  (NO RACE VARIABLE): >60 ML/MIN/1.73 M^2
GLUCOSE SERPL-MCNC: 114 MG/DL (ref 70–110)
GLUCOSE SERPL-MCNC: 91 MG/DL (ref 70–110)
HCT VFR BLD AUTO: 32.1 % (ref 37–48.5)
HGB BLD-MCNC: 10.2 G/DL (ref 12–16)
IMM GRANULOCYTES # BLD AUTO: 0.01 K/UL (ref 0–0.04)
IMM GRANULOCYTES NFR BLD AUTO: 0.2 % (ref 0–0.5)
LYMPHOCYTES # BLD AUTO: 2.6 K/UL (ref 1–4.8)
LYMPHOCYTES NFR BLD: 51.5 % (ref 18–48)
MAGNESIUM SERPL-MCNC: 1.3 MG/DL (ref 1.6–2.6)
MCH RBC QN AUTO: 31.3 PG (ref 27–31)
MCHC RBC AUTO-ENTMCNC: 31.8 G/DL (ref 32–36)
MCV RBC AUTO: 99 FL (ref 82–98)
MONOCYTES # BLD AUTO: 0.5 K/UL (ref 0.3–1)
MONOCYTES NFR BLD: 10.1 % (ref 4–15)
NEUTROPHILS # BLD AUTO: 1.7 K/UL (ref 1.8–7.7)
NEUTROPHILS NFR BLD: 32.7 % (ref 38–73)
NRBC BLD-RTO: 0 /100 WBC
PHOSPHATE SERPL-MCNC: 3 MG/DL (ref 2.7–4.5)
PLATELET # BLD AUTO: 353 K/UL (ref 150–450)
PMV BLD AUTO: 10 FL (ref 9.2–12.9)
POTASSIUM SERPL-SCNC: 2.5 MMOL/L (ref 3.5–5.1)
POTASSIUM SERPL-SCNC: 3.1 MMOL/L (ref 3.5–5.1)
PROT SERPL-MCNC: 6.6 G/DL (ref 6–8.4)
RBC # BLD AUTO: 3.26 M/UL (ref 4–5.4)
SODIUM SERPL-SCNC: 145 MMOL/L (ref 136–145)
SODIUM SERPL-SCNC: 147 MMOL/L (ref 136–145)
WBC # BLD AUTO: 5.07 K/UL (ref 3.9–12.7)

## 2023-09-29 PROCEDURE — 63600175 PHARM REV CODE 636 W HCPCS: Performed by: STUDENT IN AN ORGANIZED HEALTH CARE EDUCATION/TRAINING PROGRAM

## 2023-09-29 PROCEDURE — 83735 ASSAY OF MAGNESIUM: CPT | Performed by: STUDENT IN AN ORGANIZED HEALTH CARE EDUCATION/TRAINING PROGRAM

## 2023-09-29 PROCEDURE — 63600175 PHARM REV CODE 636 W HCPCS: Performed by: NURSE PRACTITIONER

## 2023-09-29 PROCEDURE — B4185 PARENTERAL SOL 10 GM LIPIDS: HCPCS | Performed by: STUDENT IN AN ORGANIZED HEALTH CARE EDUCATION/TRAINING PROGRAM

## 2023-09-29 PROCEDURE — 25000003 PHARM REV CODE 250: Performed by: STUDENT IN AN ORGANIZED HEALTH CARE EDUCATION/TRAINING PROGRAM

## 2023-09-29 PROCEDURE — 25000003 PHARM REV CODE 250: Performed by: SURGERY

## 2023-09-29 PROCEDURE — 94761 N-INVAS EAR/PLS OXIMETRY MLT: CPT

## 2023-09-29 PROCEDURE — 84100 ASSAY OF PHOSPHORUS: CPT | Performed by: STUDENT IN AN ORGANIZED HEALTH CARE EDUCATION/TRAINING PROGRAM

## 2023-09-29 PROCEDURE — 93005 ELECTROCARDIOGRAM TRACING: CPT

## 2023-09-29 PROCEDURE — 25000003 PHARM REV CODE 250

## 2023-09-29 PROCEDURE — 63600175 PHARM REV CODE 636 W HCPCS: Performed by: SURGERY

## 2023-09-29 PROCEDURE — 63600175 PHARM REV CODE 636 W HCPCS

## 2023-09-29 PROCEDURE — 20600001 HC STEP DOWN PRIVATE ROOM

## 2023-09-29 PROCEDURE — 97161 PT EVAL LOW COMPLEX 20 MIN: CPT

## 2023-09-29 PROCEDURE — 99223 1ST HOSP IP/OBS HIGH 75: CPT | Mod: ,,, | Performed by: SURGERY

## 2023-09-29 PROCEDURE — 85025 COMPLETE CBC W/AUTO DIFF WBC: CPT | Performed by: STUDENT IN AN ORGANIZED HEALTH CARE EDUCATION/TRAINING PROGRAM

## 2023-09-29 PROCEDURE — 80048 BASIC METABOLIC PNL TOTAL CA: CPT | Mod: XB | Performed by: STUDENT IN AN ORGANIZED HEALTH CARE EDUCATION/TRAINING PROGRAM

## 2023-09-29 PROCEDURE — 93010 EKG 12-LEAD: ICD-10-PCS | Mod: ,,, | Performed by: INTERNAL MEDICINE

## 2023-09-29 PROCEDURE — 36573 INSJ PICC RS&I 5 YR+: CPT

## 2023-09-29 PROCEDURE — C1751 CATH, INF, PER/CENT/MIDLINE: HCPCS

## 2023-09-29 PROCEDURE — 25000003 PHARM REV CODE 250: Performed by: NURSE PRACTITIONER

## 2023-09-29 PROCEDURE — A4217 STERILE WATER/SALINE, 500 ML: HCPCS | Performed by: STUDENT IN AN ORGANIZED HEALTH CARE EDUCATION/TRAINING PROGRAM

## 2023-09-29 PROCEDURE — C9113 INJ PANTOPRAZOLE SODIUM, VIA: HCPCS | Performed by: STUDENT IN AN ORGANIZED HEALTH CARE EDUCATION/TRAINING PROGRAM

## 2023-09-29 PROCEDURE — A4216 STERILE WATER/SALINE, 10 ML: HCPCS | Performed by: SURGERY

## 2023-09-29 PROCEDURE — 97116 GAIT TRAINING THERAPY: CPT

## 2023-09-29 PROCEDURE — 93010 ELECTROCARDIOGRAM REPORT: CPT | Mod: ,,, | Performed by: INTERNAL MEDICINE

## 2023-09-29 PROCEDURE — 80053 COMPREHEN METABOLIC PANEL: CPT | Performed by: STUDENT IN AN ORGANIZED HEALTH CARE EDUCATION/TRAINING PROGRAM

## 2023-09-29 PROCEDURE — 99223 PR INITIAL HOSPITAL CARE,LEVL III: ICD-10-PCS | Mod: ,,, | Performed by: SURGERY

## 2023-09-29 PROCEDURE — 76937 US GUIDE VASCULAR ACCESS: CPT

## 2023-09-29 RX ORDER — TIZANIDINE 4 MG/1
8 TABLET ORAL EVERY 8 HOURS PRN
Status: DISCONTINUED | OUTPATIENT
Start: 2023-09-29 | End: 2023-10-04

## 2023-09-29 RX ORDER — ACETAMINOPHEN 500 MG
1000 TABLET ORAL EVERY 8 HOURS
Status: DISCONTINUED | OUTPATIENT
Start: 2023-09-29 | End: 2023-10-04

## 2023-09-29 RX ORDER — MAGNESIUM SULFATE HEPTAHYDRATE 40 MG/ML
2 INJECTION, SOLUTION INTRAVENOUS ONCE
Status: COMPLETED | OUTPATIENT
Start: 2023-09-29 | End: 2023-09-29

## 2023-09-29 RX ORDER — POTASSIUM CHLORIDE 7.45 MG/ML
10 INJECTION INTRAVENOUS
Status: DISPENSED | OUTPATIENT
Start: 2023-09-29 | End: 2023-09-29

## 2023-09-29 RX ORDER — SCOLOPAMINE TRANSDERMAL SYSTEM 1 MG/1
1 PATCH, EXTENDED RELEASE TRANSDERMAL
Status: DISCONTINUED | OUTPATIENT
Start: 2023-09-29 | End: 2023-10-11 | Stop reason: HOSPADM

## 2023-09-29 RX ORDER — SODIUM CHLORIDE 0.9 % (FLUSH) 0.9 %
10 SYRINGE (ML) INJECTION
Status: DISCONTINUED | OUTPATIENT
Start: 2023-09-29 | End: 2023-10-11 | Stop reason: HOSPADM

## 2023-09-29 RX ORDER — OXYCODONE HYDROCHLORIDE 5 MG/1
5 TABLET ORAL EVERY 4 HOURS PRN
Status: DISCONTINUED | OUTPATIENT
Start: 2023-09-29 | End: 2023-09-29

## 2023-09-29 RX ORDER — MAGNESIUM SULFATE HEPTAHYDRATE 40 MG/ML
4 INJECTION, SOLUTION INTRAVENOUS ONCE
Status: COMPLETED | OUTPATIENT
Start: 2023-09-29 | End: 2023-09-29

## 2023-09-29 RX ORDER — HYDROMORPHONE HYDROCHLORIDE 1 MG/ML
1 INJECTION, SOLUTION INTRAMUSCULAR; INTRAVENOUS; SUBCUTANEOUS EVERY 4 HOURS PRN
Status: DISCONTINUED | OUTPATIENT
Start: 2023-09-29 | End: 2023-09-29

## 2023-09-29 RX ORDER — HYDROMORPHONE HYDROCHLORIDE 1 MG/ML
0.2 INJECTION, SOLUTION INTRAMUSCULAR; INTRAVENOUS; SUBCUTANEOUS EVERY 4 HOURS PRN
Status: DISCONTINUED | OUTPATIENT
Start: 2023-09-29 | End: 2023-09-30

## 2023-09-29 RX ORDER — ARIPIPRAZOLE 5 MG/1
5 TABLET ORAL DAILY
Status: DISCONTINUED | OUTPATIENT
Start: 2023-09-29 | End: 2023-10-04

## 2023-09-29 RX ORDER — MAGNESIUM SULFATE HEPTAHYDRATE 40 MG/ML
4 INJECTION, SOLUTION INTRAVENOUS ONCE
Status: DISCONTINUED | OUTPATIENT
Start: 2023-09-29 | End: 2023-09-29

## 2023-09-29 RX ORDER — LORAZEPAM 0.5 MG/1
0.5 TABLET ORAL EVERY 6 HOURS PRN
Status: DISCONTINUED | OUTPATIENT
Start: 2023-09-29 | End: 2023-09-29

## 2023-09-29 RX ORDER — FLUDROCORTISONE ACETATE 0.1 MG/1
0.1 TABLET ORAL DAILY
Status: DISCONTINUED | OUTPATIENT
Start: 2023-09-29 | End: 2023-10-05

## 2023-09-29 RX ORDER — POTASSIUM CHLORIDE 29.8 MG/ML
60 INJECTION INTRAVENOUS ONCE
Status: DISCONTINUED | OUTPATIENT
Start: 2023-09-29 | End: 2023-09-29

## 2023-09-29 RX ORDER — ZOLPIDEM TARTRATE 5 MG/1
5 TABLET ORAL NIGHTLY PRN
Status: DISCONTINUED | OUTPATIENT
Start: 2023-09-29 | End: 2023-09-30

## 2023-09-29 RX ORDER — POTASSIUM CHLORIDE 7.45 MG/ML
40 INJECTION INTRAVENOUS ONCE
Status: DISCONTINUED | OUTPATIENT
Start: 2023-09-29 | End: 2023-09-29

## 2023-09-29 RX ORDER — PANTOPRAZOLE SODIUM 40 MG/1
40 TABLET, DELAYED RELEASE ORAL 2 TIMES DAILY
Status: DISCONTINUED | OUTPATIENT
Start: 2023-09-29 | End: 2023-09-29

## 2023-09-29 RX ORDER — OXYCODONE HYDROCHLORIDE 5 MG/1
5 TABLET ORAL EVERY 6 HOURS PRN
Status: DISCONTINUED | OUTPATIENT
Start: 2023-09-29 | End: 2023-10-04

## 2023-09-29 RX ORDER — HYDROMORPHONE HYDROCHLORIDE 1 MG/ML
0.2 INJECTION, SOLUTION INTRAMUSCULAR; INTRAVENOUS; SUBCUTANEOUS EVERY 6 HOURS PRN
Status: DISCONTINUED | OUTPATIENT
Start: 2023-09-29 | End: 2023-09-29

## 2023-09-29 RX ORDER — PROCHLORPERAZINE EDISYLATE 5 MG/ML
2.5 INJECTION INTRAMUSCULAR; INTRAVENOUS EVERY 6 HOURS PRN
Status: DISCONTINUED | OUTPATIENT
Start: 2023-09-29 | End: 2023-10-11

## 2023-09-29 RX ORDER — SODIUM CHLORIDE 0.9 % (FLUSH) 0.9 %
10 SYRINGE (ML) INJECTION EVERY 6 HOURS
Status: DISCONTINUED | OUTPATIENT
Start: 2023-09-29 | End: 2023-10-11 | Stop reason: HOSPADM

## 2023-09-29 RX ADMIN — ENOXAPARIN SODIUM 40 MG: 40 INJECTION SUBCUTANEOUS at 04:09

## 2023-09-29 RX ADMIN — MAGNESIUM SULFATE HEPTAHYDRATE: 500 INJECTION, SOLUTION INTRAMUSCULAR; INTRAVENOUS at 10:09

## 2023-09-29 RX ADMIN — DEXTROSE, SODIUM CHLORIDE, AND POTASSIUM CHLORIDE: 5; .45; .15 INJECTION INTRAVENOUS at 12:09

## 2023-09-29 RX ADMIN — LABETALOL HYDROCHLORIDE 10 MG: 5 INJECTION, SOLUTION INTRAVENOUS at 12:09

## 2023-09-29 RX ADMIN — HYDROMORPHONE HYDROCHLORIDE 1 MG: 1 INJECTION, SOLUTION INTRAMUSCULAR; INTRAVENOUS; SUBCUTANEOUS at 12:09

## 2023-09-29 RX ADMIN — MIRTAZAPINE 7.5 MG: 7.5 TABLET, FILM COATED ORAL at 09:09

## 2023-09-29 RX ADMIN — LORAZEPAM 0.5 MG: 2 INJECTION INTRAMUSCULAR; INTRAVENOUS at 07:09

## 2023-09-29 RX ADMIN — SCOPALAMINE 1 PATCH: 1 PATCH, EXTENDED RELEASE TRANSDERMAL at 05:09

## 2023-09-29 RX ADMIN — LABETALOL HYDROCHLORIDE 10 MG: 5 INJECTION, SOLUTION INTRAVENOUS at 04:09

## 2023-09-29 RX ADMIN — ARIPIPRAZOLE 5 MG: 5 TABLET ORAL at 12:09

## 2023-09-29 RX ADMIN — POTASSIUM CHLORIDE 10 MEQ: 7.46 INJECTION, SOLUTION INTRAVENOUS at 09:09

## 2023-09-29 RX ADMIN — ACETAMINOPHEN 1000 MG: 500 TABLET ORAL at 01:09

## 2023-09-29 RX ADMIN — PROCHLORPERAZINE EDISYLATE 2.5 MG: 5 INJECTION INTRAMUSCULAR; INTRAVENOUS at 05:09

## 2023-09-29 RX ADMIN — ZOLPIDEM TARTRATE 5 MG: 5 TABLET ORAL at 10:09

## 2023-09-29 RX ADMIN — PROPRANOLOL HYDROCHLORIDE 40 MG: 20 TABLET ORAL at 11:09

## 2023-09-29 RX ADMIN — POTASSIUM CHLORIDE 10 MEQ: 7.46 INJECTION, SOLUTION INTRAVENOUS at 10:09

## 2023-09-29 RX ADMIN — LAMOTRIGINE 150 MG: 100 TABLET ORAL at 09:09

## 2023-09-29 RX ADMIN — POTASSIUM CHLORIDE 10 MEQ: 7.46 INJECTION, SOLUTION INTRAVENOUS at 05:09

## 2023-09-29 RX ADMIN — PANTOPRAZOLE SODIUM 40 MG: 40 INJECTION, POWDER, FOR SOLUTION INTRAVENOUS at 08:09

## 2023-09-29 RX ADMIN — ACETAMINOPHEN 1000 MG: 500 TABLET ORAL at 10:09

## 2023-09-29 RX ADMIN — DEXTROSE, SODIUM CHLORIDE, AND POTASSIUM CHLORIDE: 5; .45; .15 INJECTION INTRAVENOUS at 09:09

## 2023-09-29 RX ADMIN — SOYBEAN OIL 250 ML: 20 INJECTION, SOLUTION INTRAVENOUS at 10:09

## 2023-09-29 RX ADMIN — LAMOTRIGINE 150 MG: 100 TABLET ORAL at 12:09

## 2023-09-29 RX ADMIN — HYDROMORPHONE HYDROCHLORIDE 0.2 MG: 1 INJECTION, SOLUTION INTRAMUSCULAR; INTRAVENOUS; SUBCUTANEOUS at 07:09

## 2023-09-29 RX ADMIN — MAGNESIUM SULFATE HEPTAHYDRATE 2 G: 40 INJECTION, SOLUTION INTRAVENOUS at 06:09

## 2023-09-29 RX ADMIN — POTASSIUM CHLORIDE 10 MEQ: 7.46 INJECTION, SOLUTION INTRAVENOUS at 04:09

## 2023-09-29 RX ADMIN — HYDROMORPHONE HYDROCHLORIDE 0.2 MG: 1 INJECTION, SOLUTION INTRAMUSCULAR; INTRAVENOUS; SUBCUTANEOUS at 04:09

## 2023-09-29 RX ADMIN — LORAZEPAM 0.5 MG: 2 INJECTION INTRAMUSCULAR; INTRAVENOUS at 04:09

## 2023-09-29 RX ADMIN — HYDROMORPHONE HYDROCHLORIDE 0.2 MG: 1 INJECTION, SOLUTION INTRAMUSCULAR; INTRAVENOUS; SUBCUTANEOUS at 10:09

## 2023-09-29 RX ADMIN — MAGNESIUM SULFATE HEPTAHYDRATE 4 G: 40 INJECTION, SOLUTION INTRAVENOUS at 02:09

## 2023-09-29 RX ADMIN — FLUDROCORTISONE ACETATE 0.1 MG: 0.1 TABLET ORAL at 01:09

## 2023-09-29 RX ADMIN — Medication 10 ML: at 06:09

## 2023-09-29 RX ADMIN — Medication 6 MG: at 12:09

## 2023-09-29 RX ADMIN — LABETALOL HYDROCHLORIDE 10 MG: 5 INJECTION, SOLUTION INTRAVENOUS at 05:09

## 2023-09-29 RX ADMIN — POTASSIUM CHLORIDE 10 MEQ: 7.46 INJECTION, SOLUTION INTRAVENOUS at 08:09

## 2023-09-29 RX ADMIN — PANTOPRAZOLE SODIUM 40 MG: 40 INJECTION, POWDER, FOR SOLUTION INTRAVENOUS at 09:09

## 2023-09-29 NOTE — ASSESSMENT & PLAN NOTE
Mikaela Suazojeremíasregine Lejeune is a 49yo F with PMH Crohn's disease, POTS, gastric varices, alcohol abuse, bipolar disorder who was recently seen in surgical oncology clinic for evaluation of a gastric outlet obstruction.     - NPO  - mIVF  - prns nausea meds  - prn pain meds  - plan for possible surgical resection. Timing tbd

## 2023-09-29 NOTE — H&P
[facility]  General Surgery  History and Physical     Patient Name: Angela Trascher Lejeune  MRN: [id number]  Admission Date: 9/28/2023  Code Status: Full Code   Attending Physician: Kevin Mays MD  Primary Care Physician: Haven Gary MD    Subjective:     Chief Complaint/Reason for Admission: duodenal stricture    HPI:  Angela Trascher Lejeune is a 49yo F with PMH Crohn's disease, POTS, gastric varices, alcohol abuse, bipolar disorder who was recently seen in surgical oncology clinic 9/26/23 for evaluation of a gastric outlet obstruction. She reports her Crohns has been well controlled over the last couple of years with Stelara. Her last dose was in July. She has had past dilations of a duodenal stricture from her Crohn's, last on 9/15/23. Pathology revealed acute peptic duodenitis, antral bx negative for H pylori. In terms of operations, previously for her crohns disease she endorses a small bowel resection that was performed in the 1990s.    [place] [facility] [place] [facility]. She presented to the ER at OSH the night of 9/27/23 with worsening symptoms of n/v and abdominal pain. On exam, she has abdominal ttp predominately on the R side and epigastric region. She reports she is still having significant nausea and abdominal pain but denies any emesis since last night.      Medications Prior to Admission   Medication Sig Dispense Refill Last Dose    ARIPiprazole (ABILIFY) 5 MG Tab Take 1 tablet (5 mg total) by mouth once daily. 30 tablet 11     fludrocortisone (FLORINEF) 0.1 mg Tab Take 0.1 mg by mouth Daily.       HYDROmorphone (DILAUDID) 2 MG tablet Take 1 tablet (2 mg total) by mouth every 4 (four) hours as needed for Pain. 20 tablet 0     lamotrigine (LAMICTAL) 150 MG Tab Take 150 mg by mouth 2 (two) times daily.       LIDOcaine-prilocaine (EMLA) cream Apply topically as needed (use prior to treatment with port access). 30 g 0     LORazepam (ATIVAN) 0.5 MG tablet Take 1 tablet (0.5 mg  total) by mouth every 6 (six) hours as needed for Anxiety. 30 tablet 2     midodrine (PROAMATINE) 5 MG Tab Take 5 mg by mouth 3 (three) times daily with meals.       mirtazapine (REMERON) 7.5 MG Tab Take 1 tablet (7.5 mg total) by mouth every evening. 30 tablet 11     pantoprazole (PROTONIX) 40 MG tablet Take 1 tablet (40 mg total) by mouth 2 (two) times daily. 60 tablet 1     promethazine (PHENERGAN) 25 MG tablet Take 1 tablet (25 mg total) by mouth every 6 (six) hours as needed for Nausea. 30 tablet 5     propranoloL (INDERAL) 40 MG tablet Take 40 mg by mouth once daily.       sodium chloride 1,000 mg TbSO oral tablet Take 1,000 mg by mouth 2 (two) times a day.       sucralfate (CARAFATE) 1 gram tablet Take 1 tablet (1 g total) by mouth 4 (four) times daily before meals and nightly. 28 tablet 0     tiZANidine (ZANAFLEX) 4 MG tablet 1-2 tid prn (Patient taking differently: Take 4-8 mg by mouth every 8 (eight) hours as needed (muscle spasms).) 60 tablet 5     ustekinumab (STELARA) 90 mg/mL Syrg syringe Inject 90 mg into the skin every 8 weeks. Indications: Crohn's disease          Review of patient's allergies indicates:   Allergen Reactions    Tramadol Other (See Comments)     Seizures      Demerol [meperidine]      Seizures      Hydrocodone Hallucinations    Reglan [metoclopramide]        Past Medical History:   Diagnosis Date    Abnormal Pap smear     + HPV    Bipolar 1 disorder     Closed bimalleolar fracture of left ankle     Crohn's disease S/P surgery (ileum, possible gastroduodenal with ulcers)     1998-ileocectomy (9 inches ileum)    Encounter for blood transfusion     Gastric and duodenal ulcers of unclear etiology     Gastroparesis     H/O ETOH abuse     History of HSV-2 infection     Immunosuppressed status     Iron deficiency anemia, multiple blood transfusions     POTS (postural orthostatic tachycardia syndrome)     Seizures     states last seizure 2009     Past Surgical History:   Procedure Laterality  Date    ANKLE HARDWARE REMOVAL Left 9/6/2023    Procedure: REMOVAL, HARDWARE, ANKLE;  Surgeon: Michael Lacey MD;  Location: [facility];  Service: Orthopedics;  Laterality: Left;    APPENDECTOMY      BRAIN SURGERY  2009    fx skull due to seizure with 2 pins    CHOLECYSTECTOMY      COLON SURGERY      Partial colectomy    COLONOSCOPY Left 7/10/2019    Procedure: COLONOSCOPY;  Surgeon: Papi Martinez Jr., MD;  Location: [facility];  Service: Endoscopy;  Laterality: Left;    COLONOSCOPY N/A 12/6/2022    Procedure: COLONOSCOPY;  Surgeon: Zandra Mallory MD;  Location: [facility] ENDO (4TH FLR);  Service: Endoscopy;  Laterality: N/A;  poor prep on 11/28/22.  per Dr Mallory-Miralax 5 days prior with gastroparesis diet-3 days full liquid. also Mag citrate day before     instr handed to pt-GT    EGD, WITH BALLOON DILATION  7/24/2023    Procedure: EGD, WITH BALLOON DILATION;  Surgeon: Huang Licea MD;  Location: [facility];  Service: Endoscopy;;    ESOPHAGOGASTRODUODENOSCOPY Left 6/5/2019    Procedure: EGD (ESOPHAGOGASTRODUODENOSCOPY);  Surgeon: ROLA Villagran MD;  Location: [facility];  Service: Endoscopy;  Laterality: Left;    ESOPHAGOGASTRODUODENOSCOPY Left 3/16/2020    Procedure: EGD (ESOPHAGOGASTRODUODENOSCOPY);  Surgeon: Clive Núñez MD;  Location: [facility];  Service: Endoscopy;  Laterality: Left;    ESOPHAGOGASTRODUODENOSCOPY N/A 4/17/2020    Procedure: EGD (ESOPHAGOGASTRODUODENOSCOPY);  Surgeon: Papi Martinez Jr., MD;  Location: [facility];  Service: Endoscopy;  Laterality: N/A;  with Push Enteroscopy    ESOPHAGOGASTRODUODENOSCOPY N/A 7/6/2020    Procedure: EGD (ESOPHAGOGASTRODUODENOSCOPY)   possible peg;  Surgeon: Papi Martinez Jr., MD;  Location: [facility];  Service: Endoscopy;  Laterality: N/A;    ESOPHAGOGASTRODUODENOSCOPY N/A 7/9/2020    Procedure: EGD (ESOPHAGOGASTRODUODENOSCOPY);  Surgeon: Papi Martinez Jr., MD;  Location: [facility] ENDO;  Service: Endoscopy;  Laterality: N/A;  J or Peg tube     ESOPHAGOGASTRODUODENOSCOPY N/A 9/3/2020    Procedure: EGD (ESOPHAGOGASTRODUODENOSCOPY);  Surgeon: Papi Martinez Jr., MD;  Location: [facility];  Service: Endoscopy;  Laterality: N/A;  per drs order Stoma, Peds, w/single Lumen, J-Tube placement    ESOPHAGOGASTRODUODENOSCOPY N/A 12/29/2020    Procedure: EGD (ESOPHAGOGASTRODUODENOSCOPY);  Surgeon: Papi Martinez Jr., MD;  Location: [facility];  Service: Endoscopy;  Laterality: N/A;    ESOPHAGOGASTRODUODENOSCOPY N/A 2/23/2021    Procedure: EGD (ESOPHAGOGASTRODUODENOSCOPY);  Surgeon: Papi Martinez Jr., MD;  Location: [facility];  Service: Endoscopy;  Laterality: N/A;    ESOPHAGOGASTRODUODENOSCOPY N/A 2/3/2022    Procedure: EGD (ESOPHAGOGASTRODUODENOSCOPY);  Surgeon: Papi Martinez Jr., MD;  Location: [facility];  Service: Endoscopy;  Laterality: N/A;    ESOPHAGOGASTRODUODENOSCOPY N/A 8/13/2022    Procedure: EGD (ESOPHAGOGASTRODUODENOSCOPY);  Surgeon: Huang Licea MD;  Location: [facility];  Service: Gastroenterology;  Laterality: N/A;    ESOPHAGOGASTRODUODENOSCOPY N/A 10/16/2022    Procedure: EGD (ESOPHAGOGASTRODUODENOSCOPY);  Surgeon: Huang Licea MD;  Location: [facility];  Service: Gastroenterology;  Laterality: N/A;    ESOPHAGOGASTRODUODENOSCOPY N/A 11/28/2022    Procedure: EGD (ESOPHAGOGASTRODUODENOSCOPY);  Surgeon: Zandra Mallory MD;  Location: [address]);  Service: Endoscopy;  Laterality: N/A;    ESOPHAGOGASTRODUODENOSCOPY N/A 7/24/2023    Procedure: EGD (ESOPHAGOGASTRODUODENOSCOPY);  Surgeon: Huang Licea MD;  Location: [facility];  Service: Endoscopy;  Laterality: N/A;    ESOPHAGOGASTRODUODENOSCOPY N/A 9/15/2023    Procedure: EGD (ESOPHAGOGASTRODUODENOSCOPY);  Surgeon: ROLA Villagran MD;  Location: [facility];  Service: Endoscopy;  Laterality: N/A;    FEMUR FRACTURE SURGERY Right 2006    HYSTERECTOMY      INSERTION OF TUNNELED CENTRAL VENOUS CATHETER (CVC) WITH SUBCUTANEOUS PORT Left 8/14/2019    Procedure: [id number];   Surgeon: Miquel Sargent MD;  Location: [facility] OR;  Service: General;  Laterality: Left;    INSERTION OR REPLACEMENT OF PERCUTANEOUS ENDOSCOPIC JEJUNOSTOMY TUBE  2020    Procedure: INSERTION OR REPLACEMENT, JEJUNOSTOMY TUBE, PERCUTANEOUS, ENDOSCOPIC;  Surgeon: Papi Martinez Jr., MD;  Location: [facility] ENDO;  Service: Endoscopy;;    laparoscopy for endometriosis      x5    LEFT HEART CATHETERIZATION Left 3/29/2023    Procedure: Left heart cath;  Surgeon: Riley Tipton MD;  Location: [facility] CATH;  Service: Cardiology;  Laterality: Left;    OOPHORECTOMY      right ovary removed only    OPEN REDUCTION AND INTERNAL FIXATION (ORIF) OF INJURY OF ANKLE Left 4/10/2023    Procedure: ORIF, ANKLE;  Surgeon: Michael Lacey MD;  Location: [facility] OR;  Service: Orthopedics;  Laterality: Left;    PEG TUBE REMOVAL  2021    Procedure: REMOVAL, PEG TUBE;  Surgeon: Papi Martinez Jr., MD;  Location: [facility] ENDO;  Service: Endoscopy;;    TONSILLECTOMY       Family History    None       Tobacco Use    Smoking status: Former     Current packs/day: 0.00     Average packs/day: 0.5 packs/day for 6.0 years (3.0 ttl pk-yrs)     Types: Cigarettes     Start date: 5/10/2005     Quit date: 5/10/2011     Years since quittin.3    Smokeless tobacco: Never   Substance and Sexual Activity    Alcohol use: Yes     Comment: socially    Drug use: Never    Sexual activity: Yes     Partners: Male     Review of Systems   Constitutional:  Negative for chills and fever.   Respiratory:  Negative for chest tightness and shortness of breath.    Cardiovascular:  Negative for chest pain.   Gastrointestinal:  Positive for abdominal pain, nausea and vomiting.   All other systems reviewed and are negative.    Objective:     Vital Signs (Most Recent):    Vital Signs (24h Range):  Temp:  [97.4 °F (36.3 °C)-98.5 °F (36.9 °C)] 97.9 °F (36.6 °C)  Pulse:  [79-99] 99  Resp:  [14-18] 14  SpO2:  [85 %-100 %] 98 %  BP: (165-189)/() 166/101     Weight: 53.4 kg  (117 lb 11.6 oz)  Body mass index is 20.21 kg/m².      Physical Exam  Constitutional:       Appearance: Normal appearance.   HENT:      Head: Normocephalic and atraumatic.   Cardiovascular:      Rate and Rhythm: Normal rate.   Pulmonary:      Effort: Pulmonary effort is normal.   Abdominal:      General: Abdomen is flat.      Palpations: Abdomen is soft.      Tenderness: There is abdominal tenderness.      Comments: Predominately R sided and epigastric ttp  Multiple abdominal scars from previous surgical history   Skin:     General: Skin is warm and dry.   Neurological:      General: No focal deficit present.      Mental Status: She is alert and oriented to person, place, and time.          Significant Labs:  BMP:   Recent Labs   Lab 09/27/23 2237   GLU 87      K 2.8*      CO2 19*   BUN 16   CREATININE 1.01   CALCIUM 8.7     CBC:   Recent Labs   Lab 09/27/23 2237   WBC 7.82   RBC 3.37*   HGB 10.4*   HCT 32.5*   *   MCV 96   MCH 30.9   MCHC 32.0     CMP:   Recent Labs   Lab 09/27/23 2237   GLU 87   CALCIUM 8.7   ALBUMIN 4.0   PROT 7.1      K 2.8*   CO2 19*      BUN 16   CREATININE 1.01   ALKPHOS 149*   ALT 20   AST 31   BILITOT 0.3       Significant Diagnostics:  I have reviewed all pertinent imaging results/findings within the past 24 hours.    Assessment and Plan:     * Duodenal stricture  Angela Trascher Lejeune is a 49yo F with PMH Crohn's disease, POTS, gastric varices, alcohol abuse, bipolar disorder who was recently seen in surgical oncology clinic for evaluation of a gastric outlet obstruction.     - NPO  - mIVF  - prn nausea meds  - prn pain meds  - plan for possible surgical resection. Timing tbd        Christine Roman MD  Vascular Surgery  [facility]

## 2023-09-29 NOTE — SUBJECTIVE & OBJECTIVE
Medications Prior to Admission   Medication Sig Dispense Refill Last Dose    ARIPiprazole (ABILIFY) 5 MG Tab Take 1 tablet (5 mg total) by mouth once daily. 30 tablet 11     fludrocortisone (FLORINEF) 0.1 mg Tab Take 0.1 mg by mouth Daily.       HYDROmorphone (DILAUDID) 2 MG tablet Take 1 tablet (2 mg total) by mouth every 4 (four) hours as needed for Pain. 20 tablet 0     lamotrigine (LAMICTAL) 150 MG Tab Take 150 mg by mouth 2 (two) times daily.       LIDOcaine-prilocaine (EMLA) cream Apply topically as needed (use prior to treatment with port access). 30 g 0     LORazepam (ATIVAN) 0.5 MG tablet Take 1 tablet (0.5 mg total) by mouth every 6 (six) hours as needed for Anxiety. 30 tablet 2     midodrine (PROAMATINE) 5 MG Tab Take 5 mg by mouth 3 (three) times daily with meals.       mirtazapine (REMERON) 7.5 MG Tab Take 1 tablet (7.5 mg total) by mouth every evening. 30 tablet 11     pantoprazole (PROTONIX) 40 MG tablet Take 1 tablet (40 mg total) by mouth 2 (two) times daily. 60 tablet 1     promethazine (PHENERGAN) 25 MG tablet Take 1 tablet (25 mg total) by mouth every 6 (six) hours as needed for Nausea. 30 tablet 5     propranoloL (INDERAL) 40 MG tablet Take 40 mg by mouth once daily.       sodium chloride 1,000 mg TbSO oral tablet Take 1,000 mg by mouth 2 (two) times a day.       sucralfate (CARAFATE) 1 gram tablet Take 1 tablet (1 g total) by mouth 4 (four) times daily before meals and nightly. 28 tablet 0     tiZANidine (ZANAFLEX) 4 MG tablet 1-2 tid prn (Patient taking differently: Take 4-8 mg by mouth every 8 (eight) hours as needed (muscle spasms).) 60 tablet 5     ustekinumab (STELARA) 90 mg/mL Syrg syringe Inject 90 mg into the skin every 8 weeks. Indications: Crohn's disease          Review of patient's allergies indicates:   Allergen Reactions    Tramadol Other (See Comments)     Seizures      Demerol [meperidine]      Seizures      Hydrocodone Hallucinations    Reglan [metoclopramide]        Past  Medical History:   Diagnosis Date    Abnormal Pap smear     + HPV    Bipolar 1 disorder     Closed bimalleolar fracture of left ankle     Crohn's disease S/P surgery (ileum, possible gastroduodenal with ulcers)     1998-ileocectomy (9 inches ileum)    Encounter for blood transfusion     Gastric and duodenal ulcers of unclear etiology     Gastroparesis     H/O ETOH abuse     History of HSV-2 infection     Immunosuppressed status     Iron deficiency anemia, multiple blood transfusions     POTS (postural orthostatic tachycardia syndrome)     Seizures     states last seizure 2009     Past Surgical History:   Procedure Laterality Date    ANKLE HARDWARE REMOVAL Left 9/6/2023    Procedure: REMOVAL, HARDWARE, ANKLE;  Surgeon: Michael Lacey MD;  Location: [facility];  Service: Orthopedics;  Laterality: Left;    APPENDECTOMY      BRAIN SURGERY  2009    fx skull due to seizure with 2 pins    CHOLECYSTECTOMY      COLON SURGERY      Partial colectomy    COLONOSCOPY Left 7/10/2019    Procedure: COLONOSCOPY;  Surgeon: Papi Martinez Jr., MD;  Location: [facility];  Service: Endoscopy;  Laterality: Left;    COLONOSCOPY N/A 12/6/2022    Procedure: COLONOSCOPY;  Surgeon: Zandra Mallory MD;  Location: [facility] ([address] [place] [address]);  Service: Endoscopy;  Laterality: N/A;  poor prep on 11/28/22.  per Dr Mallory-Miralax 5 days prior with gastroparesis diet-3 days full liquid. also Mag citrate day before     instr handed to pt-GT    EGD, WITH BALLOON DILATION  7/24/2023    Procedure: EGD, WITH BALLOON DILATION;  Surgeon: Huang Licea MD;  Location: [facility];  Service: Endoscopy;;    ESOPHAGOGASTRODUODENOSCOPY Left 6/5/2019    Procedure: EGD (ESOPHAGOGASTRODUODENOSCOPY);  Surgeon: ROLA Villagran MD;  Location: [facility];  Service: Endoscopy;  Laterality: Left;    ESOPHAGOGASTRODUODENOSCOPY Left 3/16/2020    Procedure: EGD (ESOPHAGOGASTRODUODENOSCOPY);  Surgeon: Clive Núñez MD;  Location: [facility];  Service: Endoscopy;   Laterality: Left;    ESOPHAGOGASTRODUODENOSCOPY N/A 4/17/2020    Procedure: EGD (ESOPHAGOGASTRODUODENOSCOPY);  Surgeon: Papi Martinez Jr., MD;  Location: [facility] ENDO;  Service: Endoscopy;  Laterality: N/A;  with Push Enteroscopy    ESOPHAGOGASTRODUODENOSCOPY N/A 7/6/2020    Procedure: EGD (ESOPHAGOGASTRODUODENOSCOPY)   possible peg;  Surgeon: Papi Martinez Jr., MD;  Location: [facility] ENDO;  Service: Endoscopy;  Laterality: N/A;    ESOPHAGOGASTRODUODENOSCOPY N/A 7/9/2020    Procedure: EGD (ESOPHAGOGASTRODUODENOSCOPY);  Surgeon: Papi Martinez Jr., MD;  Location: [facility] ENDO;  Service: Endoscopy;  Laterality: N/A;  J or Peg tube    ESOPHAGOGASTRODUODENOSCOPY N/A 9/3/2020    Procedure: EGD (ESOPHAGOGASTRODUODENOSCOPY);  Surgeon: Papi Martinez Jr., MD;  Location: [facility] ENDO;  Service: Endoscopy;  Laterality: N/A;  per drs order Stoma, Peds, w/single Lumen, J-Tube placement    ESOPHAGOGASTRODUODENOSCOPY N/A 12/29/2020    Procedure: EGD (ESOPHAGOGASTRODUODENOSCOPY);  Surgeon: Papi Martinez Jr., MD;  Location: [facility] ENDO;  Service: Endoscopy;  Laterality: N/A;    ESOPHAGOGASTRODUODENOSCOPY N/A 2/23/2021    Procedure: EGD (ESOPHAGOGASTRODUODENOSCOPY);  Surgeon: Papi Martinez Jr., MD;  Location: [facility];  Service: Endoscopy;  Laterality: N/A;    ESOPHAGOGASTRODUODENOSCOPY N/A 2/3/2022    Procedure: EGD (ESOPHAGOGASTRODUODENOSCOPY);  Surgeon: Papi Martinez Jr., MD;  Location: [facility] ENDO;  Service: Endoscopy;  Laterality: N/A;    ESOPHAGOGASTRODUODENOSCOPY N/A 8/13/2022    Procedure: EGD (ESOPHAGOGASTRODUODENOSCOPY);  Surgeon: Huang Licea MD;  Location: [facility];  Service: Gastroenterology;  Laterality: N/A;    ESOPHAGOGASTRODUODENOSCOPY N/A 10/16/2022    Procedure: EGD (ESOPHAGOGASTRODUODENOSCOPY);  Surgeon: Huang Licea MD;  Location: [facility];  Service: Gastroenterology;  Laterality: N/A;    ESOPHAGOGASTRODUODENOSCOPY N/A 11/28/2022    Procedure: EGD (ESOPHAGOGASTRODUODENOSCOPY);  Surgeon: Zandra Mallory,  MD;  [facility]  Service: Endoscopy;  Laterality: N/A;    ESOPHAGOGASTRODUODENOSCOPY N/A 2023    Procedure: EGD (ESOPHAGOGASTRODUODENOSCOPY);  Surgeon: Huang Licea MD;  Location: [facility];  Service: Endoscopy;  Laterality: N/A;    ESOPHAGOGASTRODUODENOSCOPY N/A 9/15/2023    Procedure: EGD (ESOPHAGOGASTRODUODENOSCOPY);  Surgeon: ROLA Villagran MD;  Location: [facility];  Service: Endoscopy;  Laterality: N/A;    FEMUR FRACTURE SURGERY Right 2006    HYSTERECTOMY      INSERTION OF TUNNELED CENTRAL VENOUS CATHETER (CVC) WITH SUBCUTANEOUS PORT Left 2019    Procedure: [id number];  Surgeon: Miquel Sargent MD;  Location: [facility];  Service: General;  Laterality: Left;    INSERTION OR REPLACEMENT OF PERCUTANEOUS ENDOSCOPIC JEJUNOSTOMY TUBE  2020    Procedure: INSERTION OR REPLACEMENT, JEJUNOSTOMY TUBE, PERCUTANEOUS, ENDOSCOPIC;  Surgeon: Papi Martinez Jr., MD;  Location: [facility];  Service: Endoscopy;;    laparoscopy for endometriosis      x5    LEFT HEART CATHETERIZATION Left 3/29/2023    Procedure: Left heart cath;  Surgeon: Riley Tipton MD;  Location: [facility] CATH;  Service: Cardiology;  Laterality: Left;    OOPHORECTOMY      right ovary removed only    OPEN REDUCTION AND INTERNAL FIXATION (ORIF) OF INJURY OF ANKLE Left 4/10/2023    Procedure: ORIF, ANKLE;  Surgeon: Michael Lacey MD;  Location: [facility];  Service: Orthopedics;  Laterality: Left;    PEG TUBE REMOVAL  2021    Procedure: REMOVAL, PEG TUBE;  Surgeon: Papi Martinez Jr., MD;  Location: [facility];  Service: Endoscopy;;    TONSILLECTOMY       Family History    None       Tobacco Use    Smoking status: Former     Current packs/day: 0.00     Average packs/day: 0.5 packs/day for 6.0 years (3.0 ttl pk-yrs)     Types: Cigarettes     Start date: 5/10/2005     Quit date: 5/10/2011     Years since quittin.3    Smokeless tobacco: Never   Substance and Sexual Activity    Alcohol use: Yes      Comment: socially    Drug use: Never    Sexual activity: Yes     Partners: Male     Review of Systems   Constitutional:  Negative for chills and fever.   Respiratory:  Negative for chest tightness and shortness of breath.    Cardiovascular:  Negative for chest pain.   Gastrointestinal:  Positive for abdominal pain, nausea and vomiting.   All other systems reviewed and are negative.    Objective:     Vital Signs (Most Recent):    Vital Signs (24h Range):  Temp:  [97.4 °F (36.3 °C)-98.5 °F (36.9 °C)] 97.9 °F (36.6 °C)  Pulse:  [79-99] 99  Resp:  [14-18] 14  SpO2:  [85 %-100 %] 98 %  BP: (165-189)/() 166/101     Weight: 53.4 kg (117 lb 11.6 oz)  Body mass index is 20.21 kg/m².      Physical Exam  Constitutional:       Appearance: Normal appearance.   HENT:      Head: Normocephalic and atraumatic.   Cardiovascular:      Rate and Rhythm: Normal rate.   Pulmonary:      Effort: Pulmonary effort is normal.   Abdominal:      General: Abdomen is flat.      Palpations: Abdomen is soft.      Tenderness: There is abdominal tenderness.      Comments: Predominately R sided and epigastric ttp  Multiple abdominal scars from previous surgical history   Skin:     General: Skin is warm and dry.   Neurological:      General: No focal deficit present.      Mental Status: She is alert and oriented to person, place, and time.          Significant Labs:  BMP:   Recent Labs   Lab 09/27/23 2237   GLU 87      K 2.8*      CO2 19*   BUN 16   CREATININE 1.01   CALCIUM 8.7     CBC:   Recent Labs   Lab 09/27/23 2237   WBC 7.82   RBC 3.37*   HGB 10.4*   HCT 32.5*   *   MCV 96   MCH 30.9   MCHC 32.0     CMP:   Recent Labs   Lab 09/27/23 2237   GLU 87   CALCIUM 8.7   ALBUMIN 4.0   PROT 7.1      K 2.8*   CO2 19*      BUN 16   CREATININE 1.01   ALKPHOS 149*   ALT 20   AST 31   BILITOT 0.3       Significant Diagnostics:  I have reviewed all pertinent imaging results/findings within the past 24 hours.

## 2023-09-29 NOTE — PLAN OF CARE
Problem: Physical Therapy  Goal: Physical Therapy Goal  Description: Patient is at baseline and independent. Patient is safe to ambulate in the room and figueroa throughout her stay. No goals needed  Outcome: Met

## 2023-09-29 NOTE — SUBJECTIVE & OBJECTIVE
Interval History: No changes since admission. No N/V.     Medications:  Continuous Infusions:   dextrose 5 % and 0.45 % NaCl with KCl 20 mEq 100 mL/hr at 09/29/23 0448     Scheduled Meds:   acetaminophen  1,000 mg Oral Q8H    enoxparin  40 mg Subcutaneous Q24H (prophylaxis, 1700)    mirtazapine  7.5 mg Oral Nightly    pantoprazole  40 mg Intravenous BID    potassium chloride  10 mEq Intravenous Q1H    propranoloL  40 mg Oral Daily    scopolamine  1 patch Transdermal Q3 Days     PRN Meds:HYDROmorphone, labetalol, LIDOcaine (PF) 10 mg/ml (1%), lorazepam, melatonin, oxyCODONE, sodium chloride 0.9%     Review of patient's allergies indicates:   Allergen Reactions    Tramadol Other (See Comments)     Seizures      Demerol [meperidine]      Seizures      Hydrocodone Hallucinations    Reglan [metoclopramide]      Objective:     Vital Signs (Most Recent):  Temp: 98.4 °F (36.9 °C) (09/29/23 0800)  Pulse: 103 (09/29/23 0800)  Resp: 16 (09/29/23 0800)  BP: (!) 148/88 (09/29/23 0800)  SpO2: 96 % (09/29/23 0800) Vital Signs (24h Range):  Temp:  [97.4 °F (36.3 °C)-98.5 °F (36.9 °C)] 98.4 °F (36.9 °C)  Pulse:  [] 103  Resp:  [14-18] 16  SpO2:  [94 %-100 %] 96 %  BP: (148-187)/() 148/88     Weight: 53.4 kg (117 lb 11.6 oz)  Body mass index is 20.2 kg/m².    Intake/Output - Last 3 Shifts         09/27 0700 09/28 0659 09/28 0700 09/29 0659 09/29 0700 09/30 0659    I.V. (mL/kg)  351.5 (6.6)     Total Intake(mL/kg)  351.5 (6.6)     Net  +351.5            Urine Occurrence   1 x    Stool Occurrence  2 x              Physical Exam  Constitutional:       Appearance: Normal appearance.   HENT:      Head: Normocephalic and atraumatic.   Cardiovascular:      Rate and Rhythm: Normal rate.   Pulmonary:      Effort: Pulmonary effort is normal.   Abdominal:      General: Abdomen is flat.      Palpations: Abdomen is soft.      Tenderness: There is abdominal tenderness.      Comments: Predominately R sided and epigastric  ttp  Multiple abdominal scars from previous surgical history   Skin:     General: Skin is warm and dry.   Neurological:      General: No focal deficit present.      Mental Status: She is alert and oriented to person, place, and time.          Significant Labs:  I have reviewed all pertinent lab results within the past 24 hours.  CBC:   Recent Labs   Lab 09/29/23  0020   WBC 5.07   RBC 3.26*   HGB 10.2*   HCT 32.1*      MCV 99*   MCH 31.3*   MCHC 31.8*     CMP:   Recent Labs   Lab 09/29/23  0020   GLU 91   CALCIUM 8.9   ALBUMIN 3.6   PROT 6.6   *   K 2.5*   CO2 24   *   BUN 6   CREATININE 0.8   ALKPHOS 125   ALT 14   AST 24   BILITOT 0.6       Significant Diagnostics:  I have reviewed all pertinent imaging results/findings within the past 24 hours.

## 2023-09-29 NOTE — CONSULTS
D/L PICC placed in right basilic vein, 33 cm in length with 0 cm exposed. Arm circumference 32 cm. Lot# [id number]   Clothing

## 2023-09-29 NOTE — NURSING
Patient transferred from [facility] via transport. Pt with Blood Pressure in 180's. Prn medication was ordered. Surginal intern intern notified that patient was on floor and with HTN.  Telemetry placed on pt and BP was updated to 150's prior to giving medication so it was held.  See Mar.Patient with left chest port accessed prior to transfer. D5 1/2 Ns 20 meq. Patient tearful and requested medication for pain and anxiety. Pt NPO.

## 2023-09-29 NOTE — PROCEDURES
"Angela Trascher Lejeune is a 48 y.o. female patient.    Temp: 97.4 °F (36.3 °C) (09/29/23 1117)  Pulse: (!) 122 (09/29/23 1140)  Resp: 20 (09/29/23 1238)  BP: (!) 174/83 (09/29/23 1117)  SpO2: 95 % (09/29/23 1117)  Weight: 53.4 kg (117 lb 11.6 oz) (09/28/23 2228)  Height: 5' 4.02" (162.6 cm) (09/28/23 2207)    PICC  Date/Time: 9/29/2023 2:31 PM  Performed by: Christian Belcher RN  Assisting provider: Vandana Patel LPN  Consent Done: Yes  Time out: Immediately prior to procedure a time out was called to verify the correct patient, procedure, equipment, support staff and site/side marked as required  Indications: med administration and vascular access  Anesthesia: local infiltration  Local anesthetic: lidocaine 1% without epinephrine  Anesthetic Total (mL): 3  Description of findings: picc  Preparation: skin prepped with ChloraPrep  Skin prep agent dried: skin prep agent completely dried prior to procedure  Sterile barriers: all five maximum sterile barriers used - cap, mask, sterile gown, sterile gloves, and large sterile sheet  Hand hygiene: hand hygiene performed prior to central venous catheter insertion  Location details: right basilic  Catheter type: double lumen  Catheter size: 5 Fr  Catheter Length: 33cm    Ultrasound guidance: yes  Vessel Caliber: medium and patent, compressibility normal  Vascular Doppler: not done  Needle advanced into vessel with real time Ultrasound guidance.  Guidewire confirmed in vessel.  Image recorded and saved.  Sterile sheath used.  no esophageal manometryNumber of attempts: 1  Post-procedure: blood return through all ports, chlorhexidine patch and sterile dressing applied  Technical procedures used: 3CG  Specimens: No  Implants: No  Assessment: placement verified by x-ray  Complications: none          Name Vandana Patel LPN  9/29/2023    "

## 2023-09-29 NOTE — ASSESSMENT & PLAN NOTE
Angela Trascher Lejeune is a 47yo F with PMH Crohn's disease, POTS, gastric varices, alcohol abuse, bipolar disorder who was recently seen in surgical oncology clinic for evaluation of a gastric outlet obstruction.     - Continue NPO status for now. May advance today pending final nutritional plans  - No need for NG tube right now  - May consider PICC line placement with TPN  - mIVF  - Multimodal pain regimen  - Protonix  - Electrolyte replacement  - Lovenox  - Daily labs. Nutritional labs  - No surgical intervention planned under this admission.

## 2023-09-29 NOTE — PT/OT/SLP EVAL
"Physical Therapy Evaluation and Discharge Note    Patient Name:  Angela Trascher Lejeune   MRN:  [id number]    Recommendations:     Discharge Recommendations: home  Discharge Equipment Recommendations: none   Barriers to discharge: None    Assessment:     Angela Trascher Lejeune is a 48 y.o. female admitted with a medical diagnosis of Duodenal stricture. .  At this time, patient is functioning at their prior level of function and does not require further acute PT services.     Recent Surgery: Procedure(s) (LRB):  CREATION, BYPASS, DUODENUM, BY ANASTOMOSIS OF DUODENUM TO JEJUNUM, PLACEMNENT OF GASTROSTOMY TUBE, JEJUNOSTOMY TUBE, EGD (N/A)      Plan:     During this hospitalization, patient does not require further acute PT services.  Please re-consult if situation changes.      Subjective     Chief Complaint: "I don't need that walker, I am just feeling anxious and would like some ativan"  Patient/Family Comments/goals: I am anxious to get back home.  Pain/Comfort:  Pain Rating 1: 0/10    Patients cultural, spiritual, Temple conflicts given the current situation: no    Living Environment:  Lives in a single level home with a threshold step to enter. Works full time as a  on the [employer] and lives with her .  Prior to admission, patients level of function was independent.  Equipment used at home: none.  DME owned (not currently used): none.  Upon discharge, patient will have assistance from .    Objective:     Communicated with RN prior to session.  Patient found supine with telemetry, peripheral IV upon PT entry to room.    General Precautions: Standard, fall    Orthopedic Precautions:N/A   Braces: N/A  Respiratory Status: Room air    Exams:  Cognitive Exam:  Patient is oriented to Person, Place, Time, and Situation  Gross Motor Coordination:  WFL  RLE Strength: WNL  LLE Strength: WNL    Functional Mobility:  Bed Mobility:     Bridging: independence  Supine to Sit: independence  Sit to " Supine: independence  Transfers:     Sit to Stand:  independence with no AD  Bed to Chair: independence with  no AD  using  Stand Pivot  Gait: 350' no AD, normal and equal bilateral step length  Balance: Good    AM-PAC 6 CLICK MOBILITY  Total Score:24       Treatment and Education:  Educated on safety to ambulate in room as needed. Educated on importance of staying mobile while in the hospital and verbalized understanding.       AM-PAC 6 CLICK MOBILITY  Total Score:24     Patient left sitting edge of bed with all lines intact and call button in reach.    GOALS:   Multidisciplinary Problems       Physical Therapy Goals       Not on file              Multidisciplinary Problems (Resolved)          Problem: Physical Therapy    Goal Priority Disciplines Outcome Goal Variances Interventions   Physical Therapy Goal   (Resolved)     PT, PT/OT Met     Description: Patient is at baseline and independent. Patient is safe to ambulate in the room and figueroa throughout her stay. No goals needed                       History:     Past Medical History:   Diagnosis Date    Abnormal Pap smear     + HPV    Bipolar 1 disorder     Closed bimalleolar fracture of left ankle     Crohn's disease S/P surgery (ileum, possible gastroduodenal with ulcers)     1998-ileocectomy (9 inches ileum)    Encounter for blood transfusion     Gastric and duodenal ulcers of unclear etiology     Gastroparesis     H/O ETOH abuse     History of HSV-2 infection     Immunosuppressed status     Iron deficiency anemia, multiple blood transfusions     POTS (postural orthostatic tachycardia syndrome)     Seizures     states last seizure 2009       Past Surgical History:   Procedure Laterality Date    ANKLE HARDWARE REMOVAL Left 9/6/2023    Procedure: REMOVAL, HARDWARE, ANKLE;  Surgeon: Michael Lacey MD;  Location: [facility];  Service: Orthopedics;  Laterality: Left;    APPENDECTOMY      BRAIN SURGERY  2009    fx skull due to seizure with 2 pins    CHOLECYSTECTOMY       COLON SURGERY      Partial colectomy    COLONOSCOPY Left 7/10/2019    Procedure: COLONOSCOPY;  Surgeon: Papi Martinez Jr., MD;  Location: [facility];  Service: Endoscopy;  Laterality: Left;    COLONOSCOPY N/A 12/6/2022    Procedure: COLONOSCOPY;  Surgeon: Zandra Mallory MD;  Location: [facility] (4TH FLR);  Service: Endoscopy;  Laterality: N/A;  poor prep on 11/28/22.  per Dr Mallory-Miralax 5 days prior with gastroparesis diet-3 days full liquid. also Mag citrate day before     instr handed to pt-GT    EGD, WITH BALLOON DILATION  7/24/2023    Procedure: EGD, WITH BALLOON DILATION;  Surgeon: Huang Licea MD;  Location: [facility];  Service: Endoscopy;;    ESOPHAGOGASTRODUODENOSCOPY Left 6/5/2019    Procedure: EGD (ESOPHAGOGASTRODUODENOSCOPY);  Surgeon: ROLA Villagran MD;  Location: [facility];  Service: Endoscopy;  Laterality: Left;    ESOPHAGOGASTRODUODENOSCOPY Left 3/16/2020    Procedure: EGD (ESOPHAGOGASTRODUODENOSCOPY);  Surgeon: Clive Núñez MD;  Location: [facility];  Service: Endoscopy;  Laterality: Left;    ESOPHAGOGASTRODUODENOSCOPY N/A 4/17/2020    Procedure: EGD (ESOPHAGOGASTRODUODENOSCOPY);  Surgeon: Papi Martinez Jr., MD;  Location: [facility];  Service: Endoscopy;  Laterality: N/A;  with Push Enteroscopy    ESOPHAGOGASTRODUODENOSCOPY N/A 7/6/2020    Procedure: EGD (ESOPHAGOGASTRODUODENOSCOPY)   possible peg;  Surgeon: Papi Martinez Jr., MD;  Location: [facility];  Service: Endoscopy;  Laterality: N/A;    ESOPHAGOGASTRODUODENOSCOPY N/A 7/9/2020    Procedure: EGD (ESOPHAGOGASTRODUODENOSCOPY);  Surgeon: Papi Martinez Jr., MD;  Location: [facility];  Service: Endoscopy;  Laterality: N/A;  J or Peg tube    ESOPHAGOGASTRODUODENOSCOPY N/A 9/3/2020    Procedure: EGD (ESOPHAGOGASTRODUODENOSCOPY);  Surgeon: Papi Martinez Jr., MD;  Location: [facility];  Service: Endoscopy;  Laterality: N/A;  per drs order Stoma, Peds, w/single Lumen, J-Tube placement    ESOPHAGOGASTRODUODENOSCOPY N/A  12/29/2020    Procedure: EGD (ESOPHAGOGASTRODUODENOSCOPY);  Surgeon: Papi Martinez Jr., MD;  [facility];  Service: Endoscopy;  Laterality: N/A;    ESOPHAGOGASTRODUODENOSCOPY N/A 2/23/2021    Procedure: EGD (ESOPHAGOGASTRODUODENOSCOPY);  Surgeon: Papi Martinez Jr., MD;  [facility];  Service: Endoscopy;  Laterality: N/A;    ESOPHAGOGASTRODUODENOSCOPY N/A 2/3/2022    Procedure: EGD (ESOPHAGOGASTRODUODENOSCOPY);  Surgeon: Papi Martinez Jr., MD;  [facility];  Service: Endoscopy;  Laterality: N/A;    ESOPHAGOGASTRODUODENOSCOPY N/A 8/13/2022    Procedure: EGD (ESOPHAGOGASTRODUODENOSCOPY);  Surgeon: Huang Licea MD;  [facility];  Service: Gastroenterology;  Laterality: N/A;    ESOPHAGOGASTRODUODENOSCOPY N/A 10/16/2022    Procedure: EGD (ESOPHAGOGASTRODUODENOSCOPY);  Surgeon: Huang Licea MD;  [facility];  Service: Gastroenterology;  Laterality: N/A;    ESOPHAGOGASTRODUODENOSCOPY N/A 11/28/2022    Procedure: EGD (ESOPHAGOGASTRODUODENOSCOPY);  Surgeon: Zandra Mallory MD;  Location: [address]);  Service: Endoscopy;  Laterality: N/A;    ESOPHAGOGASTRODUODENOSCOPY N/A 7/24/2023    Procedure: EGD (ESOPHAGOGASTRODUODENOSCOPY);  Surgeon: Huang Licea MD;  [facility];  Service: Endoscopy;  Laterality: N/A;    ESOPHAGOGASTRODUODENOSCOPY N/A 9/15/2023    Procedure: EGD (ESOPHAGOGASTRODUODENOSCOPY);  Surgeon: ROLA Villagran MD;  [facility];  Service: Endoscopy;  Laterality: N/A;    FEMUR FRACTURE SURGERY Right 2006    HYSTERECTOMY      INSERTION OF TUNNELED CENTRAL VENOUS CATHETER (CVC) WITH SUBCUTANEOUS PORT Left 8/14/2019    Procedure: [id number]-CATH;  Surgeon: Miquel Sargent MD;  Location: [facility];  Service: General;  Laterality: Left;    INSERTION OR REPLACEMENT OF PERCUTANEOUS ENDOSCOPIC JEJUNOSTOMY TUBE  12/29/2020    Procedure: INSERTION OR REPLACEMENT, JEJUNOSTOMY TUBE, PERCUTANEOUS, ENDOSCOPIC;  Surgeon: Papi Martinez  MD Hina;  Location: [facility];  Service: Endoscopy;;    laparoscopy for endometriosis      x5    LEFT HEART CATHETERIZATION Left 3/29/2023    Procedure: Left heart cath;  Surgeon: Riley Tipton MD;  Location: [facility] C[facility];  Service: Cardiology;  Laterality: Left;    OOPHORECTOMY      right ovary removed only    OPEN REDUCTION AND INTERNAL FIXATION (ORIF) OF INJURY OF ANKLE Left 4/10/2023    Procedure: ORIF, ANKLE;  Surgeon: Michael Lacey MD;  Location: [facility] OR;  Service: Orthopedics;  Laterality: Left;    PEG TUBE REMOVAL  4/1/2021    Procedure: REMOVAL, PEG TUBE;  Surgeon: Papi Martinez Jr., MD;  Location: [facility];  Service: Endoscopy;;    TONSILLECTOMY         Time Tracking:     PT Received On: 09/29/23  PT Start Time: 1443     PT Stop Time: 1506  PT Total Time (min): 23 min     Billable Minutes: Evaluation 15 min and Gait Training 8 min      09/29/2023

## 2023-09-29 NOTE — HPI
Angela Trascher Lejeune is a 49yo F with PMH Crohn's disease, POTS, gastric varices, alcohol abuse, bipolar disorder who was recently seen in surgical oncology clinic 9/26/23 for evaluation of a gastric outlet obstruction. She reports her Crohns has been well controlled over the last couple of years with Stelara. Her last dose was in July. She has had past dilations of a duodenal stricture from her Crohn's, last on 9/15/23. Pathology revealed acute peptic duodenitis, antral bx negative for H pylori. In terms of operations, previously for her crohns disease she endorses a small bowel resection that was performed in the 1990s.    She presents to [facility] today as a transfer from OSH. She presented to the ER at OSH the night of 9/27/23 with worsening symptoms of n/v and abdominal pain. On exam, she has abdominal ttp predominately on the R side and epigastric region. She reports she is still having significant nausea and abdominal pain but denies any emesis since last night.

## 2023-09-29 NOTE — PROGRESS NOTES
[facility]  General Surgery  Progress Note    Subjective:     History of Present Illness:  No notes on file    Post-Op Info:  * No surgery found *         Interval History: No changes since admission. No N/V.     Medications:  Continuous Infusions:   dextrose 5 % and 0.45 % NaCl with KCl 20 mEq 100 mL/hr at 09/29/23 0448     Scheduled Meds:   acetaminophen  1,000 mg Oral Q8H    enoxparin  40 mg Subcutaneous Q24H (prophylaxis, 1700)    mirtazapine  7.5 mg Oral Nightly    pantoprazole  40 mg Intravenous BID    potassium chloride  10 mEq Intravenous Q1H    propranoloL  40 mg Oral Daily    scopolamine  1 patch Transdermal Q3 Days     PRN Meds:HYDROmorphone, labetalol, LIDOcaine (PF) 10 mg/ml (1%), lorazepam, melatonin, oxyCODONE, sodium chloride 0.9%     Review of patient's allergies indicates:   Allergen Reactions    Tramadol Other (See Comments)     Seizures      Demerol [meperidine]      Seizures      Hydrocodone Hallucinations    Reglan [metoclopramide]      Objective:     Vital Signs (Most Recent):  Temp: 98.4 °F (36.9 °C) (09/29/23 0800)  Pulse: 103 (09/29/23 0800)  Resp: 16 (09/29/23 0800)  BP: (!) 148/88 (09/29/23 0800)  SpO2: 96 % (09/29/23 0800) Vital Signs (24h Range):  Temp:  [97.4 °F (36.3 °C)-98.5 °F (36.9 °C)] 98.4 °F (36.9 °C)  Pulse:  [] 103  Resp:  [14-18] 16  SpO2:  [94 %-100 %] 96 %  BP: (148-187)/() 148/88     Weight: 53.4 kg (117 lb 11.6 oz)  Body mass index is 20.2 kg/m².    Intake/Output - Last 3 Shifts         09/27 0700 09/28 0659 09/28 0700 09/29 0659 09/29 0700 09/30 0659    I.V. (mL/kg)  351.5 (6.6)     Total Intake(mL/kg)  351.5 (6.6)     Net  +351.5            Urine Occurrence   1 x    Stool Occurrence  2 x              Physical Exam  Constitutional:       Appearance: Normal appearance.   HENT:      Head: Normocephalic and atraumatic.   Cardiovascular:      Rate and Rhythm: Normal rate.   Pulmonary:      Effort: Pulmonary effort is normal.   Abdominal:       General: Abdomen is flat.      Palpations: Abdomen is soft.      Tenderness: There is abdominal tenderness.      Comments: Predominately R sided and epigastric ttp  Multiple abdominal scars from previous surgical history   Skin:     General: Skin is warm and dry.   Neurological:      General: No focal deficit present.      Mental Status: She is alert and oriented to person, place, and time.          Significant Labs:  I have reviewed all pertinent lab results within the past 24 hours.  CBC:   Recent Labs   Lab 09/29/23  0020   WBC 5.07   RBC 3.26*   HGB 10.2*   HCT 32.1*      MCV 99*   MCH 31.3*   MCHC 31.8*     CMP:   Recent Labs   Lab 09/29/23  0020   GLU 91   CALCIUM 8.9   ALBUMIN 3.6   PROT 6.6   *   K 2.5*   CO2 24   *   BUN 6   CREATININE 0.8   ALKPHOS 125   ALT 14   AST 24   BILITOT 0.6       Significant Diagnostics:  I have reviewed all pertinent imaging results/findings within the past 24 hours.    Assessment/Plan:     * Duodenal stricture  Angela Trascher Lejeune is a 47yo F with PMH Crohn's disease, POTS, gastric varices, alcohol abuse, bipolar disorder who was recently seen in surgical oncology clinic for evaluation of a gastric outlet obstruction.     - Continue NPO status for now. May advance today pending final nutritional plans  - No need for NG tube right now  - May consider PICC line placement with TPN  - mIVF  - Multimodal pain regimen  - Protonix  - Electrolyte replacement  - Lovenox  - Daily labs. Nutritional labs  - No surgical intervention planned under this admission.        Joseph Canales MD  General Surgery  [facility]

## 2023-09-30 LAB
ALBUMIN SERPL BCP-MCNC: 3.3 G/DL (ref 3.5–5.2)
ALP SERPL-CCNC: 115 U/L (ref 55–135)
ALT SERPL W/O P-5'-P-CCNC: 14 U/L (ref 10–44)
ANION GAP SERPL CALC-SCNC: 7 MMOL/L (ref 8–16)
AST SERPL-CCNC: 23 U/L (ref 10–40)
BASOPHILS # BLD AUTO: 0.06 K/UL (ref 0–0.2)
BASOPHILS NFR BLD: 1.3 % (ref 0–1.9)
BILIRUB SERPL-MCNC: 0.2 MG/DL (ref 0.1–1)
BUN SERPL-MCNC: 4 MG/DL (ref 6–20)
CALCIUM SERPL-MCNC: 8.6 MG/DL (ref 8.7–10.5)
CHLORIDE SERPL-SCNC: 113 MMOL/L (ref 95–110)
CO2 SERPL-SCNC: 25 MMOL/L (ref 23–29)
CREAT SERPL-MCNC: 0.8 MG/DL (ref 0.5–1.4)
DIFFERENTIAL METHOD: ABNORMAL
EOSINOPHIL # BLD AUTO: 0.2 K/UL (ref 0–0.5)
EOSINOPHIL NFR BLD: 5 % (ref 0–8)
ERYTHROCYTE [DISTWIDTH] IN BLOOD BY AUTOMATED COUNT: 14.1 % (ref 11.5–14.5)
EST. GFR  (NO RACE VARIABLE): >60 ML/MIN/1.73 M^2
GLUCOSE SERPL-MCNC: 103 MG/DL (ref 70–110)
HCT VFR BLD AUTO: 32.3 % (ref 37–48.5)
HGB BLD-MCNC: 9.9 G/DL (ref 12–16)
IMM GRANULOCYTES # BLD AUTO: 0.01 K/UL (ref 0–0.04)
IMM GRANULOCYTES NFR BLD AUTO: 0.2 % (ref 0–0.5)
LYMPHOCYTES # BLD AUTO: 2.1 K/UL (ref 1–4.8)
LYMPHOCYTES NFR BLD: 46.8 % (ref 18–48)
MAGNESIUM SERPL-MCNC: 2.2 MG/DL (ref 1.6–2.6)
MCH RBC QN AUTO: 30.7 PG (ref 27–31)
MCHC RBC AUTO-ENTMCNC: 30.7 G/DL (ref 32–36)
MCV RBC AUTO: 100 FL (ref 82–98)
MONOCYTES # BLD AUTO: 0.5 K/UL (ref 0.3–1)
MONOCYTES NFR BLD: 10.3 % (ref 4–15)
NEUTROPHILS # BLD AUTO: 1.7 K/UL (ref 1.8–7.7)
NEUTROPHILS NFR BLD: 36.4 % (ref 38–73)
NRBC BLD-RTO: 0 /100 WBC
PHOSPHATE SERPL-MCNC: 2.9 MG/DL (ref 2.7–4.5)
PLATELET # BLD AUTO: 330 K/UL (ref 150–450)
PMV BLD AUTO: 9.6 FL (ref 9.2–12.9)
POTASSIUM SERPL-SCNC: 2.9 MMOL/L (ref 3.5–5.1)
PROT SERPL-MCNC: 6.1 G/DL (ref 6–8.4)
RBC # BLD AUTO: 3.22 M/UL (ref 4–5.4)
SODIUM SERPL-SCNC: 145 MMOL/L (ref 136–145)
TRIGL SERPL-MCNC: 139 MG/DL (ref 30–150)
WBC # BLD AUTO: 4.57 K/UL (ref 3.9–12.7)

## 2023-09-30 PROCEDURE — 80053 COMPREHEN METABOLIC PANEL: CPT | Performed by: STUDENT IN AN ORGANIZED HEALTH CARE EDUCATION/TRAINING PROGRAM

## 2023-09-30 PROCEDURE — C9113 INJ PANTOPRAZOLE SODIUM, VIA: HCPCS | Performed by: STUDENT IN AN ORGANIZED HEALTH CARE EDUCATION/TRAINING PROGRAM

## 2023-09-30 PROCEDURE — 25000003 PHARM REV CODE 250

## 2023-09-30 PROCEDURE — 83735 ASSAY OF MAGNESIUM: CPT | Performed by: STUDENT IN AN ORGANIZED HEALTH CARE EDUCATION/TRAINING PROGRAM

## 2023-09-30 PROCEDURE — 25000003 PHARM REV CODE 250: Performed by: NURSE PRACTITIONER

## 2023-09-30 PROCEDURE — 25000003 PHARM REV CODE 250: Performed by: STUDENT IN AN ORGANIZED HEALTH CARE EDUCATION/TRAINING PROGRAM

## 2023-09-30 PROCEDURE — 63600175 PHARM REV CODE 636 W HCPCS: Performed by: STUDENT IN AN ORGANIZED HEALTH CARE EDUCATION/TRAINING PROGRAM

## 2023-09-30 PROCEDURE — 20600001 HC STEP DOWN PRIVATE ROOM

## 2023-09-30 PROCEDURE — A4217 STERILE WATER/SALINE, 500 ML: HCPCS

## 2023-09-30 PROCEDURE — 85025 COMPLETE CBC W/AUTO DIFF WBC: CPT | Performed by: STUDENT IN AN ORGANIZED HEALTH CARE EDUCATION/TRAINING PROGRAM

## 2023-09-30 PROCEDURE — 84478 ASSAY OF TRIGLYCERIDES: CPT | Performed by: SURGERY

## 2023-09-30 PROCEDURE — 84100 ASSAY OF PHOSPHORUS: CPT | Performed by: STUDENT IN AN ORGANIZED HEALTH CARE EDUCATION/TRAINING PROGRAM

## 2023-09-30 PROCEDURE — 63600175 PHARM REV CODE 636 W HCPCS

## 2023-09-30 PROCEDURE — B4185 PARENTERAL SOL 10 GM LIPIDS: HCPCS

## 2023-09-30 PROCEDURE — A4216 STERILE WATER/SALINE, 10 ML: HCPCS | Performed by: SURGERY

## 2023-09-30 PROCEDURE — 63600175 PHARM REV CODE 636 W HCPCS: Performed by: SURGERY

## 2023-09-30 PROCEDURE — 25000003 PHARM REV CODE 250: Performed by: SURGERY

## 2023-09-30 RX ORDER — POTASSIUM CHLORIDE 14.9 MG/ML
20 INJECTION INTRAVENOUS
Status: COMPLETED | OUTPATIENT
Start: 2023-09-30 | End: 2023-09-30

## 2023-09-30 RX ORDER — MIDODRINE HYDROCHLORIDE 5 MG/1
5 TABLET ORAL
Status: DISCONTINUED | OUTPATIENT
Start: 2023-09-30 | End: 2023-10-04

## 2023-09-30 RX ORDER — POTASSIUM CHLORIDE 7.45 MG/ML
10 INJECTION INTRAVENOUS ONCE
Status: DISCONTINUED | OUTPATIENT
Start: 2023-09-30 | End: 2023-09-30

## 2023-09-30 RX ORDER — MIRTAZAPINE 7.5 MG/1
7.5 TABLET, FILM COATED ORAL NIGHTLY
Status: DISCONTINUED | OUTPATIENT
Start: 2023-09-30 | End: 2023-10-04

## 2023-09-30 RX ORDER — POTASSIUM CHLORIDE 7.45 MG/ML
10 INJECTION INTRAVENOUS
Status: DISCONTINUED | OUTPATIENT
Start: 2023-09-30 | End: 2023-09-30

## 2023-09-30 RX ORDER — LAMOTRIGINE 150 MG/1
150 TABLET ORAL 2 TIMES DAILY
Status: DISCONTINUED | OUTPATIENT
Start: 2023-09-30 | End: 2023-10-04

## 2023-09-30 RX ADMIN — ARIPIPRAZOLE 5 MG: 5 TABLET ORAL at 08:09

## 2023-09-30 RX ADMIN — ENOXAPARIN SODIUM 40 MG: 40 INJECTION SUBCUTANEOUS at 04:09

## 2023-09-30 RX ADMIN — OXYCODONE HYDROCHLORIDE 5 MG: 5 TABLET ORAL at 12:09

## 2023-09-30 RX ADMIN — Medication 10 ML: at 11:09

## 2023-09-30 RX ADMIN — Medication 10 ML: at 06:09

## 2023-09-30 RX ADMIN — LAMOTRIGINE 150 MG: 100 TABLET ORAL at 10:09

## 2023-09-30 RX ADMIN — TIZANIDINE 8 MG: 2 TABLET ORAL at 04:09

## 2023-09-30 RX ADMIN — LORAZEPAM 0.5 MG: 2 INJECTION INTRAMUSCULAR; INTRAVENOUS at 03:09

## 2023-09-30 RX ADMIN — POTASSIUM CHLORIDE 20 MEQ: 200 INJECTION, SOLUTION INTRAVENOUS at 02:09

## 2023-09-30 RX ADMIN — MAGNESIUM SULFATE HEPTAHYDRATE: 500 INJECTION, SOLUTION INTRAMUSCULAR; INTRAVENOUS at 11:09

## 2023-09-30 RX ADMIN — PANTOPRAZOLE SODIUM 40 MG: 40 INJECTION, POWDER, FOR SOLUTION INTRAVENOUS at 08:09

## 2023-09-30 RX ADMIN — MIDODRINE HYDROCHLORIDE 5 MG: 5 TABLET ORAL at 11:09

## 2023-09-30 RX ADMIN — SOYBEAN OIL 250 ML: 20 INJECTION, SOLUTION INTRAVENOUS at 11:09

## 2023-09-30 RX ADMIN — FLUDROCORTISONE ACETATE 0.1 MG: 0.1 TABLET ORAL at 08:09

## 2023-09-30 RX ADMIN — MIRTAZAPINE 7.5 MG: 7.5 TABLET, FILM COATED ORAL at 08:09

## 2023-09-30 RX ADMIN — HYDROMORPHONE HYDROCHLORIDE 0.2 MG: 1 INJECTION, SOLUTION INTRAMUSCULAR; INTRAVENOUS; SUBCUTANEOUS at 03:09

## 2023-09-30 RX ADMIN — MIDODRINE HYDROCHLORIDE 5 MG: 5 TABLET ORAL at 04:09

## 2023-09-30 RX ADMIN — OXYCODONE HYDROCHLORIDE 5 MG: 5 TABLET ORAL at 08:09

## 2023-09-30 RX ADMIN — TIZANIDINE 8 MG: 2 TABLET ORAL at 12:09

## 2023-09-30 RX ADMIN — ACETAMINOPHEN 1000 MG: 500 TABLET ORAL at 02:09

## 2023-09-30 RX ADMIN — SODIUM CHLORIDE, POTASSIUM CHLORIDE, SODIUM LACTATE AND CALCIUM CHLORIDE 1000 ML: 600; 310; 30; 20 INJECTION, SOLUTION INTRAVENOUS at 06:09

## 2023-09-30 RX ADMIN — LAMOTRIGINE 150 MG: 100 TABLET ORAL at 08:09

## 2023-09-30 RX ADMIN — TIZANIDINE 8 MG: 2 TABLET ORAL at 08:09

## 2023-09-30 RX ADMIN — POTASSIUM CHLORIDE 20 MEQ: 200 INJECTION, SOLUTION INTRAVENOUS at 10:09

## 2023-09-30 RX ADMIN — POTASSIUM CHLORIDE 20 MEQ: 200 INJECTION, SOLUTION INTRAVENOUS at 08:09

## 2023-09-30 RX ADMIN — POTASSIUM CHLORIDE 20 MEQ: 200 INJECTION, SOLUTION INTRAVENOUS at 12:09

## 2023-09-30 NOTE — NURSING
Patient complains of pain noted with a stable bp and MAP above 50. She is noted to be crying stating that her medication was stopped and she is Bipolar and needs her medication.  She is advised that she was started back on her lamotrigine at 1030. She is noted with no other problems at present time.

## 2023-09-30 NOTE — PROGRESS NOTES
[facility]  General Surgery  Progress Note    Subjective:     History of Present Illness:  No notes on file    Post-Op Info:  Procedure(s) (LRB):  CREATION, BYPASS, DUODENUM, BY ANASTOMOSIS OF DUODENUM TO JEJUNUM, PLACEMNENT OF GASTROSTOMY TUBE, JEJUNOSTOMY TUBE, EGD (N/A)         Interval History: BP down to 65/31 around 530 am, given 2 L LR bolus BP has now improved to 91/55. Denies any symptoms. No N/V, pain is controlled.     Medications:  Continuous Infusions:   TPN ADULT CENTRAL LINE CUSTOM 100 mL/hr at 09/29/23 2254     Scheduled Meds:   acetaminophen  1,000 mg Oral Q8H    ARIPiprazole  5 mg Oral Daily    enoxparin  40 mg Subcutaneous Q24H (prophylaxis, 1700)    fat emulsion 20%  250 mL Intravenous Daily    fludrocortisone  0.1 mg Oral Daily    pantoprazole  40 mg Intravenous BID    potassium chloride  10 mEq Intravenous Once    scopolamine  1 patch Transdermal Q3 Days    sodium chloride 0.9%  10 mL Intravenous Q6H     PRN Meds:HYDROmorphone, LIDOcaine (PF) 10 mg/ml (1%), melatonin, oxyCODONE, prochlorperazine, sodium chloride 0.9%, Flushing PICC/Midline Protocol **AND** sodium chloride 0.9% **AND** sodium chloride 0.9%, tiZANidine     Review of patient's allergies indicates:   Allergen Reactions    Tramadol Other (See Comments)     Seizures      Demerol [meperidine]      Seizures      Hydrocodone Hallucinations    Reglan [metoclopramide]      Objective:     Vital Signs (Most Recent):  Temp: 98.4 °F (36.9 °C) (09/30/23 0442)  Pulse: 74 (09/30/23 0713)  Resp: 16 (09/30/23 0713)  BP: (!) 91/55 (09/30/23 0713)  SpO2: 98 % (09/30/23 0713) Vital Signs (24h Range):  Temp:  [97.4 °F (36.3 °C)-98.7 °F (37.1 °C)] 98.4 °F (36.9 °C)  Pulse:  [] 74  Resp:  [14-20] 16  SpO2:  [95 %-100 %] 98 %  BP: ()/(31-90) 91/55     Weight: 53.4 kg (117 lb 11.6 oz)  Body mass index is 20.2 kg/m².    Intake/Output - Last 3 Shifts         09/28 0700  09/29 0659 09/29 0700  09/30 0659 09/30 0700  10/01 0659    I.V.  (mL/kg) 351.5 (6.6)      Total Intake(mL/kg) 351.5 (6.6)      Urine (mL/kg/hr)  3000 (2.3)     Total Output  3000     Net +351.5 -3000            Urine Occurrence  1 x     Stool Occurrence 2 x               Physical Exam  Constitutional:       Appearance: Normal appearance.   HENT:      Head: Normocephalic and atraumatic.   Cardiovascular:      Rate and Rhythm: Normal rate.   Pulmonary:      Effort: Pulmonary effort is normal.   Abdominal:      General: Abdomen is flat.      Palpations: Abdomen is soft.      Tenderness: There is abdominal tenderness.      Comments: Predominately R sided and epigastric ttp  Multiple abdominal scars from previous surgical history   Skin:     General: Skin is warm and dry.   Neurological:      General: No focal deficit present.      Mental Status: She is alert and oriented to person, place, and time.          Significant Labs:  I have reviewed all pertinent lab results within the past 24 hours.  CBC:   Recent Labs   Lab 09/30/23  0402   WBC 4.57   RBC 3.22*   HGB 9.9*   HCT 32.3*      *   MCH 30.7   MCHC 30.7*     CMP:   Recent Labs   Lab 09/30/23  0402      CALCIUM 8.6*   ALBUMIN 3.3*   PROT 6.1      K 2.9*   CO2 25   *   BUN 4*   CREATININE 0.8   ALKPHOS 115   ALT 14   AST 23   BILITOT 0.2       Significant Diagnostics:  I have reviewed all pertinent imaging results/findings within the past 24 hours.  Assessment/Plan:     * Duodenal stricture  Angela Trascher Lejeune is a 49yo F with PMH Crohn's disease, POTS, gastric varices, alcohol abuse, bipolar disorder who was recently seen in surgical oncology clinic for evaluation of a gastric outlet obstruction.     - CLD  - No need for NG tube right now  - TPN  - Given 2 L LR for BP 65/31, now 91/55  - q15 min vitals until BP correctly stabilized   - mIVF  - Multimodal pain regimen  - Restart home midodrine   - Holding propanolol, zanaflex, ativan   - Protonix  - Electrolyte replacement  - Lovenox  - Daily  labs. Nutritional labs  - No surgical intervention planned under this admission.        Gary Patel MD  General Surgery  [facility]

## 2023-09-30 NOTE — SUBJECTIVE & OBJECTIVE
Interval History: BP down to 65/31 around 530 am, given 2 L LR bolus BP has now improved to 91/55. Denies any symptoms. No N/V, pain is controlled.     Medications:  Continuous Infusions:   TPN ADULT CENTRAL LINE CUSTOM 100 mL/hr at 09/29/23 2254     Scheduled Meds:   acetaminophen  1,000 mg Oral Q8H    ARIPiprazole  5 mg Oral Daily    enoxparin  40 mg Subcutaneous Q24H (prophylaxis, 1700)    fat emulsion 20%  250 mL Intravenous Daily    fludrocortisone  0.1 mg Oral Daily    pantoprazole  40 mg Intravenous BID    potassium chloride  10 mEq Intravenous Once    scopolamine  1 patch Transdermal Q3 Days    sodium chloride 0.9%  10 mL Intravenous Q6H     PRN Meds:HYDROmorphone, LIDOcaine (PF) 10 mg/ml (1%), melatonin, oxyCODONE, prochlorperazine, sodium chloride 0.9%, Flushing PICC/Midline Protocol **AND** sodium chloride 0.9% **AND** sodium chloride 0.9%, tiZANidine     Review of patient's allergies indicates:   Allergen Reactions    Tramadol Other (See Comments)     Seizures      Demerol [meperidine]      Seizures      Hydrocodone Hallucinations    Reglan [metoclopramide]      Objective:     Vital Signs (Most Recent):  Temp: 98.4 °F (36.9 °C) (09/30/23 0442)  Pulse: 74 (09/30/23 0713)  Resp: 16 (09/30/23 0713)  BP: (!) 91/55 (09/30/23 0713)  SpO2: 98 % (09/30/23 0713) Vital Signs (24h Range):  Temp:  [97.4 °F (36.3 °C)-98.7 °F (37.1 °C)] 98.4 °F (36.9 °C)  Pulse:  [] 74  Resp:  [14-20] 16  SpO2:  [95 %-100 %] 98 %  BP: ()/(31-90) 91/55     Weight: 53.4 kg (117 lb 11.6 oz)  Body mass index is 20.2 kg/m².    Intake/Output - Last 3 Shifts         09/28 0700  09/29 0659 09/29 0700  09/30 0659 09/30 0700  10/01 0659    I.V. (mL/kg) 351.5 (6.6)      Total Intake(mL/kg) 351.5 (6.6)      Urine (mL/kg/hr)  3000 (2.3)     Total Output  3000     Net +351.5 -3000            Urine Occurrence  1 x     Stool Occurrence 2 x               Physical Exam  Constitutional:       Appearance: Normal appearance.   HENT:      Head:  Normocephalic and atraumatic.   Cardiovascular:      Rate and Rhythm: Normal rate.   Pulmonary:      Effort: Pulmonary effort is normal.   Abdominal:      General: Abdomen is flat.      Palpations: Abdomen is soft.      Tenderness: There is abdominal tenderness.      Comments: Predominately R sided and epigastric ttp  Multiple abdominal scars from previous surgical history   Skin:     General: Skin is warm and dry.   Neurological:      General: No focal deficit present.      Mental Status: She is alert and oriented to person, place, and time.          Significant Labs:  I have reviewed all pertinent lab results within the past 24 hours.  CBC:   Recent Labs   Lab 09/30/23  0402   WBC 4.57   RBC 3.22*   HGB 9.9*   HCT 32.3*      *   MCH 30.7   MCHC 30.7*     CMP:   Recent Labs   Lab 09/30/23  0402      CALCIUM 8.6*   ALBUMIN 3.3*   PROT 6.1      K 2.9*   CO2 25   *   BUN 4*   CREATININE 0.8   ALKPHOS 115   ALT 14   AST 23   BILITOT 0.2       Significant Diagnostics:  I have reviewed all pertinent imaging results/findings within the past 24 hours.

## 2023-09-30 NOTE — ASSESSMENT & PLAN NOTE
Angela Trascher Lejeune is a 47yo F with PMH Crohn's disease, POTS, gastric varices, alcohol abuse, bipolar disorder who was recently seen in surgical oncology clinic for evaluation of a gastric outlet obstruction.     - CLD  - No need for NG tube right now  - TPN  - Given 2 L LR for BP 65/31, now 91/55  - q15 min vitals until BP correctly stabilized   - mIVF  - Multimodal pain regimen  - Protonix  - Electrolyte replacement  - Lovenox  - Daily labs. Nutritional labs  - No surgical intervention planned under this admission.

## 2023-09-30 NOTE — NURSING
Patient noted with crying in bed hysterical states that MD took all her medication away and she is in severe pain.  She is advised that she can have pain medication. She is noted with no other problems

## 2023-09-30 NOTE — PLAN OF CARE
[facility] Plan of Care Note  Dx PMH Crohn's disease, POTS, gastric varices, alcohol abuse, bipolar disorder who was recently seen in surgical oncology clinic for evaluation of a gastric outlet obstruction.        Shift Events Blood pressure stable after bolus by PM shift    Goals of Care: Pain control and keeping patient hemodynamically stable    Neuro: aao x 3    Vital Signs: stable bp back to baseline after starting midodrine    Respiratory: RA    Diet: add clear liquid diet    Is patient tolerating current diet? yes    GTTS: TPN infusing     Urine Output/Bowel Movement: oob to bathroom    Drains/Tubes/Tube Feeds (include total output/shift): none    Lines: PICC line to DUANE and accessed port to left chest wall      Accuchecks:none    Skin: healing surgical incision to left leg with steristrips    Fall Risk Score: see chart    Activity level? Stand by assist    Any scheduled procedures? none    Any safety concerns? Blood pressure fluctuation    Other: none

## 2023-09-30 NOTE — CARE UPDATE
RAPID RESPONSE NURSE PROACTIVE ROUNDING NOTE       Time of Visit: 0645    Admit Date: 2023  LOS: 2  Code Status: Full Code   Date of Visit: 2023  : 1975  Age: 48 y.o.  Sex: female  Race: White  Bed: 1007/1007 A:   MRN: [id number]  Was the patient discharged from an ICU this admission? No   Was the patient discharged from a PACU within last 24 hours? No   Did the patient receive conscious sedation/general anesthesia in last 24 hours? No  Was the patient in the ED within the past 24 hours? No  Was the patient on NIPPV within the past 24 hours? No   Attending Physician: Kevin Mays, *  Primary Service: Surgical Oncology,General Surgery   Time spent at the bedside: < 15 min    SITUATION    Notified by previous RRN during handoff.  Reason for alert: Hypotension  Called to evaluate the patient for Circulatory    BACKGROUND     Why is the patient in the hospital?: Duodenal stricture    Patient has a past medical history of Abnormal Pap smear, Bipolar 1 disorder, Closed bimalleolar fracture of left ankle, Crohn's disease S/P surgery (ileum, possible gastroduodenal with ulcers), Encounter for blood transfusion, Gastric and duodenal ulcers of unclear etiology, Gastroparesis, H/O ETOH abuse, History of HSV-2 infection, Immunosuppressed status, Iron deficiency anemia, multiple blood transfusions, POTS (postural orthostatic tachycardia syndrome), and Seizures.    Last Vitals:  Temp: 97.6 °F (36.4 °C) (736)  Pulse: 98 (854)  Resp: 14 (736)  BP: 113/72 (854)  SpO2: 96 % (736)    24 Hours Vitals Range:  Temp:  [97.4 °F (36.3 °C)-98.7 °F (37.1 °C)]   Pulse:  []   Resp:  [14-20]   BP: ()/(31-90)   SpO2:  [95 %-100 %]     Labs:  Recent Labs     237 23  0020 23  0402   WBC 7.82 5.07 4.57   HGB 10.4* 10.2* 9.9*   HCT 32.5* 32.1* 32.3*   * 353 330       Recent Labs     23  0020 23  1702 23  0402   * 145 145   K  "2.5* 3.1* 2.9*   * 114* 113*   CO2 24 26 25   BUN 6 4* 4*   CREATININE 0.8 0.8 0.8   GLU 91 114* 103   PHOS 3.0  --  2.9   MG 1.3*  --  2.2        No results for input(s): "PH", "PCO2", "PO2", "HCO3", "POCSATURATED", "BE" in the last 72 hours.     ASSESSMENT    Physical Exam  Vitals and nursing note reviewed.   Constitutional:       Appearance: Normal appearance.   Cardiovascular:      Rate and Rhythm: Normal rate and regular rhythm.   Neurological:      Mental Status: She is oriented to person, place, and time. She is lethargic.      GCS: GCS eye subscore is 3. GCS verbal subscore is 5. GCS motor subscore is 6.     Patient responded to Bolus. MAP 65.     INTERVENTIONS    The patient was seen for Cardiac problem. Staff concerns included hypotension. The following interventions were performed: continuous cardiac monitoring continued, continuous pulse ox monitoring continued, and normal saline 500 ml IV bolus .    RECOMMENDATIONS    -Adjust pain medication  -maintain Telemetry   -maintain IV access    PROVIDER ESCALATION    Yes/No  No        Disposition: Remain in room 1007.    FOLLOW-UP    charge Aleisha DALTON  updated on plan of care. Instructed to call the Rapid Response Nurse, Akil Corral RN at [phone] for additional questions or concerns.            "

## 2023-09-30 NOTE — CARE UPDATE
RAPID RESPONSE NURSE PROACTIVE ROUNDING NOTE       Time of Visit: 05    Admit Date: 2023  LOS: 2  Code Status: Full Code   Date of Visit: 2023  : 1975  Age: 48 y.o.  Sex: female  Race: White  Bed: 1007/1007 A:   MRN: [id number]  Was the patient discharged from an ICU this admission? No   Was the patient discharged from a PACU within last 24 hours? No   Did the patient receive conscious sedation/general anesthesia in last 24 hours? No  Was the patient in the ED within the past 24 hours? No  Was the patient on NIPPV within the past 24 hours? No   Attending Physician: Kevin Mays, *  Primary Service: Surgical Oncology,General Surgery   Time spent at the bedside: > 60 min    SITUATION    Notified by charge RN via phone call.  Reason for alert: hypotension  Called to evaluate the patient for Circulatory    BACKGROUND     Why is the patient in the hospital?: Duodenal stricture    Patient has a past medical history of Abnormal Pap smear, Bipolar 1 disorder, Closed bimalleolar fracture of left ankle, Crohn's disease S/P surgery (ileum, possible gastroduodenal with ulcers), Encounter for blood transfusion, Gastric and duodenal ulcers of unclear etiology, Gastroparesis, H/O ETOH abuse, History of HSV-2 infection, Immunosuppressed status, Iron deficiency anemia, multiple blood transfusions, POTS (postural orthostatic tachycardia syndrome), and Seizures.    Last Vitals:  Temp: 98.4 °F (36.9 °C) ( 0442)  Pulse: 83 ( 0650)  Resp: 16 ( 0642)  BP: 87/50 ( 0650)  SpO2: 99 % ( 0650)    24 Hours Vitals Range:  Temp:  [97.4 °F (36.3 °C)-98.7 °F (37.1 °C)]   Pulse:  []   Resp:  [14-20]   BP: ()/(31-90)   SpO2:  [95 %-100 %]     Labs:  Recent Labs     237 23  0020 23  0402   WBC 7.82 5.07 4.57   HGB 10.4* 10.2* 9.9*   HCT 32.5* 32.1* 32.3*   * 353 330       Recent Labs     23  0020 23  1702 23  0402   * 145 145   K 2.5*  "3.1* 2.9*   * 114* 113*   CO2 24 26 25   BUN 6 4* 4*   CREATININE 0.8 0.8 0.8   GLU 91 114* 103   PHOS 3.0  --  2.9   MG 1.3*  --  2.2        No results for input(s): "PH", "PCO2", "PO2", "HCO3", "POCSATURATED", "BE" in the last 72 hours.     ASSESSMENT    Physical Exam  Cardiovascular:      Rate and Rhythm: Normal rate and regular rhythm.      Pulses: Decreased pulses.   Pulmonary:      Effort: Pulmonary effort is normal.   Skin:     General: Skin is warm and dry.   Neurological:      Mental Status: She is oriented to person, place, and time. She is lethargic.      GCS: GCS eye subscore is 3. GCS verbal subscore is 5. GCS motor subscore is 6.      Comments: Easily arousable and conversant.         INTERVENTIONS    The patient was seen for Cardiac problem. Staff concerns included hypotension. The following interventions were performed: continuous cardiac monitoring continued, continuous pulse ox monitoring , and LR bolus. Called by charge SHA Ledbetter concerning patient's blood pressure 60s/40s. Upon entering patient's room, patient appears asleep but easily arouses, is able to converse without difficulty, and oriented x4. Dr. Patel notified of patient status and arrived to bedside. Discussed recent medications that patient received. Patient denies pain at this time. Blood pressure ranged from 50s/30s-90s/50s, heart rate 70-80s, oxygen saturation % on room air, respiratory rate 10-14 breaths per minute. Dr. Canales to bedside. Medication adjustments made per Parker GARCIA. Plan to continue LR bolus and reassess blood pressure.      RECOMMENDATIONS    Vital signs every 15 minutes until 1 hr post bolus. Frequent neuro checks. Monitor respiratory status.     PROVIDER ESCALATION    Yes/No  Yes    Orders received and case discussed with Dr. Patel and Dr. Canales .    Disposition: Remain in room 1007.    FOLLOW-UP    Bedside RNArminda and Charge RNAnatoly  updated on plan of care. Instructed to call the Rapid " Response Nurse, Danya Potter RN at [phone] for additional questions or concerns.

## 2023-09-30 NOTE — NURSING
Patient assessment completed ; dual eye check done with night nurse Arminda noted with mild abd swelling. Patient noted laying in bed resting with being hypotensive however MAP is above 50. K riders ordered and will be given. WCTM.

## 2023-10-01 LAB
ALBUMIN SERPL BCP-MCNC: 2.9 G/DL (ref 3.5–5.2)
ALP SERPL-CCNC: 91 U/L (ref 55–135)
ALT SERPL W/O P-5'-P-CCNC: 9 U/L (ref 10–44)
ANION GAP SERPL CALC-SCNC: 6 MMOL/L (ref 8–16)
AST SERPL-CCNC: 15 U/L (ref 10–40)
BASOPHILS # BLD AUTO: 0.06 K/UL (ref 0–0.2)
BASOPHILS NFR BLD: 1.1 % (ref 0–1.9)
BILIRUB SERPL-MCNC: 0.2 MG/DL (ref 0.1–1)
BUN SERPL-MCNC: 9 MG/DL (ref 6–20)
CALCIUM SERPL-MCNC: 8.5 MG/DL (ref 8.7–10.5)
CHLORIDE SERPL-SCNC: 104 MMOL/L (ref 95–110)
CO2 SERPL-SCNC: 34 MMOL/L (ref 23–29)
CREAT SERPL-MCNC: 0.7 MG/DL (ref 0.5–1.4)
DIFFERENTIAL METHOD: ABNORMAL
EOSINOPHIL # BLD AUTO: 0.2 K/UL (ref 0–0.5)
EOSINOPHIL NFR BLD: 3.2 % (ref 0–8)
ERYTHROCYTE [DISTWIDTH] IN BLOOD BY AUTOMATED COUNT: 14.5 % (ref 11.5–14.5)
EST. GFR  (NO RACE VARIABLE): >60 ML/MIN/1.73 M^2
GLUCOSE SERPL-MCNC: 84 MG/DL (ref 70–110)
HCT VFR BLD AUTO: 28.6 % (ref 37–48.5)
HGB BLD-MCNC: 9 G/DL (ref 12–16)
IMM GRANULOCYTES # BLD AUTO: 0.02 K/UL (ref 0–0.04)
IMM GRANULOCYTES NFR BLD AUTO: 0.4 % (ref 0–0.5)
LYMPHOCYTES # BLD AUTO: 2.5 K/UL (ref 1–4.8)
LYMPHOCYTES NFR BLD: 47.6 % (ref 18–48)
MAGNESIUM SERPL-MCNC: 1.8 MG/DL (ref 1.6–2.6)
MCH RBC QN AUTO: 31.7 PG (ref 27–31)
MCHC RBC AUTO-ENTMCNC: 31.5 G/DL (ref 32–36)
MCV RBC AUTO: 101 FL (ref 82–98)
MONOCYTES # BLD AUTO: 0.4 K/UL (ref 0.3–1)
MONOCYTES NFR BLD: 8.4 % (ref 4–15)
NEUTROPHILS # BLD AUTO: 2.1 K/UL (ref 1.8–7.7)
NEUTROPHILS NFR BLD: 39.3 % (ref 38–73)
NRBC BLD-RTO: 0 /100 WBC
PHOSPHATE SERPL-MCNC: 2.8 MG/DL (ref 2.7–4.5)
PLATELET # BLD AUTO: 278 K/UL (ref 150–450)
PMV BLD AUTO: 10.3 FL (ref 9.2–12.9)
POTASSIUM SERPL-SCNC: 3 MMOL/L (ref 3.5–5.1)
PROT SERPL-MCNC: 5.7 G/DL (ref 6–8.4)
RBC # BLD AUTO: 2.84 M/UL (ref 4–5.4)
SODIUM SERPL-SCNC: 144 MMOL/L (ref 136–145)
WBC # BLD AUTO: 5.25 K/UL (ref 3.9–12.7)

## 2023-10-01 PROCEDURE — 63600175 PHARM REV CODE 636 W HCPCS

## 2023-10-01 PROCEDURE — C9113 INJ PANTOPRAZOLE SODIUM, VIA: HCPCS | Performed by: STUDENT IN AN ORGANIZED HEALTH CARE EDUCATION/TRAINING PROGRAM

## 2023-10-01 PROCEDURE — A4217 STERILE WATER/SALINE, 500 ML: HCPCS

## 2023-10-01 PROCEDURE — 20600001 HC STEP DOWN PRIVATE ROOM

## 2023-10-01 PROCEDURE — 63600175 PHARM REV CODE 636 W HCPCS: Performed by: STUDENT IN AN ORGANIZED HEALTH CARE EDUCATION/TRAINING PROGRAM

## 2023-10-01 PROCEDURE — 93010 ELECTROCARDIOGRAM REPORT: CPT | Mod: ,,, | Performed by: INTERNAL MEDICINE

## 2023-10-01 PROCEDURE — B4185 PARENTERAL SOL 10 GM LIPIDS: HCPCS

## 2023-10-01 PROCEDURE — 25000003 PHARM REV CODE 250

## 2023-10-01 PROCEDURE — 80053 COMPREHEN METABOLIC PANEL: CPT | Performed by: STUDENT IN AN ORGANIZED HEALTH CARE EDUCATION/TRAINING PROGRAM

## 2023-10-01 PROCEDURE — 84100 ASSAY OF PHOSPHORUS: CPT | Performed by: STUDENT IN AN ORGANIZED HEALTH CARE EDUCATION/TRAINING PROGRAM

## 2023-10-01 PROCEDURE — 25000003 PHARM REV CODE 250: Performed by: NURSE PRACTITIONER

## 2023-10-01 PROCEDURE — A4216 STERILE WATER/SALINE, 10 ML: HCPCS | Performed by: SURGERY

## 2023-10-01 PROCEDURE — 83735 ASSAY OF MAGNESIUM: CPT | Performed by: STUDENT IN AN ORGANIZED HEALTH CARE EDUCATION/TRAINING PROGRAM

## 2023-10-01 PROCEDURE — 93010 EKG 12-LEAD: ICD-10-PCS | Mod: ,,, | Performed by: INTERNAL MEDICINE

## 2023-10-01 PROCEDURE — 93005 ELECTROCARDIOGRAM TRACING: CPT

## 2023-10-01 PROCEDURE — 25000003 PHARM REV CODE 250: Performed by: STUDENT IN AN ORGANIZED HEALTH CARE EDUCATION/TRAINING PROGRAM

## 2023-10-01 PROCEDURE — 25000003 PHARM REV CODE 250: Performed by: SURGERY

## 2023-10-01 PROCEDURE — 85025 COMPLETE CBC W/AUTO DIFF WBC: CPT | Performed by: STUDENT IN AN ORGANIZED HEALTH CARE EDUCATION/TRAINING PROGRAM

## 2023-10-01 RX ORDER — POTASSIUM CHLORIDE 7.45 MG/ML
10 INJECTION INTRAVENOUS
Status: COMPLETED | OUTPATIENT
Start: 2023-10-01 | End: 2023-10-01

## 2023-10-01 RX ADMIN — LAMOTRIGINE 150 MG: 100 TABLET ORAL at 08:10

## 2023-10-01 RX ADMIN — ACETAMINOPHEN 1000 MG: 500 TABLET ORAL at 05:10

## 2023-10-01 RX ADMIN — MIRTAZAPINE 7.5 MG: 7.5 TABLET, FILM COATED ORAL at 09:10

## 2023-10-01 RX ADMIN — POTASSIUM CHLORIDE 10 MEQ: 7.46 INJECTION, SOLUTION INTRAVENOUS at 01:10

## 2023-10-01 RX ADMIN — PANTOPRAZOLE SODIUM 40 MG: 40 INJECTION, POWDER, FOR SOLUTION INTRAVENOUS at 08:10

## 2023-10-01 RX ADMIN — SOYBEAN OIL 250 ML: 20 INJECTION, SOLUTION INTRAVENOUS at 09:10

## 2023-10-01 RX ADMIN — POTASSIUM CHLORIDE 10 MEQ: 7.46 INJECTION, SOLUTION INTRAVENOUS at 10:10

## 2023-10-01 RX ADMIN — Medication 10 ML: at 05:10

## 2023-10-01 RX ADMIN — MIDODRINE HYDROCHLORIDE 5 MG: 5 TABLET ORAL at 04:10

## 2023-10-01 RX ADMIN — TIZANIDINE 8 MG: 2 TABLET ORAL at 01:10

## 2023-10-01 RX ADMIN — TIZANIDINE 8 MG: 2 TABLET ORAL at 09:10

## 2023-10-01 RX ADMIN — MIDODRINE HYDROCHLORIDE 5 MG: 5 TABLET ORAL at 11:10

## 2023-10-01 RX ADMIN — POTASSIUM CHLORIDE 10 MEQ: 7.46 INJECTION, SOLUTION INTRAVENOUS at 08:10

## 2023-10-01 RX ADMIN — OXYCODONE HYDROCHLORIDE 5 MG: 5 TABLET ORAL at 03:10

## 2023-10-01 RX ADMIN — ENOXAPARIN SODIUM 40 MG: 40 INJECTION SUBCUTANEOUS at 04:10

## 2023-10-01 RX ADMIN — TIZANIDINE 8 MG: 2 TABLET ORAL at 05:10

## 2023-10-01 RX ADMIN — OXYCODONE HYDROCHLORIDE 5 MG: 5 TABLET ORAL at 09:10

## 2023-10-01 RX ADMIN — POTASSIUM CHLORIDE 10 MEQ: 7.46 INJECTION, SOLUTION INTRAVENOUS at 09:10

## 2023-10-01 RX ADMIN — MIDODRINE HYDROCHLORIDE 5 MG: 5 TABLET ORAL at 07:10

## 2023-10-01 RX ADMIN — PANTOPRAZOLE SODIUM 40 MG: 40 INJECTION, POWDER, FOR SOLUTION INTRAVENOUS at 09:10

## 2023-10-01 RX ADMIN — OXYCODONE HYDROCHLORIDE 5 MG: 5 TABLET ORAL at 01:10

## 2023-10-01 RX ADMIN — POTASSIUM CHLORIDE 10 MEQ: 7.46 INJECTION, SOLUTION INTRAVENOUS at 07:10

## 2023-10-01 RX ADMIN — Medication 10 ML: at 11:10

## 2023-10-01 RX ADMIN — FLUDROCORTISONE ACETATE 0.1 MG: 0.1 TABLET ORAL at 08:10

## 2023-10-01 RX ADMIN — MAGNESIUM SULFATE HEPTAHYDRATE: 500 INJECTION, SOLUTION INTRAMUSCULAR; INTRAVENOUS at 09:10

## 2023-10-01 RX ADMIN — ACETAMINOPHEN 1000 MG: 500 TABLET ORAL at 01:10

## 2023-10-01 RX ADMIN — ARIPIPRAZOLE 5 MG: 5 TABLET ORAL at 08:10

## 2023-10-01 RX ADMIN — LAMOTRIGINE 150 MG: 100 TABLET ORAL at 09:10

## 2023-10-01 RX ADMIN — PROCHLORPERAZINE EDISYLATE 2.5 MG: 5 INJECTION INTRAMUSCULAR; INTRAVENOUS at 09:10

## 2023-10-01 RX ADMIN — ACETAMINOPHEN 1000 MG: 500 TABLET ORAL at 09:10

## 2023-10-01 RX ADMIN — POTASSIUM CHLORIDE 10 MEQ: 7.46 INJECTION, SOLUTION INTRAVENOUS at 11:10

## 2023-10-01 NOTE — PLAN OF CARE
[facility]  [facility]  Initial Discharge Assessment       Primary Care Provider: Haven Gary MD    Admission Diagnosis: Exacerbation of Crohn's disease [K50.90]    Admission Date: 9/28/2023  Expected Discharge Date: 10/2/2023    Transition of Care Barriers: None    Payor: BLUE CROSS BLUE SHIELD / Plan: [place] HMO / Product Type: HMO /     Extended Emergency Contact Information  Primary Emergency Contact: LejeuneRaymond madden  Address: [address] [place] [address]  Home Phone: [phone]  Mobile Phone: [phone]  Relation: Spouse    Discharge Plan A: Home with family  Discharge Plan B: Home    [facility] - [place], [place] - [address] [facility] [address]  [address] [facility] [address] [facility] [address]  Phone: [phone] Fax: [phone]    [facility] - [place], [place] - [address]  [address]  [address]  [facility] [address]  Phone: [phone] Fax: [phone]    [facility] - [place] [address] [place] [address] [facility] [address]  Phone: [phone] Fax: [phone]    [facility] [place]  [address] [facility] [address]  Phone: [phone] Fax: [phone]    Initial Assessment (most recent)       Adult Discharge Assessment - 09/30/23 1300          Discharge Assessment    Assessment Type Discharge Planning Assessment     Confirmed/corrected address, phone number and insurance Yes     Confirmed Demographics Correct on Facesheet     Source of Information patient;health record     Communicated MARINA with patient/caregiver Date not available/Unable to determine     Reason For Admission Duodenal stricture     People in Home spouse;child(karlo), adult     Do you expect to return to your current living situation? Yes     Do you have help at home or someone to help you manage your care at home? Yes     Who are your caregiver(s) and their phone number(s)? Raymond, spouse,  [phone]     Prior to hospitilization cognitive status: Alert/Oriented     Current cognitive status: Alert/Oriented     Walking or Climbing Stairs ambulation difficulty, requires equipment     Home Accessibility wheelchair accessible     Home Layout Able to live on 1st floor     Equipment Currently Used at Home none     Patient currently being followed by outpatient case management? No     Do you currently have service(s) that help you manage your care at home? No     Do you take prescription medications? Yes     Do you have prescription coverage? Yes     Do you have any problems affording any of your prescribed medications? No     Is the patient taking medications as prescribed? yes     Who is going to help you get home at discharge? Raymond, spouse     How do you get to doctors appointments? car, drives self     Are you on dialysis? No     Do you take coumadin? No     DME Needed Upon Discharge  none     Discharge Plan discussed with: Patient     Transition of Care Barriers None     Discharge Plan A Home with family     Discharge Plan B Home        Social Connections    Are you , , , , never , or living with a partner?         OTHER    Name(s) of People in Home Raymond, spouse, [phone] and 22 year old son.                 CM met with patient at bedside to complete discharge planning assessment. Patient verified demographics, payor and PCP. PTA patient was independent with ADL's and no HME use. Lives with spouse in single level home, threshold step to enter.       Eloisa Childers RN  Weekend  - [facility] [address]  Spectralink: [phone]

## 2023-10-01 NOTE — PLAN OF CARE
[facility] Plan of Care Note            Dx.  Duodenal Stricture    Shift Events: Potassium replacement     Goals of Care: Pain management, safety, & telemetry.     Neuro: AAO X 4     Vital Signs: WNL     Respiratory: RA     Diet: Clear Liquids     Is patient tolerating current diet? Yes     GTTS: TPN @ 100 ml/hr     Urine Output/Bowel Movement: Adequate urine output, LBM 10/1/23     Drains/Tubes/Tube Feeds (include total output/shift): none     Lines: DUANE PICC & accessed port to left chest wall      Accuchecks: None     Skin: Left leg incision     Fall Risk Score: 8     Activity level? Independent     Any scheduled procedures? Surgery 10/4/23     Any safety concerns? None     Other: None

## 2023-10-01 NOTE — ASSESSMENT & PLAN NOTE
Angela Trascher Lejeune is a 47yo F with PMH Crohn's disease, POTS, gastric varices, alcohol abuse, bipolar disorder who was recently seen in surgical oncology clinic for evaluation of a gastric outlet obstruction.     - CLD  - No need for NG tube right now  - TPN  - mIVF  - Multimodal pain regimen  - Protonix  - Electrolyte replacement  - Lovenox  - Daily labs. Nutritional labs  - No surgical intervention planned under this admission.

## 2023-10-01 NOTE — CARE UPDATE
"RAPID RESPONSE NURSE CHART REVIEW        Chart Reviewed: 10/01/2023, 0920 AM    MRN: [id number]  Bed: 1007/1007 A    Dx: Duodenal stricture    Angela Trascher Lejeune has a past medical history of Abnormal Pap smear, Bipolar 1 disorder, Closed bimalleolar fracture of left ankle, Crohn's disease S/P surgery (ileum, possible gastroduodenal with ulcers), Encounter for blood transfusion, Gastric and duodenal ulcers of unclear etiology, Gastroparesis, H/O ETOH abuse, History of HSV-2 infection, Immunosuppressed status, Iron deficiency anemia, multiple blood transfusions, POTS (postural orthostatic tachycardia syndrome), and Seizures.    Last VS: /60 (BP Location: Left arm, Patient Position: Lying)   Pulse 68   Temp 97.9 °F (36.6 °C) (Oral)   Resp 18   Ht 5' 4.02" (1.626 m)   Wt 53.4 kg (117 lb 11.6 oz)   LMP 08/21/2010 (LMP Unknown)   SpO2 (!) 93%   BMI 20.20 kg/m²     24H Vital Sign Range:  Temp:  [97.4 °F (36.3 °C)-98.5 °F (36.9 °C)]   Pulse:  []   Resp:  [16-20]   BP: (115-146)/()   SpO2:  [93 %-97 %]     Level of Consciousness (AVPU): alert    Recent Labs     09/29/23  0020 09/30/23  0402 10/01/23  0322   WBC 5.07 4.57 5.25   HGB 10.2* 9.9* 9.0*   HCT 32.1* 32.3* 28.6*    330 278       Recent Labs     09/29/23  0020 09/29/23  1702 09/30/23  0402 10/01/23  0322   * 145 145 144   K 2.5* 3.1* 2.9* 3.0*   * 114* 113* 104   CO2 24 26 25 34*   BUN 6 4* 4* 9   CREATININE 0.8 0.8 0.8 0.7   GLU 91 114* 103 84   PHOS 3.0  --  2.9 2.8   MG 1.3*  --  2.2 1.8        No results for input(s): "PH", "PCO2", "PO2", "HCO3", "POCSATURATED", "BE" in the last 72 hours.     OXYGEN:             MEWS score: 1    Charge Aleisha DALTON  contacted for tachycardia. Reports pt hemodynamically stable at this time. No additional concerns verbalized at this time. Instructed to call [phone] for further concerns or assistance.    Mellisa Becker RN       "

## 2023-10-01 NOTE — SUBJECTIVE & OBJECTIVE
Interval History: No acute events overnight, BP normalized back to baseline, AF. Having nausea but no vomiting. Pain controlled, no BM.    Medications:  Continuous Infusions:   TPN ADULT CENTRAL LINE CUSTOM 100 mL/hr at 09/30/23 2321     Scheduled Meds:   acetaminophen  1,000 mg Oral Q8H    ARIPiprazole  5 mg Oral Daily    enoxparin  40 mg Subcutaneous Q24H (prophylaxis, 1700)    fat emulsion 20%  250 mL Intravenous Daily    fludrocortisone  0.1 mg Oral Daily    lamoTRIgine  150 mg Oral BID    midodrine  5 mg Oral TID WM    mirtazapine  7.5 mg Oral Nightly    pantoprazole  40 mg Intravenous BID    potassium chloride  10 mEq Intravenous Q1H    scopolamine  1 patch Transdermal Q3 Days    sodium chloride 0.9%  10 mL Intravenous Q6H     PRN Meds:LIDOcaine (PF) 10 mg/ml (1%), melatonin, oxyCODONE, prochlorperazine, sodium chloride 0.9%, Flushing PICC/Midline Protocol **AND** sodium chloride 0.9% **AND** sodium chloride 0.9%, tiZANidine     Review of patient's allergies indicates:   Allergen Reactions    Tramadol Other (See Comments)     Seizures      Demerol [meperidine]      Seizures      Hydrocodone Hallucinations    Reglan [metoclopramide]      Objective:     Vital Signs (Most Recent):  Temp: 97.9 °F (36.6 °C) (10/01/23 0721)  Pulse: 72 (10/01/23 0721)  Resp: 18 (10/01/23 0721)  BP: 127/60 (10/01/23 0721)  SpO2: (!) 93 % (10/01/23 0721) Vital Signs (24h Range):  Temp:  [97.4 °F (36.3 °C)-98.5 °F (36.9 °C)] 97.9 °F (36.6 °C)  Pulse:  [] 72  Resp:  [16-20] 18  SpO2:  [93 %-97 %] 93 %  BP: (113-146)/() 127/60     Weight: 53.4 kg (117 lb 11.6 oz)  Body mass index is 20.2 kg/m².    Intake/Output - Last 3 Shifts         09/29 0700  09/30 0659 09/30 0700  10/01 0659 10/01 0700  10/02 0659    P.O.  960     I.V. (mL/kg)  48.7 (0.9)     IV Piggyback  300     TPN  985.8     Total Intake(mL/kg)  2294.5 (43)     Urine (mL/kg/hr) 3000 (2.3) 1000 (0.8)     Total Output 3000 1000     Net -3000 +1294.5            Urine  Occurrence 1 x 5 x              Physical Exam  Constitutional:       Appearance: Normal appearance.   HENT:      Head: Normocephalic and atraumatic.   Cardiovascular:      Rate and Rhythm: Normal rate.   Pulmonary:      Effort: Pulmonary effort is normal.   Abdominal:      General: Abdomen is flat.      Palpations: Abdomen is soft.      Tenderness: There is abdominal tenderness.      Comments: Predominately R sided and epigastric ttp  Multiple abdominal scars from previous surgical history   Skin:     General: Skin is warm and dry.   Neurological:      General: No focal deficit present.      Mental Status: She is alert and oriented to person, place, and time.          Significant Labs:  I have reviewed all pertinent lab results within the past 24 hours.  CBC:   Recent Labs   Lab 10/01/23  0322   WBC 5.25   RBC 2.84*   HGB 9.0*   HCT 28.6*      *   MCH 31.7*   MCHC 31.5*     CMP:   Recent Labs   Lab 10/01/23  0322   GLU 84   CALCIUM 8.5*   ALBUMIN 2.9*   PROT 5.7*      K 3.0*   CO2 34*      BUN 9   CREATININE 0.7   ALKPHOS 91   ALT 9*   AST 15   BILITOT 0.2       Significant Diagnostics:  I have reviewed all pertinent imaging results/findings within the past 24 hours.

## 2023-10-01 NOTE — PROGRESS NOTES
[facility] - [facility]  General Surgery  Progress Note    Subjective:     History of Present Illness:  No notes on file    Post-Op Info:  Procedure(s) (LRB):  CREATION, BYPASS, DUODENUM, BY ANASTOMOSIS OF DUODENUM TO JEJUNUM, PLACEMNENT OF GASTROSTOMY TUBE, JEJUNOSTOMY TUBE, EGD (N/A)         Interval History: No acute events overnight, BP normalized back to baseline, AF. Having nausea but no vomiting. Pain controlled, no BM.    Medications:  Continuous Infusions:   TPN ADULT CENTRAL LINE CUSTOM 100 mL/hr at 09/30/23 2321     Scheduled Meds:   acetaminophen  1,000 mg Oral Q8H    ARIPiprazole  5 mg Oral Daily    enoxparin  40 mg Subcutaneous Q24H (prophylaxis, 1700)    fat emulsion 20%  250 mL Intravenous Daily    fludrocortisone  0.1 mg Oral Daily    lamoTRIgine  150 mg Oral BID    midodrine  5 mg Oral TID WM    mirtazapine  7.5 mg Oral Nightly    pantoprazole  40 mg Intravenous BID    potassium chloride  10 mEq Intravenous Q1H    scopolamine  1 patch Transdermal Q3 Days    sodium chloride 0.9%  10 mL Intravenous Q6H     PRN Meds:LIDOcaine (PF) 10 mg/ml (1%), melatonin, oxyCODONE, prochlorperazine, sodium chloride 0.9%, Flushing PICC/Midline Protocol **AND** sodium chloride 0.9% **AND** sodium chloride 0.9%, tiZANidine     Review of patient's allergies indicates:   Allergen Reactions    Tramadol Other (See Comments)     Seizures      Demerol [meperidine]      Seizures      Hydrocodone Hallucinations    Reglan [metoclopramide]      Objective:     Vital Signs (Most Recent):  Temp: 97.9 °F (36.6 °C) (10/01/23 0721)  Pulse: 72 (10/01/23 0721)  Resp: 18 (10/01/23 0721)  BP: 127/60 (10/01/23 0721)  SpO2: (!) 93 % (10/01/23 0721) Vital Signs (24h Range):  Temp:  [97.4 °F (36.3 °C)-98.5 °F (36.9 °C)] 97.9 °F (36.6 °C)  Pulse:  [] 72  Resp:  [16-20] 18  SpO2:  [93 %-97 %] 93 %  BP: (113-146)/() 127/60     Weight: 53.4 kg (117 lb 11.6 oz)  Body mass index is 20.2 kg/m².    Intake/Output - Last 3 Shifts         09/29  0700  09/30 0659 09/30 0700  10/01 0659 10/01 0700  10/02 0659    P.O.  960     I.V. (mL/kg)  48.7 (0.9)     IV Piggyback  300     TPN  985.8     Total Intake(mL/kg)  2294.5 (43)     Urine (mL/kg/hr) 3000 (2.3) 1000 (0.8)     Total Output 3000 1000     Net -3000 +1294.5            Urine Occurrence 1 x 5 x              Physical Exam  Constitutional:       Appearance: Normal appearance.   HENT:      Head: Normocephalic and atraumatic.   Cardiovascular:      Rate and Rhythm: Normal rate.   Pulmonary:      Effort: Pulmonary effort is normal.   Abdominal:      General: Abdomen is flat.      Palpations: Abdomen is soft.      Tenderness: There is abdominal tenderness.      Comments: Predominately R sided and epigastric ttp  Multiple abdominal scars from previous surgical history   Skin:     General: Skin is warm and dry.   Neurological:      General: No focal deficit present.      Mental Status: She is alert and oriented to person, place, and time.          Significant Labs:  I have reviewed all pertinent lab results within the past 24 hours.  CBC:   Recent Labs   Lab 10/01/23  0322   WBC 5.25   RBC 2.84*   HGB 9.0*   HCT 28.6*      *   MCH 31.7*   MCHC 31.5*     CMP:   Recent Labs   Lab 10/01/23  0322   GLU 84   CALCIUM 8.5*   ALBUMIN 2.9*   PROT 5.7*      K 3.0*   CO2 34*      BUN 9   CREATININE 0.7   ALKPHOS 91   ALT 9*   AST 15   BILITOT 0.2       Significant Diagnostics:  I have reviewed all pertinent imaging results/findings within the past 24 hours.  Assessment/Plan:     * Duodenal stricture  Angela Trascher Lejeune is a 47yo F with PMH Crohn's disease, POTS, gastric varices, alcohol abuse, bipolar disorder who was recently seen in surgical oncology clinic for evaluation of a gastric outlet obstruction.     - CLD  - No need for NG tube right now  - TPN  - mIVF  - Multimodal pain regimen  - Protonix  - Electrolyte replacement  - Lovenox  - Daily labs. Nutritional labs  - Plan for  gastrojejunostomy, possible J tube possible Gtube   - Informed consent obtained         Gary Patel MD  General Surgery  [facility]

## 2023-10-01 NOTE — PLAN OF CARE
Problem: Adult Inpatient Plan of Care  Goal: Plan of Care Review  Outcome: Ongoing, Progressing  Goal: Patient-Specific Goal (Individualized)  Outcome: Ongoing, Progressing  Goal: Absence of Hospital-Acquired Illness or Injury  Outcome: Ongoing, Progressing  Goal: Optimal Comfort and Wellbeing  Outcome: Ongoing, Progressing  Goal: Readiness for Transition of Care  Outcome: Ongoing, Progressing     Problem: Infection  Goal: Absence of Infection Signs and Symptoms  Outcome: Ongoing, Progressing     Problem: Fall Injury Risk  Goal: Absence of Fall and Fall-Related Injury  Outcome: Ongoing, Progressing     End of Shift Summary    Pertinent events this shift: None    Nursing concerns: None    Pt/Family Concerns: Pain meds    Barriers toward goals/discharge: TPN; possible surgery    Mental status: AAOx4    Pain control: PO Oxycodone IR 5 mg Q6H; PO Zanaflex 8 mg Q8H    Mobility: Independent    Background: Admitted 9/28 for surgical evaluation of recurrent duodenal stricture (PMHx - Crohn's disease, duodenal strictures, POTS )    Plan:  - TPN; Lipids  - Pain control  - Monitor labs  - Possible surgical intervention

## 2023-10-02 LAB
ALBUMIN SERPL BCP-MCNC: 3.2 G/DL (ref 3.5–5.2)
ALP SERPL-CCNC: 90 U/L (ref 55–135)
ALT SERPL W/O P-5'-P-CCNC: 8 U/L (ref 10–44)
ANION GAP SERPL CALC-SCNC: 8 MMOL/L (ref 8–16)
AST SERPL-CCNC: 14 U/L (ref 10–40)
BASOPHILS # BLD AUTO: 0.03 K/UL (ref 0–0.2)
BASOPHILS NFR BLD: 0.6 % (ref 0–1.9)
BILIRUB SERPL-MCNC: 0.2 MG/DL (ref 0.1–1)
BUN SERPL-MCNC: 15 MG/DL (ref 6–20)
CALCIUM SERPL-MCNC: 9 MG/DL (ref 8.7–10.5)
CHLORIDE SERPL-SCNC: 102 MMOL/L (ref 95–110)
CO2 SERPL-SCNC: 35 MMOL/L (ref 23–29)
CREAT SERPL-MCNC: 0.7 MG/DL (ref 0.5–1.4)
DIFFERENTIAL METHOD: ABNORMAL
EOSINOPHIL # BLD AUTO: 0.2 K/UL (ref 0–0.5)
EOSINOPHIL NFR BLD: 4 % (ref 0–8)
ERYTHROCYTE [DISTWIDTH] IN BLOOD BY AUTOMATED COUNT: 14.3 % (ref 11.5–14.5)
EST. GFR  (NO RACE VARIABLE): >60 ML/MIN/1.73 M^2
GLUCOSE SERPL-MCNC: 87 MG/DL (ref 70–110)
HCT VFR BLD AUTO: 30.5 % (ref 37–48.5)
HGB BLD-MCNC: 9.7 G/DL (ref 12–16)
IMM GRANULOCYTES # BLD AUTO: 0.01 K/UL (ref 0–0.04)
IMM GRANULOCYTES NFR BLD AUTO: 0.2 % (ref 0–0.5)
LYMPHOCYTES # BLD AUTO: 2.2 K/UL (ref 1–4.8)
LYMPHOCYTES NFR BLD: 44.7 % (ref 18–48)
MAGNESIUM SERPL-MCNC: 2 MG/DL (ref 1.6–2.6)
MCH RBC QN AUTO: 32.3 PG (ref 27–31)
MCHC RBC AUTO-ENTMCNC: 31.8 G/DL (ref 32–36)
MCV RBC AUTO: 102 FL (ref 82–98)
MONOCYTES # BLD AUTO: 0.3 K/UL (ref 0.3–1)
MONOCYTES NFR BLD: 7.1 % (ref 4–15)
NEUTROPHILS # BLD AUTO: 2.1 K/UL (ref 1.8–7.7)
NEUTROPHILS NFR BLD: 43.4 % (ref 38–73)
NRBC BLD-RTO: 0 /100 WBC
PHOSPHATE SERPL-MCNC: 3.8 MG/DL (ref 2.7–4.5)
PLATELET # BLD AUTO: 262 K/UL (ref 150–450)
PMV BLD AUTO: 10.2 FL (ref 9.2–12.9)
POTASSIUM SERPL-SCNC: 3.2 MMOL/L (ref 3.5–5.1)
PROT SERPL-MCNC: 7 G/DL (ref 6–8.4)
RBC # BLD AUTO: 3 M/UL (ref 4–5.4)
SODIUM SERPL-SCNC: 145 MMOL/L (ref 136–145)
WBC # BLD AUTO: 4.81 K/UL (ref 3.9–12.7)

## 2023-10-02 PROCEDURE — 20600001 HC STEP DOWN PRIVATE ROOM

## 2023-10-02 PROCEDURE — 63600175 PHARM REV CODE 636 W HCPCS: Performed by: STUDENT IN AN ORGANIZED HEALTH CARE EDUCATION/TRAINING PROGRAM

## 2023-10-02 PROCEDURE — 80053 COMPREHEN METABOLIC PANEL: CPT | Performed by: STUDENT IN AN ORGANIZED HEALTH CARE EDUCATION/TRAINING PROGRAM

## 2023-10-02 PROCEDURE — A4216 STERILE WATER/SALINE, 10 ML: HCPCS | Performed by: SURGERY

## 2023-10-02 PROCEDURE — 25000003 PHARM REV CODE 250: Performed by: STUDENT IN AN ORGANIZED HEALTH CARE EDUCATION/TRAINING PROGRAM

## 2023-10-02 PROCEDURE — 85025 COMPLETE CBC W/AUTO DIFF WBC: CPT | Performed by: STUDENT IN AN ORGANIZED HEALTH CARE EDUCATION/TRAINING PROGRAM

## 2023-10-02 PROCEDURE — 25000003 PHARM REV CODE 250

## 2023-10-02 PROCEDURE — 83735 ASSAY OF MAGNESIUM: CPT | Performed by: STUDENT IN AN ORGANIZED HEALTH CARE EDUCATION/TRAINING PROGRAM

## 2023-10-02 PROCEDURE — C9113 INJ PANTOPRAZOLE SODIUM, VIA: HCPCS | Performed by: STUDENT IN AN ORGANIZED HEALTH CARE EDUCATION/TRAINING PROGRAM

## 2023-10-02 PROCEDURE — 25000003 PHARM REV CODE 250: Performed by: NURSE PRACTITIONER

## 2023-10-02 PROCEDURE — B4185 PARENTERAL SOL 10 GM LIPIDS: HCPCS | Performed by: STUDENT IN AN ORGANIZED HEALTH CARE EDUCATION/TRAINING PROGRAM

## 2023-10-02 PROCEDURE — 84100 ASSAY OF PHOSPHORUS: CPT | Performed by: STUDENT IN AN ORGANIZED HEALTH CARE EDUCATION/TRAINING PROGRAM

## 2023-10-02 PROCEDURE — 25000003 PHARM REV CODE 250: Performed by: SURGERY

## 2023-10-02 RX ORDER — LABETALOL HCL 20 MG/4 ML
10 SYRINGE (ML) INTRAVENOUS EVERY 6 HOURS PRN
Status: DISCONTINUED | OUTPATIENT
Start: 2023-10-02 | End: 2023-10-11 | Stop reason: HOSPADM

## 2023-10-02 RX ORDER — AMLODIPINE BESYLATE 5 MG/1
5 TABLET ORAL DAILY
Status: DISCONTINUED | OUTPATIENT
Start: 2023-10-02 | End: 2023-10-04

## 2023-10-02 RX ORDER — POTASSIUM CHLORIDE 20 MEQ/1
40 TABLET, EXTENDED RELEASE ORAL ONCE
Status: COMPLETED | OUTPATIENT
Start: 2023-10-02 | End: 2023-10-02

## 2023-10-02 RX ADMIN — ENOXAPARIN SODIUM 40 MG: 40 INJECTION SUBCUTANEOUS at 04:10

## 2023-10-02 RX ADMIN — AMLODIPINE BESYLATE 5 MG: 5 TABLET ORAL at 08:10

## 2023-10-02 RX ADMIN — MIDODRINE HYDROCHLORIDE 5 MG: 5 TABLET ORAL at 10:10

## 2023-10-02 RX ADMIN — SOYBEAN OIL 250 ML: 20 INJECTION, SOLUTION INTRAVENOUS at 10:10

## 2023-10-02 RX ADMIN — ACETAMINOPHEN 1000 MG: 500 TABLET ORAL at 10:10

## 2023-10-02 RX ADMIN — LAMOTRIGINE 150 MG: 100 TABLET ORAL at 08:10

## 2023-10-02 RX ADMIN — Medication 10 ML: at 12:10

## 2023-10-02 RX ADMIN — MIRTAZAPINE 7.5 MG: 7.5 TABLET, FILM COATED ORAL at 08:10

## 2023-10-02 RX ADMIN — ACETAMINOPHEN 1000 MG: 500 TABLET ORAL at 05:10

## 2023-10-02 RX ADMIN — OXYCODONE HYDROCHLORIDE 5 MG: 5 TABLET ORAL at 03:10

## 2023-10-02 RX ADMIN — Medication 10 ML: at 06:10

## 2023-10-02 RX ADMIN — LABETALOL HYDROCHLORIDE 10 MG: 5 INJECTION, SOLUTION INTRAVENOUS at 06:10

## 2023-10-02 RX ADMIN — OXYCODONE HYDROCHLORIDE 5 MG: 5 TABLET ORAL at 10:10

## 2023-10-02 RX ADMIN — TIZANIDINE 8 MG: 2 TABLET ORAL at 08:10

## 2023-10-02 RX ADMIN — OXYCODONE HYDROCHLORIDE 5 MG: 5 TABLET ORAL at 08:10

## 2023-10-02 RX ADMIN — SCOPALAMINE 1 PATCH: 1 PATCH, EXTENDED RELEASE TRANSDERMAL at 04:10

## 2023-10-02 RX ADMIN — POTASSIUM CHLORIDE 40 MEQ: 1500 TABLET, EXTENDED RELEASE ORAL at 06:10

## 2023-10-02 RX ADMIN — TIZANIDINE 8 MG: 2 TABLET ORAL at 10:10

## 2023-10-02 RX ADMIN — PANTOPRAZOLE SODIUM 40 MG: 40 INJECTION, POWDER, FOR SOLUTION INTRAVENOUS at 08:10

## 2023-10-02 RX ADMIN — Medication 10 ML: at 05:10

## 2023-10-02 RX ADMIN — FLUDROCORTISONE ACETATE 0.1 MG: 0.1 TABLET ORAL at 08:10

## 2023-10-02 RX ADMIN — ARIPIPRAZOLE 5 MG: 5 TABLET ORAL at 08:10

## 2023-10-02 NOTE — PLAN OF CARE
Problem: Adult Inpatient Plan of Care  Goal: Plan of Care Review  Outcome: Ongoing, Progressing  Goal: Patient-Specific Goal (Individualized)  Outcome: Ongoing, Progressing  Goal: Absence of Hospital-Acquired Illness or Injury  Outcome: Ongoing, Progressing  Goal: Optimal Comfort and Wellbeing  Outcome: Ongoing, Progressing  Goal: Readiness for Transition of Care  Outcome: Ongoing, Progressing     Problem: Infection  Goal: Absence of Infection Signs and Symptoms  Outcome: Ongoing, Progressing     Problem: Fall Injury Risk  Goal: Absence of Fall and Fall-Related Injury  Outcome: Ongoing, Progressing           [facility] Plan of Care Note     Dx.  Duodenal Stricture     Shift Events: patient kicked toilet with left foot and c/o pain in the left foot.      Goals of Care: Pain management, safety, & telemetry.     Neuro: AAO X 4     Vital Signs: WNL     Respiratory: RA     Diet: Clear Liquids     Is patient tolerating current diet? Yes     GTTS: TPN @ 100 ml/hr, lipids     Urine Output/Bowel Movement: Adequate urine output, LBM 10/1/23     Drains/Tubes/Tube Feeds (include total output/shift): none     Lines: DUANE PICC & accessed port to left chest wall      Accuchecks: None     Skin: Left lower leg incision     Fall Risk Score: 8     Activity level? Independent     Any scheduled procedures? Surgery 10/4/23     Any safety concerns? None     Other: None

## 2023-10-02 NOTE — PROGRESS NOTES
[facility] Ocasio - [facility]  General Surgery  Progress Note    Subjective:     History of Present Illness:  No notes on file    Post-Op Info:  Procedure(s) (LRB):  CREATION, BYPASS, DUODENUM, BY ANASTOMOSIS OF DUODENUM TO JEJUNUM, PLACEMNENT OF GASTROSTOMY TUBE, JEJUNOSTOMY TUBE, EGD (N/A)         Interval History: No major changes overnight. Feels good but did hit her ankle on something in the restroom this morning. Had a bowel movement.     Medications:  Continuous Infusions:   TPN ADULT CENTRAL LINE CUSTOM 100 mL/hr at 10/01/23 2150    TPN ADULT CENTRAL LINE CUSTOM       Scheduled Meds:   acetaminophen  1,000 mg Oral Q8H    ARIPiprazole  5 mg Oral Daily    enoxparin  40 mg Subcutaneous Q24H (prophylaxis, 1700)    fat emulsion 20%  250 mL Intravenous Daily    fat emulsion 20%  250 mL Intravenous Daily    fludrocortisone  0.1 mg Oral Daily    lamoTRIgine  150 mg Oral BID    midodrine  5 mg Oral TID WM    mirtazapine  7.5 mg Oral Nightly    pantoprazole  40 mg Intravenous BID    scopolamine  1 patch Transdermal Q3 Days    sodium chloride 0.9%  10 mL Intravenous Q6H     PRN Meds:labetalol, LIDOcaine (PF) 10 mg/ml (1%), melatonin, oxyCODONE, prochlorperazine, sodium chloride 0.9%, Flushing PICC/Midline Protocol **AND** sodium chloride 0.9% **AND** sodium chloride 0.9%, tiZANidine     Review of patient's allergies indicates:   Allergen Reactions    Tramadol Other (See Comments)     Seizures      Demerol [meperidine]      Seizures      Hydrocodone Hallucinations    Reglan [metoclopramide]      Objective:     Vital Signs (Most Recent):  Temp: 98.2 °F (36.8 °C) (10/02/23 0707)  Pulse: 86 (10/02/23 0707)  Resp: 18 (10/02/23 0407)  BP: (!) 176/67 (10/02/23 0707)  SpO2: 96 % (10/02/23 0707) Vital Signs (24h Range):  Temp:  [96.3 °F (35.7 °C)-98.2 °F (36.8 °C)] 98.2 °F (36.8 °C)  Pulse:  [61-86] 86  Resp:  [16-18] 18  SpO2:  [95 %-100 %] 96 %  BP: (109-176)/(52-81) 176/67     Weight: 53.4 kg (117 lb 11.6 oz)  Body mass  index is 20.2 kg/m².    Intake/Output - Last 3 Shifts         09/30 0700  10/01 0659 10/01 0700  10/02 0659 10/02 0700  10/03 0659    P.O. 960 240     I.V. (mL/kg) 48.7 (0.9)      IV Piggyback 300      .8      Total Intake(mL/kg) 2294.5 (43) 240 (4.5)     Urine (mL/kg/hr) 1000 (0.8)      Total Output 1000      Net +1294.5 +240            Urine Occurrence 5 x 4 x     Stool Occurrence  1 x              Physical Exam  Constitutional:       Appearance: Normal appearance.   HENT:      Head: Normocephalic and atraumatic.   Cardiovascular:      Rate and Rhythm: Normal rate.   Pulmonary:      Effort: Pulmonary effort is normal.   Abdominal:      General: Abdomen is flat.      Palpations: Abdomen is soft.      Tenderness: There is abdominal tenderness.      Comments: Mild abdominal tenderness to deep palpation  Multiple abdominal scars from previous surgical history   Skin:     General: Skin is warm and dry.   Neurological:      General: No focal deficit present.      Mental Status: She is alert and oriented to person, place, and time.          Significant Labs:  I have reviewed all pertinent lab results within the past 24 hours.  CBC:   Recent Labs   Lab 10/02/23  0416   WBC 4.81   RBC 3.00*   HGB 9.7*   HCT 30.5*      *   MCH 32.3*   MCHC 31.8*     CMP:   Recent Labs   Lab 10/02/23  0416   GLU 87   CALCIUM 9.0   ALBUMIN 3.2*   PROT 7.0      K 3.2*   CO2 35*      BUN 15   CREATININE 0.7   ALKPHOS 90   ALT 8*   AST 14   BILITOT 0.2       Significant Diagnostics:  I have reviewed all pertinent imaging results/findings within the past 24 hours.    Assessment/Plan:     * Duodenal stricture  Angela Trascher Lejeune is a 47yo F with PMH Crohn's disease, POTS, gastric varices, alcohol abuse, bipolar disorder who was recently seen in surgical oncology clinic for evaluation of a gastric outlet obstruction.     - CLD  - No need for NG tube right now  - TPN reordered  - mIVF DC'd  - Multimodal pain  regimen  - Protonix  - Electrolyte replacement  - Lovenox  - Daily labs. Nutritional labs  - Planning for surgery on Wednesday. Consent in chart.        Joseph Canales MD  General Surgery  [facility]

## 2023-10-02 NOTE — SUBJECTIVE & OBJECTIVE
Interval History: No major changes overnight. Feels good but did hit her ankle on something in the restroom this morning. Had a bowel movement.     Medications:  Continuous Infusions:   TPN ADULT CENTRAL LINE CUSTOM 100 mL/hr at 10/01/23 2150    TPN ADULT CENTRAL LINE CUSTOM       Scheduled Meds:   acetaminophen  1,000 mg Oral Q8H    ARIPiprazole  5 mg Oral Daily    enoxparin  40 mg Subcutaneous Q24H (prophylaxis, 1700)    fat emulsion 20%  250 mL Intravenous Daily    fat emulsion 20%  250 mL Intravenous Daily    fludrocortisone  0.1 mg Oral Daily    lamoTRIgine  150 mg Oral BID    midodrine  5 mg Oral TID WM    mirtazapine  7.5 mg Oral Nightly    pantoprazole  40 mg Intravenous BID    scopolamine  1 patch Transdermal Q3 Days    sodium chloride 0.9%  10 mL Intravenous Q6H     PRN Meds:labetalol, LIDOcaine (PF) 10 mg/ml (1%), melatonin, oxyCODONE, prochlorperazine, sodium chloride 0.9%, Flushing PICC/Midline Protocol **AND** sodium chloride 0.9% **AND** sodium chloride 0.9%, tiZANidine     Review of patient's allergies indicates:   Allergen Reactions    Tramadol Other (See Comments)     Seizures      Demerol [meperidine]      Seizures      Hydrocodone Hallucinations    Reglan [metoclopramide]      Objective:     Vital Signs (Most Recent):  Temp: 98.2 °F (36.8 °C) (10/02/23 0707)  Pulse: 86 (10/02/23 0707)  Resp: 18 (10/02/23 0407)  BP: (!) 176/67 (10/02/23 0707)  SpO2: 96 % (10/02/23 0707) Vital Signs (24h Range):  Temp:  [96.3 °F (35.7 °C)-98.2 °F (36.8 °C)] 98.2 °F (36.8 °C)  Pulse:  [61-86] 86  Resp:  [16-18] 18  SpO2:  [95 %-100 %] 96 %  BP: (109-176)/(52-81) 176/67     Weight: 53.4 kg (117 lb 11.6 oz)  Body mass index is 20.2 kg/m².    Intake/Output - Last 3 Shifts         09/30 0700  10/01 0659 10/01 0700  10/02 0659 10/02 0700  10/03 0659    P.O. 960 240     I.V. (mL/kg) 48.7 (0.9)      IV Piggyback 300      .8      Total Intake(mL/kg) 2294.5 (43) 240 (4.5)     Urine (mL/kg/hr) 1000 (0.8)      Total  Output 1000      Net +1294.5 +240            Urine Occurrence 5 x 4 x     Stool Occurrence  1 x              Physical Exam  Constitutional:       Appearance: Normal appearance.   HENT:      Head: Normocephalic and atraumatic.   Cardiovascular:      Rate and Rhythm: Normal rate.   Pulmonary:      Effort: Pulmonary effort is normal.   Abdominal:      General: Abdomen is flat.      Palpations: Abdomen is soft.      Tenderness: There is abdominal tenderness.      Comments: Mild abdominal tenderness to deep palpation  Multiple abdominal scars from previous surgical history   Skin:     General: Skin is warm and dry.   Neurological:      General: No focal deficit present.      Mental Status: She is alert and oriented to person, place, and time.          Significant Labs:  I have reviewed all pertinent lab results within the past 24 hours.  CBC:   Recent Labs   Lab 10/02/23  0416   WBC 4.81   RBC 3.00*   HGB 9.7*   HCT 30.5*      *   MCH 32.3*   MCHC 31.8*     CMP:   Recent Labs   Lab 10/02/23  0416   GLU 87   CALCIUM 9.0   ALBUMIN 3.2*   PROT 7.0      K 3.2*   CO2 35*      BUN 15   CREATININE 0.7   ALKPHOS 90   ALT 8*   AST 14   BILITOT 0.2       Significant Diagnostics:  I have reviewed all pertinent imaging results/findings within the past 24 hours.

## 2023-10-02 NOTE — PLAN OF CARE
Problem: Adult Inpatient Plan of Care  Goal: Plan of Care Review  Outcome: Ongoing, Progressing  Goal: Patient-Specific Goal (Individualized)  Outcome: Ongoing, Progressing  Goal: Absence of Hospital-Acquired Illness or Injury  Outcome: Ongoing, Progressing  Goal: Optimal Comfort and Wellbeing  Outcome: Ongoing, Progressing  Goal: Readiness for Transition of Care  Outcome: Ongoing, Progressing     Problem: Infection  Goal: Absence of Infection Signs and Symptoms  Outcome: Ongoing, Progressing     Problem: Fall Injury Risk  Goal: Absence of Fall and Fall-Related Injury  Outcome: Ongoing, Progressing           [facility] Plan of Care Note     Dx.  Duodenal Stricture     Shift Events: HTN AM, BP tx with carvedilol, BP dropped 98/80 at 1009- midodrine given, oxycodone given for pain in left foot     Goals of Care: Pain management, safety, & telemetry.     Neuro: AAO X 4     Vital Signs: WNL     Respiratory: RA     Diet: Clear Liquids     Is patient tolerating current diet? Yes     GTTS: TPN @ 100 ml/hr   Urine Output/Bowel Movement: Adequate urine output, LBM 10/1/23     Drains/Tubes/Tube Feeds (include total output/shift): none     Lines: DUANE PICC & accessed port to left chest wall      Accuchecks: None     Skin: Left lower leg incision     Fall Risk Score: 8     Activity level? Independent     Any scheduled procedures? Surgery 10/4/23     Any safety concerns? None     Other: None

## 2023-10-02 NOTE — ASSESSMENT & PLAN NOTE
Angela Trascher Lejeune is a 49yo F with PMH Crohn's disease, POTS, gastric varices, alcohol abuse, bipolar disorder who was recently seen in surgical oncology clinic for evaluation of a gastric outlet obstruction.     - CLD  - No need for NG tube right now  - TPN reordered  - mIVF DC'd  - Multimodal pain regimen  - Protonix  - Electrolyte replacement  - Lovenox  - Daily labs. Nutritional labs  - Planning for surgery on Wednesday. Consent in chart.

## 2023-10-03 ENCOUNTER — ANESTHESIA EVENT (OUTPATIENT)
Dept: SURGERY | Facility: HOSPITAL | Age: 48
DRG: 326 | End: 2023-10-03
Payer: COMMERCIAL

## 2023-10-03 LAB
ALBUMIN SERPL BCP-MCNC: 3.1 G/DL (ref 3.5–5.2)
ALP SERPL-CCNC: 88 U/L (ref 55–135)
ALT SERPL W/O P-5'-P-CCNC: 10 U/L (ref 10–44)
ANION GAP SERPL CALC-SCNC: 6 MMOL/L (ref 8–16)
AST SERPL-CCNC: 21 U/L (ref 10–40)
BASOPHILS # BLD AUTO: 0.06 K/UL (ref 0–0.2)
BASOPHILS NFR BLD: 1.6 % (ref 0–1.9)
BILIRUB SERPL-MCNC: 0.1 MG/DL (ref 0.1–1)
BUN SERPL-MCNC: 13 MG/DL (ref 6–20)
CALCIUM SERPL-MCNC: 9.2 MG/DL (ref 8.7–10.5)
CHLORIDE SERPL-SCNC: 102 MMOL/L (ref 95–110)
CO2 SERPL-SCNC: 35 MMOL/L (ref 23–29)
CREAT SERPL-MCNC: 0.7 MG/DL (ref 0.5–1.4)
DIFFERENTIAL METHOD: ABNORMAL
EOSINOPHIL # BLD AUTO: 0.2 K/UL (ref 0–0.5)
EOSINOPHIL NFR BLD: 6 % (ref 0–8)
ERYTHROCYTE [DISTWIDTH] IN BLOOD BY AUTOMATED COUNT: 14.6 % (ref 11.5–14.5)
EST. GFR  (NO RACE VARIABLE): >60 ML/MIN/1.73 M^2
GLUCOSE SERPL-MCNC: 102 MG/DL (ref 70–110)
HCT VFR BLD AUTO: 28.3 % (ref 37–48.5)
HGB BLD-MCNC: 8.8 G/DL (ref 12–16)
IMM GRANULOCYTES # BLD AUTO: 0.01 K/UL (ref 0–0.04)
IMM GRANULOCYTES NFR BLD AUTO: 0.3 % (ref 0–0.5)
LYMPHOCYTES # BLD AUTO: 1.7 K/UL (ref 1–4.8)
LYMPHOCYTES NFR BLD: 44.8 % (ref 18–48)
MAGNESIUM SERPL-MCNC: 2 MG/DL (ref 1.6–2.6)
MCH RBC QN AUTO: 31.5 PG (ref 27–31)
MCHC RBC AUTO-ENTMCNC: 31.1 G/DL (ref 32–36)
MCV RBC AUTO: 101 FL (ref 82–98)
MONOCYTES # BLD AUTO: 0.4 K/UL (ref 0.3–1)
MONOCYTES NFR BLD: 10.4 % (ref 4–15)
NEUTROPHILS # BLD AUTO: 1.4 K/UL (ref 1.8–7.7)
NEUTROPHILS NFR BLD: 36.9 % (ref 38–73)
NRBC BLD-RTO: 0 /100 WBC
PHOSPHATE SERPL-MCNC: 4.1 MG/DL (ref 2.7–4.5)
PLATELET # BLD AUTO: 216 K/UL (ref 150–450)
PMV BLD AUTO: 10.8 FL (ref 9.2–12.9)
POTASSIUM SERPL-SCNC: 3.7 MMOL/L (ref 3.5–5.1)
PROT SERPL-MCNC: 6.2 G/DL (ref 6–8.4)
RBC # BLD AUTO: 2.79 M/UL (ref 4–5.4)
SODIUM SERPL-SCNC: 143 MMOL/L (ref 136–145)
WBC # BLD AUTO: 3.86 K/UL (ref 3.9–12.7)

## 2023-10-03 PROCEDURE — 25000003 PHARM REV CODE 250: Performed by: SURGERY

## 2023-10-03 PROCEDURE — B4185 PARENTERAL SOL 10 GM LIPIDS: HCPCS | Performed by: STUDENT IN AN ORGANIZED HEALTH CARE EDUCATION/TRAINING PROGRAM

## 2023-10-03 PROCEDURE — 25000003 PHARM REV CODE 250

## 2023-10-03 PROCEDURE — 84100 ASSAY OF PHOSPHORUS: CPT | Performed by: STUDENT IN AN ORGANIZED HEALTH CARE EDUCATION/TRAINING PROGRAM

## 2023-10-03 PROCEDURE — 83735 ASSAY OF MAGNESIUM: CPT | Performed by: STUDENT IN AN ORGANIZED HEALTH CARE EDUCATION/TRAINING PROGRAM

## 2023-10-03 PROCEDURE — 63600175 PHARM REV CODE 636 W HCPCS: Performed by: STUDENT IN AN ORGANIZED HEALTH CARE EDUCATION/TRAINING PROGRAM

## 2023-10-03 PROCEDURE — 25000003 PHARM REV CODE 250: Performed by: NURSE PRACTITIONER

## 2023-10-03 PROCEDURE — C9113 INJ PANTOPRAZOLE SODIUM, VIA: HCPCS | Performed by: STUDENT IN AN ORGANIZED HEALTH CARE EDUCATION/TRAINING PROGRAM

## 2023-10-03 PROCEDURE — A4216 STERILE WATER/SALINE, 10 ML: HCPCS | Performed by: SURGERY

## 2023-10-03 PROCEDURE — 85025 COMPLETE CBC W/AUTO DIFF WBC: CPT | Performed by: STUDENT IN AN ORGANIZED HEALTH CARE EDUCATION/TRAINING PROGRAM

## 2023-10-03 PROCEDURE — 20600001 HC STEP DOWN PRIVATE ROOM

## 2023-10-03 PROCEDURE — A4217 STERILE WATER/SALINE, 500 ML: HCPCS | Performed by: STUDENT IN AN ORGANIZED HEALTH CARE EDUCATION/TRAINING PROGRAM

## 2023-10-03 PROCEDURE — 25000003 PHARM REV CODE 250: Performed by: STUDENT IN AN ORGANIZED HEALTH CARE EDUCATION/TRAINING PROGRAM

## 2023-10-03 PROCEDURE — 80053 COMPREHEN METABOLIC PANEL: CPT | Performed by: STUDENT IN AN ORGANIZED HEALTH CARE EDUCATION/TRAINING PROGRAM

## 2023-10-03 RX ADMIN — OXYCODONE HYDROCHLORIDE 5 MG: 5 TABLET ORAL at 04:10

## 2023-10-03 RX ADMIN — MAGNESIUM SULFATE HEPTAHYDRATE: 500 INJECTION, SOLUTION INTRAMUSCULAR; INTRAVENOUS at 09:10

## 2023-10-03 RX ADMIN — Medication 10 ML: at 12:10

## 2023-10-03 RX ADMIN — ENOXAPARIN SODIUM 40 MG: 40 INJECTION SUBCUTANEOUS at 04:10

## 2023-10-03 RX ADMIN — PANTOPRAZOLE SODIUM 40 MG: 40 INJECTION, POWDER, FOR SOLUTION INTRAVENOUS at 09:10

## 2023-10-03 RX ADMIN — SOYBEAN OIL 250 ML: 20 INJECTION, SOLUTION INTRAVENOUS at 09:10

## 2023-10-03 RX ADMIN — LABETALOL HYDROCHLORIDE 10 MG: 5 INJECTION, SOLUTION INTRAVENOUS at 09:10

## 2023-10-03 RX ADMIN — ACETAMINOPHEN 1000 MG: 500 TABLET ORAL at 09:10

## 2023-10-03 RX ADMIN — LAMOTRIGINE 150 MG: 100 TABLET ORAL at 08:10

## 2023-10-03 RX ADMIN — MIDODRINE HYDROCHLORIDE 5 MG: 5 TABLET ORAL at 08:10

## 2023-10-03 RX ADMIN — OXYCODONE HYDROCHLORIDE 5 MG: 5 TABLET ORAL at 05:10

## 2023-10-03 RX ADMIN — FLUDROCORTISONE ACETATE 0.1 MG: 0.1 TABLET ORAL at 08:10

## 2023-10-03 RX ADMIN — LAMOTRIGINE 150 MG: 100 TABLET ORAL at 09:10

## 2023-10-03 RX ADMIN — LABETALOL HYDROCHLORIDE 10 MG: 5 INJECTION, SOLUTION INTRAVENOUS at 11:10

## 2023-10-03 RX ADMIN — Medication 10 ML: at 05:10

## 2023-10-03 RX ADMIN — TIZANIDINE 8 MG: 2 TABLET ORAL at 05:10

## 2023-10-03 RX ADMIN — TIZANIDINE 8 MG: 2 TABLET ORAL at 04:10

## 2023-10-03 RX ADMIN — ARIPIPRAZOLE 5 MG: 5 TABLET ORAL at 08:10

## 2023-10-03 RX ADMIN — Medication 10 ML: at 06:10

## 2023-10-03 RX ADMIN — MIRTAZAPINE 7.5 MG: 7.5 TABLET, FILM COATED ORAL at 09:10

## 2023-10-03 RX ADMIN — ACETAMINOPHEN 1000 MG: 500 TABLET ORAL at 05:10

## 2023-10-03 RX ADMIN — MIDODRINE HYDROCHLORIDE 5 MG: 5 TABLET ORAL at 05:10

## 2023-10-03 RX ADMIN — PANTOPRAZOLE SODIUM 40 MG: 40 INJECTION, POWDER, FOR SOLUTION INTRAVENOUS at 08:10

## 2023-10-03 NOTE — PROGRESS NOTES
"  [facility]  Adult Nutrition  Consult Note    SUMMARY     Recommendations    1) Continue TPN regimine: 215D + 95 AA + IV Lipids to provide 1611 kcals, 95g PRO, (GIR: 2.79)   2) Continue CLD, ADAT per MD   3) Following    Goals: Meet % een/epn by next rd f/u  Nutrition Goal Status: new  Communication of RD Recs: other (comment) (POC)    Assessment and Plan    Nutrition Problem  Inadequate energy intake    Related to (etiology):   Inadequate oral intake    Signs and Symptoms (as evidenced by):   CLD, on TPN     Interventions/Recommendations (treatment strategy):  Collaboration with other providers    Nutrition Diagnosis Status:   New      Reason for Assessment    Reason For Assessment: new TPN  Diagnosis: gastrointestinal disease  Relevant Medical History: Crohn's disease, POTS, gastric varices, alcohol abuse  Interdisciplinary Rounds: did not attend  General Information Comments: RD triggered for new TPN. Unable to speak to pt today. Per chart review pt consuming 50-75% of CLD. TPN meeting nutrition needs. Pending OR tomorrow. Noted with 4% wt loss in 1 month (not significant). Rd following.    Nutrition Discharge Planning: adequate intake    Nutrition Risk Screen    Nutrition Risk Screen: reduced oral intake over the last month    Nutrition/Diet History    Spiritual, Cultural Beliefs, Anglican Practices, Values that Affect Care: no  Food Allergies: NKFA    Anthropometrics    Temp: 98.3 °F (36.8 °C)  Height Method: Stated  Height: 5' 4.02" (162.6 cm)  Height (inches): 64.02 in  Weight Method: Bed Scale  Weight: 53.1 kg (117 lb)  Weight (lb): 117 lb  Ideal Body Weight (IBW), Female: 120.1 lb  % Ideal Body Weight, Female (lb): 97.42 %  BMI (Calculated): 20.1  BMI Grade: 18.5-24.9 - normal       Lab/Procedures/Meds    Pertinent Labs Reviewed: reviewed  Pertinent Labs Comments: H/H: 8.8/28.3, MCV: 101, MCH: 31.5, MCHC: 31.1  Pertinent Medications Reviewed: reviewed  Pertinent Medications Comments: " enoxaparin, pantoprazole, NaCl      Estimated/Assessed Needs    Weight Used For Calorie Calculations: 53.1 kg (117 lb)  Energy Calorie Requirements (kcal): 3194-1400 (25-30 kcal/kg)  Energy Need Method: Kcal/kg  Protein Requirements: 63-74 (1.2-1.4 g/kg)  Weight Used For Protein Calculations: 53.1 kg (117 lb)     Estimated Fluid Requirement Method: RDA Method  RDA Method (mL): 1326         Nutrition Prescription Ordered    Current Diet Order: CLD  Current Nutrition Support Formula Ordered: Other (Comment) (215D + 95 AA + IV Lipids)    Evaluation of Received Nutrient/Fluid Intake    Parenteral Calories (kcal): 1611  Parenteral Protein (gm): 95  Lipid Calories (kcals): 500 kcals  GIR (Glucose Infusion Rate) (mg/kg/min): 2.79 mg/kg/min  % Kcal Needs: 100  % Protein Needs: 128  I/O: -634 ml since admit  Comments: LBM 10/2  Tolerance: tolerating  % Intake of Estimated Energy Needs: 75 - 100 %  % Meal Intake: 50 - 75 %    Nutrition Risk    Level of Risk/Frequency of Follow-up: low (f/u 1x/week)       Monitor and Evaluation    Food and Nutrient Intake: energy intake, food and beverage intake, parenteral nutrition intake  Food and Nutrient Adminstration: diet order, enteral and parenteral nutrition administration  Physical Activity and Function: nutrition-related ADLs and IADLs  Anthropometric Measurements: height/length, weight, weight change, body mass index  Biochemical Data, Medical Tests and Procedures: electrolyte and renal panel, gastrointestinal profile, glucose/endocrine profile, inflammatory profile, lipid profile       Nutrition Follow-Up    RD Follow-up?: Yes    Brenda Sutton RD, LDN

## 2023-10-03 NOTE — ASSESSMENT & PLAN NOTE
Angela Trascher Lejeune is a 47yo F with PMH Crohn's disease, POTS, gastric varices, alcohol abuse, bipolar disorder who was recently seen in surgical oncology clinic for evaluation of a gastric outlet obstruction.     - CLD. NPO at midnight  - No need for NG tube right now  - TPN reordered  - mIVF DC'd  - Multimodal pain regimen  - Protonix  - Electrolyte replacement  - Lovenox  - Daily labs. Nutritional labs  - Planning for surgery on Wednesday. Consent in chart.

## 2023-10-03 NOTE — PLAN OF CARE
Recommendations    1) Continue TPN regimine: 215D + 95 AA + IV Lipids to provide 1611 kcals, 95g PRO, (GIR: 2.79)   2) Continue CLD, ADAT per MD   3) Following    Goals: Meet % een/epn by next rd f/u  Nutrition Goal Status: new  Communication of RD Recs: other (comment) (POC)

## 2023-10-03 NOTE — SUBJECTIVE & OBJECTIVE
Interval History:  No events overnight.  Feeling okay this morning.  Planning for OR tomorrow    Medications:  Continuous Infusions:   TPN ADULT CENTRAL LINE CUSTOM 100 mL/hr at 10/02/23 2212     Scheduled Meds:   acetaminophen  1,000 mg Oral Q8H    amLODIPine  5 mg Oral Daily    ARIPiprazole  5 mg Oral Daily    enoxparin  40 mg Subcutaneous Q24H (prophylaxis, 1700)    fat emulsion 20%  250 mL Intravenous Daily    fludrocortisone  0.1 mg Oral Daily    lamoTRIgine  150 mg Oral BID    midodrine  5 mg Oral TID WM    mirtazapine  7.5 mg Oral Nightly    pantoprazole  40 mg Intravenous BID    scopolamine  1 patch Transdermal Q3 Days    sodium chloride 0.9%  10 mL Intravenous Q6H     PRN Meds:labetalol, LIDOcaine (PF) 10 mg/ml (1%), melatonin, oxyCODONE, prochlorperazine, sodium chloride 0.9%, Flushing PICC/Midline Protocol **AND** sodium chloride 0.9% **AND** sodium chloride 0.9%, tiZANidine     Review of patient's allergies indicates:   Allergen Reactions    Tramadol Other (See Comments)     Seizures      Demerol [meperidine]      Seizures      Hydrocodone Hallucinations    Reglan [metoclopramide]      Objective:     Vital Signs (Most Recent):  Temp: 98 °F (36.7 °C) (10/03/23 0728)  Pulse: 69 (10/03/23 0728)  Resp: 18 (10/03/23 0728)  BP: 122/70 (10/03/23 0728)  SpO2: 98 % (10/03/23 0728) Vital Signs (24h Range):  Temp:  [97.6 °F (36.4 °C)-98.5 °F (36.9 °C)] 98 °F (36.7 °C)  Pulse:  [60-96] 69  Resp:  [17-19] 18  SpO2:  [96 %-98 %] 98 %  BP: ()/(46-82) 122/70     Weight: 53.4 kg (117 lb 11.6 oz)  Body mass index is 20.2 kg/m².    Intake/Output - Last 3 Shifts         10/01 0700  10/02 0659 10/02 0700  10/03 0659 10/03 0700  10/04 0659    P.O. 240 480     I.V. (mL/kg)       IV Piggyback       TPN       Total Intake(mL/kg) 240 (4.5) 480 (9)     Urine (mL/kg/hr)       Total Output       Net +240 +480            Urine Occurrence 4 x      Stool Occurrence 1 x               Physical Exam  Constitutional:        Appearance: Normal appearance.   HENT:      Head: Normocephalic and atraumatic.   Cardiovascular:      Rate and Rhythm: Normal rate.   Pulmonary:      Effort: Pulmonary effort is normal.   Abdominal:      General: Abdomen is flat.      Palpations: Abdomen is soft.      Tenderness: There is abdominal tenderness.      Comments: Mild abdominal tenderness to deep palpation  Multiple abdominal scars from previous surgical history   Skin:     General: Skin is warm and dry.   Neurological:      General: No focal deficit present.      Mental Status: She is alert and oriented to person, place, and time.          Significant Labs:  I have reviewed all pertinent lab results within the past 24 hours.  CBC:   Recent Labs   Lab 10/03/23  0546   WBC 3.86*   RBC 2.79*   HGB 8.8*   HCT 28.3*      *   MCH 31.5*   MCHC 31.1*     CMP:   Recent Labs   Lab 10/03/23  0546      CALCIUM 9.2   ALBUMIN 3.1*   PROT 6.2      K 3.7   CO2 35*      BUN 13   CREATININE 0.7   ALKPHOS 88   ALT 10   AST 21   BILITOT 0.1       Significant Diagnostics:  I have reviewed all pertinent imaging results/findings within the past 24 hours.

## 2023-10-03 NOTE — PLAN OF CARE
Problem: Adult Inpatient Plan of Care  Goal: Plan of Care Review  Outcome: Ongoing, Progressing  Goal: Patient-Specific Goal (Individualized)  Outcome: Ongoing, Progressing  Goal: Absence of Hospital-Acquired Illness or Injury  Outcome: Ongoing, Progressing  Goal: Optimal Comfort and Wellbeing  Outcome: Ongoing, Progressing  Goal: Readiness for Transition of Care  Outcome: Ongoing, Progressing     Problem: Infection  Goal: Absence of Infection Signs and Symptoms  Outcome: Ongoing, Progressing     Problem: Fall Injury Risk  Goal: Absence of Fall and Fall-Related Injury  Outcome: Ongoing, Progressing               [facility] Plan of Care Note     Dx.  Duodenal Stricture     Shift Events: HTN, PRN labatolol given, BP WNL, pain meds given, offered patient a bed bath, at this time patient requested to do it later per family and visitors at bedside throughout the day, educated patient on importance of staff in room at all OOB activities- patient had episode when in BR of dizziness and almost falling. Bed alarm activated and patient aware. DUANE PICC dressing change     Goals of Care: Pain management, safety, & telemetry.     Neuro: AAO X 4     Vital Signs: WNL     Respiratory: RA     Diet: Clear Liquids     Is patient tolerating current diet? Yes     GTTS: TPN @ 100 ml/hr, lipids     Urine Output/Bowel Movement: Adequate urine output, LBM 10/1/23     Drains/Tubes/Tube Feeds (include total output/shift): none     Lines: DUANE PICC & accessed port to left chest wall      Accuchecks: None     Skin: Left lower leg incision     Fall Risk Score: 8     Activity level? Independent     Any scheduled procedures? Surgery 10/4/23     Any safety concerns? None     Other: None

## 2023-10-03 NOTE — PROGRESS NOTES
Patient attempted to shower, this nurse in patient room and patient got really dizzy and almost fell in the shower. This nurse educated patient on importance of not showering during stay to remain safe and avoid a fall, educated patient on importance of calling staff for any OOB needs, patient verbalized understanding, bed alarm activated, patient aware of bed alarm. No further questions from patient at this time

## 2023-10-03 NOTE — ANESTHESIA PREPROCEDURE EVALUATION
[facility]  Anesthesia Pre-Operative Evaluation         Patient Name: Angela Trascher Lejeune  YOB: 1975  MRN: [id number]    SUBJECTIVE:     Pre-operative evaluation for Procedure(s) (LRB):  CREATION, BYPASS, DUODENUM, BY ANASTOMOSIS OF DUODENUM TO JEJUNUM, PLACEMNENT OF GASTROSTOMY TUBE, JEJUNOSTOMY TUBE, EGD (N/A)     10/03/2023    Angela Trascher Lejeune is a 48 y.o. female w/ a significant PMHx of Crohn's disease, POTS, iron def anemia, gastroparesis, seizures, gastric varices, alcohol abuse, bipolar disorder who was recently seen in surgical oncology clinic 9/26/23 for evaluation of a gastric outlet obstruction due to duodenal stricture. Presented 9/27/23 to OSH with n/v abd pain. Reports prior hx of gastroparesis but that has resolved since previous surgery.     Patient now presents for the above procedure(s).    TTE 9/13/2023:    Left Ventricle: The left ventricle is normal in size. Normal wall   thickness. Normal wall motion. There is low normal systolic function.   There is normal diastolic function.    Right Ventricle: Normal right ventricular cavity size. Wall thickness   is normal. Systolic function is normal.    Aortic Valve: The aortic valve is a trileaflet valve. No cusp   calcification.    IVC/SVC: Normal venous pressure at 3 mmHg.    [facility] 3/29/2023:    The ejection fraction was calculated to be 60%.    The left ventricular ejection fraction was grossly normal.    The left ventricular systolic function was normal.    The left ventricular end diastolic pressure was normal.    The pre-procedure left ventricular end diastolic pressure was 14.    The post-procedure left ventricular end diastolic pressure was 14.    The estimated blood loss was none.    There was single vessel coronary artery disease. There was non-obstructive coronary artery disease..    LDA:   PICC Double Lumen 09/29/23 1431 right basilic (Active)   Line Necessity  Review Longterm central access required 10/02/23 2049   Verification by X-ray Yes 10/02/23 0805   Site Assessment No drainage;No redness;No warmth;No swelling 10/02/23 2049   Extremity Assessment Distal to IV No abnormal discoloration;No warmth;No swelling;No redness 10/02/23 2049   Line Securement Device Secured with sutureless device 10/02/23 0805   Dressing Type CHG impregnated dressing/sponge;Central line dressing 10/02/23 2049   Dressing Status Clean;Dry;Intact 10/02/23 2049   Dressing Intervention Integrity maintained 10/02/23 2049   Date on Dressing 09/29/23 10/01/23 2334   Dressing Due to be Changed 10/06/23 10/02/23 2049   Distal Patency/Care flushed w/o difficulty;infusing 10/02/23 2049   Proximal 1 Patency/Care flushed w/o difficulty;infusing 10/02/23 2049   Current Insertion Depth (cm) 33 cm 09/29/23 1434   Current Exposed Catheter (cm) 0 cm 09/29/23 1434   Extremity Circumference (cm) 32 cm 09/29/23 1434   Number of days: 3            PowerPort A Cath Single Lumen 09/27/23 2302 Subclavian Left (Active)   Line Necessity Review Poor venous access 09/29/23 0800   Verification by X-ray Yes 09/28/23 0543   Site Assessment No drainage;No redness;No swelling;No warmth 10/01/23 1615   Line Securement Device Secured with sutureless device 10/01/23 1615   Dressing Type CHG impregnated dressing/sponge 10/01/23 1615   Dressing Status Clean;Dry;Intact 10/01/23 1615   Dressing Intervention Integrity maintained 10/01/23 1615   Date on Dressing 09/27/23 10/01/23 0816   Dressing Due to be Changed 10/03/23 10/01/23 0816   Patency/Care flushed w/o difficulty;blood return present;normal saline locked 09/30/23 2000   Status Accessed 10/01/23 0816   Accessed by: Milvia Lam RN 09/27/23 2322   Needle Insertion Date 09/27/23 09/27/23 2322   Needle Insertion Time 2308 09/27/23 2322   Type of Needle PowerHuber 09/27/23 2322   Needle Length 1 in 09/27/23 2322   Needle Status Left in place 09/27/23 2322   Flush Performed Yes  09/29/23 0800   Number of days: 5       Prev airway: None documented.    Drips:    TPN ADULT CENTRAL LINE CUSTOM 100 mL/hr at 10/02/23 2212       Patient Active Problem List   Diagnosis    Bipolar I disorder    Seizure disorder    Carpal tunnel syndrome    Anxiety state    Essential tremor    Iron deficiency anemia secondary to inadequate dietary iron intake    Left ovarian cyst    Asymptomatic bacteriuria    Fatigue associated with anemia    Hypokalemia    Venous insufficiency    Crohn's disease of small intestine with complication    Gastric ulcer without hemorrhage or perforation    Gastroparesis    Abnormal ECG    Immunosuppressed status    Duodenal ulcer    Closed bimalleolar fracture of left ankle    Gastroenteritis    Acute cystitis without hematuria    Decreased range of motion of left ankle    Weakness    Left ankle swelling    Impaired functional mobility, balance, gait, and endurance    Right upper quadrant pain    Bilious vomiting    Hypertension    History of alcohol abuse    POTS (postural orthostatic tachycardia syndrome)    N&V (nausea and vomiting)    Duodenal stricture    Mechanical complication of internal orthopedic implant       Review of patient's allergies indicates:   Allergen Reactions    Tramadol Other (See Comments)     Seizures      Demerol [meperidine]      Seizures      Hydrocodone Hallucinations    Reglan [metoclopramide]        Current Inpatient Medications:   acetaminophen  1,000 mg Oral Q8H    amLODIPine  5 mg Oral Daily    ARIPiprazole  5 mg Oral Daily    enoxparin  40 mg Subcutaneous Q24H (prophylaxis, 1700)    fat emulsion 20%  250 mL Intravenous Daily    fludrocortisone  0.1 mg Oral Daily    lamoTRIgine  150 mg Oral BID    midodrine  5 mg Oral TID WM    mirtazapine  7.5 mg Oral Nightly    pantoprazole  40 mg Intravenous BID    scopolamine  1 patch Transdermal Q3 Days    sodium chloride 0.9%  10 mL Intravenous Q6H       No current  facility-administered medications on file prior to encounter.     Current Outpatient Medications on File Prior to Encounter   Medication Sig Dispense Refill    ARIPiprazole (ABILIFY) 5 MG Tab Take 1 tablet (5 mg total) by mouth once daily. 30 tablet 11    fludrocortisone (FLORINEF) 0.1 mg Tab Take 0.1 mg by mouth Daily.      HYDROmorphone (DILAUDID) 2 MG tablet Take 1 tablet (2 mg total) by mouth every 4 (four) hours as needed for Pain. 20 tablet 0    lamotrigine (LAMICTAL) 150 MG Tab Take 150 mg by mouth 2 (two) times daily.      LIDOcaine-prilocaine (EMLA) cream Apply topically as needed (use prior to treatment with port access). 30 g 0    LORazepam (ATIVAN) 0.5 MG tablet Take 1 tablet (0.5 mg total) by mouth every 6 (six) hours as needed for Anxiety. 30 tablet 2    midodrine (PROAMATINE) 5 MG Tab Take 5 mg by mouth 3 (three) times daily with meals.      mirtazapine (REMERON) 7.5 MG Tab Take 1 tablet (7.5 mg total) by mouth every evening. 30 tablet 11    pantoprazole (PROTONIX) 40 MG tablet Take 1 tablet (40 mg total) by mouth 2 (two) times daily. 60 tablet 1    promethazine (PHENERGAN) 25 MG tablet Take 1 tablet (25 mg total) by mouth every 6 (six) hours as needed for Nausea. 30 tablet 5    propranoloL (INDERAL) 40 MG tablet Take 40 mg by mouth once daily.      sodium chloride 1,000 mg TbSO oral tablet Take 1,000 mg by mouth 2 (two) times a day.      sucralfate (CARAFATE) 1 gram tablet Take 1 tablet (1 g total) by mouth 4 (four) times daily before meals and nightly. 28 tablet 0    tiZANidine (ZANAFLEX) 4 MG tablet 1-2 tid prn (Patient taking differently: Take 4-8 mg by mouth every 8 (eight) hours as needed (muscle spasms).) 60 tablet 5    ustekinumab (STELARA) 90 mg/mL Syrg syringe Inject 90 mg into the skin every 8 weeks. Indications: Crohn's disease      [DISCONTINUED] prazosin (MINIPRESS) 1 MG Cap          Past Surgical History:   Procedure Laterality Date    ANKLE HARDWARE REMOVAL Left  9/6/2023    Procedure: REMOVAL, HARDWARE, ANKLE;  Surgeon: Michael Lacey MD;  Location: [facility] OR;  Service: Orthopedics;  Laterality: Left;    APPENDECTOMY      BRAIN SURGERY  2009    fx skull due to seizure with 2 pins    CHOLECYSTECTOMY      COLON SURGERY      Partial colectomy    COLONOSCOPY Left 7/10/2019    Procedure: COLONOSCOPY;  Surgeon: Papi Martinez Jr., MD;  Location: [facility] ENDO;  Service: Endoscopy;  Laterality: Left;    COLONOSCOPY N/A 12/6/2022    Procedure: COLONOSCOPY;  Surgeon: Zandra Mallory MD;  Location: [facility] ENDO ([facility] FLR);  Service: Endoscopy;  Laterality: N/A;  poor prep on 11/28/22.  per Dr Mallory-Miralax 5 days prior with gastroparesis diet-3 days full liquid. also Mag citrate day before     instr handed to pt-GT    EGD, WITH BALLOON DILATION  7/24/2023    Procedure: EGD, WITH BALLOON DILATION;  Surgeon: Huang Licea MD;  Location: [facility] ENDO;  Service: Endoscopy;;    ESOPHAGOGASTRODUODENOSCOPY Left 6/5/2019    Procedure: EGD (ESOPHAGOGASTRODUODENOSCOPY);  Surgeon: ROLA Villagran MD;  Location: [facility];  Service: Endoscopy;  Laterality: Left;    ESOPHAGOGASTRODUODENOSCOPY Left 3/16/2020    Procedure: EGD (ESOPHAGOGASTRODUODENOSCOPY);  Surgeon: Clive Núñez MD;  Location: [facility];  Service: Endoscopy;  Laterality: Left;    ESOPHAGOGASTRODUODENOSCOPY N/A 4/17/2020    Procedure: EGD (ESOPHAGOGASTRODUODENOSCOPY);  Surgeon: Papi Martinez Jr., MD;  Location: [facility];  Service: Endoscopy;  Laterality: N/A;  with Push Enteroscopy    ESOPHAGOGASTRODUODENOSCOPY N/A 7/6/2020    Procedure: EGD (ESOPHAGOGASTRODUODENOSCOPY)   possible peg;  Surgeon: Papi Martinez Jr., MD;  Location: [facility];  Service: Endoscopy;  Laterality: N/A;    ESOPHAGOGASTRODUODENOSCOPY N/A 7/9/2020    Procedure: EGD (ESOPHAGOGASTRODUODENOSCOPY);  Surgeon: Papi Martinez Jr., MD;  Location: [facility];  Service: Endoscopy;  Laterality: N/A;  J or Peg tube    ESOPHAGOGASTRODUODENOSCOPY  N/A 9/3/2020    Procedure: EGD (ESOPHAGOGASTRODUODENOSCOPY);  Surgeon: Papi Martinez Jr., MD;  Location: [facility];  Service: Endoscopy;  Laterality: N/A;  per drs order Stoma, Peds, w/single Lumen, J-Tube placement    ESOPHAGOGASTRODUODENOSCOPY N/A 12/29/2020    Procedure: EGD (ESOPHAGOGASTRODUODENOSCOPY);  Surgeon: Papi Martinez Jr., MD;  Location: [facility];  Service: Endoscopy;  Laterality: N/A;    ESOPHAGOGASTRODUODENOSCOPY N/A 2/23/2021    Procedure: EGD (ESOPHAGOGASTRODUODENOSCOPY);  Surgeon: Papi Martinez Jr., MD;  Location: [facility];  Service: Endoscopy;  Laterality: N/A;    ESOPHAGOGASTRODUODENOSCOPY N/A 2/3/2022    Procedure: EGD (ESOPHAGOGASTRODUODENOSCOPY);  Surgeon: Papi Martinez Jr., MD;  Location: [facility];  Service: Endoscopy;  Laterality: N/A;    ESOPHAGOGASTRODUODENOSCOPY N/A 8/13/2022    Procedure: EGD (ESOPHAGOGASTRODUODENOSCOPY);  Surgeon: Huang Licea MD;  Location: [facility];  Service: Gastroenterology;  Laterality: N/A;    ESOPHAGOGASTRODUODENOSCOPY N/A 10/16/2022    Procedure: EGD (ESOPHAGOGASTRODUODENOSCOPY);  Surgeon: Huang Licea MD;  Location: [facility];  Service: Gastroenterology;  Laterality: N/A;    ESOPHAGOGASTRODUODENOSCOPY N/A 11/28/2022    Procedure: EGD (ESOPHAGOGASTRODUODENOSCOPY);  Surgeon: Zandra Mallory MD;  Location: [address]);  Service: Endoscopy;  Laterality: N/A;    ESOPHAGOGASTRODUODENOSCOPY N/A 7/24/2023    Procedure: EGD (ESOPHAGOGASTRODUODENOSCOPY);  Surgeon: Huang Licea MD;  Location: [facility];  Service: Endoscopy;  Laterality: N/A;    ESOPHAGOGASTRODUODENOSCOPY N/A 9/15/2023    Procedure: EGD (ESOPHAGOGASTRODUODENOSCOPY);  Surgeon: ROLA Villagran MD;  Location: [facility];  Service: Endoscopy;  Laterality: N/A;    FEMUR FRACTURE SURGERY Right 2006    HYSTERECTOMY      INSERTION OF TUNNELED CENTRAL VENOUS CATHETER (CVC) WITH SUBCUTANEOUS PORT Left 8/14/2019    Procedure: [id number];  Surgeon: Miquel BISWAS  MD Rosetta;  Location: [facility] OR;  Service: General;  Laterality: Left;    INSERTION OR REPLACEMENT OF PERCUTANEOUS ENDOSCOPIC JEJUNOSTOMY TUBE  2020    Procedure: INSERTION OR REPLACEMENT, JEJUNOSTOMY TUBE, PERCUTANEOUS, ENDOSCOPIC;  Surgeon: Papi Martinez Jr., MD;  Location: [facility];  Service: Endoscopy;;    laparoscopy for endometriosis      x5    LEFT HEART CATHETERIZATION Left 3/29/2023    Procedure: Left heart cath;  Surgeon: Riley Tipton MD;  Location: [facility] CATH;  Service: Cardiology;  Laterality: Left;    OOPHORECTOMY      right ovary removed only    OPEN REDUCTION AND INTERNAL FIXATION (ORIF) OF INJURY OF ANKLE Left 4/10/2023    Procedure: ORIF, ANKLE;  Surgeon: Michael Lacey MD;  Location: [facility] OR;  Service: Orthopedics;  Laterality: Left;    PEG TUBE REMOVAL  2021    Procedure: REMOVAL, PEG TUBE;  Surgeon: Papi Martinez Jr., MD;  Location: [facility] ENDO;  Service: Endoscopy;;    TONSILLECTOMY         Social History     Socioeconomic History    Marital status:    Tobacco Use    Smoking status: Former     Current packs/day: 0.00     Average packs/day: 0.5 packs/day for 6.0 years (3.0 ttl pk-yrs)     Types: Cigarettes     Start date: 5/10/2005     Quit date: 5/10/2011     Years since quittin.4    Smokeless tobacco: Never   Substance and Sexual Activity    Alcohol use: Yes     Comment: socially    Drug use: Never    Sexual activity: Yes     Partners: Male     Social Determinants of Health     Financial Resource Strain: Medium Risk (2023)    Overall Financial Resource Strain (CARDIA)     Difficulty of Paying Living Expenses: Somewhat hard   Food Insecurity: Food Insecurity Present (2023)    Hunger Vital Sign     Worried About Running Out of Food in the Last Year: Sometimes true     Ran Out of Food in the Last Year: Sometimes true   Transportation Needs: No Transportation Needs (2023)    PRAPARE - Transportation     Lack of Transportation (Medical): No      Lack of Transportation (Non-Medical): No   Physical Activity: Inactive (8/29/2023)    Exercise Vital Sign     Days of Exercise per Week: 0 days     Minutes of Exercise per Session: 0 min   Stress: Stress Concern Present (9/28/2023)    [employer] [place] [employer] - Occupational Stress Questionnaire     Feeling of Stress : Very much   Social Connections: Moderately Isolated (9/30/2023)    Social Connection and Isolation Panel [NHANES]     Frequency of Communication with Friends and Family: Twice a week     Frequency of Social Gatherings with Friends and Family: Once a week     Attends Denominational Services: Never     Active Member of Clubs or Organizations: No     Attends Club or Organization Meetings: Never     Marital Status:    Housing Stability: High Risk (9/28/2023)    Housing Stability Vital Sign     Unable to Pay for Housing in the Last Year: Yes     Number of Places Lived in the Last Year: 1     Unstable Housing in the Last Year: No       OBJECTIVE:     Vital Signs Range (Last 24H):  Temp:  [36.4 °C (97.6 °F)-36.9 °C (98.5 °F)]   Pulse:  []   Resp:  [17-19]   BP: ()/(46-93)   SpO2:  [96 %-98 %]       Significant Labs:  Lab Results   Component Value Date    WBC 4.81 10/02/2023    HGB 9.7 (L) 10/02/2023    HCT 30.5 (L) 10/02/2023     10/02/2023    CHOL 204 (H) 03/30/2020    TRIG 139 09/30/2023    HDL 52 03/30/2020    ALT 10 10/03/2023    AST 21 10/03/2023     10/03/2023    K 3.7 10/03/2023     10/03/2023    CREATININE 0.7 10/03/2023    BUN 13 10/03/2023    CO2 35 (H) 10/03/2023    TSH 0.483 04/20/2023    INR 1.0 10/15/2022    HGBA1C 4.9 03/30/2020       Diagnostic Studies: No relevant studies.    EKG:   Results for orders placed or performed during the hospital encounter of 09/28/23   EKG 12-lead    Collection Time: 10/01/23 11:10 AM    Narrative    Test Reason : T50.905A,    Vent. Rate : 080 BPM     Atrial Rate : 080 BPM     P-R Int : 172 ms           QRS Dur : 090 ms      QT Int : 384 ms       P-R-T Axes : 047 007 055 degrees     QTc Int : 442 ms    Normal sinus rhythm  Normal ECG  When compared with ECG of 29-SEP-2023 02:42,  Nonspecific T wave abnormality no longer evident in Lateral leads  QT has shortened  Confirmed by Silvia Alcazar MD ([id number]) on 10/2/2023 2:06:56 PM    Referred By: KIMBERLY CANELA           Confirmed By:Silvia Alcazar MD         GARRETT:  No results found for this or any previous visit.    ASSESSMENT/PLAN:         Pre-op Assessment    I have reviewed the Patient Summary Reports.     I have reviewed the Nursing Notes. I have reviewed the NPO Status.      Review of Systems  Anesthesia Hx:  No problems with previous Anesthesia  History of prior surgery of interest to airway management or planning: Denies Family Hx of Anesthesia complications.   Denies Personal Hx of Anesthesia complications.   Social:  Former Smoker    Hematology/Oncology:         -- Anemia:   Cardiovascular:   Hypertension POTS   Pulmonary:   Denies COPD.  Denies Asthma.  Denies Sleep Apnea.    Hepatic/GI:   PUD, GERD Duodenal stricture, gastroparesis, crohns on immunosuppressive therapy   Neurological:   Denies CVA. Seizures    Psych:   Psychiatric History (bipolar I)          Physical Exam  General: Well nourished, Cooperative, Alert and Oriented    Airway:  Mallampati: II   Tongue: Normal    Dental:        Anesthesia Plan  Type of Anesthesia, risks & benefits discussed:    Anesthesia Type: Gen ETT  Intra-op Monitoring Plan: Standard ASA Monitors  Post Op Pain Control Plan: multimodal analgesia and IV/PO Opioids PRN  Induction:  IV and rapid sequence  Airway Plan: Direct and Video, Post-Induction  Informed Consent: Informed consent signed with the Patient and all parties understand the risks and agree with anesthesia plan.  All questions answered.   ASA Score: 3  Day of Surgery Review of History & Physical: H&P Update referred to the surgeon/provider.    Ready For Surgery  From Anesthesia Perspective.     .

## 2023-10-03 NOTE — PLAN OF CARE
Problem: Adult Inpatient Plan of Care  Goal: Plan of Care Review  Outcome: Ongoing, Progressing  Goal: Patient-Specific Goal (Individualized)  Outcome: Ongoing, Progressing  Goal: Absence of Hospital-Acquired Illness or Injury  Outcome: Ongoing, Progressing  Goal: Optimal Comfort and Wellbeing  Outcome: Ongoing, Progressing  Goal: Readiness for Transition of Care  Outcome: Ongoing, Progressing     Problem: Infection  Goal: Absence of Infection Signs and Symptoms  Outcome: Ongoing, Progressing     Problem: Fall Injury Risk  Goal: Absence of Fall and Fall-Related Injury  Outcome: Ongoing, Progressing               [facility] Plan of Care Note     Dx.  Duodenal Stricture     Shift Events: none     Goals of Care: Pain management, safety, & telemetry.     Neuro: AAO X 4     Vital Signs: WNL     Respiratory: RA     Diet: Clear Liquids     Is patient tolerating current diet? Yes     GTTS: TPN @ 100 ml/hr, lipids     Urine Output/Bowel Movement: Adequate urine output, LBM 10/1/23     Drains/Tubes/Tube Feeds (include total output/shift): none     Lines: DUANE PICC & accessed port to left chest wall      Accuchecks: None     Skin: Left lower leg incision     Fall Risk Score: 8     Activity level? Independent     Any scheduled procedures? Surgery 10/4/23     Any safety concerns? None     Other: None

## 2023-10-03 NOTE — PROGRESS NOTES
Sly Ocasio - [facility]  General Surgery  Progress Note    Subjective:     History of Present Illness:  No notes on file    Post-Op Info:  Procedure(s) (LRB):  CREATION, BYPASS, DUODENUM, BY ANASTOMOSIS OF DUODENUM TO JEJUNUM, PLACEMNENT OF GASTROSTOMY TUBE, JEJUNOSTOMY TUBE, EGD (N/A)         Interval History:  No events overnight.  Feeling okay this morning.  Planning for OR tomorrow    Medications:  Continuous Infusions:   TPN ADULT CENTRAL LINE CUSTOM 100 mL/hr at 10/02/23 2212     Scheduled Meds:   acetaminophen  1,000 mg Oral Q8H    amLODIPine  5 mg Oral Daily    ARIPiprazole  5 mg Oral Daily    enoxparin  40 mg Subcutaneous Q24H (prophylaxis, 1700)    fat emulsion 20%  250 mL Intravenous Daily    fludrocortisone  0.1 mg Oral Daily    lamoTRIgine  150 mg Oral BID    midodrine  5 mg Oral TID WM    mirtazapine  7.5 mg Oral Nightly    pantoprazole  40 mg Intravenous BID    scopolamine  1 patch Transdermal Q3 Days    sodium chloride 0.9%  10 mL Intravenous Q6H     PRN Meds:labetalol, LIDOcaine (PF) 10 mg/ml (1%), melatonin, oxyCODONE, prochlorperazine, sodium chloride 0.9%, Flushing PICC/Midline Protocol **AND** sodium chloride 0.9% **AND** sodium chloride 0.9%, tiZANidine     Review of patient's allergies indicates:   Allergen Reactions    Tramadol Other (See Comments)     Seizures      Demerol [meperidine]      Seizures      Hydrocodone Hallucinations    Reglan [metoclopramide]      Objective:     Vital Signs (Most Recent):  Temp: 98 °F (36.7 °C) (10/03/23 0728)  Pulse: 69 (10/03/23 0728)  Resp: 18 (10/03/23 0728)  BP: 122/70 (10/03/23 0728)  SpO2: 98 % (10/03/23 0728) Vital Signs (24h Range):  Temp:  [97.6 °F (36.4 °C)-98.5 °F (36.9 °C)] 98 °F (36.7 °C)  Pulse:  [60-96] 69  Resp:  [17-19] 18  SpO2:  [96 %-98 %] 98 %  BP: ()/(46-82) 122/70     Weight: 53.4 kg (117 lb 11.6 oz)  Body mass index is 20.2 kg/m².    Intake/Output - Last 3 Shifts         10/01 0700  10/02 0659 10/02 0700  10/03 0659  10/03 0700  10/04 0659    P.O. 240 480     I.V. (mL/kg)       IV Piggyback       TPN       Total Intake(mL/kg) 240 (4.5) 480 (9)     Urine (mL/kg/hr)       Total Output       Net +240 +480            Urine Occurrence 4 x      Stool Occurrence 1 x               Physical Exam  Constitutional:       Appearance: Normal appearance.   HENT:      Head: Normocephalic and atraumatic.   Cardiovascular:      Rate and Rhythm: Normal rate.   Pulmonary:      Effort: Pulmonary effort is normal.   Abdominal:      General: Abdomen is flat.      Palpations: Abdomen is soft.      Tenderness: There is abdominal tenderness.      Comments: Mild abdominal tenderness to deep palpation  Multiple abdominal scars from previous surgical history   Skin:     General: Skin is warm and dry.   Neurological:      General: No focal deficit present.      Mental Status: She is alert and oriented to person, place, and time.          Significant Labs:  I have reviewed all pertinent lab results within the past 24 hours.  CBC:   Recent Labs   Lab 10/03/23  0546   WBC 3.86*   RBC 2.79*   HGB 8.8*   HCT 28.3*      *   MCH 31.5*   MCHC 31.1*     CMP:   Recent Labs   Lab 10/03/23  0546      CALCIUM 9.2   ALBUMIN 3.1*   PROT 6.2      K 3.7   CO2 35*      BUN 13   CREATININE 0.7   ALKPHOS 88   ALT 10   AST 21   BILITOT 0.1       Significant Diagnostics:  I have reviewed all pertinent imaging results/findings within the past 24 hours.    Assessment/Plan:     * Duodenal stricture  Angela Trascher Lejeune is a 49yo F with PMH Crohn's disease, POTS, gastric varices, alcohol abuse, bipolar disorder who was recently seen in surgical oncology clinic for evaluation of a gastric outlet obstruction.     - CLD. NPO at midnight  - No need for NG tube right now  - TPN reordered  - mIVF DC'd  - Multimodal pain regimen  - Protonix  - Electrolyte replacement  - Lovenox  - Daily labs. Nutritional labs  - Planning for surgery on Wednesday. Consent  in chart.        Joseph Canales MD  General Surgery  [facility]

## 2023-10-04 ENCOUNTER — ANESTHESIA (OUTPATIENT)
Dept: SURGERY | Facility: HOSPITAL | Age: 48
DRG: 326 | End: 2023-10-04
Payer: COMMERCIAL

## 2023-10-04 LAB
ABO + RH BLD: NORMAL
ALBUMIN SERPL BCP-MCNC: 3.1 G/DL (ref 3.5–5.2)
ALP SERPL-CCNC: 96 U/L (ref 55–135)
ALT SERPL W/O P-5'-P-CCNC: 15 U/L (ref 10–44)
ANION GAP SERPL CALC-SCNC: 7 MMOL/L (ref 8–16)
AST SERPL-CCNC: 34 U/L (ref 10–40)
BASOPHILS # BLD AUTO: 0.07 K/UL (ref 0–0.2)
BASOPHILS NFR BLD: 0.9 % (ref 0–1.9)
BILIRUB SERPL-MCNC: 0.1 MG/DL (ref 0.1–1)
BLD GP AB SCN CELLS X3 SERPL QL: NORMAL
BUN SERPL-MCNC: 14 MG/DL (ref 6–20)
CALCIUM SERPL-MCNC: 9 MG/DL (ref 8.7–10.5)
CHLORIDE SERPL-SCNC: 105 MMOL/L (ref 95–110)
CO2 SERPL-SCNC: 28 MMOL/L (ref 23–29)
CREAT SERPL-MCNC: 0.7 MG/DL (ref 0.5–1.4)
DIFFERENTIAL METHOD: ABNORMAL
EOSINOPHIL # BLD AUTO: 0.2 K/UL (ref 0–0.5)
EOSINOPHIL NFR BLD: 2.7 % (ref 0–8)
ERYTHROCYTE [DISTWIDTH] IN BLOOD BY AUTOMATED COUNT: 14 % (ref 11.5–14.5)
EST. GFR  (NO RACE VARIABLE): >60 ML/MIN/1.73 M^2
GLUCOSE SERPL-MCNC: 93 MG/DL (ref 70–110)
HCT VFR BLD AUTO: 28.3 % (ref 37–48.5)
HGB BLD-MCNC: 9 G/DL (ref 12–16)
IMM GRANULOCYTES # BLD AUTO: 0.02 K/UL (ref 0–0.04)
IMM GRANULOCYTES NFR BLD AUTO: 0.2 % (ref 0–0.5)
LYMPHOCYTES # BLD AUTO: 1.5 K/UL (ref 1–4.8)
LYMPHOCYTES NFR BLD: 19.1 % (ref 18–48)
MAGNESIUM SERPL-MCNC: 2 MG/DL (ref 1.6–2.6)
MCH RBC QN AUTO: 33 PG (ref 27–31)
MCHC RBC AUTO-ENTMCNC: 31.8 G/DL (ref 32–36)
MCV RBC AUTO: 104 FL (ref 82–98)
MONOCYTES # BLD AUTO: 0.7 K/UL (ref 0.3–1)
MONOCYTES NFR BLD: 8.1 % (ref 4–15)
NEUTROPHILS # BLD AUTO: 5.6 K/UL (ref 1.8–7.7)
NEUTROPHILS NFR BLD: 69 % (ref 38–73)
NRBC BLD-RTO: 0 /100 WBC
PHOSPHATE SERPL-MCNC: 3.9 MG/DL (ref 2.7–4.5)
PLATELET # BLD AUTO: 182 K/UL (ref 150–450)
PMV BLD AUTO: 10.4 FL (ref 9.2–12.9)
POTASSIUM SERPL-SCNC: 4 MMOL/L (ref 3.5–5.1)
PROT SERPL-MCNC: 6.5 G/DL (ref 6–8.4)
RBC # BLD AUTO: 2.73 M/UL (ref 4–5.4)
SODIUM SERPL-SCNC: 140 MMOL/L (ref 136–145)
SPECIMEN OUTDATE: NORMAL
WBC # BLD AUTO: 8.05 K/UL (ref 3.9–12.7)

## 2023-10-04 PROCEDURE — 44020 EXPLORE SMALL INTESTINE: CPT | Mod: 51,59,, | Performed by: SURGERY

## 2023-10-04 PROCEDURE — 71000033 HC RECOVERY, INTIAL HOUR: Performed by: SURGERY

## 2023-10-04 PROCEDURE — 43820 PR GASTROJEJUNOSTOMY: ICD-10-PCS | Mod: ,,, | Performed by: SURGERY

## 2023-10-04 PROCEDURE — 37000008 HC ANESTHESIA 1ST 15 MINUTES: Performed by: SURGERY

## 2023-10-04 PROCEDURE — 37000009 HC ANESTHESIA EA ADD 15 MINS: Performed by: SURGERY

## 2023-10-04 PROCEDURE — 63600175 PHARM REV CODE 636 W HCPCS: Performed by: STUDENT IN AN ORGANIZED HEALTH CARE EDUCATION/TRAINING PROGRAM

## 2023-10-04 PROCEDURE — D9220A PRA ANESTHESIA: Mod: ANES,,, | Performed by: ANESTHESIOLOGY

## 2023-10-04 PROCEDURE — 27201423 OPTIME MED/SURG SUP & DEVICES STERILE SUPPLY: Performed by: SURGERY

## 2023-10-04 PROCEDURE — D9220A PRA ANESTHESIA: ICD-10-PCS | Mod: CRNA,,, | Performed by: NURSE ANESTHETIST, CERTIFIED REGISTERED

## 2023-10-04 PROCEDURE — 25000003 PHARM REV CODE 250: Performed by: NURSE ANESTHETIST, CERTIFIED REGISTERED

## 2023-10-04 PROCEDURE — 25000003 PHARM REV CODE 250: Performed by: STUDENT IN AN ORGANIZED HEALTH CARE EDUCATION/TRAINING PROGRAM

## 2023-10-04 PROCEDURE — 86901 BLOOD TYPING SEROLOGIC RH(D): CPT

## 2023-10-04 PROCEDURE — 25000003 PHARM REV CODE 250: Performed by: SURGERY

## 2023-10-04 PROCEDURE — A4216 STERILE WATER/SALINE, 10 ML: HCPCS | Performed by: SURGERY

## 2023-10-04 PROCEDURE — D9220A PRA ANESTHESIA: Mod: CRNA,,, | Performed by: NURSE ANESTHETIST, CERTIFIED REGISTERED

## 2023-10-04 PROCEDURE — 63600175 PHARM REV CODE 636 W HCPCS

## 2023-10-04 PROCEDURE — 36000709 HC OR TIME LEV III EA ADD 15 MIN: Performed by: SURGERY

## 2023-10-04 PROCEDURE — 63600175 PHARM REV CODE 636 W HCPCS: Performed by: ANESTHESIOLOGY

## 2023-10-04 PROCEDURE — 20600001 HC STEP DOWN PRIVATE ROOM

## 2023-10-04 PROCEDURE — 71000016 HC POSTOP RECOV ADDL HR: Performed by: SURGERY

## 2023-10-04 PROCEDURE — 94761 N-INVAS EAR/PLS OXIMETRY MLT: CPT

## 2023-10-04 PROCEDURE — 25000242 PHARM REV CODE 250 ALT 637 W/ HCPCS: Performed by: NURSE PRACTITIONER

## 2023-10-04 PROCEDURE — 84100 ASSAY OF PHOSPHORUS: CPT | Performed by: STUDENT IN AN ORGANIZED HEALTH CARE EDUCATION/TRAINING PROGRAM

## 2023-10-04 PROCEDURE — A4217 STERILE WATER/SALINE, 500 ML: HCPCS

## 2023-10-04 PROCEDURE — 25000003 PHARM REV CODE 250

## 2023-10-04 PROCEDURE — 99900035 HC TECH TIME PER 15 MIN (STAT)

## 2023-10-04 PROCEDURE — C1729 CATH, DRAINAGE: HCPCS | Performed by: SURGERY

## 2023-10-04 PROCEDURE — 63600175 PHARM REV CODE 636 W HCPCS: Performed by: SURGERY

## 2023-10-04 PROCEDURE — 43820 GASTROJEJUNOSTOMY WO VAGOTMY: CPT | Mod: ,,, | Performed by: SURGERY

## 2023-10-04 PROCEDURE — 94640 AIRWAY INHALATION TREATMENT: CPT

## 2023-10-04 PROCEDURE — 80053 COMPREHEN METABOLIC PANEL: CPT | Performed by: STUDENT IN AN ORGANIZED HEALTH CARE EDUCATION/TRAINING PROGRAM

## 2023-10-04 PROCEDURE — D9220A PRA ANESTHESIA: ICD-10-PCS | Mod: ANES,,, | Performed by: ANESTHESIOLOGY

## 2023-10-04 PROCEDURE — C9113 INJ PANTOPRAZOLE SODIUM, VIA: HCPCS | Performed by: STUDENT IN AN ORGANIZED HEALTH CARE EDUCATION/TRAINING PROGRAM

## 2023-10-04 PROCEDURE — 83735 ASSAY OF MAGNESIUM: CPT | Performed by: STUDENT IN AN ORGANIZED HEALTH CARE EDUCATION/TRAINING PROGRAM

## 2023-10-04 PROCEDURE — 36000708 HC OR TIME LEV III 1ST 15 MIN: Performed by: SURGERY

## 2023-10-04 PROCEDURE — 71000015 HC POSTOP RECOV 1ST HR: Performed by: SURGERY

## 2023-10-04 PROCEDURE — 85025 COMPLETE CBC W/AUTO DIFF WBC: CPT | Performed by: STUDENT IN AN ORGANIZED HEALTH CARE EDUCATION/TRAINING PROGRAM

## 2023-10-04 PROCEDURE — 44020 PR ENTEROTOMY,NON-DUOD,EXPLOR/BX/FB REMOVAL: ICD-10-PCS | Mod: 51,59,, | Performed by: SURGERY

## 2023-10-04 PROCEDURE — B4185 PARENTERAL SOL 10 GM LIPIDS: HCPCS

## 2023-10-04 PROCEDURE — 63600175 PHARM REV CODE 636 W HCPCS: Performed by: NURSE ANESTHETIST, CERTIFIED REGISTERED

## 2023-10-04 RX ORDER — DEXAMETHASONE SODIUM PHOSPHATE 4 MG/ML
INJECTION, SOLUTION INTRA-ARTICULAR; INTRALESIONAL; INTRAMUSCULAR; INTRAVENOUS; SOFT TISSUE
Status: DISCONTINUED | OUTPATIENT
Start: 2023-10-04 | End: 2023-10-04

## 2023-10-04 RX ORDER — FENTANYL CITRATE 50 UG/ML
INJECTION, SOLUTION INTRAMUSCULAR; INTRAVENOUS
Status: DISCONTINUED | OUTPATIENT
Start: 2023-10-04 | End: 2023-10-04

## 2023-10-04 RX ORDER — FENTANYL CITRATE 50 UG/ML
25 INJECTION, SOLUTION INTRAMUSCULAR; INTRAVENOUS EVERY 5 MIN PRN
Status: COMPLETED | OUTPATIENT
Start: 2023-10-04 | End: 2023-10-04

## 2023-10-04 RX ORDER — NALOXONE HCL 0.4 MG/ML
0.02 VIAL (ML) INJECTION
Status: DISCONTINUED | OUTPATIENT
Start: 2023-10-04 | End: 2023-10-11 | Stop reason: HOSPADM

## 2023-10-04 RX ORDER — HYDROMORPHONE HCL IN 0.9% NACL 6 MG/30 ML
PATIENT CONTROLLED ANALGESIA SYRINGE INTRAVENOUS CONTINUOUS
Status: DISCONTINUED | OUTPATIENT
Start: 2023-10-04 | End: 2023-10-05

## 2023-10-04 RX ORDER — ACETAMINOPHEN 500 MG
1000 TABLET ORAL EVERY 8 HOURS
Status: DISCONTINUED | OUTPATIENT
Start: 2023-10-05 | End: 2023-10-04

## 2023-10-04 RX ORDER — HYDRALAZINE HYDROCHLORIDE 20 MG/ML
5 INJECTION INTRAMUSCULAR; INTRAVENOUS EVERY 6 HOURS
Status: DISCONTINUED | OUTPATIENT
Start: 2023-10-04 | End: 2023-10-05

## 2023-10-04 RX ORDER — PHENYLEPHRINE HYDROCHLORIDE 10 MG/ML
INJECTION INTRAVENOUS
Status: DISCONTINUED | OUTPATIENT
Start: 2023-10-04 | End: 2023-10-04

## 2023-10-04 RX ORDER — MIDODRINE HYDROCHLORIDE 5 MG/1
5 TABLET ORAL EVERY 12 HOURS
Status: DISCONTINUED | OUTPATIENT
Start: 2023-10-04 | End: 2023-10-05

## 2023-10-04 RX ORDER — LIDOCAINE 50 MG/G
1 PATCH TOPICAL
Status: DISCONTINUED | OUTPATIENT
Start: 2023-10-04 | End: 2023-10-06

## 2023-10-04 RX ORDER — FENTANYL CITRATE 50 UG/ML
INJECTION, SOLUTION INTRAMUSCULAR; INTRAVENOUS
Status: COMPLETED
Start: 2023-10-04 | End: 2023-10-04

## 2023-10-04 RX ORDER — KETOROLAC TROMETHAMINE 30 MG/ML
INJECTION, SOLUTION INTRAMUSCULAR; INTRAVENOUS
Status: DISCONTINUED | OUTPATIENT
Start: 2023-10-04 | End: 2023-10-04

## 2023-10-04 RX ORDER — ACETAMINOPHEN 10 MG/ML
INJECTION, SOLUTION INTRAVENOUS
Status: DISCONTINUED | OUTPATIENT
Start: 2023-10-04 | End: 2023-10-04

## 2023-10-04 RX ORDER — BUPIVACAINE HYDROCHLORIDE 2.5 MG/ML
INJECTION, SOLUTION EPIDURAL; INFILTRATION; INTRACAUDAL
Status: DISCONTINUED | OUTPATIENT
Start: 2023-10-04 | End: 2023-10-04 | Stop reason: HOSPADM

## 2023-10-04 RX ORDER — HYDROMORPHONE HYDROCHLORIDE 1 MG/ML
0.2 INJECTION, SOLUTION INTRAMUSCULAR; INTRAVENOUS; SUBCUTANEOUS EVERY 5 MIN PRN
Status: COMPLETED | OUTPATIENT
Start: 2023-10-04 | End: 2023-10-04

## 2023-10-04 RX ORDER — LIDOCAINE HYDROCHLORIDE 20 MG/ML
INJECTION INTRAVENOUS
Status: DISCONTINUED | OUTPATIENT
Start: 2023-10-04 | End: 2023-10-04

## 2023-10-04 RX ORDER — ONDANSETRON 2 MG/ML
INJECTION INTRAMUSCULAR; INTRAVENOUS
Status: DISCONTINUED | OUTPATIENT
Start: 2023-10-04 | End: 2023-10-04

## 2023-10-04 RX ORDER — SODIUM CHLORIDE 0.9 % (FLUSH) 0.9 %
3 SYRINGE (ML) INJECTION
Status: DISCONTINUED | OUTPATIENT
Start: 2023-10-04 | End: 2023-10-04 | Stop reason: HOSPADM

## 2023-10-04 RX ORDER — HEPARIN 100 UNIT/ML
5 SYRINGE INTRAVENOUS ONCE
Status: COMPLETED | OUTPATIENT
Start: 2023-10-04 | End: 2023-10-04

## 2023-10-04 RX ORDER — ACETAMINOPHEN 10 MG/ML
1000 INJECTION, SOLUTION INTRAVENOUS EVERY 8 HOURS
Status: DISPENSED | OUTPATIENT
Start: 2023-10-04 | End: 2023-10-05

## 2023-10-04 RX ORDER — ARIPIPRAZOLE 1 MG/ML
5 SOLUTION ORAL DAILY
Status: DISCONTINUED | OUTPATIENT
Start: 2023-10-05 | End: 2023-10-05

## 2023-10-04 RX ORDER — CEFAZOLIN SODIUM 1 G/3ML
INJECTION, POWDER, FOR SOLUTION INTRAMUSCULAR; INTRAVENOUS
Status: DISCONTINUED | OUTPATIENT
Start: 2023-10-04 | End: 2023-10-04

## 2023-10-04 RX ORDER — HYDROMORPHONE HYDROCHLORIDE 2 MG/ML
INJECTION, SOLUTION INTRAMUSCULAR; INTRAVENOUS; SUBCUTANEOUS
Status: DISCONTINUED | OUTPATIENT
Start: 2023-10-04 | End: 2023-10-04

## 2023-10-04 RX ORDER — LAMOTRIGINE 150 MG/1
150 TABLET ORAL 2 TIMES DAILY
Status: DISCONTINUED | OUTPATIENT
Start: 2023-10-04 | End: 2023-10-05

## 2023-10-04 RX ORDER — LEVALBUTEROL INHALATION SOLUTION 0.63 MG/3ML
0.63 SOLUTION RESPIRATORY (INHALATION)
Status: DISCONTINUED | OUTPATIENT
Start: 2023-10-04 | End: 2023-10-05

## 2023-10-04 RX ORDER — METRONIDAZOLE 500 MG/100ML
INJECTION, SOLUTION INTRAVENOUS
Status: DISCONTINUED | OUTPATIENT
Start: 2023-10-04 | End: 2023-10-04

## 2023-10-04 RX ORDER — MIDAZOLAM HYDROCHLORIDE 1 MG/ML
INJECTION INTRAMUSCULAR; INTRAVENOUS
Status: DISCONTINUED | OUTPATIENT
Start: 2023-10-04 | End: 2023-10-04

## 2023-10-04 RX ORDER — LABETALOL HCL 20 MG/4 ML
10 SYRINGE (ML) INTRAVENOUS ONCE
Status: COMPLETED | OUTPATIENT
Start: 2023-10-04 | End: 2023-10-04

## 2023-10-04 RX ORDER — DEXMEDETOMIDINE HYDROCHLORIDE 100 UG/ML
INJECTION, SOLUTION INTRAVENOUS
Status: DISCONTINUED | OUTPATIENT
Start: 2023-10-04 | End: 2023-10-04

## 2023-10-04 RX ORDER — HALOPERIDOL 5 MG/ML
0.5 INJECTION INTRAMUSCULAR EVERY 10 MIN PRN
Status: COMPLETED | OUTPATIENT
Start: 2023-10-04 | End: 2023-10-04

## 2023-10-04 RX ORDER — MIRTAZAPINE 15 MG/1
15 TABLET, ORALLY DISINTEGRATING ORAL NIGHTLY
Status: DISCONTINUED | OUTPATIENT
Start: 2023-10-04 | End: 2023-10-05

## 2023-10-04 RX ORDER — LORAZEPAM 2 MG/ML
0.5 INJECTION INTRAMUSCULAR ONCE AS NEEDED
Status: COMPLETED | OUTPATIENT
Start: 2023-10-04 | End: 2023-10-04

## 2023-10-04 RX ORDER — ROCURONIUM BROMIDE 10 MG/ML
INJECTION, SOLUTION INTRAVENOUS
Status: DISCONTINUED | OUTPATIENT
Start: 2023-10-04 | End: 2023-10-04

## 2023-10-04 RX ORDER — PROPOFOL 10 MG/ML
VIAL (ML) INTRAVENOUS
Status: DISCONTINUED | OUTPATIENT
Start: 2023-10-04 | End: 2023-10-04

## 2023-10-04 RX ADMIN — Medication 10 ML: at 12:10

## 2023-10-04 RX ADMIN — PROPOFOL 120 MG: 10 INJECTION, EMULSION INTRAVENOUS at 08:10

## 2023-10-04 RX ADMIN — HYDROMORPHONE HYDROCHLORIDE 0.2 MG: 1 INJECTION, SOLUTION INTRAMUSCULAR; INTRAVENOUS; SUBCUTANEOUS at 02:10

## 2023-10-04 RX ADMIN — HYDRALAZINE HYDROCHLORIDE 5 MG: 20 INJECTION, SOLUTION INTRAMUSCULAR; INTRAVENOUS at 11:10

## 2023-10-04 RX ADMIN — METRONIDAZOLE 500 MG: 500 INJECTION, SOLUTION INTRAVENOUS at 08:10

## 2023-10-04 RX ADMIN — Medication 10 ML: at 06:10

## 2023-10-04 RX ADMIN — ROCURONIUM BROMIDE 70 MG: 10 INJECTION, SOLUTION INTRAVENOUS at 08:10

## 2023-10-04 RX ADMIN — LORAZEPAM 0.5 MG: 2 INJECTION INTRAMUSCULAR; INTRAVENOUS at 04:10

## 2023-10-04 RX ADMIN — I.V. FAT EMULSION 250 ML: 20 EMULSION INTRAVENOUS at 10:10

## 2023-10-04 RX ADMIN — ROCURONIUM BROMIDE 15 MG: 10 INJECTION, SOLUTION INTRAVENOUS at 11:10

## 2023-10-04 RX ADMIN — ACETAMINOPHEN 1000 MG: 10 INJECTION, SOLUTION INTRAVENOUS at 10:10

## 2023-10-04 RX ADMIN — HYDROMORPHONE HYDROCHLORIDE 0.5 MG: 2 INJECTION INTRAMUSCULAR; INTRAVENOUS; SUBCUTANEOUS at 10:10

## 2023-10-04 RX ADMIN — ROCURONIUM BROMIDE 15 MG: 10 INJECTION, SOLUTION INTRAVENOUS at 10:10

## 2023-10-04 RX ADMIN — ENOXAPARIN SODIUM 40 MG: 40 INJECTION SUBCUTANEOUS at 04:10

## 2023-10-04 RX ADMIN — MIRTAZAPINE 15 MG: 15 TABLET, ORALLY DISINTEGRATING ORAL at 09:10

## 2023-10-04 RX ADMIN — ACETAMINOPHEN 1000 MG: 10 INJECTION, SOLUTION INTRAVENOUS at 11:10

## 2023-10-04 RX ADMIN — CEFAZOLIN 2 G: 330 INJECTION, POWDER, FOR SOLUTION INTRAMUSCULAR; INTRAVENOUS at 08:10

## 2023-10-04 RX ADMIN — FENTANYL CITRATE 25 MCG: 50 INJECTION INTRAMUSCULAR; INTRAVENOUS at 04:10

## 2023-10-04 RX ADMIN — HYDROMORPHONE HYDROCHLORIDE 0.2 MG: 1 INJECTION, SOLUTION INTRAMUSCULAR; INTRAVENOUS; SUBCUTANEOUS at 01:10

## 2023-10-04 RX ADMIN — CEFAZOLIN 2 G: 330 INJECTION, POWDER, FOR SOLUTION INTRAMUSCULAR; INTRAVENOUS at 11:10

## 2023-10-04 RX ADMIN — LABETALOL HYDROCHLORIDE 10 MG: 5 INJECTION, SOLUTION INTRAVENOUS at 04:10

## 2023-10-04 RX ADMIN — PHENYLEPHRINE HYDROCHLORIDE 100 MCG: 10 INJECTION INTRAVENOUS at 09:10

## 2023-10-04 RX ADMIN — HEPARIN 500 UNITS: 100 SYRINGE at 08:10

## 2023-10-04 RX ADMIN — HALOPERIDOL LACTATE 0.5 MG: 5 INJECTION, SOLUTION INTRAMUSCULAR at 03:10

## 2023-10-04 RX ADMIN — PHENYLEPHRINE HYDROCHLORIDE 200 MCG: 10 INJECTION INTRAVENOUS at 09:10

## 2023-10-04 RX ADMIN — HYDROMORPHONE HYDROCHLORIDE: 2 INJECTION, SOLUTION INTRAMUSCULAR; INTRAVENOUS; SUBCUTANEOUS at 11:10

## 2023-10-04 RX ADMIN — HYDROMORPHONE HYDROCHLORIDE: 2 INJECTION, SOLUTION INTRAMUSCULAR; INTRAVENOUS; SUBCUTANEOUS at 02:10

## 2023-10-04 RX ADMIN — DEXMEDETOMIDINE 8 MCG: 100 INJECTION, SOLUTION, CONCENTRATE INTRAVENOUS at 01:10

## 2023-10-04 RX ADMIN — LAMOTRIGINE 150 MG: 100 TABLET ORAL at 09:10

## 2023-10-04 RX ADMIN — METHOCARBAMOL 500 MG: 100 INJECTION, SOLUTION INTRAMUSCULAR; INTRAVENOUS at 05:10

## 2023-10-04 RX ADMIN — FENTANYL CITRATE 100 MCG: 50 INJECTION, SOLUTION INTRAMUSCULAR; INTRAVENOUS at 08:10

## 2023-10-04 RX ADMIN — Medication 10 ML: at 11:10

## 2023-10-04 RX ADMIN — PHENYLEPHRINE HYDROCHLORIDE 200 MCG: 10 INJECTION INTRAVENOUS at 11:10

## 2023-10-04 RX ADMIN — DEXAMETHASONE SODIUM PHOSPHATE 4 MG: 4 INJECTION, SOLUTION INTRAMUSCULAR; INTRAVENOUS at 08:10

## 2023-10-04 RX ADMIN — PANTOPRAZOLE SODIUM 40 MG: 40 INJECTION, POWDER, FOR SOLUTION INTRAVENOUS at 09:10

## 2023-10-04 RX ADMIN — SODIUM CHLORIDE: 0.9 INJECTION, SOLUTION INTRAVENOUS at 08:10

## 2023-10-04 RX ADMIN — MIDAZOLAM HYDROCHLORIDE 2 MG: 2 INJECTION, SOLUTION INTRAMUSCULAR; INTRAVENOUS at 08:10

## 2023-10-04 RX ADMIN — ROCURONIUM BROMIDE 15 MG: 10 INJECTION, SOLUTION INTRAVENOUS at 09:10

## 2023-10-04 RX ADMIN — PHENYLEPHRINE HYDROCHLORIDE 100 MCG: 10 INJECTION INTRAVENOUS at 11:10

## 2023-10-04 RX ADMIN — LIDOCAINE HYDROCHLORIDE 100 MG: 20 INJECTION INTRAVENOUS at 08:10

## 2023-10-04 RX ADMIN — LABETALOL HYDROCHLORIDE 10 MG: 5 INJECTION, SOLUTION INTRAVENOUS at 03:10

## 2023-10-04 RX ADMIN — HYDRALAZINE HYDROCHLORIDE 5 MG: 20 INJECTION, SOLUTION INTRAMUSCULAR; INTRAVENOUS at 05:10

## 2023-10-04 RX ADMIN — MAGNESIUM SULFATE HEPTAHYDRATE: 500 INJECTION, SOLUTION INTRAMUSCULAR; INTRAVENOUS at 10:10

## 2023-10-04 RX ADMIN — Medication 10 ML: at 04:10

## 2023-10-04 RX ADMIN — PROCHLORPERAZINE EDISYLATE 2.5 MG: 5 INJECTION INTRAMUSCULAR; INTRAVENOUS at 10:10

## 2023-10-04 RX ADMIN — HYDROMORPHONE HYDROCHLORIDE 0.5 MG: 2 INJECTION INTRAMUSCULAR; INTRAVENOUS; SUBCUTANEOUS at 08:10

## 2023-10-04 RX ADMIN — MIDODRINE HYDROCHLORIDE 5 MG: 5 TABLET ORAL at 09:10

## 2023-10-04 RX ADMIN — ROCURONIUM BROMIDE 10 MG: 10 INJECTION, SOLUTION INTRAVENOUS at 11:10

## 2023-10-04 RX ADMIN — LEVALBUTEROL HYDROCHLORIDE 0.63 MG: 0.63 SOLUTION RESPIRATORY (INHALATION) at 08:10

## 2023-10-04 RX ADMIN — ONDANSETRON 4 MG: 2 INJECTION INTRAMUSCULAR; INTRAVENOUS at 12:10

## 2023-10-04 RX ADMIN — SUGAMMADEX 200 MG: 100 INJECTION, SOLUTION INTRAVENOUS at 01:10

## 2023-10-04 NOTE — PLAN OF CARE
[facility] Plan of Care Note  Dx Duodenal ulcer    Shift Events Call placed to on call for surgery - MD Britt in regard to pt  being hypertensive. Too soon to administer labetalol, bp 175/86. First dose of labetalol administered at 2106. Next dose not due until 0306. No new orders at this time. Pt transferred off unit to surgery. Tele box removed. Called nurse to give report. Attempted to call day NP to report no Type and cross completed. No answer. Reported to Surgical nurse. Pt a/ox4. Left in wheelchair. Family at the bedside.     Goals of Care: Goals of care ongoing and progressing.     Neuro: A/O x4    Vital Signs: HTN, stable    Respiratory: RA    Diet: Clear liq- NPO since midnight    Is patient tolerating current diet? Yes    GTTS: NA    Urine Output/Bowel Movement: Adequate, up to toilet    Drains/Tubes/Tube Feeds (include total output/shift): NA    Lines: PICC to RUE, Port-a-cath to L chest      Accuchecks:NA    Skin: LLE incision with steri-strips    Fall Risk Score: Moderate    Activity level? Ambulates, stand-by    Any scheduled procedures? G or J tube placement today 10/4    Any safety concerns? NA    Problem: Adult Inpatient Plan of Care  Goal: Plan of Care Review  Outcome: Ongoing, Progressing  Goal: Patient-Specific Goal (Individualized)  Outcome: Ongoing, Progressing  Goal: Absence of Hospital-Acquired Illness or Injury  Outcome: Ongoing, Progressing     Problem: Infection  Goal: Absence of Infection Signs and Symptoms  Outcome: Ongoing, Progressing     Problem: Fall Injury Risk  Goal: Absence of Fall and Fall-Related Injury  Outcome: Ongoing, Progressing

## 2023-10-04 NOTE — TRANSFER OF CARE
"Anesthesia Transfer of Care Note    Patient: Angela Trascher Lejeune    Procedure(s) Performed: Procedure(s) (LRB):  CREATION, BYPASS, DUODENUM, BY ANASTOMOSIS OF DUODENUM TO JEJUNUM, PLACEMNENT OF GASTROSTOMY TUBE, JEJUNOSTOMY TUBE (N/A)    Patient location: PACU    Anesthesia Type: general    Transport from OR: Transported from OR on 2-3 L/min O2 by NC with adequate spontaneous ventilation    Post pain: adequate analgesia    Post assessment: no apparent anesthetic complications    Post vital signs: stable    Level of consciousness: awake and alert    Nausea/Vomiting: no nausea/vomiting    Complications: none, recall present    Transfer of care protocol was followedComments: REPORT given to PACU RN      Last vitals:   Visit Vitals  /71 (BP Location: Left arm, Patient Position: Lying)   Pulse 95   Temp 37.3 °C (99.2 °F) (Oral)   Resp 16   Ht 5' 4.02" (1.626 m)   Wt 53.1 kg (117 lb)   LMP 08/21/2010 (LMP Unknown)   SpO2 100%   Breastfeeding No   BMI 20.07 kg/m²     "

## 2023-10-04 NOTE — NURSING
Pt transferred off unit to surgery. Tele box removed. Called nurse to give report. Attempted to call day NP to report no Type and cross completed. No answer. Reported to Surgical nurse. Pt a/ox4. Left in wheelchair. Family at the bedside.

## 2023-10-04 NOTE — ANESTHESIA POSTPROCEDURE EVALUATION
Anesthesia Post Evaluation    Patient: Angela Trascher Lejeune    Procedure(s) Performed: Procedure(s) (LRB):  CREATION, BYPASS, DUODENUM, BY ANASTOMOSIS OF DUODENUM TO JEJUNUM, PLACEMNENT OF GASTROSTOMY TUBE, JEJUNOSTOMY TUBE (N/A)    Final Anesthesia Type: general      Patient location during evaluation: PACU  Patient participation: Yes- Able to Participate  Level of consciousness: awake and alert and oriented  Post-procedure vital signs: reviewed and stable  Pain management: adequate  Airway patency: patent    PONV status at discharge: No PONV  Anesthetic complications: no      Cardiovascular status: blood pressure returned to baseline  Respiratory status: unassisted, room air and spontaneous ventilation  Hydration status: euvolemic  Follow-up not needed.          Vitals Value Taken Time   /92 10/04/23 1326   Temp 37 10/04/23 1400   Pulse 112 10/04/23 1400   Resp 38 10/04/23 1400   SpO2 100 % 10/04/23 1400   Vitals shown include unvalidated device data.      No case tracking events are documented in the log.      Pain/Rufus Score: Pain Rating Prior to Med Admin: 7 (10/4/2023  1:40 PM)  Pain Rating Post Med Admin: 0 (10/3/2023 10:05 PM)

## 2023-10-04 NOTE — BRIEF OP NOTE
Sly Ocasio - Surgery ([facility])  Brief Operative Note    SUMMARY     Surgery Date: 10/4/2023     Surgeon(s) and Role:     * Kevin Mays MD - Primary     * Joseph Canales MD - Resident - Assisting        Pre-op Diagnosis:  Duodenal stricture [K31.5]    Post-op Diagnosis:  Post-Op Diagnosis Codes:     * Duodenal stricture [K31.5]    Procedure(s) (LRB):  CREATION, BYPASS, DUODENUM, BY ANASTOMOSIS OF DUODENUM TO JEJUNUM, PLACEMNENT OF GASTROSTOMY TUBE, JEJUNOSTOMY TUBE (N/A)    Anesthesia: General    Operative Findings: See Op Note    Estimated Blood Loss: * No values recorded between 10/4/2023  8:44 AM and 10/4/2023  1:16 PM *    Estimated Blood Loss has been documented.         Specimens:   Specimen (24h ago, onward)      None            [id number]

## 2023-10-04 NOTE — PROGRESS NOTES
Dr. Crisostomo notified port a cath turcios needle due to be changed.  Pt requests not to have port reaccessed if not needed for surgery.  Red port of picc line right arm with good blood return/flushed not in use.  MD states can lock off port with heparin and leave unaccessed.

## 2023-10-04 NOTE — ASSESSMENT & PLAN NOTE
Angela Trascher Lejeune is a 49yo F with PMH Crohn's disease, POTS, gastric varices, alcohol abuse, bipolar disorder who was recently seen in surgical oncology clinic for evaluation of a gastric outlet obstruction.     - NPO since midnight  - OR today 10/4  - No need for NG tube right now  - TPN reordered  - mIVF DC'd  - Multimodal pain regimen  - Protonix  - Electrolyte replacement  - Lovenox  - Daily labs. Nutritional labs  - Planning for surgery today. Consents in chart.

## 2023-10-04 NOTE — PROGRESS NOTES
Sly Ocasio - Surgery (2nd Fl)  General Surgery  Progress Note    Subjective:     History of Present Illness:  No notes on file    Post-Op Info:  Procedure(s) (LRB):  CREATION, BYPASS, DUODENUM, BY ANASTOMOSIS OF DUODENUM TO JEJUNUM, PLACEMNENT OF GASTROSTOMY TUBE, JEJUNOSTOMY TUBE, EGD (N/A)   Day of Surgery     Interval History: No acute events overnight, VSS. To OR today.    Medications:  Continuous Infusions:   TPN ADULT CENTRAL LINE CUSTOM 100 mL/hr at 10/03/23 2123    TPN ADULT CENTRAL LINE CUSTOM       Scheduled Meds:   acetaminophen  1,000 mg Oral Q8H    amLODIPine  5 mg Oral Daily    ARIPiprazole  5 mg Oral Daily    enoxparin  40 mg Subcutaneous Q24H (prophylaxis, 1700)    fat emulsion 20%  250 mL Intravenous Daily    fat emulsion 20%  250 mL Intravenous Daily    fludrocortisone  0.1 mg Oral Daily    lamoTRIgine  150 mg Oral BID    midodrine  5 mg Oral TID WM    mirtazapine  7.5 mg Oral Nightly    pantoprazole  40 mg Intravenous BID    scopolamine  1 patch Transdermal Q3 Days    sodium chloride 0.9%  10 mL Intravenous Q6H     PRN Meds:labetalol, LIDOcaine (PF) 10 mg/ml (1%), melatonin, oxyCODONE, prochlorperazine, sodium chloride 0.9%, Flushing PICC/Midline Protocol **AND** sodium chloride 0.9% **AND** sodium chloride 0.9%, tiZANidine     Review of patient's allergies indicates:   Allergen Reactions    Tramadol Other (See Comments)     Seizures      Demerol [meperidine]      Seizures      Hydrocodone Hallucinations    Reglan [metoclopramide]      Objective:     Vital Signs (Most Recent):  Temp: 99.2 °F (37.3 °C) (10/04/23 0721)  Pulse: 95 (10/04/23 0721)  Resp: 16 (10/04/23 0721)  BP: 129/71 (10/04/23 0721)  SpO2: 100 % (10/04/23 0721) Vital Signs (24h Range):  Temp:  [96.6 °F (35.9 °C)-100.1 °F (37.8 °C)] 99.2 °F (37.3 °C)  Pulse:  [60-95] 95  Resp:  [16-19] 16  SpO2:  [95 %-100 %] 100 %  BP: (109-192)/(59-88) 129/71     Weight: 53.1 kg (117 lb)  Body mass index is 20.07  kg/m².    Intake/Output - Last 3 Shifts         10/02 0700  10/03 0659 10/03 0700  10/04 0659 10/04 0700  10/05 0659    P.O. 480      IV Piggyback   100    Total Intake(mL/kg) 480 (9)  100 (1.9)    Net +480  +100                    Physical Exam  Constitutional:       Appearance: Normal appearance.   HENT:      Head: Normocephalic and atraumatic.   Cardiovascular:      Rate and Rhythm: Normal rate.   Pulmonary:      Effort: Pulmonary effort is normal.   Abdominal:      General: Abdomen is flat.      Palpations: Abdomen is soft.      Tenderness: There is abdominal tenderness.      Comments: Mild abdominal tenderness to deep palpation  Multiple abdominal scars from previous surgical history   Skin:     General: Skin is warm and dry.   Neurological:      General: No focal deficit present.      Mental Status: She is alert and oriented to person, place, and time.          Significant Labs:  I have reviewed all pertinent lab results within the past 24 hours.    Significant Diagnostics:  I have reviewed all pertinent imaging results/findings within the past 24 hours.    Assessment/Plan:     * Duodenal stricture  Angela Trascher Lejeune is a 47yo F with PMH Crohn's disease, POTS, gastric varices, alcohol abuse, bipolar disorder who was recently seen in surgical oncology clinic for evaluation of a gastric outlet obstruction.     - NPO since midnight  - OR today 10/4  - No need for NG tube right now  - TPN reordered  - mIVF DC'd  - Multimodal pain regimen  - Protonix  - Electrolyte replacement  - Lovenox  - Daily labs. Nutritional labs  - Planning for surgery today. Consents in chart.        Shanta Tran MD  General Surgery  [facility] - Surgery ([facility])

## 2023-10-04 NOTE — NURSING
Call placed to on call for surgery - MD Britt in regard to pt  being hypertensive. Too soon to administer labetalol, bp 175/86. First dose of labetalol administered at 2106. Next dose not due until 0306. No new orders at this time.

## 2023-10-04 NOTE — ANESTHESIA PROCEDURE NOTES
Intubation    Date/Time: 10/4/2023 8:24 AM    Performed by: Sarthak Decker  Authorized by: Guy Crisostomo MD    Intubation:     Induction:  Intravenous    Intubated:  Postinduction    Mask Ventilation:  Easy mask    Attempts:  1    Attempted By:  CRNA and student    Method of Intubation:  Video laryngoscopy    Blade:  Arora 3    Laryngeal View Grade: Grade I - full view of cords      Difficult Airway Encountered?: No      Complications:  None    Airway Device:  Oral endotracheal tube    Airway Device Size:  7.0    Style/Cuff Inflation:  Cuffed (inflated to minimal occlusive pressure)    Tube secured:  22    Secured at:  The lips    Placement Verified By:  Capnometry    Complicating Factors:  None    Findings Post-Intubation:  BS equal bilateral and atraumatic/condition of teeth unchanged

## 2023-10-04 NOTE — SUBJECTIVE & OBJECTIVE
Interval History: No acute events overnight, VSS. To OR today.    Medications:  Continuous Infusions:   TPN ADULT CENTRAL LINE CUSTOM 100 mL/hr at 10/03/23 2123    TPN ADULT CENTRAL LINE CUSTOM       Scheduled Meds:   acetaminophen  1,000 mg Oral Q8H    amLODIPine  5 mg Oral Daily    ARIPiprazole  5 mg Oral Daily    enoxparin  40 mg Subcutaneous Q24H (prophylaxis, 1700)    fat emulsion 20%  250 mL Intravenous Daily    fat emulsion 20%  250 mL Intravenous Daily    fludrocortisone  0.1 mg Oral Daily    lamoTRIgine  150 mg Oral BID    midodrine  5 mg Oral TID WM    mirtazapine  7.5 mg Oral Nightly    pantoprazole  40 mg Intravenous BID    scopolamine  1 patch Transdermal Q3 Days    sodium chloride 0.9%  10 mL Intravenous Q6H     PRN Meds:labetalol, LIDOcaine (PF) 10 mg/ml (1%), melatonin, oxyCODONE, prochlorperazine, sodium chloride 0.9%, Flushing PICC/Midline Protocol **AND** sodium chloride 0.9% **AND** sodium chloride 0.9%, tiZANidine     Review of patient's allergies indicates:   Allergen Reactions    Tramadol Other (See Comments)     Seizures      Demerol [meperidine]      Seizures      Hydrocodone Hallucinations    Reglan [metoclopramide]      Objective:     Vital Signs (Most Recent):  Temp: 99.2 °F (37.3 °C) (10/04/23 0721)  Pulse: 95 (10/04/23 0721)  Resp: 16 (10/04/23 0721)  BP: 129/71 (10/04/23 0721)  SpO2: 100 % (10/04/23 0721) Vital Signs (24h Range):  Temp:  [96.6 °F (35.9 °C)-100.1 °F (37.8 °C)] 99.2 °F (37.3 °C)  Pulse:  [60-95] 95  Resp:  [16-19] 16  SpO2:  [95 %-100 %] 100 %  BP: (109-192)/(59-88) 129/71     Weight: 53.1 kg (117 lb)  Body mass index is 20.07 kg/m².    Intake/Output - Last 3 Shifts         10/02 0700  10/03 0659 10/03 0700  10/04 0659 10/04 0700  10/05 0659    P.O. 480      IV Piggyback   100    Total Intake(mL/kg) 480 (9)  100 (1.9)    Net +480  +100                    Physical Exam  Constitutional:       Appearance: Normal appearance.   HENT:      Head: Normocephalic and atraumatic.    Cardiovascular:      Rate and Rhythm: Normal rate.   Pulmonary:      Effort: Pulmonary effort is normal.   Abdominal:      General: Abdomen is flat.      Palpations: Abdomen is soft.      Tenderness: There is abdominal tenderness.      Comments: Mild abdominal tenderness to deep palpation  Multiple abdominal scars from previous surgical history   Skin:     General: Skin is warm and dry.   Neurological:      General: No focal deficit present.      Mental Status: She is alert and oriented to person, place, and time.          Significant Labs:  I have reviewed all pertinent lab results within the past 24 hours.    Significant Diagnostics:  I have reviewed all pertinent imaging results/findings within the past 24 hours.

## 2023-10-04 NOTE — ANESTHESIA RELEASE NOTE
"Anesthesia Release from PACU Note    Patient: Angela Trascher Lejeune    Procedure(s) Performed: Procedure(s) (LRB):  CREATION, BYPASS, DUODENUM, BY ANASTOMOSIS OF DUODENUM TO JEJUNUM, PLACEMNENT OF GASTROSTOMY TUBE, JEJUNOSTOMY TUBE (N/A)    Anesthesia type: general    Post pain: additional pain medications ordered and given in PACU with improvement in patient pain to a tolerable level    Post assessment: no apparent anesthetic complications, tolerated procedure well and no evidence of recall    Last Vitals:   Visit Vitals  BP (!) 180/85 (BP Location: Right arm)   Pulse 103   Temp 36.7 °C (98 °F) (Temporal)   Resp 17   Ht 5' 4.02" (1.626 m)   Wt 53.1 kg (117 lb)   LMP 08/21/2010 (LMP Unknown)   SpO2 97%   Breastfeeding No   BMI 20.07 kg/m²       Post vital signs: stable     Level of consciousness: awake, alert , oriented and responds to stimulation    Nausea/Vomiting: no nausea/no vomiting    Complications: none    Airway Patency: patent    Respiratory: unassisted    Cardiovascular: stable    Hydration: euvolemic  "

## 2023-10-04 NOTE — OP NOTE
[facility] Ocasio - Surgery ([facility])  Surgery Department  Operative Note       Date of Procedure: 10/4/2023       Surgeon(s):  Surgeon(s) and Role:     * Kevin Mays MD - Primary     * Joseph Canales MD - Resident - Assisting    Pre-Operative Diagnosis:   Duodenal stricture [K31.5]  Gastric outlet obstruction  Severe protein-calorie malnutrition  Crohn's disease    Post-Operative Diagnosis:   Same     Anesthesia: General    Operative Findings:   Stricture lateral to crossing of CBD, focal, soft and non mass-like  Bypass via retrocolic mg-y gastrojejunostomy (across pylorus), 45 cm limb, total of >350 cm of small bowel in continuity  Grossly normal small bowel and colon    Procedures:  Mg-Y gastrojejunostomy -22  Exploration of duodenum via duodenotomy  Gastrostomy tube placement  Feeding Jejunostomy tube placement.    Estimated Blood Loss (EBL):  100 mL      Specimen(s): none    Indications:  Angela Trascher Lejeune presents for the above procedures.  Risks and benefits were reviewed including bleeding, infection, damage to local structures, anastomotic leak, need for additional procedures, death, and imponderables.  She understands and gave informed consent to proceed.    Details:  The patient was transported to the operating room and satisfactory anesthesia established.  Preoperative antibiotics were administered.  The patient was placed in the supine position and extremities were padded and protected throughout.  A harmon catheter was placed.  Appropriate lines were placed by anesthesia.    The operative field was prepped and draped in sterile fashion.  A timeout was performed.  The abdomen was entered sharply via an upper midline incision through the linea alba excising her prior feeding tube site.  The peritoneum was breached sharply under direct vision.  The prior PEG site was closed with suture.  The infracolic compartment was largely free of adhesions.  A prior ileocecal anastomosis was in good  order and the entire small bowel appeared free of active disease.  >350 cm of small bowel was present.  The duodenal sweep was oriented normally.  An Johnathon wound retractor was placed and an Omni retractor was used for exposure.  Adhesions in the upper abdomen from prior cholecystectomy were taken down sharply and the hepatoduodenal ligament freed up.  This took >30 min.  The lesser sac was entered through the gastrocolic omentum.  The duodenum was medialized with a wide Kocher maneuver.  There was an area of focal inflammation lateral to the crossing of the bile duct behind the duodenum which correlated with imaging.  The pylorus and proximal duodenum were opened and the interior of the duodenum explored with a finger.  The area of inflammation lateral to the janki was indeed the area of stricture, which would not allow a pinky finger.  It was smooth, soft, focal and nonmass-like.  I felt resection of this stricture was ill advised in this position and bypass was planned.      The ligament of Treitz was identified and the first mobile loop of jejunum divided with a gold load with good effect.  A Delfino limb measuring 45 cm was fashioned.  There was easy reach of the Delfino limb, no mesenteric vessels were divided.  The Delfino limb was passed retrocolic through a defect in the right side of the transverse mesocolon and laid easily atop the duodenotomy.  The pyloric incision was extended proximally onto the stomach and a side-side gastrojejunostomy fashioned between the antrum/pylorus/proximal duodenum and the Delfino limb.  This was done in two layers with outer 2-0 vicryl interrupted seromuscular and inner running 3-0 PDS full thickness.      A jejunojejunostomy was fashioned 45 cm downstream from the GJ in a side-side configuration using a gold 60 mm stapler.  The staple line was examined from within, all three limbs were patent, the staple line was secure and hemostatic.  The enteroenterostomy was closed in 2 layers using  running 3-0 PDS suture.  Mesenteric defects were closed with silk sutures.    A 20Fr Malecot was brought in through the left abdomen, through an anterior gastrotomy through double pursestring vicryl sutures and tacked to the anterior abdominal wall with seromuscular sutures.  A 12Fr no-balloon jejunostomy tube was brought in through the left abdomen and placed in Witzel fashion distal to the jejunojejunostomy and tacked to the anterior abdominal wall with seromuscular silk sutures.  Tube patency and postion were confirmed.      A separate sterile closure setup was used.  Exparel was instilled as a transversus abdominis plane block.  The abdomen was closed over a fish retractor using looped #1 PDS suture in continuous shallow bite/short advance fashion.  Wound was irrigated copiously with dilute betadine and the skin was closed with Monocryl and Dermabond.      All needle, lap, and sponge counts were reported as correct.  I communicated the intraoperative findings with the family following the procedure.     Condition: Good    Disposition: PACU - hemodynamically stable.    Attestation: I was present and scrubbed for the key portions of the procedure. This was a complex case that went over and above the technical work required for a standard GJ.  I am attaching a -22 modifier to reflect the additional 2 hours of work demanded by her reoperative state, adhesions, altered anatomy, and difficult location of this stricture.

## 2023-10-05 ENCOUNTER — ANESTHESIA (OUTPATIENT)
Dept: SURGERY | Facility: HOSPITAL | Age: 48
DRG: 326 | End: 2023-10-05
Payer: COMMERCIAL

## 2023-10-05 ENCOUNTER — ANESTHESIA EVENT (OUTPATIENT)
Dept: SURGERY | Facility: HOSPITAL | Age: 48
DRG: 326 | End: 2023-10-05
Payer: COMMERCIAL

## 2023-10-05 LAB
ALBUMIN SERPL BCP-MCNC: 3.1 G/DL (ref 3.5–5.2)
ALLENS TEST: ABNORMAL
ALP SERPL-CCNC: 109 U/L (ref 55–135)
ALT SERPL W/O P-5'-P-CCNC: 46 U/L (ref 10–44)
ANION GAP SERPL CALC-SCNC: 5 MMOL/L (ref 8–16)
APTT PPP: 28.5 SEC (ref 21–32)
AST SERPL-CCNC: 106 U/L (ref 10–40)
BASOPHILS # BLD AUTO: 0.02 K/UL (ref 0–0.2)
BASOPHILS # BLD AUTO: 0.02 K/UL (ref 0–0.2)
BASOPHILS # BLD AUTO: 0.03 K/UL (ref 0–0.2)
BASOPHILS NFR BLD: 0.1 % (ref 0–1.9)
BASOPHILS NFR BLD: 0.2 % (ref 0–1.9)
BASOPHILS NFR BLD: 0.3 % (ref 0–1.9)
BILIRUB SERPL-MCNC: 0.3 MG/DL (ref 0.1–1)
BLD PROD TYP BPU: NORMAL
BLD PROD TYP BPU: NORMAL
BLOOD UNIT EXPIRATION DATE: NORMAL
BLOOD UNIT EXPIRATION DATE: NORMAL
BLOOD UNIT TYPE CODE: 5100
BLOOD UNIT TYPE CODE: 5100
BLOOD UNIT TYPE: NORMAL
BLOOD UNIT TYPE: NORMAL
BUN SERPL-MCNC: 28 MG/DL (ref 6–20)
CALCIUM SERPL-MCNC: 9.4 MG/DL (ref 8.7–10.5)
CHLORIDE SERPL-SCNC: 108 MMOL/L (ref 95–110)
CO2 SERPL-SCNC: 26 MMOL/L (ref 23–29)
CODING SYSTEM: NORMAL
CODING SYSTEM: NORMAL
CREAT SERPL-MCNC: 0.8 MG/DL (ref 0.5–1.4)
CROSSMATCH INTERPRETATION: NORMAL
CROSSMATCH INTERPRETATION: NORMAL
DELSYS: ABNORMAL
DIFFERENTIAL METHOD: ABNORMAL
DISPENSE STATUS: NORMAL
DISPENSE STATUS: NORMAL
EOSINOPHIL # BLD AUTO: 0 K/UL (ref 0–0.5)
EOSINOPHIL NFR BLD: 0.1 % (ref 0–8)
EOSINOPHIL NFR BLD: 0.1 % (ref 0–8)
EOSINOPHIL NFR BLD: 0.3 % (ref 0–8)
ERYTHROCYTE [DISTWIDTH] IN BLOOD BY AUTOMATED COUNT: 14.3 % (ref 11.5–14.5)
ERYTHROCYTE [DISTWIDTH] IN BLOOD BY AUTOMATED COUNT: 14.5 % (ref 11.5–14.5)
ERYTHROCYTE [DISTWIDTH] IN BLOOD BY AUTOMATED COUNT: 20.1 % (ref 11.5–14.5)
ERYTHROCYTE [SEDIMENTATION RATE] IN BLOOD BY WESTERGREN METHOD: 16 MM/H
EST. GFR  (NO RACE VARIABLE): >60 ML/MIN/1.73 M^2
FIO2: 50
GLUCOSE SERPL-MCNC: 116 MG/DL (ref 70–110)
HCO3 UR-SCNC: 23.3 MMOL/L (ref 24–28)
HCT VFR BLD AUTO: 22.9 % (ref 37–48.5)
HCT VFR BLD AUTO: 24.7 % (ref 37–48.5)
HCT VFR BLD AUTO: 30.5 % (ref 37–48.5)
HCT VFR BLD CALC: 26 %PCV (ref 36–54)
HGB BLD-MCNC: 6.7 G/DL (ref 12–16)
HGB BLD-MCNC: 7.6 G/DL (ref 12–16)
HGB BLD-MCNC: 9.5 G/DL (ref 12–16)
IMM GRANULOCYTES # BLD AUTO: 0.04 K/UL (ref 0–0.04)
IMM GRANULOCYTES # BLD AUTO: 0.06 K/UL (ref 0–0.04)
IMM GRANULOCYTES # BLD AUTO: 0.11 K/UL (ref 0–0.04)
IMM GRANULOCYTES NFR BLD AUTO: 0.4 % (ref 0–0.5)
IMM GRANULOCYTES NFR BLD AUTO: 0.4 % (ref 0–0.5)
IMM GRANULOCYTES NFR BLD AUTO: 1 % (ref 0–0.5)
INR PPP: 0.9 (ref 0.8–1.2)
LYMPHOCYTES # BLD AUTO: 0.5 K/UL (ref 1–4.8)
LYMPHOCYTES # BLD AUTO: 1 K/UL (ref 1–4.8)
LYMPHOCYTES # BLD AUTO: 1.3 K/UL (ref 1–4.8)
LYMPHOCYTES NFR BLD: 11.7 % (ref 18–48)
LYMPHOCYTES NFR BLD: 4.8 % (ref 18–48)
LYMPHOCYTES NFR BLD: 7.1 % (ref 18–48)
MAGNESIUM SERPL-MCNC: 2.1 MG/DL (ref 1.6–2.6)
MCH RBC QN AUTO: 29.3 PG (ref 27–31)
MCH RBC QN AUTO: 30.2 PG (ref 27–31)
MCH RBC QN AUTO: 31.3 PG (ref 27–31)
MCHC RBC AUTO-ENTMCNC: 29.3 G/DL (ref 32–36)
MCHC RBC AUTO-ENTMCNC: 30.8 G/DL (ref 32–36)
MCHC RBC AUTO-ENTMCNC: 31.1 G/DL (ref 32–36)
MCV RBC AUTO: 102 FL (ref 82–98)
MCV RBC AUTO: 103 FL (ref 82–98)
MCV RBC AUTO: 94 FL (ref 82–98)
MODE: ABNORMAL
MONOCYTES # BLD AUTO: 0.7 K/UL (ref 0.3–1)
MONOCYTES # BLD AUTO: 1.2 K/UL (ref 0.3–1)
MONOCYTES # BLD AUTO: 1.7 K/UL (ref 0.3–1)
MONOCYTES NFR BLD: 10.9 % (ref 4–15)
MONOCYTES NFR BLD: 11.8 % (ref 4–15)
MONOCYTES NFR BLD: 6.4 % (ref 4–15)
NEUTROPHILS # BLD AUTO: 11.7 K/UL (ref 1.8–7.7)
NEUTROPHILS # BLD AUTO: 8.5 K/UL (ref 1.8–7.7)
NEUTROPHILS # BLD AUTO: 9.4 K/UL (ref 1.8–7.7)
NEUTROPHILS NFR BLD: 76.5 % (ref 38–73)
NEUTROPHILS NFR BLD: 80.5 % (ref 38–73)
NEUTROPHILS NFR BLD: 87.4 % (ref 38–73)
NRBC BLD-RTO: 0 /100 WBC
NUM UNITS TRANS PACKED RBC: NORMAL
NUM UNITS TRANS PACKED RBC: NORMAL
PCO2 BLDA: 40.8 MMHG (ref 35–45)
PEEP: 5
PH SMN: 7.36 [PH] (ref 7.35–7.45)
PHOSPHATE SERPL-MCNC: 2 MG/DL (ref 2.7–4.5)
PLATELET # BLD AUTO: 131 K/UL (ref 150–450)
PLATELET # BLD AUTO: 146 K/UL (ref 150–450)
PLATELET # BLD AUTO: 162 K/UL (ref 150–450)
PMV BLD AUTO: 11.5 FL (ref 9.2–12.9)
PMV BLD AUTO: 11.7 FL (ref 9.2–12.9)
PMV BLD AUTO: 11.7 FL (ref 9.2–12.9)
PO2 BLDA: 48 MMHG (ref 40–60)
POC BE: -2 MMOL/L
POC IONIZED CALCIUM: 1.19 MMOL/L (ref 1.06–1.42)
POC SATURATED O2: 82 % (ref 95–100)
POC TCO2: 24 MMOL/L (ref 24–29)
POTASSIUM BLD-SCNC: 4.6 MMOL/L (ref 3.5–5.1)
POTASSIUM SERPL-SCNC: 4.6 MMOL/L (ref 3.5–5.1)
PROT SERPL-MCNC: 6.4 G/DL (ref 6–8.4)
PROTHROMBIN TIME: 10.2 SEC (ref 9–12.5)
RBC # BLD AUTO: 2.22 M/UL (ref 4–5.4)
RBC # BLD AUTO: 2.43 M/UL (ref 4–5.4)
RBC # BLD AUTO: 3.24 M/UL (ref 4–5.4)
SAMPLE: ABNORMAL
SITE: ABNORMAL
SODIUM BLD-SCNC: 138 MMOL/L (ref 136–145)
SODIUM SERPL-SCNC: 139 MMOL/L (ref 136–145)
VT: 360
WBC # BLD AUTO: 10.73 K/UL (ref 3.9–12.7)
WBC # BLD AUTO: 11.08 K/UL (ref 3.9–12.7)
WBC # BLD AUTO: 14.48 K/UL (ref 3.9–12.7)

## 2023-10-05 PROCEDURE — 25000003 PHARM REV CODE 250: Performed by: STUDENT IN AN ORGANIZED HEALTH CARE EDUCATION/TRAINING PROGRAM

## 2023-10-05 PROCEDURE — 63600175 PHARM REV CODE 636 W HCPCS: Performed by: NURSE PRACTITIONER

## 2023-10-05 PROCEDURE — 25000003 PHARM REV CODE 250

## 2023-10-05 PROCEDURE — D9220A PRA ANESTHESIA: Mod: ANES,,, | Performed by: ANESTHESIOLOGY

## 2023-10-05 PROCEDURE — 49002 PR REOPEN RECENT ABD EXPLORATORY: ICD-10-PCS | Mod: 78,,, | Performed by: SURGERY

## 2023-10-05 PROCEDURE — 27201423 OPTIME MED/SURG SUP & DEVICES STERILE SUPPLY: Performed by: SURGERY

## 2023-10-05 PROCEDURE — 71000039 HC RECOVERY, EACH ADD'L HOUR: Performed by: SURGERY

## 2023-10-05 PROCEDURE — 37000008 HC ANESTHESIA 1ST 15 MINUTES: Performed by: SURGERY

## 2023-10-05 PROCEDURE — 85025 COMPLETE CBC W/AUTO DIFF WBC: CPT | Performed by: STUDENT IN AN ORGANIZED HEALTH CARE EDUCATION/TRAINING PROGRAM

## 2023-10-05 PROCEDURE — A4217 STERILE WATER/SALINE, 500 ML: HCPCS | Performed by: SURGERY

## 2023-10-05 PROCEDURE — 97606 NEG PRS WND THER DME>50 SQCM: CPT | Mod: ,,, | Performed by: SURGERY

## 2023-10-05 PROCEDURE — 25000003 PHARM REV CODE 250: Performed by: NURSE ANESTHETIST, CERTIFIED REGISTERED

## 2023-10-05 PROCEDURE — 85730 THROMBOPLASTIN TIME PARTIAL: CPT | Performed by: STUDENT IN AN ORGANIZED HEALTH CARE EDUCATION/TRAINING PROGRAM

## 2023-10-05 PROCEDURE — 82803 BLOOD GASES ANY COMBINATION: CPT

## 2023-10-05 PROCEDURE — 99900035 HC TECH TIME PER 15 MIN (STAT)

## 2023-10-05 PROCEDURE — D9220A PRA ANESTHESIA: ICD-10-PCS | Mod: CRNA,,, | Performed by: STUDENT IN AN ORGANIZED HEALTH CARE EDUCATION/TRAINING PROGRAM

## 2023-10-05 PROCEDURE — P9040 RBC LEUKOREDUCED IRRADIATED: HCPCS

## 2023-10-05 PROCEDURE — D9220A PRA ANESTHESIA: Mod: CRNA,,, | Performed by: STUDENT IN AN ORGANIZED HEALTH CARE EDUCATION/TRAINING PROGRAM

## 2023-10-05 PROCEDURE — P9017 PLASMA 1 DONOR FRZ W/IN 8 HR: HCPCS | Performed by: SURGERY

## 2023-10-05 PROCEDURE — 36000706: Performed by: SURGERY

## 2023-10-05 PROCEDURE — C9113 INJ PANTOPRAZOLE SODIUM, VIA: HCPCS | Performed by: STUDENT IN AN ORGANIZED HEALTH CARE EDUCATION/TRAINING PROGRAM

## 2023-10-05 PROCEDURE — 63600175 PHARM REV CODE 636 W HCPCS: Performed by: STUDENT IN AN ORGANIZED HEALTH CARE EDUCATION/TRAINING PROGRAM

## 2023-10-05 PROCEDURE — 27000221 HC OXYGEN, UP TO 24 HOURS

## 2023-10-05 PROCEDURE — 63600175 PHARM REV CODE 636 W HCPCS: Performed by: ANESTHESIOLOGY

## 2023-10-05 PROCEDURE — 99231 PR SUBSEQUENT HOSPITAL CARE,LEVL I: ICD-10-PCS | Mod: ,,, | Performed by: ANESTHESIOLOGY

## 2023-10-05 PROCEDURE — 25000003 PHARM REV CODE 250: Performed by: SURGERY

## 2023-10-05 PROCEDURE — 63600175 PHARM REV CODE 636 W HCPCS

## 2023-10-05 PROCEDURE — 99900026 HC AIRWAY MAINTENANCE (STAT)

## 2023-10-05 PROCEDURE — D9220A PRA ANESTHESIA: ICD-10-PCS | Mod: ANES,,, | Performed by: ANESTHESIOLOGY

## 2023-10-05 PROCEDURE — A4216 STERILE WATER/SALINE, 10 ML: HCPCS | Performed by: STUDENT IN AN ORGANIZED HEALTH CARE EDUCATION/TRAINING PROGRAM

## 2023-10-05 PROCEDURE — 80053 COMPREHEN METABOLIC PANEL: CPT | Performed by: STUDENT IN AN ORGANIZED HEALTH CARE EDUCATION/TRAINING PROGRAM

## 2023-10-05 PROCEDURE — A4216 STERILE WATER/SALINE, 10 ML: HCPCS | Performed by: SURGERY

## 2023-10-05 PROCEDURE — P9016 RBC LEUKOCYTES REDUCED: HCPCS

## 2023-10-05 PROCEDURE — 94002 VENT MGMT INPAT INIT DAY: CPT

## 2023-10-05 PROCEDURE — 63600175 PHARM REV CODE 636 W HCPCS: Performed by: SURGERY

## 2023-10-05 PROCEDURE — 86920 COMPATIBILITY TEST SPIN: CPT

## 2023-10-05 PROCEDURE — 49002 REOPENING OF ABDOMEN: CPT | Mod: 78,,, | Performed by: SURGERY

## 2023-10-05 PROCEDURE — 37000009 HC ANESTHESIA EA ADD 15 MINS: Performed by: SURGERY

## 2023-10-05 PROCEDURE — 85610 PROTHROMBIN TIME: CPT | Performed by: STUDENT IN AN ORGANIZED HEALTH CARE EDUCATION/TRAINING PROGRAM

## 2023-10-05 PROCEDURE — B4185 PARENTERAL SOL 10 GM LIPIDS: HCPCS | Performed by: SURGERY

## 2023-10-05 PROCEDURE — 12000002 HC ACUTE/MED SURGE SEMI-PRIVATE ROOM

## 2023-10-05 PROCEDURE — 86920 COMPATIBILITY TEST SPIN: CPT | Performed by: ANESTHESIOLOGY

## 2023-10-05 PROCEDURE — 84100 ASSAY OF PHOSPHORUS: CPT | Performed by: STUDENT IN AN ORGANIZED HEALTH CARE EDUCATION/TRAINING PROGRAM

## 2023-10-05 PROCEDURE — 97606 PR NEG PRESS WOUND THERAPY (NPWT) W/NON-DISPOSABLE WOUND VAC DEVICE (DME), >=50 CM: ICD-10-PCS | Mod: ,,, | Performed by: SURGERY

## 2023-10-05 PROCEDURE — 85025 COMPLETE CBC W/AUTO DIFF WBC: CPT | Mod: 91 | Performed by: STUDENT IN AN ORGANIZED HEALTH CARE EDUCATION/TRAINING PROGRAM

## 2023-10-05 PROCEDURE — 36000707: Performed by: SURGERY

## 2023-10-05 PROCEDURE — 27100171 HC OXYGEN HIGH FLOW UP TO 24 HOURS

## 2023-10-05 PROCEDURE — 94761 N-INVAS EAR/PLS OXIMETRY MLT: CPT

## 2023-10-05 PROCEDURE — 63600175 PHARM REV CODE 636 W HCPCS: Performed by: NURSE ANESTHETIST, CERTIFIED REGISTERED

## 2023-10-05 PROCEDURE — 99231 SBSQ HOSP IP/OBS SF/LOW 25: CPT | Mod: ,,, | Performed by: ANESTHESIOLOGY

## 2023-10-05 PROCEDURE — 71000033 HC RECOVERY, INTIAL HOUR: Performed by: SURGERY

## 2023-10-05 PROCEDURE — 94760 N-INVAS EAR/PLS OXIMETRY 1: CPT

## 2023-10-05 PROCEDURE — 83735 ASSAY OF MAGNESIUM: CPT | Performed by: STUDENT IN AN ORGANIZED HEALTH CARE EDUCATION/TRAINING PROGRAM

## 2023-10-05 RX ORDER — MIDODRINE HYDROCHLORIDE 5 MG/1
5 TABLET ORAL EVERY 12 HOURS
Status: DISCONTINUED | OUTPATIENT
Start: 2023-10-05 | End: 2023-10-06

## 2023-10-05 RX ORDER — PHENYLEPHRINE HYDROCHLORIDE 10 MG/ML
INJECTION INTRAVENOUS
Status: DISCONTINUED | OUTPATIENT
Start: 2023-10-05 | End: 2023-10-05

## 2023-10-05 RX ORDER — LIDOCAINE HYDROCHLORIDE 20 MG/ML
INJECTION INTRAVENOUS
Status: DISCONTINUED | OUTPATIENT
Start: 2023-10-05 | End: 2023-10-05

## 2023-10-05 RX ORDER — FENTANYL CITRATE-0.9 % NACL/PF 10 MCG/ML
0-200 PLASTIC BAG, INJECTION (ML) INTRAVENOUS CONTINUOUS
Status: DISCONTINUED | OUTPATIENT
Start: 2023-10-05 | End: 2023-10-06

## 2023-10-05 RX ORDER — POTASSIUM CHLORIDE 14.9 MG/ML
20 INJECTION INTRAVENOUS
Status: DISCONTINUED | OUTPATIENT
Start: 2023-10-05 | End: 2023-10-06

## 2023-10-05 RX ORDER — CALCIUM GLUCONATE 20 MG/ML
3 INJECTION, SOLUTION INTRAVENOUS
Status: DISCONTINUED | OUTPATIENT
Start: 2023-10-05 | End: 2023-10-06

## 2023-10-05 RX ORDER — CALCIUM GLUCONATE 20 MG/ML
1 INJECTION, SOLUTION INTRAVENOUS
Status: DISCONTINUED | OUTPATIENT
Start: 2023-10-05 | End: 2023-10-06

## 2023-10-05 RX ORDER — DEXAMETHASONE SODIUM PHOSPHATE 4 MG/ML
INJECTION, SOLUTION INTRA-ARTICULAR; INTRALESIONAL; INTRAMUSCULAR; INTRAVENOUS; SOFT TISSUE
Status: DISCONTINUED | OUTPATIENT
Start: 2023-10-05 | End: 2023-10-05

## 2023-10-05 RX ORDER — ROCURONIUM BROMIDE 10 MG/ML
INJECTION, SOLUTION INTRAVENOUS
Status: DISCONTINUED | OUTPATIENT
Start: 2023-10-05 | End: 2023-10-05

## 2023-10-05 RX ORDER — FENTANYL CITRATE 50 UG/ML
25 INJECTION, SOLUTION INTRAMUSCULAR; INTRAVENOUS EVERY 5 MIN PRN
Status: COMPLETED | OUTPATIENT
Start: 2023-10-05 | End: 2023-10-05

## 2023-10-05 RX ORDER — CALCIUM GLUCONATE 20 MG/ML
2 INJECTION, SOLUTION INTRAVENOUS
Status: DISCONTINUED | OUTPATIENT
Start: 2023-10-05 | End: 2023-10-06

## 2023-10-05 RX ORDER — FLUDROCORTISONE ACETATE 0.1 MG/1
0.1 TABLET ORAL DAILY
Status: DISCONTINUED | OUTPATIENT
Start: 2023-10-06 | End: 2023-10-06

## 2023-10-05 RX ORDER — ARIPIPRAZOLE 5 MG/1
5 TABLET ORAL DAILY
Status: DISCONTINUED | OUTPATIENT
Start: 2023-10-06 | End: 2023-10-06

## 2023-10-05 RX ORDER — LEVALBUTEROL INHALATION SOLUTION 0.63 MG/3ML
0.63 SOLUTION RESPIRATORY (INHALATION) EVERY 8 HOURS PRN
Status: DISCONTINUED | OUTPATIENT
Start: 2023-10-05 | End: 2023-10-11 | Stop reason: HOSPADM

## 2023-10-05 RX ORDER — DEXMEDETOMIDINE HYDROCHLORIDE 100 UG/ML
INJECTION, SOLUTION INTRAVENOUS
Status: DISCONTINUED | OUTPATIENT
Start: 2023-10-05 | End: 2023-10-05

## 2023-10-05 RX ORDER — SODIUM CHLORIDE 0.9 % (FLUSH) 0.9 %
10 SYRINGE (ML) INJECTION
Status: DISCONTINUED | OUTPATIENT
Start: 2023-10-05 | End: 2023-10-11 | Stop reason: HOSPADM

## 2023-10-05 RX ORDER — VASOPRESSIN 20 [USP'U]/ML
INJECTION, SOLUTION INTRAMUSCULAR; SUBCUTANEOUS
Status: DISCONTINUED | OUTPATIENT
Start: 2023-10-05 | End: 2023-10-05

## 2023-10-05 RX ORDER — KETAMINE HCL IN 0.9 % NACL 50 MG/5 ML
SYRINGE (ML) INTRAVENOUS
Status: DISCONTINUED | OUTPATIENT
Start: 2023-10-05 | End: 2023-10-05

## 2023-10-05 RX ORDER — HYDROCODONE BITARTRATE AND ACETAMINOPHEN 500; 5 MG/1; MG/1
TABLET ORAL
Status: DISCONTINUED | OUTPATIENT
Start: 2023-10-05 | End: 2023-10-07

## 2023-10-05 RX ORDER — FENTANYL CITRATE 50 UG/ML
INJECTION, SOLUTION INTRAMUSCULAR; INTRAVENOUS
Status: DISCONTINUED | OUTPATIENT
Start: 2023-10-05 | End: 2023-10-05

## 2023-10-05 RX ORDER — MIRTAZAPINE 15 MG/1
15 TABLET, ORALLY DISINTEGRATING ORAL NIGHTLY
Status: DISCONTINUED | OUTPATIENT
Start: 2023-10-05 | End: 2023-10-06

## 2023-10-05 RX ORDER — PROPOFOL 10 MG/ML
VIAL (ML) INTRAVENOUS CONTINUOUS PRN
Status: DISCONTINUED | OUTPATIENT
Start: 2023-10-05 | End: 2023-10-05

## 2023-10-05 RX ORDER — HALOPERIDOL 5 MG/ML
0.5 INJECTION INTRAMUSCULAR EVERY 10 MIN PRN
Status: DISCONTINUED | OUTPATIENT
Start: 2023-10-05 | End: 2023-10-06 | Stop reason: HOSPADM

## 2023-10-05 RX ORDER — FENTANYL CITRATE 50 UG/ML
50 INJECTION, SOLUTION INTRAMUSCULAR; INTRAVENOUS
Status: DISCONTINUED | OUTPATIENT
Start: 2023-10-05 | End: 2023-10-05

## 2023-10-05 RX ORDER — MAGNESIUM SULFATE HEPTAHYDRATE 40 MG/ML
4 INJECTION, SOLUTION INTRAVENOUS
Status: DISCONTINUED | OUTPATIENT
Start: 2023-10-05 | End: 2023-10-06

## 2023-10-05 RX ORDER — CEFAZOLIN SODIUM 1 G/3ML
INJECTION, POWDER, FOR SOLUTION INTRAMUSCULAR; INTRAVENOUS
Status: DISCONTINUED | OUTPATIENT
Start: 2023-10-05 | End: 2023-10-05

## 2023-10-05 RX ORDER — MIDAZOLAM HYDROCHLORIDE 1 MG/ML
INJECTION INTRAMUSCULAR; INTRAVENOUS
Status: DISCONTINUED | OUTPATIENT
Start: 2023-10-05 | End: 2023-10-05

## 2023-10-05 RX ORDER — POTASSIUM CHLORIDE 29.8 MG/ML
40 INJECTION INTRAVENOUS
Status: DISCONTINUED | OUTPATIENT
Start: 2023-10-05 | End: 2023-10-06

## 2023-10-05 RX ORDER — POTASSIUM CHLORIDE 29.8 MG/ML
80 INJECTION INTRAVENOUS
Status: DISCONTINUED | OUTPATIENT
Start: 2023-10-05 | End: 2023-10-06

## 2023-10-05 RX ORDER — ACETAMINOPHEN 10 MG/ML
1000 INJECTION, SOLUTION INTRAVENOUS EVERY 8 HOURS
Status: COMPLETED | OUTPATIENT
Start: 2023-10-05 | End: 2023-10-06

## 2023-10-05 RX ORDER — ACETAMINOPHEN 10 MG/ML
1000 INJECTION, SOLUTION INTRAVENOUS EVERY 8 HOURS
Status: DISCONTINUED | OUTPATIENT
Start: 2023-10-05 | End: 2023-10-05

## 2023-10-05 RX ORDER — ONDANSETRON 2 MG/ML
INJECTION INTRAMUSCULAR; INTRAVENOUS
Status: DISCONTINUED | OUTPATIENT
Start: 2023-10-05 | End: 2023-10-05

## 2023-10-05 RX ORDER — ONDANSETRON 2 MG/ML
4 INJECTION INTRAMUSCULAR; INTRAVENOUS EVERY 6 HOURS PRN
Status: DISCONTINUED | OUTPATIENT
Start: 2023-10-05 | End: 2023-10-05

## 2023-10-05 RX ORDER — MAGNESIUM SULFATE HEPTAHYDRATE 40 MG/ML
2 INJECTION, SOLUTION INTRAVENOUS
Status: DISCONTINUED | OUTPATIENT
Start: 2023-10-05 | End: 2023-10-06

## 2023-10-05 RX ORDER — PROPOFOL 10 MG/ML
0-50 INJECTION, EMULSION INTRAVENOUS CONTINUOUS
Status: DISCONTINUED | OUTPATIENT
Start: 2023-10-05 | End: 2023-10-06

## 2023-10-05 RX ORDER — ARIPIPRAZOLE 5 MG/1
5 TABLET ORAL DAILY
Status: DISCONTINUED | OUTPATIENT
Start: 2023-10-05 | End: 2023-10-05

## 2023-10-05 RX ORDER — PROPOFOL 10 MG/ML
VIAL (ML) INTRAVENOUS
Status: DISCONTINUED | OUTPATIENT
Start: 2023-10-05 | End: 2023-10-05

## 2023-10-05 RX ORDER — SODIUM CHLORIDE 9 MG/ML
INJECTION, SOLUTION INTRAVENOUS CONTINUOUS PRN
Status: DISCONTINUED | OUTPATIENT
Start: 2023-10-05 | End: 2023-10-05

## 2023-10-05 RX ORDER — SODIUM CHLORIDE, SODIUM LACTATE, POTASSIUM CHLORIDE, CALCIUM CHLORIDE 600; 310; 30; 20 MG/100ML; MG/100ML; MG/100ML; MG/100ML
INJECTION, SOLUTION INTRAVENOUS CONTINUOUS
Status: DISCONTINUED | OUTPATIENT
Start: 2023-10-05 | End: 2023-10-09

## 2023-10-05 RX ORDER — ONDANSETRON 2 MG/ML
4 INJECTION INTRAMUSCULAR; INTRAVENOUS EVERY 6 HOURS PRN
Status: DISCONTINUED | OUTPATIENT
Start: 2023-10-05 | End: 2023-10-11

## 2023-10-05 RX ADMIN — HYDROMORPHONE HYDROCHLORIDE: 2 INJECTION, SOLUTION INTRAMUSCULAR; INTRAVENOUS; SUBCUTANEOUS at 08:10

## 2023-10-05 RX ADMIN — ACETAMINOPHEN 1000 MG: 10 INJECTION, SOLUTION INTRAVENOUS at 05:10

## 2023-10-05 RX ADMIN — FENTANYL CITRATE 25 MCG: 50 INJECTION INTRAMUSCULAR; INTRAVENOUS at 06:10

## 2023-10-05 RX ADMIN — ACETAMINOPHEN 1000 MG: 10 INJECTION, SOLUTION INTRAVENOUS at 02:10

## 2023-10-05 RX ADMIN — ONDANSETRON 4 MG: 2 INJECTION INTRAMUSCULAR; INTRAVENOUS at 05:10

## 2023-10-05 RX ADMIN — MIDAZOLAM HYDROCHLORIDE 4 MG: 1 INJECTION INTRAMUSCULAR; INTRAVENOUS at 03:10

## 2023-10-05 RX ADMIN — PROPOFOL 50 MCG/KG/MIN: 10 INJECTION, EMULSION INTRAVENOUS at 09:10

## 2023-10-05 RX ADMIN — ROCURONIUM BROMIDE 20 MG: 10 INJECTION, SOLUTION INTRAVENOUS at 04:10

## 2023-10-05 RX ADMIN — ROCURONIUM BROMIDE 50 MG: 10 INJECTION, SOLUTION INTRAVENOUS at 03:10

## 2023-10-05 RX ADMIN — SODIUM CHLORIDE, POTASSIUM CHLORIDE, SODIUM LACTATE AND CALCIUM CHLORIDE 500 ML: 600; 310; 30; 20 INJECTION, SOLUTION INTRAVENOUS at 01:10

## 2023-10-05 RX ADMIN — Medication 10 ML: at 11:10

## 2023-10-05 RX ADMIN — PROPOFOL 160 MG: 10 INJECTION, EMULSION INTRAVENOUS at 03:10

## 2023-10-05 RX ADMIN — FENTANYL CITRATE 50 MCG: 50 INJECTION, SOLUTION INTRAMUSCULAR; INTRAVENOUS at 05:10

## 2023-10-05 RX ADMIN — Medication 10 ML: at 05:10

## 2023-10-05 RX ADMIN — LAMOTRIGINE 150 MG: 150 TABLET ORAL at 11:10

## 2023-10-05 RX ADMIN — ARIPIPRAZOLE 5 MG: 5 TABLET ORAL at 11:10

## 2023-10-05 RX ADMIN — PROPOFOL 50 MCG/KG/MIN: 10 INJECTION, EMULSION INTRAVENOUS at 06:10

## 2023-10-05 RX ADMIN — ACETAMINOPHEN 1000 MG: 10 INJECTION, SOLUTION INTRAVENOUS at 09:10

## 2023-10-05 RX ADMIN — SODIUM CHLORIDE: 0.9 INJECTION, SOLUTION INTRAVENOUS at 03:10

## 2023-10-05 RX ADMIN — FENTANYL CITRATE 50 MCG: 50 INJECTION, SOLUTION INTRAMUSCULAR; INTRAVENOUS at 03:10

## 2023-10-05 RX ADMIN — SMOFLIPID 250 ML: 6; 6; 5; 3 INJECTION, EMULSION INTRAVENOUS at 09:10

## 2023-10-05 RX ADMIN — SCOPALAMINE 1 PATCH: 1 PATCH, EXTENDED RELEASE TRANSDERMAL at 04:10

## 2023-10-05 RX ADMIN — PANTOPRAZOLE SODIUM 40 MG: 40 INJECTION, POWDER, FOR SOLUTION INTRAVENOUS at 09:10

## 2023-10-05 RX ADMIN — PHENYLEPHRINE HYDROCHLORIDE 200 MCG: 10 INJECTION INTRAVENOUS at 03:10

## 2023-10-05 RX ADMIN — DEXAMETHASONE SODIUM PHOSPHATE 4 MG: 4 INJECTION, SOLUTION INTRAMUSCULAR; INTRAVENOUS at 05:10

## 2023-10-05 RX ADMIN — METHOCARBAMOL 500 MG: 100 INJECTION, SOLUTION INTRAMUSCULAR; INTRAVENOUS at 08:10

## 2023-10-05 RX ADMIN — Medication 25 MCG/HR: at 08:10

## 2023-10-05 RX ADMIN — DEXMEDETOMIDINE 12 MCG: 100 INJECTION, SOLUTION, CONCENTRATE INTRAVENOUS at 06:10

## 2023-10-05 RX ADMIN — LAMOTRIGINE 150 MG: 100 TABLET ORAL at 09:10

## 2023-10-05 RX ADMIN — FENTANYL CITRATE 50 MCG: 50 INJECTION INTRAMUSCULAR; INTRAVENOUS at 07:10

## 2023-10-05 RX ADMIN — MAGNESIUM SULFATE HEPTAHYDRATE: 500 INJECTION, SOLUTION INTRAMUSCULAR; INTRAVENOUS at 09:10

## 2023-10-05 RX ADMIN — Medication 5 MG: at 05:10

## 2023-10-05 RX ADMIN — Medication 15 MG: at 03:10

## 2023-10-05 RX ADMIN — SODIUM CHLORIDE, POTASSIUM CHLORIDE, SODIUM LACTATE AND CALCIUM CHLORIDE 1000 ML: 600; 310; 30; 20 INJECTION, SOLUTION INTRAVENOUS at 11:10

## 2023-10-05 RX ADMIN — Medication 10 MG: at 05:10

## 2023-10-05 RX ADMIN — CEFAZOLIN 2 G: 330 INJECTION, POWDER, FOR SOLUTION INTRAMUSCULAR; INTRAVENOUS at 03:10

## 2023-10-05 RX ADMIN — VASOPRESSIN 2 UNITS: 20 INJECTION INTRAVENOUS at 03:10

## 2023-10-05 RX ADMIN — DEXMEDETOMIDINE 8 MCG: 100 INJECTION, SOLUTION, CONCENTRATE INTRAVENOUS at 06:10

## 2023-10-05 RX ADMIN — MIDODRINE HYDROCHLORIDE 5 MG: 5 TABLET ORAL at 09:10

## 2023-10-05 RX ADMIN — FENTANYL CITRATE 25 MCG: 50 INJECTION INTRAMUSCULAR; INTRAVENOUS at 07:10

## 2023-10-05 RX ADMIN — SODIUM CHLORIDE, POTASSIUM CHLORIDE, SODIUM LACTATE AND CALCIUM CHLORIDE 500 ML: 600; 310; 30; 20 INJECTION, SOLUTION INTRAVENOUS at 04:10

## 2023-10-05 RX ADMIN — MIRTAZAPINE 15 MG: 15 TABLET, ORALLY DISINTEGRATING ORAL at 11:10

## 2023-10-05 RX ADMIN — SODIUM CHLORIDE, POTASSIUM CHLORIDE, SODIUM LACTATE AND CALCIUM CHLORIDE: 600; 310; 30; 20 INJECTION, SOLUTION INTRAVENOUS at 06:10

## 2023-10-05 RX ADMIN — LABETALOL HYDROCHLORIDE 10 MG: 5 INJECTION, SOLUTION INTRAVENOUS at 06:10

## 2023-10-05 RX ADMIN — HYDRALAZINE HYDROCHLORIDE 5 MG: 20 INJECTION, SOLUTION INTRAMUSCULAR; INTRAVENOUS at 05:10

## 2023-10-05 RX ADMIN — LABETALOL HYDROCHLORIDE 10 MG: 5 INJECTION, SOLUTION INTRAVENOUS at 07:10

## 2023-10-05 RX ADMIN — LIDOCAINE HYDROCHLORIDE 50 MG: 20 INJECTION INTRAVENOUS at 03:10

## 2023-10-05 RX ADMIN — PROPOFOL 50 MCG/KG/MIN: 10 INJECTION, EMULSION INTRAVENOUS at 05:10

## 2023-10-05 RX ADMIN — FLUDROCORTISONE ACETATE 0.1 MG: 0.1 TABLET ORAL at 09:10

## 2023-10-05 NOTE — ANESTHESIA PREPROCEDURE EVALUATION
[facility]  Anesthesia Pre-Operative Evaluation         Patient Name: Angela Trascher Lejeune  YOB: 1975  MRN: [id number]    SUBJECTIVE:     Pre-operative evaluation for Procedure(s) (LRB):  EVACUATION, HEMATOMA       10/05/2023    Angela Trascher Lejeune is a 48 y.o. female w/ a significant PMHx of Crohn's disease, POTS, iron def anemia, gastroparesis, seizures, gastric varices, alcohol abuse, bipolar disorder who was recently seen in surgical oncology clinic 9/26/23 for evaluation of a gastric outlet obstruction due to duodenal stricture. Presented 9/27/23 to OSH with n/v abd pain. Reports prior hx of gastroparesis but that has resolved since previous surgery.     Patient now presents for the above procedure(s).    TTE 9/13/2023:    Left Ventricle: The left ventricle is normal in size. Normal wall   thickness. Normal wall motion. There is low normal systolic function.   There is normal diastolic function.    Right Ventricle: Normal right ventricular cavity size. Wall thickness   is normal. Systolic function is normal.    Aortic Valve: The aortic valve is a trileaflet valve. No cusp   calcification.    IVC/SVC: Normal venous pressure at 3 mmHg.    [facility] 3/29/2023:    The ejection fraction was calculated to be 60%.    The left ventricular ejection fraction was grossly normal.    The left ventricular systolic function was normal.    The left ventricular end diastolic pressure was normal.    The pre-procedure left ventricular end diastolic pressure was 14.    The post-procedure left ventricular end diastolic pressure was 14.    The estimated blood loss was none.    There was single vessel coronary artery disease. There was non-obstructive coronary artery disease..    LDA:   PICC Double Lumen 09/29/23 1431 right basilic (Active)   Line Necessity Review Longterm central access required 10/02/23 2049   Verification by X-ray Yes 10/02/23 0805   Site  Assessment No drainage;No redness;No warmth;No swelling 10/02/23 2049   Extremity Assessment Distal to IV No abnormal discoloration;No warmth;No swelling;No redness 10/02/23 2049   Line Securement Device Secured with sutureless device 10/02/23 0805   Dressing Type CHG impregnated dressing/sponge;Central line dressing 10/02/23 2049   Dressing Status Clean;Dry;Intact 10/02/23 2049   Dressing Intervention Integrity maintained 10/02/23 2049   Date on Dressing 09/29/23 10/01/23 2334   Dressing Due to be Changed 10/06/23 10/02/23 2049   Distal Patency/Care flushed w/o difficulty;infusing 10/02/23 2049   Proximal 1 Patency/Care flushed w/o difficulty;infusing 10/02/23 2049   Current Insertion Depth (cm) 33 cm 09/29/23 1434   Current Exposed Catheter (cm) 0 cm 09/29/23 1434   Extremity Circumference (cm) 32 cm 09/29/23 1434   Number of days: 3            PowerPort A Cath Single Lumen 09/27/23 2302 Subclavian Left (Active)   Line Necessity Review Poor venous access 09/29/23 0800   Verification by X-ray Yes 09/28/23 0543   Site Assessment No drainage;No redness;No swelling;No warmth 10/01/23 1615   Line Securement Device Secured with sutureless device 10/01/23 1615   Dressing Type CHG impregnated dressing/sponge 10/01/23 1615   Dressing Status Clean;Dry;Intact 10/01/23 1615   Dressing Intervention Integrity maintained 10/01/23 1615   Date on Dressing 09/27/23 10/01/23 0816   Dressing Due to be Changed 10/03/23 10/01/23 0816   Patency/Care flushed w/o difficulty;blood return present;normal saline locked 09/30/23 2000   Status Accessed 10/01/23 0816   Accessed by: Milvia Lam RN 09/27/23 2322   Needle Insertion Date 09/27/23 09/27/23 2322   Needle Insertion Time 2308 09/27/23 2322   Type of Needle PowerHuber 09/27/23 2322   Needle Length 1 in 09/27/23 2322   Needle Status Left in place 09/27/23 2322   Flush Performed Yes 09/29/23 0800   Number of days: 5       Prev airway: None documented.    Drips:       Patient Active  "Problem List   Diagnosis    Bipolar I disorder    Seizure disorder    Carpal tunnel syndrome    Anxiety state    Essential tremor    Iron deficiency anemia secondary to inadequate dietary iron intake    Left ovarian cyst    Asymptomatic bacteriuria    Fatigue associated with anemia    Hypokalemia    Venous insufficiency    Crohn's disease of small intestine with complication    Gastric ulcer without hemorrhage or perforation    Gastroparesis    Abnormal ECG    Immunosuppressed status    Duodenal ulcer    Closed bimalleolar fracture of left ankle    Gastroenteritis    Acute cystitis without hematuria    Decreased range of motion of left ankle    Weakness    Left ankle swelling    Impaired functional mobility, balance, gait, and endurance    Right upper quadrant pain    Bilious vomiting    Hypertension    History of alcohol abuse    POTS (postural orthostatic tachycardia syndrome)    N&V (nausea and vomiting)    Duodenal stricture    Mechanical complication of internal orthopedic implant       Review of patient's allergies indicates:   Allergen Reactions    Tramadol Other (See Comments)     Seizures      Adhesive Other (See Comments)     STATES, " TEARS SKIN"     Demerol [meperidine]      Seizures      Hydrocodone Hallucinations    Reglan [metoclopramide]        Current Inpatient Medications:      Current Facility-Administered Medications on File Prior to Visit   Medication Dose Route Frequency Provider Last Rate Last Admin    0.9%  NaCl infusion (for blood administration)   Intravenous Q24H PRN Shanta Tran MD        0.9%  NaCl infusion (for blood administration)   Intravenous Q24H PRN Shanta Tran MD        acetaminophen 1,000 mg/100 mL (10 mg/mL) injection 1,000 mg  1,000 mg Intravenous Q8H Jordyn Carey  mL/hr at 10/05/23 1412 1,000 mg at 10/05/23 1412    ARIPiprazole tablet 5 mg  5 mg Oral Daily Kevin Mays MD   5 mg at 10/05/23 1138 "    fludrocortisone tablet 0.1 mg  0.1 mg Oral Daily Joseph Canales MD   0.1 mg at 10/05/23 0938    HYDROmorphone PCA syringe 6 mg/30 mL (0.2 mg/mL) NS   Intravenous Continuous Joseph Canales MD   Stopped at 10/05/23 1410    labetalol 20 mg/4 mL (5 mg/mL) IV syring  10 mg Intravenous Q6H PRN Joseph Canales MD   10 mg at 10/05/23 0736    lamoTRIgine tablet 150 mg  150 mg Oral BID Joseph Canales MD   150 mg at 10/05/23 0938    levalbuterol nebulizer solution 0.63 mg  0.63 mg Nebulization Q8H PRN Jordyn Carey NP        LIDOcaine (PF) 10 mg/ml (1%) injection 10 mg  1 mL Intradermal Once PRN Joseph Canales MD        LIDOcaine 5 % patch 1 patch  1 patch Transdermal Q24H Joseph Canales MD        lipid (SMOFLIPID) (SMOFLIPID) 20 % infusion 250 mL  250 mL Intravenous Daily Kevin Mays MD        midodrine tablet 5 mg  5 mg Oral Q12H Joseph Canales MD   5 mg at 10/05/23 0938    mirtazapine disintegrating tablet 15 mg  15 mg Oral Nightly Joseph Canales MD   15 mg at 10/04/23 2159    naloxone 0.4 mg/mL injection 0.02 mg  0.02 mg Intravenous PRN Joseph Canales MD        ondansetron injection 4 mg  4 mg Intravenous Q6H PRN Ledy Espinoza MD        pantoprazole injection 40 mg  40 mg Intravenous BID Joseph Canales MD   40 mg at 10/05/23 0939    prochlorperazine injection Soln 2.5 mg  2.5 mg Intravenous Q6H PRN Joseph Canales MD   2.5 mg at 10/04/23 2259    scopolamine 1.3-1.5 mg (1 mg over 3 days) 1 patch  1 patch Transdermal Q3 Days Joseph Canales MD   1 patch at 10/05/23 0457    sodium chloride 0.9% flush 10 mL  10 mL Intravenous PRN Joseph Canales MD        sodium chloride 0.9% flush 10 mL  10 mL Intravenous Q6H Joseph Canales MD   10 mL at 10/05/23 1140    And    sodium chloride 0.9% flush 10 mL  10 mL Intravenous PRN Joseph Canales MD   10 mL at 10/04/23 0441    TPN ADULT CENTRAL LINE CUSTOM    Intravenous Continuous Joseph Canales  mL/hr at 10/04/23 2243 New Bag at 10/04/23 2243    TPN ADULT CENTRAL LINE CUSTOM   Intravenous Continuous Kevin Mays MD         Current Outpatient Medications on File Prior to Visit   Medication Sig Dispense Refill    ARIPiprazole (ABILIFY) 5 MG Tab Take 1 tablet (5 mg total) by mouth once daily. 30 tablet 11    fludrocortisone (FLORINEF) 0.1 mg Tab Take 0.1 mg by mouth Daily.      HYDROmorphone (DILAUDID) 2 MG tablet Take 1 tablet (2 mg total) by mouth every 4 (four) hours as needed for Pain. 20 tablet 0    lamotrigine (LAMICTAL) 150 MG Tab Take 150 mg by mouth 2 (two) times daily.      LIDOcaine-prilocaine (EMLA) cream Apply topically as needed (use prior to treatment with port access). 30 g 0    LORazepam (ATIVAN) 0.5 MG tablet Take 1 tablet (0.5 mg total) by mouth every 6 (six) hours as needed for Anxiety. 30 tablet 2    midodrine (PROAMATINE) 5 MG Tab Take 5 mg by mouth 3 (three) times daily with meals.      mirtazapine (REMERON) 7.5 MG Tab Take 1 tablet (7.5 mg total) by mouth every evening. 30 tablet 11    pantoprazole (PROTONIX) 40 MG tablet Take 1 tablet (40 mg total) by mouth 2 (two) times daily. 60 tablet 1    promethazine (PHENERGAN) 25 MG tablet Take 1 tablet (25 mg total) by mouth every 6 (six) hours as needed for Nausea. 30 tablet 5    propranoloL (INDERAL) 40 MG tablet Take 40 mg by mouth once daily.      sodium chloride 1,000 mg TbSO oral tablet Take 1,000 mg by mouth 2 (two) times a day.      sucralfate (CARAFATE) 1 gram tablet Take 1 tablet (1 g total) by mouth 4 (four) times daily before meals and nightly. 28 tablet 0    tiZANidine (ZANAFLEX) 4 MG tablet 1-2 tid prn (Patient taking differently: Take 4-8 mg by mouth every 8 (eight) hours as needed (muscle spasms).) 60 tablet 5    ustekinumab (STELARA) 90 mg/mL Syrg syringe Inject 90 mg into the skin every 8 weeks. Indications: Crohn's disease      [DISCONTINUED]  prazosin (MINIPRESS) 1 MG Cap          Past Surgical History:   Procedure Laterality Date    ANKLE HARDWARE REMOVAL Left 9/6/2023    Procedure: REMOVAL, HARDWARE, ANKLE;  Surgeon: Michael Lacey MD;  Location: [facility];  Service: Orthopedics;  Laterality: Left;    APPENDECTOMY      BRAIN SURGERY  2009    fx skull due to seizure with 2 pins    BYPASS OF DUODENUM BY ANASTOMOSIS OF DUODENUM TO JEJUNUM N/A 10/4/2023    Procedure: CREATION, BYPASS, DUODENUM, BY ANASTOMOSIS OF DUODENUM TO JEJUNUM, PLACEMNENT OF GASTROSTOMY TUBE, JEJUNOSTOMY TUBE;  Surgeon: Kevin Mays MD;  Location: [facility] OR 2ND FLR;  Service: General;  Laterality: N/A;    CHOLECYSTECTOMY      COLON SURGERY      Partial colectomy    COLONOSCOPY Left 7/10/2019    Procedure: COLONOSCOPY;  Surgeon: Papi Martinez Jr., MD;  Location: [facility];  Service: Endoscopy;  Laterality: Left;    COLONOSCOPY N/A 12/6/2022    Procedure: COLONOSCOPY;  Surgeon: Zandra Mallory MD;  Location: [facility] (4TH FLR);  Service: Endoscopy;  Laterality: N/A;  poor prep on 11/28/22.  per Dr Mallory-Miralax 5 days prior with gastroparesis diet-3 days full liquid. also Mag citrate day before     instr handed to pt-GT    EGD, WITH BALLOON DILATION  7/24/2023    Procedure: EGD, WITH BALLOON DILATION;  Surgeon: Huang Licea MD;  Location: [facility];  Service: Endoscopy;;    ESOPHAGOGASTRODUODENOSCOPY Left 6/5/2019    Procedure: EGD (ESOPHAGOGASTRODUODENOSCOPY);  Surgeon: ROLA Villagran MD;  Location: [facility];  Service: Endoscopy;  Laterality: Left;    ESOPHAGOGASTRODUODENOSCOPY Left 3/16/2020    Procedure: EGD (ESOPHAGOGASTRODUODENOSCOPY);  Surgeon: Clive Núñez MD;  Location: [facility];  Service: Endoscopy;  Laterality: Left;    ESOPHAGOGASTRODUODENOSCOPY N/A 4/17/2020    Procedure: EGD (ESOPHAGOGASTRODUODENOSCOPY);  Surgeon: Papi Martinez Jr., MD;  Location: [facility];  Service: Endoscopy;  Laterality: N/A;  with Push Enteroscopy     ESOPHAGOGASTRODUODENOSCOPY N/A 7/6/2020    Procedure: EGD (ESOPHAGOGASTRODUODENOSCOPY)   possible peg;  Surgeon: Papi Martinez Jr., MD;  Location: [facility] ENDO;  Service: Endoscopy;  Laterality: N/A;    ESOPHAGOGASTRODUODENOSCOPY N/A 7/9/2020    Procedure: EGD (ESOPHAGOGASTRODUODENOSCOPY);  Surgeon: Papi Martinez Jr., MD;  Location: [facility] ENDO;  Service: Endoscopy;  Laterality: N/A;  J or Peg tube    ESOPHAGOGASTRODUODENOSCOPY N/A 9/3/2020    Procedure: EGD (ESOPHAGOGASTRODUODENOSCOPY);  Surgeon: Papi Martinez Jr., MD;  Location: [facility] ENDO;  Service: Endoscopy;  Laterality: N/A;  per drs order Stoma, Peds, w/single Lumen, J-Tube placement    ESOPHAGOGASTRODUODENOSCOPY N/A 12/29/2020    Procedure: EGD (ESOPHAGOGASTRODUODENOSCOPY);  Surgeon: Papi Martinez Jr., MD;  Location: [facility] ENDO;  Service: Endoscopy;  Laterality: N/A;    ESOPHAGOGASTRODUODENOSCOPY N/A 2/23/2021    Procedure: EGD (ESOPHAGOGASTRODUODENOSCOPY);  Surgeon: Papi Martinez Jr., MD;  Location: [facility] ENDO;  Service: Endoscopy;  Laterality: N/A;    ESOPHAGOGASTRODUODENOSCOPY N/A 2/3/2022    Procedure: EGD (ESOPHAGOGASTRODUODENOSCOPY);  Surgeon: Papi Martinez Jr., MD;  Location: [facility] ENDO;  Service: Endoscopy;  Laterality: N/A;    ESOPHAGOGASTRODUODENOSCOPY N/A 8/13/2022    Procedure: EGD (ESOPHAGOGASTRODUODENOSCOPY);  Surgeon: Huang Licea MD;  Location: [facility] ENDO;  Service: Gastroenterology;  Laterality: N/A;    ESOPHAGOGASTRODUODENOSCOPY N/A 10/16/2022    Procedure: EGD (ESOPHAGOGASTRODUODENOSCOPY);  Surgeon: Huang Licea MD;  [facility];  Service: Gastroenterology;  Laterality: N/A;    ESOPHAGOGASTRODUODENOSCOPY N/A 11/28/2022    Procedure: EGD (ESOPHAGOGASTRODUODENOSCOPY);  Surgeon: Zandra Mallory MD;  Location: [address]);  Service: Endoscopy;  Laterality: N/A;    ESOPHAGOGASTRODUODENOSCOPY N/A 7/24/2023    Procedure: EGD (ESOPHAGOGASTRODUODENOSCOPY);  Surgeon: Huang Licea MD;  Location: [facility];   Service: Endoscopy;  Laterality: N/A;    ESOPHAGOGASTRODUODENOSCOPY N/A 9/15/2023    Procedure: EGD (ESOPHAGOGASTRODUODENOSCOPY);  Surgeon: ROLA Villagran MD;  [facility];  Service: Endoscopy;  Laterality: N/A;    FEMUR FRACTURE SURGERY Right 2006    HYSTERECTOMY      INSERTION OF TUNNELED CENTRAL VENOUS CATHETER (CVC) WITH SUBCUTANEOUS PORT Left 2019    Procedure: [id number]-B-CATH;  Surgeon: Miquel Sargent MD;  Location: [facility] OR;  Service: General;  Laterality: Left;    INSERTION OR REPLACEMENT OF PERCUTANEOUS ENDOSCOPIC JEJUNOSTOMY TUBE  2020    Procedure: INSERTION OR REPLACEMENT, JEJUNOSTOMY TUBE, PERCUTANEOUS, ENDOSCOPIC;  Surgeon: Papi Martinez Jr., MD;  [facility];  Service: Endoscopy;;    laparoscopy for endometriosis      x5    LEFT HEART CATHETERIZATION Left 3/29/2023    Procedure: Left heart cath;  Surgeon: Riley Tipton MD;  Location: [facility] CATH;  Service: Cardiology;  Laterality: Left;    OOPHORECTOMY      right ovary removed only    OPEN REDUCTION AND INTERNAL FIXATION (ORIF) OF INJURY OF ANKLE Left 4/10/2023    Procedure: ORIF, ANKLE;  Surgeon: Michael Lacey MD;  Location: [facility] OR;  Service: Orthopedics;  Laterality: Left;    PEG TUBE REMOVAL  2021    Procedure: REMOVAL, PEG TUBE;  Surgeon: Papi Martinez Jr., MD;  [facility];  Service: Endoscopy;;    TONSILLECTOMY         Social History     Socioeconomic History    Marital status:    Tobacco Use    Smoking status: Former     Current packs/day: 0.00     Average packs/day: 0.5 packs/day for 6.0 years (3.0 ttl pk-yrs)     Types: Cigarettes     Start date: 5/10/2005     Quit date: 5/10/2011     Years since quittin.4    Smokeless tobacco: Never   Substance and Sexual Activity    Alcohol use: Yes     Comment: socially    Drug use: Never    Sexual activity: Yes     Partners: Male     Social Determinants of Health     Financial Resource Strain: Medium Risk (2023)     Overall Financial Resource Strain (CARDIA)     Difficulty of Paying Living Expenses: Somewhat hard   Food Insecurity: Food Insecurity Present (9/28/2023)    Hunger Vital Sign     Worried About Running Out of Food in the Last Year: Sometimes true     Ran Out of Food in the Last Year: Sometimes true   Transportation Needs: No Transportation Needs (9/28/2023)    PRAPARE - Transportation     Lack of Transportation (Medical): No     Lack of Transportation (Non-Medical): No   Physical Activity: Inactive (8/29/2023)    Exercise Vital Sign     Days of Exercise per Week: 0 days     Minutes of Exercise per Session: 0 min   Stress: Stress Concern Present (9/28/2023)    [employer] - Occupational Stress Questionnaire     Feeling of Stress : Very much   Social Connections: Moderately Isolated (9/30/2023)    Social Connection and Isolation Panel [NHANES]     Frequency of Communication with Friends and Family: Twice a week     Frequency of Social Gatherings with Friends and Family: Once a week     Attends Temple Services: Never     Active Member of Clubs or Organizations: No     Attends Club or Organization Meetings: Never     Marital Status:    Housing Stability: High Risk (9/28/2023)    Housing Stability Vital Sign     Unable to Pay for Housing in the Last Year: Yes     Number of Places Lived in the Last Year: 1     Unstable Housing in the Last Year: No       OBJECTIVE:     Vital Signs Range (Last 24H):  Temp:  [35.7 °C (96.2 °F)-37.5 °C (99.5 °F)]   Pulse:  []   Resp:  [11-35]   BP: (111-209)/()   SpO2:  [96 %-100 %]       Significant Labs:  Lab Results   Component Value Date    WBC 11.08 10/05/2023    HGB 6.7 (L) 10/05/2023    HCT 22.9 (L) 10/05/2023     (L) 10/05/2023    CHOL 204 (H) 03/30/2020    TRIG 139 09/30/2023    HDL 52 03/30/2020    ALT 46 (H) 10/05/2023     (H) 10/05/2023     10/05/2023    K 4.6 10/05/2023     10/05/2023     CREATININE 0.8 10/05/2023    BUN 28 (H) 10/05/2023    CO2 26 10/05/2023    TSH 0.483 04/20/2023    INR 1.0 10/15/2022    HGBA1C 4.9 03/30/2020       Diagnostic Studies: No relevant studies.    EKG:   Results for orders placed or performed during the hospital encounter of 09/28/23   EKG 12-lead    Collection Time: 10/01/23 11:10 AM    Narrative    Test Reason : T50.905A,    Vent. Rate : 080 BPM     Atrial Rate : 080 BPM     P-R Int : 172 ms          QRS Dur : 090 ms      QT Int : 384 ms       P-R-T Axes : 047 007 055 degrees     QTc Int : 442 ms    Normal sinus rhythm  Normal ECG  When compared with ECG of 29-SEP-2023 02:42,  Nonspecific T wave abnormality no longer evident in Lateral leads  QT has shortened  Confirmed by Silvia Alcazar MD ([id number]) on 10/2/2023 2:06:56 PM    Referred By: KIMBERLY CANELA           Confirmed By:Silvia Alcazar MD         GARRETT:  No results found for this or any previous visit.    ASSESSMENT/PLAN:         Pre-op Assessment    I have reviewed the Patient Summary Reports.     I have reviewed the Nursing Notes. I have reviewed the NPO Status.      Review of Systems  Anesthesia Hx:  No problems with previous Anesthesia  History of prior surgery of interest to airway management or planning: Denies Family Hx of Anesthesia complications.   Denies Personal Hx of Anesthesia complications.   Social:  Former Smoker    Hematology/Oncology:         -- Anemia:   Cardiovascular:   Hypertension POTS   Pulmonary:   Denies COPD.  Denies Asthma.  Denies Sleep Apnea.    Hepatic/GI:   PUD, GERD Duodenal stricture, gastroparesis, crohns on immunosuppressive therapy   Neurological:   Denies CVA. Seizures    Psych:   Psychiatric History (bipolar I)          Physical Exam  General: Well nourished, Cooperative, Alert and Oriented    Airway:  Mallampati: II   Tongue: Normal    Dental:        Anesthesia Plan  Type of Anesthesia, risks & benefits discussed:    Anesthesia Type: Gen ETT  Intra-op Monitoring Plan:  Standard ASA Monitors  Post Op Pain Control Plan: multimodal analgesia and IV/PO Opioids PRN  Induction:  IV and rapid sequence  Airway Plan: Direct and Video, Post-Induction  Informed Consent: Informed consent signed with the Patient and all parties understand the risks and agree with anesthesia plan.  All questions answered.   ASA Score: 3  Day of Surgery Review of History & Physical: H&P Update referred to the surgeon/provider.    Ready For Surgery From Anesthesia Perspective.     .

## 2023-10-05 NOTE — TRANSFER OF CARE
"Anesthesia Transfer of Care Note    Patient: Angela Trascher Lejeune    Procedure(s) Performed: Procedure(s) (LRB):  LAPAROTOMY, EXPLORATORY (N/A)  WASHOUT, HEMATOMA    Patient location: PACU    Transport from OR: Transported from OR intubated on 100% O2 by AMBU with adequate controlled ventilation    Post pain: adequate analgesia    Post assessment: no apparent anesthetic complications    Post vital signs: stable    Level of consciousness: sedated    Nausea/Vomiting: no nausea/vomiting    Complications: none    Transfer of care protocol was followed      Last vitals:   Visit Vitals  BP (!) 140/67 (BP Location: Left arm, Patient Position: Sitting)   Pulse (!) 130   Temp 37.4 °C (99.3 °F)   Resp 20   Ht 5' 4.02" (1.626 m)   Wt 53.1 kg (117 lb)   LMP 08/21/2010 (LMP Unknown)   SpO2 100%   Breastfeeding No   BMI 20.07 kg/m²     "

## 2023-10-05 NOTE — NURSING
At approximately 2328 patient blood pressure was 164/76 and . Patient had been without pain meds. Pharmacy delivered syringe to PCA pump at 2334. Patient had been without pain meds for about an hour. Scheduled hydralazine was given and new syringe was placed into pump. Patient blood pressure decreased to 92/47 with heart rate 131. On call general surg was contacted at 0030. Ordered to wait 30 minutes to see if patient heart rate goes down with pain meds and if not start fluids. Patient does has history of POTS.

## 2023-10-05 NOTE — PLAN OF CARE
Pt taken to OR by CRNA and OR, RN. First unit of blood still infusing via port. Double lumen PICC infusing TPN and NaCl. Tele monitor sent to PACU. Family visited with pt prior to procedure.

## 2023-10-05 NOTE — PROGRESS NOTES
Patient arrived from PACU at shift change, this nurse just went in with night shift nurse, patient states she is in 12/10 abd pain/ unbearable and PCA dilaudid is not doing anything for her pain. This nurse paged surgical on call

## 2023-10-05 NOTE — NURSING
Contacted Dr. Edgardo Dorman in regards to patient HR being in the 120s. Verbal order to give bolus of  cc.

## 2023-10-05 NOTE — PT/OT/SLP PROGRESS
Physical Therapy Attempt      Patient Name:  Angela Trascher Lejeune   MRN:  [id number]    Patient not seen today secondary to Nurse/ LISA hold, patient awaiting transport to procedure: EVACUATION, HEMATOMA. Will follow-up 10/6/23 as appropriate.

## 2023-10-05 NOTE — ANESTHESIA POST-OP PAIN MANAGEMENT
"[facility]  Anesthesiology - Acute Pain Service  Consult Note      SUBJECTIVE:     Chief Complaint/Reason for Consult: Refractory abdominal pain    Surgical Procedure:   CREATION, BYPASS, DUODENUM, BY ANASTOMOSIS OF DUODENUM TO JEJUNUM,   PLACEMNENT OF GASTROSTOMY TUBE,   JEJUNOSTOMY TUBE (N/A)    Post Op Day: POD #1    History of Present Illness:  Patient is a 48 y.o. female with a past medical history significant for POTS, alcohol use, bipolar disorder, and Crohn's disease with recent gastric outlet obstruction admitted to Surgical Oncology for management of obstruction. Patient underwent duodenal-jejunal bypass with placement of  G and J tubes on Wed 10/4 and has had persistent midline abdominal pain extending from the xiphoid to the pubis which has been refractory to current medications including IV Tylenol and a Dilaudid PCA.    Home Pain Medications:   Hydromorphone 2mg Q4H PRN       In-patient Scheduled Medications   acetaminophen  1,000 mg Intravenous Q8H    ARIPiprazole  5 mg Oral Daily    fludrocortisone  0.1 mg Oral Daily    lamoTRIgine  150 mg Oral BID    LIDOcaine  1 patch Transdermal Q24H    lipid (SMOFLIPID)  250 mL Intravenous Daily    midodrine  5 mg Oral Q12H    mirtazapine  15 mg Oral Nightly    pantoprazole  40 mg Intravenous BID    scopolamine  1 patch Transdermal Q3 Days    sodium chloride 0.9%  10 mL Intravenous Q6H       In-Patient PRN Medications  0.9%  NaCl infusion (for blood administration), 0.9%  NaCl infusion (for blood administration), labetalol, levalbuterol, LIDOcaine (PF) 10 mg/ml (1%), naloxone, ondansetron, prochlorperazine, sodium chloride 0.9%, Flushing PICC/Midline Protocol **AND** sodium chloride 0.9% **AND** sodium chloride 0.9%      Review of patient's allergies indicates:   Allergen Reactions    Tramadol Other (See Comments)     Seizures      Adhesive Other (See Comments)     STATES, " TEARS SKIN"     Demerol [meperidine]      Seizures      " Hydrocodone Hallucinations    Reglan [metoclopramide]         Past Medical History:   Diagnosis Date    Abnormal Pap smear     + HPV    Bipolar 1 disorder     Closed bimalleolar fracture of left ankle     Crohn's disease S/P surgery (ileum, possible gastroduodenal with ulcers)     1998-ileocectomy (9 inches ileum)    Encounter for blood transfusion     Gastric and duodenal ulcers of unclear etiology     Gastroparesis     H/O ETOH abuse     History of HSV-2 infection     Immunosuppressed status     Iron deficiency anemia, multiple blood transfusions     POTS (postural orthostatic tachycardia syndrome)     Seizures     states last seizure 2009     Past Surgical History:   Procedure Laterality Date    ANKLE HARDWARE REMOVAL Left 9/6/2023    Procedure: REMOVAL, HARDWARE, ANKLE;  Surgeon: Michael Lacey MD;  Location: [facility] OR;  Service: Orthopedics;  Laterality: Left;    APPENDECTOMY      BRAIN SURGERY  2009    fx skull due to seizure with 2 pins    BYPASS OF DUODENUM BY ANASTOMOSIS OF DUODENUM TO JEJUNUM N/A 10/4/2023    Procedure: CREATION, BYPASS, DUODENUM, BY ANASTOMOSIS OF DUODENUM TO JEJUNUM, PLACEMNENT OF GASTROSTOMY TUBE, JEJUNOSTOMY TUBE;  Surgeon: Kevin Mays MD;  Location: [facility] OR 2ND FLR;  Service: General;  Laterality: N/A;    CHOLECYSTECTOMY      COLON SURGERY      Partial colectomy    COLONOSCOPY Left 7/10/2019    Procedure: COLONOSCOPY;  Surgeon: Papi Martinez Jr., MD;  Location: [facility];  Service: Endoscopy;  Laterality: Left;    COLONOSCOPY N/A 12/6/2022    Procedure: COLONOSCOPY;  Surgeon: Zandra Mallory MD;  Location: [facility] (4TH FLR);  Service: Endoscopy;  Laterality: N/A;  poor prep on 11/28/22.  per Dr Mallory-Miralax 5 days prior with gastroparesis diet-3 days full liquid. also Mag citrate day before     instr handed to pt-GT    EGD, WITH BALLOON DILATION  7/24/2023    Procedure: EGD, WITH BALLOON DILATION;  Surgeon: Huang Licea MD;  Location: [facility]  ENDO;  Service: Endoscopy;;    ESOPHAGOGASTRODUODENOSCOPY Left 6/5/2019    Procedure: EGD (ESOPHAGOGASTRODUODENOSCOPY);  Surgeon: ROLA Villagran MD;  Location: [facility] ENDO;  Service: Endoscopy;  Laterality: Left;    ESOPHAGOGASTRODUODENOSCOPY Left 3/16/2020    Procedure: EGD (ESOPHAGOGASTRODUODENOSCOPY);  Surgeon: Clive Núñez MD;  Location: [facility] ENDO;  Service: Endoscopy;  Laterality: Left;    ESOPHAGOGASTRODUODENOSCOPY N/A 4/17/2020    Procedure: EGD (ESOPHAGOGASTRODUODENOSCOPY);  Surgeon: Papi Martinez Jr., MD;  Location: [facility] ENDO;  Service: Endoscopy;  Laterality: N/A;  with Push Enteroscopy    ESOPHAGOGASTRODUODENOSCOPY N/A 7/6/2020    Procedure: EGD (ESOPHAGOGASTRODUODENOSCOPY)   possible peg;  Surgeon: Papi Martinez Jr., MD;  Location: [facility] ENDO;  Service: Endoscopy;  Laterality: N/A;    ESOPHAGOGASTRODUODENOSCOPY N/A 7/9/2020    Procedure: EGD (ESOPHAGOGASTRODUODENOSCOPY);  Surgeon: Papi Martinez Jr., MD;  Location: [facility] ENDO;  Service: Endoscopy;  Laterality: N/A;  J or Peg tube    ESOPHAGOGASTRODUODENOSCOPY N/A 9/3/2020    Procedure: EGD (ESOPHAGOGASTRODUODENOSCOPY);  Surgeon: Papi Martinez Jr., MD;  Location: [facility];  Service: Endoscopy;  Laterality: N/A;  per drs order Stoma, Peds, w/single Lumen, J-Tube placement    ESOPHAGOGASTRODUODENOSCOPY N/A 12/29/2020    Procedure: EGD (ESOPHAGOGASTRODUODENOSCOPY);  Surgeon: Papi Martinez Jr., MD;  Location: [facility] ENDO;  Service: Endoscopy;  Laterality: N/A;    ESOPHAGOGASTRODUODENOSCOPY N/A 2/23/2021    Procedure: EGD (ESOPHAGOGASTRODUODENOSCOPY);  Surgeon: Papi Martinez Jr., MD;  Location: [facility] ENDO;  Service: Endoscopy;  Laterality: N/A;    ESOPHAGOGASTRODUODENOSCOPY N/A 2/3/2022    Procedure: EGD (ESOPHAGOGASTRODUODENOSCOPY);  Surgeon: Papi Martinez Jr., MD;  Location: [facility];  Service: Endoscopy;  Laterality: N/A;    ESOPHAGOGASTRODUODENOSCOPY N/A 8/13/2022    Procedure: EGD (ESOPHAGOGASTRODUODENOSCOPY);  Surgeon:  Hunag Licea MD;  Location: [facility];  Service: Gastroenterology;  Laterality: N/A;    ESOPHAGOGASTRODUODENOSCOPY N/A 10/16/2022    Procedure: EGD (ESOPHAGOGASTRODUODENOSCOPY);  Surgeon: Huang Licea MD;  Location: [facility];  Service: Gastroenterology;  Laterality: N/A;    ESOPHAGOGASTRODUODENOSCOPY N/A 11/28/2022    Procedure: EGD (ESOPHAGOGASTRODUODENOSCOPY);  Surgeon: Zandra Mallory MD;  [facility]  Service: Endoscopy;  Laterality: N/A;    ESOPHAGOGASTRODUODENOSCOPY N/A 7/24/2023    Procedure: EGD (ESOPHAGOGASTRODUODENOSCOPY);  Surgeon: Huang Licea MD;  Location: [facility];  Service: Endoscopy;  Laterality: N/A;    ESOPHAGOGASTRODUODENOSCOPY N/A 9/15/2023    Procedure: EGD (ESOPHAGOGASTRODUODENOSCOPY);  Surgeon: ROLA Villagran MD;  Location: [facility];  Service: Endoscopy;  Laterality: N/A;    FEMUR FRACTURE SURGERY Right 2006    HYSTERECTOMY      INSERTION OF TUNNELED CENTRAL VENOUS CATHETER (CVC) WITH SUBCUTANEOUS PORT Left 8/14/2019    Procedure: [id number];  Surgeon: Miquel Sargent MD;  Location: [facility];  Service: General;  Laterality: Left;    INSERTION OR REPLACEMENT OF PERCUTANEOUS ENDOSCOPIC JEJUNOSTOMY TUBE  12/29/2020    Procedure: INSERTION OR REPLACEMENT, JEJUNOSTOMY TUBE, PERCUTANEOUS, ENDOSCOPIC;  Surgeon: Papi Martinez Jr., MD;  Location: [facility];  Service: Endoscopy;;    laparoscopy for endometriosis      x5    LEFT HEART CATHETERIZATION Left 3/29/2023    Procedure: Left heart cath;  Surgeon: Riley Tipton MD;  Location: [facility] CATH;  Service: Cardiology;  Laterality: Left;    OOPHORECTOMY      right ovary removed only    OPEN REDUCTION AND INTERNAL FIXATION (ORIF) OF INJURY OF ANKLE Left 4/10/2023    Procedure: ORIF, ANKLE;  Surgeon: Michael Lacey MD;  Location: [facility];  Service: Orthopedics;  Laterality: Left;    PEG TUBE REMOVAL  4/1/2021    Procedure: REMOVAL, PEG TUBE;  Surgeon: Papi Martinez Jr., MD;  Location:  [facility] ENDO;  Service: Endoscopy;;    TONSILLECTOMY       Family History   Adopted: Yes     Social History     Tobacco Use    Smoking status: Former     Current packs/day: 0.00     Average packs/day: 0.5 packs/day for 6.0 years (3.0 ttl pk-yrs)     Types: Cigarettes     Start date: 5/10/2005     Quit date: 5/10/2011     Years since quittin.4    Smokeless tobacco: Never   Substance Use Topics    Alcohol use: Yes     Comment: socially    Drug use: Never           OBJECTIVE:     Current Vital Signs  Temp: 37.4 °C (99.3 °F) (10/05/23 1412)  Pulse: (!) 130 (10/05/23 1443)  Resp: 20 (10/05/23 1443)  BP: (!) 140/67 (10/05/23 1443)  SpO2: 100 % (10/05/23 1443)    Vital Signs Range (Last 24H):  Temp:  [35.7 °C (96.2 °F)-37.5 °C (99.5 °F)]   Pulse:  []   Resp:  [11-35]   BP: (111-209)/()   SpO2:  [96 %-100 %]       Physical Exam:  At Rest:   10    Numbers    Laboratory:  CBC:   Recent Labs     10/05/23  0505 10/05/23  1117   WBC 14.48* 11.08   RBC 2.43* 2.22*   HGB 7.6* 6.7*   HCT 24.7* 22.9*    146*   * 103*   MCH 31.3* 30.2   MCHC 30.8* 29.3*       CMP:   Recent Labs     10/04/23  0441 10/05/23  0505    139   K 4.0 4.6   CO2 28 26    108   BUN 14 28*   CREATININE 0.7 0.8   GLU 93 116*   MG 2.0 2.1   PHOS 3.9 2.0*   CALCIUM 9.0 9.4   ALBUMIN 3.1* 3.1*   PROT 6.5 6.4   ALKPHOS 96 109   ALT 15 46*   AST 34 106*   BILITOT 0.1 0.3          ASSESSMENT/PLAN:     Active Hospital Problems    Diagnosis  POA    *Duodenal stricture [K31.5]  Yes      Resolved Hospital Problems   No resolved problems to display.         Plan:   1) Continue multimodal analgesia as able given NPO status as follows:    - Continue acetaminophen 1000mg IV Q6H    - Add methocarbamol 500mg IV Q6H    - Continue hydromorphone PCA, can consider transition to oxycodone elixir via JT once pain better controlled     2) Planned to place bilateral JESENIA catheters this afternoon, however due to RN staffing were unable to  place pre-operatively and subsequently unable to place in OR due to late case and unavailability of in-house regional anesthesiology staff.       3) Will re-evaluate patient in morning and if pain remains poorly controlled, will plan for bilateral JESENIA catheter placement tomorrow.     4) If patient's pain uncontrolled overnight prior to re-assessment, can consider upgrade to ICU for initiation of ketamine infusion and/or dexmedetomidine infusion in addition to multimodal analgesics and PCA. Ability to provide additional sedation in ICU setting could facilitate JESENIA catheter placement tomorrow if indicated.      Case discussed with staff, Dr. Nguyen; final recommendations per attestation above.     Thank you for your consult and allowing us to participate in the care of this patient. We will continue to follow along. Please call the Acute Pain Service at [phone] or Anesthesia at [phone] if you have any questions or concerns.    Jean-Claude Burris MD, PGY-4  Department of Anesthesiology  Acute Pain Service - [phone] or [phone]  [facility]

## 2023-10-05 NOTE — PLAN OF CARE
Problem: Adult Inpatient Plan of Care  Goal: Plan of Care Review  Outcome: Ongoing, Progressing  Goal: Patient-Specific Goal (Individualized)  Outcome: Ongoing, Progressing  Goal: Absence of Hospital-Acquired Illness or Injury  Outcome: Ongoing, Progressing  Goal: Optimal Comfort and Wellbeing  Outcome: Ongoing, Progressing  Goal: Readiness for Transition of Care  Outcome: Ongoing, Progressing     Problem: Infection  Goal: Absence of Infection Signs and Symptoms  Outcome: Ongoing, Progressing     Problem: Fall Injury Risk  Goal: Absence of Fall and Fall-Related Injury  Outcome: Ongoing, Progressing   [facility] Plan of Care Note  Dx: Duodenal stricture    Shift Events: nausea/ vomiting, elevated heart rate.     Goals of Care: pain control    Neuro: A0x4    Vital Signs: tachycardia    Respiratory: RA    Diet: NPO    Is patient tolerating current diet? yes    GTTS: TPN @ 100, LIPIDS @ 20.8, PCA Pump dilaudid    Urine Output/Bowel Movement: harmon    Drains/Tubes/Tube Feeds (include total output/shift): 1300cc harmon, 100cc g tube    Lines: DUANE Picc line    Accuchecks:n/a    Skin: midline abdominal incision, left lower leg incision    Fall Risk Score: 11    Activity level? In the bed post op    Any scheduled procedures? D/c harmon @ 0600    Any safety concerns? none    Other: none

## 2023-10-05 NOTE — PT/OT/SLP PROGRESS
Occupational Therapy      Patient Name:  Angela Trascher Lejeune   MRN:  [id number]    Patient not seen today secondary to Nurse/ LISA hold. Patient not appropriate for OT at this time due to medical status. OT orders acknowledged. Will follow-up as appropriate.    10/5/2023

## 2023-10-05 NOTE — SUBJECTIVE & OBJECTIVE
Interval History: POD 1 from GJ bypass and G and J tube placement. Uncontrolled pain overnight and this morning. Has been tachy overnight and received 2 500mL LR boluses. No relief of her pain with PCA and other multimodals.     Medications:  Continuous Infusions:   hydromorphone in 0.9 % NaCl 6 mg/30 ml      TPN ADULT CENTRAL LINE CUSTOM 100 mL/hr at 10/04/23 2053     Scheduled Meds:   acetaminophen  1,000 mg Intravenous Q8H    acetaminophen  1,000 mg Intravenous Q8H    ARIPiprazole  5 mg Per J Tube Daily    enoxparin  40 mg Subcutaneous Q24H (prophylaxis, 1700)    fat emulsion 20%  250 mL Intravenous Daily    fludrocortisone  0.1 mg Oral Daily    lamoTRIgine  150 mg Oral BID    levalbuterol  0.63 mg Nebulization Q6H WAKE    LIDOcaine  1 patch Transdermal Q24H    midodrine  5 mg Oral Q12H    mirtazapine  15 mg Oral Nightly    pantoprazole  40 mg Intravenous BID    scopolamine  1 patch Transdermal Q3 Days    sodium chloride 0.9%  10 mL Intravenous Q6H     PRN Meds:labetalol, LIDOcaine (PF) 10 mg/ml (1%), naloxone, ondansetron, prochlorperazine, sodium chloride 0.9%, Flushing PICC/Midline Protocol **AND** sodium chloride 0.9% **AND** sodium chloride 0.9%     Review of patient's allergies indicates:   Allergen Reactions    Tramadol Other (See Comments)     Seizures      Demerol [meperidine]      Seizures      Hydrocodone Hallucinations    Reglan [metoclopramide]      Objective:     Vital Signs (Most Recent):  Temp: 96.2 °F (35.7 °C) (10/05/23 0727)  Pulse: (!) 136 (10/05/23 0727)  Resp: 17 (10/05/23 0823)  BP: (!) 113/57 (10/05/23 0727)  SpO2: 100 % (10/05/23 0727) Vital Signs (24h Range):  Temp:  [96.2 °F (35.7 °C)-98.6 °F (37 °C)] 96.2 °F (35.7 °C)  Pulse:  [] 136  Resp:  [11-40] 17  SpO2:  [96 %-100 %] 100 %  BP: (113-209)/() 113/57     Weight: 53.1 kg (117 lb)  Body mass index is 20.07 kg/m².    Intake/Output - Last 3 Shifts         10/03 0700  10/04 0659 10/04 0700  10/05 0659 10/05 0700  10/06 0659     P.O.       IV Piggyback  1200     Total Intake(mL/kg)  1200 (22.6)     Urine (mL/kg/hr)  2650 (2.1)     Drains  100 450    Stool  0     Total Output  2750 450    Net  -1550 -450           Stool Occurrence  0 x              Physical Exam  Constitutional:       Appearance: Normal appearance.   HENT:      Head: Normocephalic and atraumatic.   Cardiovascular:      Rate and Rhythm: Normal rate.   Pulmonary:      Effort: Pulmonary effort is normal.   Abdominal:      General: Abdomen is flat.      Palpations: Abdomen is soft.      Tenderness: There is abdominal tenderness.      Comments: Multiple abdominal scars from previous surgical history  Midline incision with dermabond. G and J tube in place.  Hematoma formation around incision.  Severely TTP diffusely.   Skin:     General: Skin is warm and dry.   Neurological:      General: No focal deficit present.      Mental Status: She is alert and oriented to person, place, and time.          Significant Labs:  I have reviewed all pertinent lab results within the past 24 hours.    Significant Diagnostics:  I have reviewed all pertinent imaging results/findings within the past 24 hours.

## 2023-10-05 NOTE — RESPIRATORY THERAPY
"RAPID RESPONSE RESPIRATORY THERAPY ETCO2 CHECK         Time of visit: 948     Code Status: Full Code   : 1975  Bed: 1007/1007 A:   MRN: [id number]  Time spent at the bedside: < 15 min    SITUATION    Evaluated patient for: ETCo2 compliance    BACKGROUND    Why is the patient in the hospital?: Duodenal stricture    Patient has a past medical history of Abnormal Pap smear, Bipolar 1 disorder, Closed bimalleolar fracture of left ankle, Crohn's disease S/P surgery (ileum, possible gastroduodenal with ulcers), Encounter for blood transfusion, Gastric and duodenal ulcers of unclear etiology, Gastroparesis, H/O ETOH abuse, History of HSV-2 infection, Immunosuppressed status, Iron deficiency anemia, multiple blood transfusions, POTS (postural orthostatic tachycardia syndrome), and Seizures.    24 Hours Vitals Range:  Temp:  [96.2 °F (35.7 °C)-98.6 °F (37 °C)]   Pulse:  []   Resp:  [11-40]   BP: (113-209)/()   SpO2:  [96 %-100 %]     Labs:    Recent Labs     10/03/23  0546 10/04/23  0441 10/05/23  0505    140 139   K 3.7 4.0 4.6    105 108   CO2 35* 28 26   BUN 13 14 28*   CREATININE 0.7 0.7 0.8    93 116*   PHOS 4.1 3.9 2.0*   MG 2.0 2.0 2.1        No results for input(s): "PH", "PCO2", "PO2", "HCO3", "POCSATURATED", "BE" in the last 72 hours.    ASSESSMENT/INTERVENTIONS      Last VS   Temp: 96.2 °F (35.7 °C) (10/05 0727)  Pulse: 130 (10/05 0957)  Resp: 17 (10/05 0823)  BP: 113/57 (10/05 0727)  SpO2: 100 % (10/05 0727)    Level of Consciousness: Level of Consciousness (AVPU): alert  Respiratory Effort: Respiratory Effort: Unlabored, Normal Expansion/Accessory Muscle Usage: Expansion/Accessory Muscles/Retractions: no use of accessory muscles, expansion symmetric, no retractions  All Lung Field Breath Sounds: All Lung Fields Breath Sounds: Anterior:, Lateral:, clear, equal bilaterally  Is the ETCO2 monitor on? Yes  Is the patient wearing a cannula? Yes  Are ETCO2 orders placed? Yes  Is the " patient on a PCA pump? Yes  ETCO2 monitored: ETCO2 (mmHg): 40 mmHg  Ambu at bedside:      Active Orders   Respiratory Care    ACAPELLA TREATMENT Q6H WAKE     Frequency: Q6H WAKE     Number of Occurrences: Until Specified     Order Comments: Q10x Q1 hour w/a      END TIDAL CO2 MONITOR Q12H     Frequency: Q12H     Number of Occurrences: Until Specified    Incentive spirometry     Frequency: Q6H WAKE     Number of Occurrences: Until Specified     Order Comments: Q10x q1 hour w/a      Inhalation Treatment Q8H PRN     Frequency: Q8H PRN     Number of Occurrences: 2 Days    Oxygen Continuous     Frequency: Continuous     Number of Occurrences: Until Specified     Order Questions:      Device type: Low flow      Device: Nasal Cannula (1- 5 Liters)      LPM: 1      Titrate O2 per Oxygen Titration Protocol: Yes      To maintain SpO2 goal of: >= 90%      Notify MD of: Inability to achieve desired SpO2; Sudden change in patient status and requires 20% increase in FiO2; Patient requires >60% FiO2    Pulse Oximetry Q Shift     Frequency: Q Shift     Number of Occurrences: Until Specified       RECOMMENDATIONS    We recommend: RRT Recs: Continue POC per primary team.      FOLLOW-UP    Please call back the Rapid Response RTLucille RRT at x [phone] for any questions or concerns.

## 2023-10-05 NOTE — CARE UPDATE
"RAPID RESPONSE NURSE CHART REVIEW        Chart Reviewed: 10/05/2023, 12:05 AM    MRN: [id number]  Bed: 1007/1007 A    Dx: Duodenal stricture    Angela Trascher Lejeune has a past medical history of Abnormal Pap smear, Bipolar 1 disorder, Closed bimalleolar fracture of left ankle, Crohn's disease S/P surgery (ileum, possible gastroduodenal with ulcers), Encounter for blood transfusion, Gastric and duodenal ulcers of unclear etiology, Gastroparesis, H/O ETOH abuse, History of HSV-2 infection, Immunosuppressed status, Iron deficiency anemia, multiple blood transfusions, POTS (postural orthostatic tachycardia syndrome), and Seizures.    Last VS: BP (!) 164/76 (BP Location: Left arm, Patient Position: Lying)   Pulse (!) 139   Temp 98 °F (36.7 °C) (Oral)   Resp (P) 20   Ht 5' 4.02" (1.626 m)   Wt 53.1 kg (117 lb)   LMP 08/21/2010 (LMP Unknown)   SpO2 99%   Breastfeeding No   BMI 20.07 kg/m²     24H Vital Sign Range:  Temp:  [96.6 °F (35.9 °C)-99.2 °F (37.3 °C)]   Pulse:  []   Resp:  [11-40]   BP: (109-209)/()   SpO2:  [96 %-100 %]     Level of Consciousness (AVPU): alert    Recent Labs     10/02/23  0416 10/03/23  0546 10/04/23  0441   WBC 4.81 3.86* 8.05   HGB 9.7* 8.8* 9.0*   HCT 30.5* 28.3* 28.3*    216 182       Recent Labs     10/02/23  0416 10/03/23  0546 10/04/23  0441    143 140   K 3.2* 3.7 4.0    102 105   CO2 35* 35* 28   BUN 15 13 14   CREATININE 0.7 0.7 0.7   GLU 87 102 93   PHOS 3.8 4.1 3.9   MG 2.0 2.0 2.0        No results for input(s): "PH", "PCO2", "PO2", "HCO3", "POCSATURATED", "BE" in the last 72 hours.     OXYGEN:             MEWS score: 4    bedside RNFanny  contacted for tachycardia. Reports patient in post-op pain, medications being adjusted per primary team. No additional concerns verbalized at this time. Instructed to call [phone] for further concerns or assistance.    Rambo Omalley RN       "

## 2023-10-05 NOTE — ASSESSMENT & PLAN NOTE
Mikaela Domingojeune is a 49yo F with PMH Crohn's disease, POTS, gastric varices, alcohol abuse, bipolar disorder who was recently seen in surgical oncology clinic for evaluation of a gastric outlet obstruction. Now s/p Delfino-Y gastrojejunostomy, G tube, and J tube placement 10/4/23.     - NPO   - No need for NG tube right now  - TPN reordered  - mIVF DC'd  - Multimodal pain regimen, will adjust today and possibly consult anesthesia for further pain control options  - Protonix  - Electrolyte replacement  - Lovenox  - Daily labs. Nutritional labs

## 2023-10-05 NOTE — PROGRESS NOTES
Sly Ocasio - [facility]  General Surgery  Progress Note    Subjective:     History of Present Illness:  No notes on file    Post-Op Info:  Procedure(s) (LRB):  CREATION, BYPASS, DUODENUM, BY ANASTOMOSIS OF DUODENUM TO JEJUNUM, PLACEMNENT OF GASTROSTOMY TUBE, JEJUNOSTOMY TUBE (N/A)   1 Day Post-Op     Interval History: POD 1 from GJ bypass and G and J tube placement. Uncontrolled pain overnight and this morning. Has been tachy overnight and received 2 500mL LR boluses. No relief of her pain with PCA and other multimodals.     Medications:  Continuous Infusions:   hydromorphone in 0.9 % NaCl 6 mg/30 ml      TPN ADULT CENTRAL LINE CUSTOM 100 mL/hr at 10/04/23 2243     Scheduled Meds:   acetaminophen  1,000 mg Intravenous Q8H    acetaminophen  1,000 mg Intravenous Q8H    ARIPiprazole  5 mg Per J Tube Daily    enoxparin  40 mg Subcutaneous Q24H (prophylaxis, 1700)    fat emulsion 20%  250 mL Intravenous Daily    fludrocortisone  0.1 mg Oral Daily    lamoTRIgine  150 mg Oral BID    levalbuterol  0.63 mg Nebulization Q6H WAKE    LIDOcaine  1 patch Transdermal Q24H    midodrine  5 mg Oral Q12H    mirtazapine  15 mg Oral Nightly    pantoprazole  40 mg Intravenous BID    scopolamine  1 patch Transdermal Q3 Days    sodium chloride 0.9%  10 mL Intravenous Q6H     PRN Meds:labetalol, LIDOcaine (PF) 10 mg/ml (1%), naloxone, ondansetron, prochlorperazine, sodium chloride 0.9%, Flushing PICC/Midline Protocol **AND** sodium chloride 0.9% **AND** sodium chloride 0.9%     Review of patient's allergies indicates:   Allergen Reactions    Tramadol Other (See Comments)     Seizures      Demerol [meperidine]      Seizures      Hydrocodone Hallucinations    Reglan [metoclopramide]      Objective:     Vital Signs (Most Recent):  Temp: 96.2 °F (35.7 °C) (10/05/23 0727)  Pulse: (!) 136 (10/05/23 0727)  Resp: 17 (10/05/23 0823)  BP: (!) 113/57 (10/05/23 0727)  SpO2: 100 % (10/05/23 0727) Vital Signs (24h Range):  Temp:  [96.2 °F  (35.7 °C)-98.6 °F (37 °C)] 96.2 °F (35.7 °C)  Pulse:  [] 136  Resp:  [11-40] 17  SpO2:  [96 %-100 %] 100 %  BP: (113-209)/() 113/57     Weight: 53.1 kg (117 lb)  Body mass index is 20.07 kg/m².    Intake/Output - Last 3 Shifts         10/03 0700  10/04 0659 10/04 0700  10/05 0659 10/05 0700  10/06 0659    P.O.       IV Piggyback  1200     Total Intake(mL/kg)  1200 (22.6)     Urine (mL/kg/hr)  2650 (2.1)     Drains  100 450    Stool  0     Total Output  2750 450    Net  -1550 -450           Stool Occurrence  0 x              Physical Exam  Constitutional:       Appearance: Normal appearance.   HENT:      Head: Normocephalic and atraumatic.   Cardiovascular:      Rate and Rhythm: Normal rate.   Pulmonary:      Effort: Pulmonary effort is normal.   Abdominal:      General: Abdomen is flat.      Palpations: Abdomen is soft.      Tenderness: There is abdominal tenderness.      Comments: Multiple abdominal scars from previous surgical history  Midline incision with dermabond. G and J tube in place.  Hematoma formation around incision.  Severely TTP diffusely.   Skin:     General: Skin is warm and dry.   Neurological:      General: No focal deficit present.      Mental Status: She is alert and oriented to person, place, and time.          Significant Labs:  I have reviewed all pertinent lab results within the past 24 hours.    Significant Diagnostics:  I have reviewed all pertinent imaging results/findings within the past 24 hours.    Assessment/Plan:     * Duodenal stricture  Angela Trascher Lejeune is a 47yo F with PMH Crohn's disease, POTS, gastric varices, alcohol abuse, bipolar disorder who was recently seen in surgical oncology clinic for evaluation of a gastric outlet obstruction. Now s/p Delfino-Y gastrojejunostomy, G tube, and J tube placement 10/4/23.     - NPO   - No need for NG tube right now  - TPN reordered  - mIVF DC'd  - Multimodal pain regimen, will adjust today and possibly consult anesthesia for  further pain control options  - Protonix  - Electrolyte replacement  - Lovenox  - Daily labs. Nutritional labs          Shanta Tran MD  General Surgery  Sly MARTINEZ

## 2023-10-05 NOTE — ANESTHESIA PROCEDURE NOTES
Intubation    Date/Time: 10/5/2023 3:34 PM    Performed by: Ila Lund CRNA  Authorized by: Kana Reynoso MD    Intubation:     Induction:  Intravenous    Intubated:  Postinduction    Mask Ventilation:  Easy mask    Attempts:  1    Attempted By:  CRNA    Method of Intubation:  Video laryngoscopy    Blade:  Arora 3    Laryngeal View Grade: Grade I - full view of cords      Difficult Airway Encountered?: No      Complications:  None    Airway Device:  Oral endotracheal tube    Airway Device Size:  7.0    Style/Cuff Inflation:  Cuffed (inflated to minimal occlusive pressure)    Tube secured:  20    Secured at:  The teeth    Placement Verified By:  Capnometry    Complicating Factors:  Small mouth    Findings Post-Intubation:  BS equal bilateral and atraumatic/condition of teeth unchanged

## 2023-10-05 NOTE — NURSING TRANSFER
Nursing Transfer Note      10/4/2023   7:12 PM    Nurse giving handoff:Erica RN  Nurse receiving handoff:Avelina DALTON    Reason patient is being transferred: md order    Transfer To:[facility]    Transfer via bed    Transfer with ivp    Transported by pct    Order for Tele Monitor? No    Additional Lines: Alba Catheter      Medicines sent: pca         Patient belongings transferred with patient: No    Chart send with patient: Yes    Notified: mother and father at bedside    Patient reassessed at: 1800

## 2023-10-05 NOTE — PLAN OF CARE
Problem: Adult Inpatient Plan of Care  Goal: Plan of Care Review  Outcome: Ongoing, Progressing  Goal: Patient-Specific Goal (Individualized)  Outcome: Ongoing, Progressing  Goal: Absence of Hospital-Acquired Illness or Injury  Outcome: Ongoing, Progressing  Goal: Optimal Comfort and Wellbeing  Outcome: Ongoing, Progressing  Goal: Readiness for Transition of Care  Outcome: Ongoing, Progressing     Problem: Infection  Goal: Absence of Infection Signs and Symptoms  Outcome: Ongoing, Progressing     Problem: Fall Injury Risk  Goal: Absence of Fall and Fall-Related Injury  Outcome: Ongoing, Progressing     Problem: Skin Injury Risk Increased  Goal: Skin Health and Integrity  Outcome: Ongoing, Progressing    [facility] Plan of Care Note  Dx Duodenal stricture    Shift Events Lab drawn via PICC line using aseptic technique; Hgb dropped to 6.7; Jordyn NP made aware of Hgb, elevated heart rate, and low-grade temperature.  2 units of blood ordered; 1 unit started at 1324 via left subclavian port that was placed @ 1320.  Medicated x 1 with PRN Labetalol for tachycardia.    Goals of Care: manage pain    Neuro: A&O x 4; intact    Vital Signs: tachycardic    Respiratory: Pt on room air    Diet: NPO; TPN    Is patient tolerating current diet? N/A    GTTS: PCA, TPN    Urine Output/Bowel Movement: see flowsheet    Drains/Tubes/Tube Feeds (include total output/shift): G-tube to drainage bag; harmon catheter    Lines: DUANE double lumen PICC; right subclavian port accessed.      Accuchecks:none    Skin: see flowsheet    Fall Risk Score: 13    Activity level? 1-2 person assist    Any scheduled procedures? surgery    Any safety concerns? Fall risk    Other: Pt transported at 1226 to [facility] via bed for scheduled surgery.  PCA pump stopped.

## 2023-10-05 NOTE — CARE UPDATE
"RAPID RESPONSE NURSE CHART REVIEW        Chart Reviewed: 10/05/2023, 10:06 AM    MRN: [id number]  Bed: 1007/1007 A    Dx: Duodenal stricture    Angela Trascher Lejeune has a past medical history of Abnormal Pap smear, Bipolar 1 disorder, Closed bimalleolar fracture of left ankle, Crohn's disease S/P surgery (ileum, possible gastroduodenal with ulcers), Encounter for blood transfusion, Gastric and duodenal ulcers of unclear etiology, Gastroparesis, H/O ETOH abuse, History of HSV-2 infection, Immunosuppressed status, Iron deficiency anemia, multiple blood transfusions, POTS (postural orthostatic tachycardia syndrome), and Seizures.    Last VS: BP (!) 145/67   Pulse (!) 130   Temp 99.3 °F (37.4 °C)   Resp (!) 22   Ht 5' 4.02" (1.626 m)   Wt 53.1 kg (117 lb)   LMP 08/21/2010 (LMP Unknown)   SpO2 100%   Breastfeeding No   BMI 20.07 kg/m²     24H Vital Sign Range:  Temp:  [96.2 °F (35.7 °C)-99.5 °F (37.5 °C)]   Pulse:  []   Resp:  [11-35]   BP: (111-209)/()   SpO2:  [96 %-100 %]     Level of Consciousness (AVPU): alert    Recent Labs     10/04/23  0441 10/05/23  0505 10/05/23  1117   WBC 8.05 14.48* 11.08   HGB 9.0* 7.6* 6.7*   HCT 28.3* 24.7* 22.9*    162 146*       Recent Labs     10/03/23  0546 10/04/23  0441 10/05/23  0505    140 139   K 3.7 4.0 4.6    105 108   CO2 35* 28 26   BUN 13 14 28*   CREATININE 0.7 0.7 0.8    93 116*   PHOS 4.1 3.9 2.0*   MG 2.0 2.0 2.1        MEWS score: 4    Rounding completed with charge SHA Panchal. Patient remains tachycardic HR 120s. No concerns verbalized at this time. Instructed to call [phone] for further concerns or assistance.    Gabi Sewell RN        "

## 2023-10-05 NOTE — BRIEF OP NOTE
Sly Ocasio - Surgery ([facility])  Brief Operative Note    SUMMARY     Surgery Date: 10/5/2023     Surgeon(s) and Role:     * Kevin Mays MD - Primary     * Joseph Canales MD - Resident - Assisting     * Shanta Tran MD - Resident - Assisting      Pre-op Diagnosis:  Duodenal stricture [K31.5]    Post-op Diagnosis:  Post-Op Diagnosis Codes:     * Duodenal stricture [K31.5]    Procedure(s) (LRB):  LAPAROTOMY, EXPLORATORY (N/A)  WASHOUT, HEMATOMA    Anesthesia: * No anesthesia type entered *    Implants:  * No implants in log *    Operative Findings: See Op Note    Estimated Blood Loss: * No values recorded between 10/5/2023  3:50 PM and 10/5/2023  5:59 PM *    Estimated Blood Loss has been documented.         Specimens:   Specimen (24h ago, onward)      None            [id number]

## 2023-10-05 NOTE — NURSING
Messaged Dr. Edgardo Dorman on secure chat about the completion of the first bolus and heart rate continuously being elevated. Dr. Dorman ordered to give another bolus 500 cc of LR.

## 2023-10-06 ENCOUNTER — ANESTHESIA EVENT (OUTPATIENT)
Dept: SURGERY | Facility: HOSPITAL | Age: 48
DRG: 326 | End: 2023-10-06
Payer: COMMERCIAL

## 2023-10-06 ENCOUNTER — ANESTHESIA (OUTPATIENT)
Dept: SURGERY | Facility: HOSPITAL | Age: 48
DRG: 326 | End: 2023-10-06
Payer: COMMERCIAL

## 2023-10-06 PROBLEM — Z99.11 ENCOUNTER FOR WEANING FROM VENTILATOR: Status: ACTIVE | Noted: 2023-10-06

## 2023-10-06 LAB
ALBUMIN SERPL BCP-MCNC: 2.8 G/DL (ref 3.5–5.2)
ALBUMIN SERPL BCP-MCNC: 2.9 G/DL (ref 3.5–5.2)
ALP SERPL-CCNC: 80 U/L (ref 55–135)
ALP SERPL-CCNC: 84 U/L (ref 55–135)
ALT SERPL W/O P-5'-P-CCNC: 26 U/L (ref 10–44)
ALT SERPL W/O P-5'-P-CCNC: 28 U/L (ref 10–44)
ANION GAP SERPL CALC-SCNC: 10 MMOL/L (ref 8–16)
ANION GAP SERPL CALC-SCNC: 4 MMOL/L (ref 8–16)
AST SERPL-CCNC: 51 U/L (ref 10–40)
AST SERPL-CCNC: 52 U/L (ref 10–40)
BASOPHILS # BLD AUTO: 0.01 K/UL (ref 0–0.2)
BASOPHILS # BLD AUTO: 0.03 K/UL (ref 0–0.2)
BASOPHILS NFR BLD: 0.1 % (ref 0–1.9)
BASOPHILS NFR BLD: 0.3 % (ref 0–1.9)
BILIRUB SERPL-MCNC: 0.4 MG/DL (ref 0.1–1)
BILIRUB SERPL-MCNC: 0.6 MG/DL (ref 0.1–1)
BLD PROD TYP BPU: NORMAL
BLD PROD TYP BPU: NORMAL
BLOOD UNIT EXPIRATION DATE: NORMAL
BLOOD UNIT EXPIRATION DATE: NORMAL
BLOOD UNIT TYPE CODE: 5100
BLOOD UNIT TYPE CODE: 5100
BLOOD UNIT TYPE: NORMAL
BLOOD UNIT TYPE: NORMAL
BUN SERPL-MCNC: 12 MG/DL (ref 6–20)
BUN SERPL-MCNC: 16 MG/DL (ref 6–20)
CALCIUM SERPL-MCNC: 8.7 MG/DL (ref 8.7–10.5)
CALCIUM SERPL-MCNC: 9 MG/DL (ref 8.7–10.5)
CHLORIDE SERPL-SCNC: 110 MMOL/L (ref 95–110)
CHLORIDE SERPL-SCNC: 113 MMOL/L (ref 95–110)
CO2 SERPL-SCNC: 24 MMOL/L (ref 23–29)
CO2 SERPL-SCNC: 24 MMOL/L (ref 23–29)
CODING SYSTEM: NORMAL
CODING SYSTEM: NORMAL
CREAT SERPL-MCNC: 0.6 MG/DL (ref 0.5–1.4)
CREAT SERPL-MCNC: 0.7 MG/DL (ref 0.5–1.4)
CROSSMATCH INTERPRETATION: NORMAL
CROSSMATCH INTERPRETATION: NORMAL
DIFFERENTIAL METHOD: ABNORMAL
DIFFERENTIAL METHOD: ABNORMAL
DISPENSE STATUS: NORMAL
DISPENSE STATUS: NORMAL
EOSINOPHIL # BLD AUTO: 0 K/UL (ref 0–0.5)
EOSINOPHIL # BLD AUTO: 0.1 K/UL (ref 0–0.5)
EOSINOPHIL NFR BLD: 0 % (ref 0–8)
EOSINOPHIL NFR BLD: 0.5 % (ref 0–8)
ERYTHROCYTE [DISTWIDTH] IN BLOOD BY AUTOMATED COUNT: 20.4 % (ref 11.5–14.5)
ERYTHROCYTE [DISTWIDTH] IN BLOOD BY AUTOMATED COUNT: 20.9 % (ref 11.5–14.5)
EST. GFR  (NO RACE VARIABLE): >60 ML/MIN/1.73 M^2
EST. GFR  (NO RACE VARIABLE): >60 ML/MIN/1.73 M^2
GLUCOSE SERPL-MCNC: 147 MG/DL (ref 70–110)
GLUCOSE SERPL-MCNC: 89 MG/DL (ref 70–110)
HCT VFR BLD AUTO: 28 % (ref 37–48.5)
HCT VFR BLD AUTO: 28.9 % (ref 37–48.5)
HGB BLD-MCNC: 8.7 G/DL (ref 12–16)
HGB BLD-MCNC: 8.9 G/DL (ref 12–16)
IMM GRANULOCYTES # BLD AUTO: 0.04 K/UL (ref 0–0.04)
IMM GRANULOCYTES # BLD AUTO: 0.06 K/UL (ref 0–0.04)
IMM GRANULOCYTES NFR BLD AUTO: 0.4 % (ref 0–0.5)
IMM GRANULOCYTES NFR BLD AUTO: 0.5 % (ref 0–0.5)
LACTATE SERPL-SCNC: 0.9 MMOL/L (ref 0.5–2.2)
LYMPHOCYTES # BLD AUTO: 0.5 K/UL (ref 1–4.8)
LYMPHOCYTES # BLD AUTO: 1.6 K/UL (ref 1–4.8)
LYMPHOCYTES NFR BLD: 14.8 % (ref 18–48)
LYMPHOCYTES NFR BLD: 5.1 % (ref 18–48)
MAGNESIUM SERPL-MCNC: 2 MG/DL (ref 1.6–2.6)
MCH RBC QN AUTO: 29 PG (ref 27–31)
MCH RBC QN AUTO: 29.6 PG (ref 27–31)
MCHC RBC AUTO-ENTMCNC: 30.8 G/DL (ref 32–36)
MCHC RBC AUTO-ENTMCNC: 31.1 G/DL (ref 32–36)
MCV RBC AUTO: 93 FL (ref 82–98)
MCV RBC AUTO: 96 FL (ref 82–98)
MONOCYTES # BLD AUTO: 0.8 K/UL (ref 0.3–1)
MONOCYTES # BLD AUTO: 1.4 K/UL (ref 0.3–1)
MONOCYTES NFR BLD: 12.3 % (ref 4–15)
MONOCYTES NFR BLD: 7.8 % (ref 4–15)
NEUTROPHILS # BLD AUTO: 7.9 K/UL (ref 1.8–7.7)
NEUTROPHILS # BLD AUTO: 9 K/UL (ref 1.8–7.7)
NEUTROPHILS NFR BLD: 71.6 % (ref 38–73)
NEUTROPHILS NFR BLD: 86.6 % (ref 38–73)
NRBC BLD-RTO: 0 /100 WBC
NRBC BLD-RTO: 0 /100 WBC
NUM UNITS TRANS FFP: NORMAL
NUM UNITS TRANS FFP: NORMAL
PHOSPHATE SERPL-MCNC: 2 MG/DL (ref 2.7–4.5)
PLATELET # BLD AUTO: 128 K/UL (ref 150–450)
PLATELET # BLD AUTO: 136 K/UL (ref 150–450)
PMV BLD AUTO: 11.5 FL (ref 9.2–12.9)
PMV BLD AUTO: 12.2 FL (ref 9.2–12.9)
POCT GLUCOSE: 139 MG/DL (ref 70–110)
POTASSIUM SERPL-SCNC: 3.6 MMOL/L (ref 3.5–5.1)
POTASSIUM SERPL-SCNC: 4.7 MMOL/L (ref 3.5–5.1)
PROT SERPL-MCNC: 6 G/DL (ref 6–8.4)
PROT SERPL-MCNC: 6.3 G/DL (ref 6–8.4)
RBC # BLD AUTO: 3 M/UL (ref 4–5.4)
RBC # BLD AUTO: 3.01 M/UL (ref 4–5.4)
SODIUM SERPL-SCNC: 141 MMOL/L (ref 136–145)
SODIUM SERPL-SCNC: 144 MMOL/L (ref 136–145)
WBC # BLD AUTO: 10.37 K/UL (ref 3.9–12.7)
WBC # BLD AUTO: 11.02 K/UL (ref 3.9–12.7)

## 2023-10-06 PROCEDURE — 63600175 PHARM REV CODE 636 W HCPCS: Performed by: STUDENT IN AN ORGANIZED HEALTH CARE EDUCATION/TRAINING PROGRAM

## 2023-10-06 PROCEDURE — 25000003 PHARM REV CODE 250: Performed by: STUDENT IN AN ORGANIZED HEALTH CARE EDUCATION/TRAINING PROGRAM

## 2023-10-06 PROCEDURE — 99231 PR SUBSEQUENT HOSPITAL CARE,LEVL I: ICD-10-PCS | Mod: ,,, | Performed by: ANESTHESIOLOGY

## 2023-10-06 PROCEDURE — 83605 ASSAY OF LACTIC ACID: CPT

## 2023-10-06 PROCEDURE — 97530 THERAPEUTIC ACTIVITIES: CPT

## 2023-10-06 PROCEDURE — A4216 STERILE WATER/SALINE, 10 ML: HCPCS | Performed by: STUDENT IN AN ORGANIZED HEALTH CARE EDUCATION/TRAINING PROGRAM

## 2023-10-06 PROCEDURE — 85025 COMPLETE CBC W/AUTO DIFF WBC: CPT | Mod: 91

## 2023-10-06 PROCEDURE — 76942 ECHO GUIDE FOR BIOPSY: CPT | Mod: 26,,, | Performed by: SURGERY

## 2023-10-06 PROCEDURE — 80053 COMPREHEN METABOLIC PANEL: CPT | Mod: 91

## 2023-10-06 PROCEDURE — 99900026 HC AIRWAY MAINTENANCE (STAT)

## 2023-10-06 PROCEDURE — 36415 COLL VENOUS BLD VENIPUNCTURE: CPT

## 2023-10-06 PROCEDURE — 97166 OT EVAL MOD COMPLEX 45 MIN: CPT

## 2023-10-06 PROCEDURE — 97112 NEUROMUSCULAR REEDUCATION: CPT

## 2023-10-06 PROCEDURE — 25000003 PHARM REV CODE 250

## 2023-10-06 PROCEDURE — 25000242 PHARM REV CODE 250 ALT 637 W/ HCPCS: Performed by: SURGERY

## 2023-10-06 PROCEDURE — 99291 PR CRITICAL CARE, E/M 30-74 MINUTES: ICD-10-PCS | Mod: 24,,, | Performed by: STUDENT IN AN ORGANIZED HEALTH CARE EDUCATION/TRAINING PROGRAM

## 2023-10-06 PROCEDURE — 99900035 HC TECH TIME PER 15 MIN (STAT)

## 2023-10-06 PROCEDURE — 63600175 PHARM REV CODE 636 W HCPCS: Performed by: SURGERY

## 2023-10-06 PROCEDURE — 20600001 HC STEP DOWN PRIVATE ROOM

## 2023-10-06 PROCEDURE — 99231 SBSQ HOSP IP/OBS SF/LOW 25: CPT | Mod: ,,, | Performed by: ANESTHESIOLOGY

## 2023-10-06 PROCEDURE — 83735 ASSAY OF MAGNESIUM: CPT | Performed by: STUDENT IN AN ORGANIZED HEALTH CARE EDUCATION/TRAINING PROGRAM

## 2023-10-06 PROCEDURE — 80053 COMPREHEN METABOLIC PANEL: CPT | Performed by: STUDENT IN AN ORGANIZED HEALTH CARE EDUCATION/TRAINING PROGRAM

## 2023-10-06 PROCEDURE — 64999 UNLISTED PX NERVOUS SYSTEM: CPT | Mod: ,,, | Performed by: SURGERY

## 2023-10-06 PROCEDURE — 94761 N-INVAS EAR/PLS OXIMETRY MLT: CPT

## 2023-10-06 PROCEDURE — 85025 COMPLETE CBC W/AUTO DIFF WBC: CPT | Performed by: STUDENT IN AN ORGANIZED HEALTH CARE EDUCATION/TRAINING PROGRAM

## 2023-10-06 PROCEDURE — 93005 ELECTROCARDIOGRAM TRACING: CPT

## 2023-10-06 PROCEDURE — 94003 VENT MGMT INPAT SUBQ DAY: CPT

## 2023-10-06 PROCEDURE — 64463 PVB THORACIC CONT INFUSION: CPT | Performed by: STUDENT IN AN ORGANIZED HEALTH CARE EDUCATION/TRAINING PROGRAM

## 2023-10-06 PROCEDURE — 93010 EKG 12-LEAD: ICD-10-PCS | Mod: ,,, | Performed by: INTERNAL MEDICINE

## 2023-10-06 PROCEDURE — A4217 STERILE WATER/SALINE, 500 ML: HCPCS | Performed by: SURGERY

## 2023-10-06 PROCEDURE — 99291 CRITICAL CARE FIRST HOUR: CPT | Mod: 24,,, | Performed by: STUDENT IN AN ORGANIZED HEALTH CARE EDUCATION/TRAINING PROGRAM

## 2023-10-06 PROCEDURE — 97164 PT RE-EVAL EST PLAN CARE: CPT

## 2023-10-06 PROCEDURE — 63600175 PHARM REV CODE 636 W HCPCS

## 2023-10-06 PROCEDURE — 84100 ASSAY OF PHOSPHORUS: CPT | Performed by: STUDENT IN AN ORGANIZED HEALTH CARE EDUCATION/TRAINING PROGRAM

## 2023-10-06 PROCEDURE — 76942 PR U/S GUIDANCE FOR NEEDLE GUIDANCE: ICD-10-PCS | Mod: 26,,, | Performed by: SURGERY

## 2023-10-06 PROCEDURE — 36415 COLL VENOUS BLD VENIPUNCTURE: CPT | Performed by: STUDENT IN AN ORGANIZED HEALTH CARE EDUCATION/TRAINING PROGRAM

## 2023-10-06 PROCEDURE — 64999: ICD-10-PCS | Mod: ,,, | Performed by: SURGERY

## 2023-10-06 PROCEDURE — 63600175 PHARM REV CODE 636 W HCPCS: Performed by: NURSE PRACTITIONER

## 2023-10-06 PROCEDURE — 27100171 HC OXYGEN HIGH FLOW UP TO 24 HOURS

## 2023-10-06 PROCEDURE — C9113 INJ PANTOPRAZOLE SODIUM, VIA: HCPCS | Performed by: STUDENT IN AN ORGANIZED HEALTH CARE EDUCATION/TRAINING PROGRAM

## 2023-10-06 PROCEDURE — 93010 ELECTROCARDIOGRAM REPORT: CPT | Mod: ,,, | Performed by: INTERNAL MEDICINE

## 2023-10-06 PROCEDURE — B4185 PARENTERAL SOL 10 GM LIPIDS: HCPCS

## 2023-10-06 PROCEDURE — 25000003 PHARM REV CODE 250: Performed by: SURGERY

## 2023-10-06 RX ORDER — ROPIVACAINE HYDROCHLORIDE 2 MG/ML
0.1 INJECTION, SOLUTION EPIDURAL; INFILTRATION; PERINEURAL CONTINUOUS
Status: DISCONTINUED | OUTPATIENT
Start: 2023-10-06 | End: 2023-10-09

## 2023-10-06 RX ORDER — ACETAMINOPHEN 325 MG/1
650 TABLET ORAL EVERY 6 HOURS PRN
Status: DISCONTINUED | OUTPATIENT
Start: 2023-10-06 | End: 2023-10-06

## 2023-10-06 RX ORDER — KETAMINE HYDROCHLORIDE 50 MG/ML
50 INJECTION, SOLUTION INTRAMUSCULAR; INTRAVENOUS ONCE
Status: COMPLETED | OUTPATIENT
Start: 2023-10-06 | End: 2023-10-06

## 2023-10-06 RX ORDER — MIRTAZAPINE 15 MG/1
15 TABLET, ORALLY DISINTEGRATING ORAL NIGHTLY
Status: DISCONTINUED | OUTPATIENT
Start: 2023-10-06 | End: 2023-10-11 | Stop reason: HOSPADM

## 2023-10-06 RX ORDER — ACETAMINOPHEN 650 MG/20.3ML
1000 LIQUID ORAL EVERY 8 HOURS
Status: DISCONTINUED | OUTPATIENT
Start: 2023-10-06 | End: 2023-10-09

## 2023-10-06 RX ORDER — HYDROMORPHONE HYDROCHLORIDE 1 MG/ML
1 INJECTION, SOLUTION INTRAMUSCULAR; INTRAVENOUS; SUBCUTANEOUS
Status: DISCONTINUED | OUTPATIENT
Start: 2023-10-06 | End: 2023-10-07

## 2023-10-06 RX ORDER — HYDROMORPHONE HYDROCHLORIDE 1 MG/ML
1 INJECTION, SOLUTION INTRAMUSCULAR; INTRAVENOUS; SUBCUTANEOUS EVERY 4 HOURS PRN
Status: DISCONTINUED | OUTPATIENT
Start: 2023-10-06 | End: 2023-10-06

## 2023-10-06 RX ORDER — LORAZEPAM 2 MG/ML
1 INJECTION INTRAMUSCULAR EVERY 12 HOURS PRN
Status: DISCONTINUED | OUTPATIENT
Start: 2023-10-06 | End: 2023-10-07

## 2023-10-06 RX ORDER — FLUDROCORTISONE ACETATE 0.1 MG/1
0.1 TABLET ORAL DAILY
Status: DISCONTINUED | OUTPATIENT
Start: 2023-10-07 | End: 2023-10-11 | Stop reason: HOSPADM

## 2023-10-06 RX ORDER — ROPIVACAINE HYDROCHLORIDE 5 MG/ML
INJECTION, SOLUTION EPIDURAL; INFILTRATION; PERINEURAL
Status: COMPLETED | OUTPATIENT
Start: 2023-10-06 | End: 2023-10-06

## 2023-10-06 RX ORDER — OXYCODONE HCL 5 MG/5 ML
15 SOLUTION, ORAL ORAL EVERY 4 HOURS PRN
Status: DISCONTINUED | OUTPATIENT
Start: 2023-10-06 | End: 2023-10-07

## 2023-10-06 RX ORDER — OXYCODONE HCL 5 MG/5 ML
10 SOLUTION, ORAL ORAL EVERY 4 HOURS PRN
Status: DISCONTINUED | OUTPATIENT
Start: 2023-10-06 | End: 2023-10-07

## 2023-10-06 RX ORDER — KETAMINE HCL IN 0.9 % NACL 50 MG/5 ML
SYRINGE (ML) INTRAVENOUS
Status: DISCONTINUED | OUTPATIENT
Start: 2023-10-06 | End: 2023-10-09 | Stop reason: HOSPADM

## 2023-10-06 RX ORDER — ARIPIPRAZOLE 5 MG/1
5 TABLET ORAL DAILY
Status: DISCONTINUED | OUTPATIENT
Start: 2023-10-07 | End: 2023-10-11 | Stop reason: HOSPADM

## 2023-10-06 RX ORDER — ACETAMINOPHEN 650 MG/20.3ML
650 LIQUID ORAL EVERY 6 HOURS PRN
Status: DISCONTINUED | OUTPATIENT
Start: 2023-10-06 | End: 2023-10-06

## 2023-10-06 RX ORDER — LABETALOL HCL 20 MG/4 ML
5 SYRINGE (ML) INTRAVENOUS ONCE
Status: COMPLETED | OUTPATIENT
Start: 2023-10-06 | End: 2023-10-06

## 2023-10-06 RX ORDER — MUPIROCIN 20 MG/G
OINTMENT TOPICAL
Status: DISPENSED
Start: 2023-10-06 | End: 2023-10-06

## 2023-10-06 RX ADMIN — METHOCARBAMOL 500 MG: 100 INJECTION, SOLUTION INTRAMUSCULAR; INTRAVENOUS at 05:10

## 2023-10-06 RX ADMIN — LORAZEPAM 1 MG: 2 INJECTION INTRAMUSCULAR; INTRAVENOUS at 10:10

## 2023-10-06 RX ADMIN — FLUDROCORTISONE ACETATE 0.1 MG: 0.1 TABLET ORAL at 09:10

## 2023-10-06 RX ADMIN — LABETALOL HYDROCHLORIDE 10 MG: 5 INJECTION, SOLUTION INTRAVENOUS at 05:10

## 2023-10-06 RX ADMIN — PANTOPRAZOLE SODIUM 40 MG: 40 INJECTION, POWDER, FOR SOLUTION INTRAVENOUS at 09:10

## 2023-10-06 RX ADMIN — SODIUM CHLORIDE, POTASSIUM CHLORIDE, SODIUM LACTATE AND CALCIUM CHLORIDE: 600; 310; 30; 20 INJECTION, SOLUTION INTRAVENOUS at 03:10

## 2023-10-06 RX ADMIN — ACETAMINOPHEN 1000 MG: 10 INJECTION, SOLUTION INTRAVENOUS at 05:10

## 2023-10-06 RX ADMIN — Medication 10 ML: at 10:10

## 2023-10-06 RX ADMIN — ROPIVACAINE HYDROCHLORIDE 0.1 ML/HR: 2 INJECTION, SOLUTION EPIDURAL; INFILTRATION at 09:10

## 2023-10-06 RX ADMIN — LABETALOL HYDROCHLORIDE 5 MG: 5 INJECTION, SOLUTION INTRAVENOUS at 12:10

## 2023-10-06 RX ADMIN — KETAMINE HYDROCHLORIDE 50 MG: 50 INJECTION, SOLUTION INTRAMUSCULAR; INTRAVENOUS at 08:10

## 2023-10-06 RX ADMIN — PROPOFOL 50 MCG/KG/MIN: 10 INJECTION, EMULSION INTRAVENOUS at 03:10

## 2023-10-06 RX ADMIN — ARIPIPRAZOLE 5 MG: 5 TABLET ORAL at 09:10

## 2023-10-06 RX ADMIN — METHOCARBAMOL 500 MG: 100 INJECTION, SOLUTION INTRAMUSCULAR; INTRAVENOUS at 11:10

## 2023-10-06 RX ADMIN — MIRTAZAPINE 15 MG: 15 TABLET, ORALLY DISINTEGRATING ORAL at 08:10

## 2023-10-06 RX ADMIN — OXYCODONE HYDROCHLORIDE 15 MG: 5 SOLUTION ORAL at 08:10

## 2023-10-06 RX ADMIN — LABETALOL HYDROCHLORIDE 10 MG: 5 INJECTION, SOLUTION INTRAVENOUS at 03:10

## 2023-10-06 RX ADMIN — OXYCODONE HYDROCHLORIDE 15 MG: 5 SOLUTION ORAL at 03:10

## 2023-10-06 RX ADMIN — HYDROMORPHONE HYDROCHLORIDE 1 MG: 1 INJECTION, SOLUTION INTRAMUSCULAR; INTRAVENOUS; SUBCUTANEOUS at 10:10

## 2023-10-06 RX ADMIN — ROPIVACAINE HYDROCHLORIDE 30 ML: 5 INJECTION EPIDURAL; INFILTRATION; PERINEURAL at 08:10

## 2023-10-06 RX ADMIN — LABETALOL HYDROCHLORIDE 10 MG: 5 INJECTION, SOLUTION INTRAVENOUS at 09:10

## 2023-10-06 RX ADMIN — Medication 50 MG: at 08:10

## 2023-10-06 RX ADMIN — OXYCODONE HYDROCHLORIDE 15 MG: 5 SOLUTION ORAL at 10:10

## 2023-10-06 RX ADMIN — SODIUM PHOSPHATE, MONOBASIC, MONOHYDRATE AND SODIUM PHOSPHATE, DIBASIC, ANHYDROUS 20.01 MMOL: 142; 276 INJECTION, SOLUTION INTRAVENOUS at 12:10

## 2023-10-06 RX ADMIN — HYDROMORPHONE HYDROCHLORIDE 1 MG: 1 INJECTION, SOLUTION INTRAMUSCULAR; INTRAVENOUS; SUBCUTANEOUS at 12:10

## 2023-10-06 RX ADMIN — LAMOTRIGINE 150 MG: 150 TABLET ORAL at 09:10

## 2023-10-06 RX ADMIN — SMOFLIPID 250 ML: 6; 6; 5; 3 INJECTION, EMULSION INTRAVENOUS at 10:10

## 2023-10-06 RX ADMIN — ACETAMINOPHEN 999.01 MG: 650 SOLUTION ORAL at 04:10

## 2023-10-06 RX ADMIN — LABETALOL HYDROCHLORIDE 10 MG: 5 INJECTION, SOLUTION INTRAVENOUS at 02:10

## 2023-10-06 RX ADMIN — MAGNESIUM SULFATE HEPTAHYDRATE: 500 INJECTION, SOLUTION INTRAMUSCULAR; INTRAVENOUS at 10:10

## 2023-10-06 RX ADMIN — LAMOTRIGINE 150 MG: 100 TABLET ORAL at 08:10

## 2023-10-06 NOTE — NURSING TRANSFER
Nursing Transfer Note      10/6/2023   4:42 PM    Nurse giving handoff:TRUDY Hager RN   Nurse receiving handoff:SAH alexis    Reason patient is being transferred: per md order    Transfer To: 1061    Transfer via bed    Transfer with cardiac monitoring    Transported by pct x2    Telemetry: Box Number [id number]  Order for Tele Monitor? Yes    Additional Lines: Alba Catheter, G & J tubes. JESENIA catheters x2    Any special needs or follow-up needed: pain controlled    Patient belongings transferred with patient: Yes    Chart send with patient: Yes    Notified: mother

## 2023-10-06 NOTE — PROGRESS NOTES
Pt extubated at 0819 per anesthesia pain team and discussion with SICU team. Propofol stopped at time of extubation. Bilateral JESENIA catheters placed per pain team and ropivacaine started per MD order. Fentanyl discontinued at start of Ropivacaine infusion. Patients mother updated fully and will be back to visit patient later today.

## 2023-10-06 NOTE — PT/OT/SLP EVAL
Occupational Therapy  Co-Evaluation and Treatment    Name: Angela Trascher Lejeune  MRN: [id number]  Admitting Diagnosis: Duodenal stricture  Recent Surgery: Procedure(s) (LRB):  LAPAROTOMY, EXPLORATORY (N/A)  WASHOUT, HEMATOMA 1 Day Post-Op    Recommendations:     Discharge Recommendations: other (see comments)  Discharge Equipment Recommendations:  none  Barriers to discharge:  None    Assessment:     Angela Trascher Lejeune is a 48 y.o. female with a medical diagnosis of Duodenal stricture.  She presents with performance deficits affecting function: weakness, impaired endurance, impaired self care skills, impaired functional mobility, gait instability, impaired balance, decreased upper extremity function, decreased lower extremity function, decreased ROM, decreased safety awareness, pain, impaired cardiopulmonary response to activity.  Pt agreeable to therapy in PACU. Pt performed bed mobility with log roll technique, EOB sitting, scooting, ROM/MMT assessments, dressing tasks, transfers and short distance mobility with assistance. Pt participates well and is motivated to regain functional independence in mobility and self care.        -Co-tx with PT performed due to need for education and assistance from two skilled therapy disciplines at pt's current functional level.   Rehab Prognosis: Good; patient would benefit from acute skilled OT services to address these deficits and reach maximum level of function.       Plan:     Patient to be seen 4 x/week to address the above listed problems via self-care/home management, therapeutic activities, therapeutic exercises, neuromuscular re-education  Plan of Care Expires: 10/27/23  Plan of Care Reviewed with: patient    Subjective     Chief Complaint: Pain  Patient/Family Comments/goals: Get well    Occupational Profile:  Living Environment: Pt lives with  in a 1SH with 1 SUZANNA and has a WIS.  Previous level of function: Independent, driving, working  Roles and Routines:  Pt works full time as a ; spend time with family  Equipment Used at Home: none  Assistance upon Discharge: Available    Pain/Comfort:  Pain Rating 1: 8/10  Location - Orientation 1: generalized  Location 1: abdomen  Pain Addressed 1: Reposition, Distraction, Cessation of Activity  Pain Rating Post-Intervention 1: 8/10    Patients cultural, spiritual, Roman Catholic conflicts given the current situation: no    Objective:     Communicated with: RN prior to session.  Patient found HOB elevated with telemetry, pulse ox (continuous), blood pressure cuff, perineural catheter, SCD, NHAN drain, wound vac, G/J tube, PICC line upon OT entry to room.    General Precautions: Standard, fall, NPO  Orthopedic Precautions: N/A  Braces: N/A  Respiratory Status: Nasal cannula, flow 2 L/min    Occupational Performance:    Bed Mobility:    Patient completed Rolling/Turning to Left with  minimum assistance  Patient completed Scooting/Bridging with total assistance and 2 persons  Patient completed Supine to Sit with minimum assistance  Patient completed Sit to Supine with minimum assistance    Functional Mobility/Transfers:  Patient completed Sit <> Stand Transfer with minimum assistance  with  no assistive device   Functional Mobility: Min A    Activities of Daily Living:  Upper Body Dressing: maximal assistance gown as stephania  Lower Body Dressing: total assistance socks    Cognitive/Visual Perceptual:  Cognitive/Psychosocial Skills:     -       Oriented to: Person, Place, Time, and Situation   -       Follows Commands/attention:Follows multistep  commands  -       Safety awareness/insight to disability: impaired     Physical Exam:  Upper Extremity Range of Motion:     -       Right Upper Extremity: WFL  -       Left Upper Extremity: WFL  Upper Extremity Strength:    -       Right Upper Extremity: WFL  -       Left Upper Extremity: WFL   Strength:    -       Right Upper Extremity: WFL  -       Left Upper Extremity: WFL  Fine  Motor Coordination:    -       Intact  Gross motor coordination:   WFL    AMPAC 6 Click ADL:  AMPAC Total Score: 12    Treatment & Education:  Pt edu re OT role, POC and safety.    Patient left HOB elevated with all lines intact and call button in reach    GOALS:   Multidisciplinary Problems       Occupational Therapy Goals          Problem: Occupational Therapy    Goal Priority Disciplines Outcome Interventions   Occupational Therapy Goal     OT, PT/OT Ongoing, Progressing    Description: Goals to be met by: 10/27/23     Patient will increase functional independence with ADLs by performing:    UE Dressing with Supervision.  LE Dressing with Supervision.  Grooming while standing at sink with Supervision.  Toileting from toilet with Supervision for hygiene and clothing management.   Supine to sit with Supervision.  Step transfer with Supervision.                         History:     Past Medical History:   Diagnosis Date    Abnormal Pap smear     + HPV    Bipolar 1 disorder     Closed bimalleolar fracture of left ankle     Crohn's disease S/P surgery (ileum, possible gastroduodenal with ulcers)     1998-ileocectomy (9 inches ileum)    Encounter for blood transfusion     Gastric and duodenal ulcers of unclear etiology     Gastroparesis     H/O ETOH abuse     History of HSV-2 infection     Immunosuppressed status     Iron deficiency anemia, multiple blood transfusions     POTS (postural orthostatic tachycardia syndrome)     Seizures     states last seizure 2009         Past Surgical History:   Procedure Laterality Date    ANKLE HARDWARE REMOVAL Left 9/6/2023    Procedure: REMOVAL, HARDWARE, ANKLE;  Surgeon: Michael Lacey MD;  Location: [facility];  Service: Orthopedics;  Laterality: Left;    APPENDECTOMY      BRAIN SURGERY  2009    fx skull due to seizure with 2 pins    BYPASS OF DUODENUM BY ANASTOMOSIS OF DUODENUM TO JEJUNUM N/A 10/4/2023    Procedure: CREATION, BYPASS, DUODENUM, BY ANASTOMOSIS OF DUODENUM TO  JEJUNUM, PLACEMNENT OF GASTROSTOMY TUBE, JEJUNOSTOMY TUBE;  Surgeon: Kevin Mays MD;  Location: [facility] OR 2ND FLR;  Service: General;  Laterality: N/A;    CHOLECYSTECTOMY      COLON SURGERY      Partial colectomy    COLONOSCOPY Left 7/10/2019    Procedure: COLONOSCOPY;  Surgeon: Papi Martinez Jr., MD;  Location: [facility];  Service: Endoscopy;  Laterality: Left;    COLONOSCOPY N/A 12/6/2022    Procedure: COLONOSCOPY;  Surgeon: Zandra Mallory MD;  Location: [facility] ENDO (4TH FLR);  Service: Endoscopy;  Laterality: N/A;  poor prep on 11/28/22.  per Dr Mallory-Miralax 5 days prior with gastroparesis diet-3 days full liquid. also Mag citrate day before     instr handed to pt-GT    EGD, WITH BALLOON DILATION  7/24/2023    Procedure: EGD, WITH BALLOON DILATION;  Surgeon: Huang Licea MD;  Location: [facility];  Service: Endoscopy;;    ESOPHAGOGASTRODUODENOSCOPY Left 6/5/2019    Procedure: EGD (ESOPHAGOGASTRODUODENOSCOPY);  Surgeon: ROLA Villagran MD;  Location: [facility];  Service: Endoscopy;  Laterality: Left;    ESOPHAGOGASTRODUODENOSCOPY Left 3/16/2020    Procedure: EGD (ESOPHAGOGASTRODUODENOSCOPY);  Surgeon: Clive Núñez MD;  Location: [facility];  Service: Endoscopy;  Laterality: Left;    ESOPHAGOGASTRODUODENOSCOPY N/A 4/17/2020    Procedure: EGD (ESOPHAGOGASTRODUODENOSCOPY);  Surgeon: Papi Martinez Jr., MD;  Location: [facility];  Service: Endoscopy;  Laterality: N/A;  with Push Enteroscopy    ESOPHAGOGASTRODUODENOSCOPY N/A 7/6/2020    Procedure: EGD (ESOPHAGOGASTRODUODENOSCOPY)   possible peg;  Surgeon: Papi Martinez Jr., MD;  Location: [facility];  Service: Endoscopy;  Laterality: N/A;    ESOPHAGOGASTRODUODENOSCOPY N/A 7/9/2020    Procedure: EGD (ESOPHAGOGASTRODUODENOSCOPY);  Surgeon: Papi Martinez Jr., MD;  Location: [facility];  Service: Endoscopy;  Laterality: N/A;  J or Peg tube    ESOPHAGOGASTRODUODENOSCOPY N/A 9/3/2020    Procedure: EGD (ESOPHAGOGASTRODUODENOSCOPY);  Surgeon: Papi  MAN Martinez Jr., MD;  Location: [facility];  Service: Endoscopy;  Laterality: N/A;  per drs order Stoma, Peds, w/single Lumen, J-Tube placement    ESOPHAGOGASTRODUODENOSCOPY N/A 12/29/2020    Procedure: EGD (ESOPHAGOGASTRODUODENOSCOPY);  Surgeon: Papi Martinez Jr., MD;  Location: [facility];  Service: Endoscopy;  Laterality: N/A;    ESOPHAGOGASTRODUODENOSCOPY N/A 2/23/2021    Procedure: EGD (ESOPHAGOGASTRODUODENOSCOPY);  Surgeon: Papi Martinez Jr., MD;  Location: [facility];  Service: Endoscopy;  Laterality: N/A;    ESOPHAGOGASTRODUODENOSCOPY N/A 2/3/2022    Procedure: EGD (ESOPHAGOGASTRODUODENOSCOPY);  Surgeon: Papi Martinez Jr., MD;  Location: [facility];  Service: Endoscopy;  Laterality: N/A;    ESOPHAGOGASTRODUODENOSCOPY N/A 8/13/2022    Procedure: EGD (ESOPHAGOGASTRODUODENOSCOPY);  Surgeon: Huang Licea MD;  Location: [facility];  Service: Gastroenterology;  Laterality: N/A;    ESOPHAGOGASTRODUODENOSCOPY N/A 10/16/2022    Procedure: EGD (ESOPHAGOGASTRODUODENOSCOPY);  Surgeon: Huang Licea MD;  Location: [facility];  Service: Gastroenterology;  Laterality: N/A;    ESOPHAGOGASTRODUODENOSCOPY N/A 11/28/2022    Procedure: EGD (ESOPHAGOGASTRODUODENOSCOPY);  Surgeon: Zandra Mallory MD;  Location: [address]);  Service: Endoscopy;  Laterality: N/A;    ESOPHAGOGASTRODUODENOSCOPY N/A 7/24/2023    Procedure: EGD (ESOPHAGOGASTRODUODENOSCOPY);  Surgeon: Huang Licea MD;  Location: [facility];  Service: Endoscopy;  Laterality: N/A;    ESOPHAGOGASTRODUODENOSCOPY N/A 9/15/2023    Procedure: EGD (ESOPHAGOGASTRODUODENOSCOPY);  Surgeon: ROLA Villagran MD;  Location: [facility];  Service: Endoscopy;  Laterality: N/A;    FEMUR FRACTURE SURGERY Right 2006    HYSTERECTOMY      INSERTION OF TUNNELED CENTRAL VENOUS CATHETER (CVC) WITH SUBCUTANEOUS PORT Left 8/14/2019    Procedure: [id number];  Surgeon: Miquel Sargent MD;  Location: [facility];  Service: General;  Laterality: Left;    INSERTION OR  REPLACEMENT OF PERCUTANEOUS ENDOSCOPIC JEJUNOSTOMY TUBE  12/29/2020    Procedure: INSERTION OR REPLACEMENT, JEJUNOSTOMY TUBE, PERCUTANEOUS, ENDOSCOPIC;  Surgeon: Papi Martinez Jr., MD;  Location: [facility] ENDO;  Service: Endoscopy;;    laparoscopy for endometriosis      x5    LEFT HEART CATHETERIZATION Left 3/29/2023    Procedure: Left heart cath;  Surgeon: Riley Tipton MD;  Location: [facility] CATH;  Service: Cardiology;  Laterality: Left;    OOPHORECTOMY      right ovary removed only    OPEN REDUCTION AND INTERNAL FIXATION (ORIF) OF INJURY OF ANKLE Left 4/10/2023    Procedure: ORIF, ANKLE;  Surgeon: Michael Lacey MD;  Location: [facility] OR;  Service: Orthopedics;  Laterality: Left;    PEG TUBE REMOVAL  4/1/2021    Procedure: REMOVAL, PEG TUBE;  Surgeon: Papi Martinez Jr., MD;  Location: [facility];  Service: Endoscopy;;    TONSILLECTOMY         Time Tracking:     OT Date of Treatment: 10/06/23  OT Start Time: 1234  OT Stop Time: 1256  OT Total Time (min): 22 min    Billable Minutes:Evaluation 14 minutes  Therapeutic Activity 8 minutes    OANH Walton  10/6/2023  Pager: [phone]

## 2023-10-06 NOTE — ANESTHESIA POSTPROCEDURE EVALUATION
Anesthesia Post Evaluation    Patient: Angela Trascher Lejeune    Procedure(s) Performed: Procedure(s) (LRB):  LAPAROTOMY, EXPLORATORY (N/A)  WASHOUT, HEMATOMA    Final Anesthesia Type: general      Patient location during evaluation: PACU  Patient participation: No - Unable to Participate, Intubation  Level of consciousness: sedated  Post-procedure vital signs: reviewed and stable  Pain management: adequate  Airway patency: patent    PONV status at discharge: No PONV  Anesthetic complications: no      Cardiovascular status: blood pressure returned to baseline  Respiratory status: unassisted  Hydration status: euvolemic  Follow-up not needed.          Vitals Value Taken Time   /63 10/06/23 0517   Temp 36.9 °C (98.4 °F) 10/06/23 0400   Pulse 99 10/06/23 0533   Resp 16 10/06/23 0533   SpO2 100 % 10/06/23 0533   Vitals shown include unvalidated device data.      No case tracking events are documented in the log.      Pain/Rufus Score: Pain Rating Prior to Med Admin: 8 (10/5/2023 10:15 PM)  Pain Rating Post Med Admin: 8 (10/5/2023 10:04 PM)

## 2023-10-06 NOTE — CARE UPDATE
Patient extubated without parameters by MD and myself. Patient was not placed on spontaneous breathing trial prior to extubation but was wide awake. Patient is on room air talking with clear voice, sats in the upper 90's and no respiratory distress noted. Will continue to monitor for changes in respiratory status.

## 2023-10-06 NOTE — PLAN OF CARE
Angela Trascher Lejeune is a 48 y.o. female admitted to [facility] on 2023 for Duodenal stricture. Patient previously being seen by PT but orders discontinued secondary to patient demonstrating independence with mobility on . New orders for PT placed after going to OR on 10/4 for duodenal exploration, G-tube placement and then for ex-lap hematoma washout on 10/5. She was extubated this morning in PACU and deemed stable for re-evaluation. Angela Trascher Lejeune tolerated re-evaluation well today. Reports generalized 8/10 abdominal pain with activity, RN able to provide pain medication while she sat up at EOB with PT/OT. Stood with min (A), took 4 steps around bedside with min (A) of therapist via hand-held support. She also performed 10 reps of static standing marching with min (A) to increase activity levels; mobility limited due to surplus of medical lines as well as surroundings (in PACU). Discussed PT role, POC, and goals with patient; verbalized understanding. Angela Trascher Lejeune would benefit from acute PT services to promote mobility during this admission and improve return to PLOF.    Problem: Physical Therapy  Goal: Physical Therapy Goal  Description: Goals to be met by: 10/20/23     Patient will increase functional independence with mobility by performin. Supine to sit with Stand-by Assistance - Not met  2. Sit to stand transfer with Stand-by Assistance - Not met  3. Bed to chair transfer with Stand-by Assistance using No Assistive Device - Not met  4. Gait x 200 feet with Stand-by Assistance using No Assistive Device - Not met  10/6/2023 1543 by Fransico Farooq, PT  Outcome: Ongoing, Progressing    Fransico Farooq, PT  10/6/2023

## 2023-10-06 NOTE — CODE/ RAPID DOCUMENTATION
RAPID RESPONSE NURSE NOTE        Admit Date: 2023  LOS: 8  Code Status: Full Code   Date of Consult: 10/06/2023  : 1975  Age: 48 y.o.  Weight:   Wt Readings from Last 1 Encounters:   10/03/23 53.1 kg (117 lb)     Sex: female  Race: White   Bed: [address] [place] [address] A:   MRN: [id number]  Time Rapid Response Team page Received: 1746  Time Rapid Response Team at Bedside: 1747  Time Rapid Response Team left Bedside: 1759  Was the patient discharged from an ICU this admission? No   Was the patient discharged from a PACU within last 24 hours? Yes   Did the patient receive conscious sedation/general anesthesia in last 24 hours? No  Was the patient in the ED within the past 24 hours? No  Was the patient on NIPPV within the past 24 hours? No   Did this progress into an ARC or CPA: No  Attending Physician: Kevin Mays, *  Primary Service: Surgical Oncology,General Surgery       SITUATION    Notified by pager.  Reason for alert: Pain  Called to evaluate the patient for  Pain Control    BACKGROUND     Why is the patient in the hospital?: Duodenal stricture    Patient has a past medical history of Abnormal Pap smear, Bipolar 1 disorder, Closed bimalleolar fracture of left ankle, Crohn's disease S/P surgery (ileum, possible gastroduodenal with ulcers), Encounter for blood transfusion, Gastric and duodenal ulcers of unclear etiology, Gastroparesis, H/O ETOH abuse, History of HSV-2 infection, Immunosuppressed status, Iron deficiency anemia, multiple blood transfusions, POTS (postural orthostatic tachycardia syndrome), and Seizures.    Last Vitals:  Temp: 99.2 °F (37.3 °C) (10/06 1600)  Pulse: 121 (10/06 1752)  Resp: 24 (10/06 1752)  BP: 202/99 (10/06 1700)  SpO2: 100 % (10/06 1752)    24 Hours Vitals Range:  Temp:  [98.4 °F (36.9 °C)-100.9 °F (38.3 °C)]   Pulse:  [103-133]   Resp:  [12-39]   BP: (128-217)/(60-99)   SpO2:  [95 %-100 %]     Labs:  Recent Labs     10/05/23  1925 10/06/23  0422 10/06/23  1805   WBC 10.73 10.37  11.02   HGB 9.5* 8.7* 8.9*   HCT 30.5* 28.0* 28.9*   * 128* 136*       Recent Labs     10/04/23  0441 10/05/23  0505 10/06/23  0422    139 141   K 4.0 4.6 4.7    108 113*   CO2 28 26 24   BUN 14 28* 16   CREATININE 0.7 0.8 0.7   GLU 93 116* 147*   PHOS 3.9 2.0* 2.0*   MG 2.0 2.1 2.0        Recent Labs     10/05/23  1842   PH 7.363   PCO2 40.8   PO2 48   HCO3 23.3*   POCSATURATED 82   BE -2        ASSESSMENT    Physical Exam  Vitals reviewed.   Constitutional:       Appearance: She is diaphoretic.   Cardiovascular:      Rate and Rhythm: Tachycardia present.   Pulmonary:      Effort: Pulmonary effort is normal. Tachypnea present.   Skin:     General: Skin is warm.   Neurological:      Mental Status: She is alert.   Psychiatric:         Mood and Affect: Mood is anxious.     Patient awake alert, lying in bed. She is complaining of abdominal pain. Surgery MD at bedside requested the number for anesthesia regarding perineural ropivacaine. Anesthesia was called to bedside by rapid response nurse.     INTERVENTIONS    The patient was seen for Medical problem. Staff concerns included uncontrolled pain. The following interventions were performed: consulted anesthesia.    RECOMMENDATIONS    We recommend:    - Pain control  - Continuous telemetry monitoring    PROVIDER ESCALATION    Orders received and case discussed with Dr. Carrasco .    Primary team arrival time: at bedside    Disposition: Remain in room 1061.    FOLLOW UP    Charge Ansley DALTON  updated on plan of care. Instructed to call the Rapid Response Nurse, Ginna Castaneda RN at [phone] for additional questions or concerns.

## 2023-10-06 NOTE — PROGRESS NOTES
[facility] ([facility])  Critical Care - Surgery  Progress Note    Patient Name: Angela Trascher Lejeune  MRN: [id number]  Admission Date: 9/28/2023  Hospital Length of Stay: 8 days  Code Status: Full Code  Attending Provider: Kevin Mays, *  Primary Care Provider: Haven Gary MD   Principal Problem: Duodenal stricture    Subjective:     Hospital/ICU Course:  No notes on file    Interval History/Significant Events: No acute events overnight. Some issues with pain control last night and keeping her comfortable, started a fentanyl gtt.     Follow-up For: Procedure(s) (LRB):  LAPAROTOMY, EXPLORATORY (N/A)  WASHOUT, HEMATOMA    Post-Operative Day: 1 Day Post-Op    Objective:     Vital Signs (Most Recent):  Temp: 98.4 °F (36.9 °C) (10/06/23 0400)  Pulse: 107 (10/06/23 0338)  Resp: 16 (10/06/23 0338)  BP: (!) 145/65 (10/06/23 0300)  SpO2: 100 % (10/06/23 0338) Vital Signs (24h Range):  Temp:  [96.2 °F (35.7 °C)-100.6 °F (38.1 °C)] 98.4 °F (36.9 °C)  Pulse:  [101-143] 107  Resp:  [15-28] 16  SpO2:  [99 %-100 %] 100 %  BP: (111-204)/(57-93) 145/65     Weight: 53.1 kg (117 lb)  Body mass index is 20.07 kg/m².      Intake/Output Summary (Last 24 hours) at 10/6/2023 0637  Last data filed at 10/6/2023 0400  Gross per 24 hour   Intake 4614.85 ml   Output 6110 ml   Net -1495.15 ml          Physical Exam  Vitals and nursing note reviewed.   Constitutional:       Interventions: She is sedated and intubated.   HENT:      Head: Normocephalic and atraumatic.   Cardiovascular:      Rate and Rhythm: Regular rhythm. Tachycardia present.   Pulmonary:      Effort: She is intubated.   Abdominal:      General: Abdomen is flat.      Palpations: Abdomen is soft.      Comments: Provena in place, g-tube, j-tube in place.   Skin:     General: Skin is warm and dry.            Vents:  Vent Mode: A/C (10/06/23 0338)  Ventilator Initiated: Yes (10/05/23 1819)  Set Rate: 16 BPM (10/06/23 0338)  Vt Set: 360 mL (10/06/23  0338)  PEEP/CPAP: 5 cmH20 (10/06/23 0338)  Oxygen Concentration (%): 50 (10/06/23 0338)  Peak Airway Pressure: 17 cmH20 (10/06/23 0338)  Plateau Pressure: 19 cmH20 (10/06/23 0338)  Total Ve: 6.37 L/m (10/06/23 0338)  Negative Inspiratory Force (cm H2O): 0 (10/06/23 0338)  F/VT Ratio<105 (RSBI): (!) 39.8 (10/06/23 0338)    Lines/Drains/Airways       Peripherally Inserted Central Catheter Line  Duration             PICC Double Lumen 09/29/23 1431 right basilic 6 days              Central Venous Catheter Line  Duration                  PowerPort A Cath Single Lumen 10/05/23 1320 Atrial Left <1 day              Drain  Duration                  Gastrostomy/Enterostomy 10/04/23 1117 Gastrostomy tube w/o balloon LUQ 1 day         Gastrostomy/Enterostomy 10/04/23 1149 Jejunostomy tube LUQ 1 day         Urethral Catheter 10/04/23 0820 Non-latex;Straight-tip 16 Fr. 1 day         Closed/Suction Drain 10/05/23 1722 Tube - 1 Right Abdomen Bulb 19 Fr. <1 day              Airway  Duration                  Airway - Non-Surgical 10/05/23 1534 <1 day                    Significant Labs:    CBC/Anemia Profile:  Recent Labs   Lab 10/05/23  1117 10/05/23  1842 10/05/23  1925 10/06/23  0422   WBC 11.08  --  10.73 10.37   HGB 6.7*  --  9.5* 8.7*   HCT 22.9* 26* 30.5* 28.0*   *  --  131* 128*   *  --  94 93   RDW 14.3  --  20.1* 20.9*        Chemistries:  Recent Labs   Lab 10/05/23  0505 10/06/23  0422    141   K 4.6 4.7    113*   CO2 26 24   BUN 28* 16   CREATININE 0.8 0.7   CALCIUM 9.4 8.7   ALBUMIN 3.1* 2.8*   PROT 6.4 6.0   BILITOT 0.3 0.4   ALKPHOS 109 80   ALT 46* 28   * 52*   MG 2.1 2.0   PHOS 2.0* 2.0*       All pertinent labs within the past 24 hours have been reviewed.    Significant Imaging:  I have reviewed all pertinent imaging results/findings within the past 24 hours.    Assessment/Plan:     GI  * Duodenal stricture    Neuro/Psych:   -- Sedation: propofol   -- Pain: fentanyl gtt, robaxin              Cards:   -- HDS  -- tachycardic      Pulm:   -- Goal O2 sat > 90%  --intubated on a/c rate 16/ FiO2 50%, PEEP 5, Vt 360  -- Wean as able      Renal:  -- Keep harmon for strict I/O  -- BUN/Cr 16/0.7      FEN / GI:   -- Net -1495  -- Replace lytes as needed  -- Nutrition: NPO  --TPN       ID:   -- Tm: afebrile; WBC 10.37  -- Abx none      Heme/Onc:   -- H/H  8.7/28  -- Daily CBC      Endo:   -- Gluc goal 140-180      PPx:   Feeding: NPO  Analgesia/Sedation: fentanyl gtt, robaxin / propofol  Thromboembolic prevention: SCDs  HOB >30:   Stress Ulcer ppx: protonix  Glucose control: Critical care goal 140-180 g/dl, ISS    Lines/Drains/Airway: NHAN drain, j-tube, g-tube, provena wound vac, PICC, port, ETT      Dispo/Code Status/Palliative:   -- SICU / Full Code          Critical secondary to Patient is currently receiving parenteral controlled substances: fentanyl, propofol     Critical care was time spent personally by me on the following activities: development of treatment plan with patient or surrogate and bedside caregivers, discussions with consultants, evaluation of patient's response to treatment, examination of patient, ordering and performing treatments and interventions, ordering and review of laboratory studies, ordering and review of radiographic studies, pulse oximetry, re-evaluation of patient's condition.  This critical care time did not overlap with that of any other provider or involve time for any procedures.     Sheila Melton MD  Critical Care - Surgery  [facility] - Surgery (2nd Fl)

## 2023-10-06 NOTE — ASSESSMENT & PLAN NOTE
Mikaela Domingojeune is a 49yo F with PMH Crohn's disease, POTS, gastric varices, alcohol abuse, bipolar disorder who was recently seen in surgical oncology clinic for evaluation of a gastric outlet obstruction. Now s/p Delfino-Y gastrojejunostomy, G tube, and J tube placement 10/4/23.     - NPO   - No need for NG tube right now  - TPN reordered  - mIVF DC'd  - JESENIA catheters and blocks placed today by anesthesia pain  - will monitor pain control today, wean fentanyl as able  - Protonix  - Electrolyte replacement  - Lovenox  - Daily labs. Nutritional labs    Dispo: remain in SICU, possible SD to floor later today

## 2023-10-06 NOTE — PROGRESS NOTES
BP labile and persistently tachycardic throughout shift. Pt extubated this shift and currently on room air. Dressing remains CDI with incisional wound vac in place. NHAN intact to bulb suction. J tube clamped. G-tube to gravity. Alba to gravity. TPN and IVF infusing per MD order. Phosphorus replacement in progress. Ropivacaine infusing through bilateral JESENIA catheters. POC reviewed and understanding verbalized. Mother updated at bedside

## 2023-10-06 NOTE — PROGRESS NOTES
[facility] - [facility] (2nd Fl)  General Surgery  Progress Note    Subjective:     History of Present Illness:  No notes on file    Post-Op Info:  Procedure(s) (LRB):  LAPAROTOMY, EXPLORATORY (N/A)  WASHOUT, HEMATOMA   1 Day Post-Op     Interval History: Take back to OR yesterday for hematoma evacuation. Left intubated overnight due to pain control issues. This morning on propofol and fentanyl gtt. Blocks/ perineural catheter palcement by anesthesia pain this AM for better pain control post op. Now extubated. Continued to be intermittently hypertensive overnight.    Medications:  Continuous Infusions:   fentanyl 200 mcg/hr (10/06/23 0700)    lactated ringers 125 mL/hr at 10/06/23 0700    propofoL Stopped (10/06/23 0819)    TPN ADULT CENTRAL LINE CUSTOM 100 mL/hr at 10/06/23 0700     Scheduled Meds:   ARIPiprazole  5 mg Per G Tube Daily    fludrocortisone  0.1 mg Per G Tube Daily    lamoTRIgine  150 mg Per G Tube BID    LIDOcaine  1 patch Transdermal Q24H    lipid (SMOFLIPID)  250 mL Intravenous Daily    methocarbamol (ROBAXIN) IVPB  500 mg Intravenous Q6H    midodrine  5 mg Per G Tube Q12H    mirtazapine  15 mg Per G Tube Nightly    pantoprazole  40 mg Intravenous BID    scopolamine  1 patch Transdermal Q3 Days    sodium chloride 0.9%  10 mL Intravenous Q6H     PRN Meds:0.9%  NaCl infusion (for blood administration), 0.9%  NaCl infusion (for blood administration), calcium gluconate IVPB, calcium gluconate IVPB, calcium gluconate IVPB, haloperidol lactate, labetalol, levalbuterol, LIDOcaine (PF) 10 mg/ml (1%), magnesium sulfate IVPB, magnesium sulfate IVPB, naloxone, ondansetron, potassium chloride in water **AND** potassium chloride in water **AND** potassium chloride in water, prochlorperazine, sodium chloride 0.9%, Flushing PICC/Midline Protocol **AND** sodium chloride 0.9% **AND** sodium chloride 0.9%, sodium chloride 0.9%, sodium phosphate 15 mmol in dextrose 5 % (D5W) 250 mL IVPB, sodium phosphate  "20.01 mmol in dextrose 5 % (D5W) 250 mL IVPB, sodium phosphate 30 mmol in dextrose 5 % (D5W) 250 mL IVPB     Review of patient's allergies indicates:   Allergen Reactions    Tramadol Other (See Comments)     Seizures      Adhesive Other (See Comments)     STATES, " TEARS SKIN"     Demerol [meperidine]      Seizures      Hydrocodone Hallucinations    Reglan [metoclopramide]      Objective:     Vital Signs (Most Recent):  Temp: (!) 100.8 °F (38.2 °C) (10/06/23 0700)  Pulse: 110 (10/06/23 0805)  Resp: 18 (10/06/23 0800)  BP: 128/60 (10/06/23 0745)  SpO2: 100 % (10/06/23 0800) Vital Signs (24h Range):  Temp:  [98.4 °F (36.9 °C)-100.8 °F (38.2 °C)] 100.8 °F (38.2 °C)  Pulse:  [101-137] 110  Resp:  [15-28] 18  SpO2:  [99 %-100 %] 100 %  BP: (128-204)/(60-93) 128/60     Weight: 53.1 kg (117 lb)  Body mass index is 20.08 kg/m².    Intake/Output - Last 3 Shifts         10/04 0700  10/05 0659 10/05 0700  10/06 0659 10/06 0700  10/07 0659    I.V. (mL/kg)  2340.8 (44.1) 384.9 (7.2)    Blood  897.5     IV Piggyback 1200 1376.6 193.9    TPN   1155.6    Total Intake(mL/kg) 1200 (22.6) 4614.9 (86.9) 1734.4 (32.7)    Urine (mL/kg/hr) 2650 (2.1) 5270 (4.1) 325 (4.3)    Drains 100 840     Stool 0      Total Output 2750 6110 325    Net -1550 -1495.2 +1409.4           Stool Occurrence 0 x               Physical Exam  Constitutional:       Appearance: Normal appearance.   HENT:      Head: Normocephalic and atraumatic.   Cardiovascular:      Rate and Rhythm: Normal rate.   Pulmonary:      Effort: Pulmonary effort is normal.   Abdominal:      General: Abdomen is flat.      Palpations: Abdomen is soft.      Tenderness: There is abdominal tenderness.      Comments: Multiple abdominal scars from previous surgical history  Midline incision now with skin staples and prevena in place to suction. G and J tube in place.  Ecchymosis around incision, not raised or edematous this AM.  Subcutaneous NHAN in place with 40cc SS output overnight. "   Skin:     General: Skin is warm and dry.   Neurological:      General: No focal deficit present.      Mental Status: She is alert and oriented to person, place, and time.          Significant Labs:  I have reviewed all pertinent lab results within the past 24 hours.    Significant Diagnostics:  I have reviewed all pertinent imaging results/findings within the past 24 hours.    Assessment/Plan:     * Duodenal stricture  Angela Trascher Lejeune is a 47yo F with PMH Crohn's disease, POTS, gastric varices, alcohol abuse, bipolar disorder who was recently seen in surgical oncology clinic for evaluation of a gastric outlet obstruction. Now s/p Delfino-Y gastrojejunostomy, G tube, and J tube placement 10/4/23. S/p takeback to OR 10/5 for hematoma evacuation.    - NPO   - No need for NG tube right now  - TPN reordered  - mIVF DC'd  - JESENIA catheters and blocks placed today by anesthesia pain  - will monitor pain control today, wean fentanyl as able  - Protonix  - Electrolyte replacement  - Lovenox  - Daily labs. Nutritional labs    Dispo: remain in SICU, possible SD to floor later today          Shanta Tran MD  General Surgery  [facility] - Surgery (2nd Fl)

## 2023-10-06 NOTE — ANESTHESIA RELEASE NOTE
"Anesthesia Release from PACU Note    Patient: Angela Trascher Lejeune    Procedure(s) Performed: * No procedures listed *    Anesthesia type: general and regional    Post pain: Adequate analgesia    Post assessment: no apparent anesthetic complications    Last Vitals:   Visit Vitals  BP (!) 165/74 (BP Location: Left arm, Patient Position: Lying)   Pulse (!) 115   Temp 37.3 °C (99.2 °F) (Temporal)   Resp 18   Ht 5' 4" (1.626 m)   Wt 53.1 kg (117 lb)   LMP 08/21/2010 (LMP Unknown)   SpO2 98%   Breastfeeding No   BMI 20.08 kg/m²       Post vital signs: stable    Level of consciousness: awake    Nausea/Vomiting: no nausea/no vomiting    Complications: none    Airway Patency: patent    Respiratory: unassisted    Cardiovascular: stable and blood pressure at baseline    Hydration: euvolemic  "

## 2023-10-06 NOTE — ADDENDUM NOTE
Addendum  created 10/06/23 1015 by Griffin Ricks MD    Order list changed, Pharmacy for encounter modified

## 2023-10-06 NOTE — PT/OT/SLP RE-EVAL
Physical Therapy  [facility]    Angela Trascher Lejeune   [id number]    Time Tracking:     PT Received On: 10/06/23   PT Start Time: 1234   PT Stop Time: 1256   PT Total Time (min): 22 min    Billable Minutes: Re-eval 10 and Neuromuscular Re-education 12 minutes       Recommendations:     Discharge recommendations: Other (no further PT needs identified upon D/C home)     Equipment recommendations: None    Barriers to Discharge: None    Patient Information:     Recent Surgery: Procedure(s) (LRB):  LAPAROTOMY, EXPLORATORY (N/A)  WASHOUT, HEMATOMA 1 Day Post-Op    Diagnosis: Duodenal stricture    Length of Stay: 8 days    General Precautions: Standard, fall, NPO  Orthopedic Precautions: None  Brace: None    Assessment:     Angela Trascher Lejeune is a 48 y.o. female admitted to [facility] on 9/28/2023 for Duodenal stricture. Patient previously being seen by PT but orders discontinued secondary to patient demonstrating independence with mobility on 9/29. New orders for PT placed after going to OR on 10/4 for duodenal exploration, G-tube placement and then for ex-lap hematoma washout on 10/5. She was extubated this morning in PACU and deemed stable for re-evaluation. Angela Trascher Lejeune tolerated re-evaluation well today. Reports generalized 8/10 abdominal pain with activity, RN able to provide pain medication while she sat up at EOB with PT/OT. Stood with min (A), took 4 steps around bedside with min (A) of therapist via hand-held support. She also performed 10 reps of static standing marching with min (A) to increase activity levels; mobility limited due to surplus of medical lines as well as surroundings (in PACU). Discussed PT role, POC, and goals with patient; verbalized understanding. Angela Trascher Lejeune would benefit from acute PT services to promote mobility during this admission and improve return to PLOF.    Problem List: weakness, decreased endurance, impaired self-care skills, impaired  "mobility, decreased sitting and/or standing balance, gait instability, impaired cardiopulmonary response to activity, and pain    Rehab Prognosis: Good; patient would benefit from acute skilled PT services to address these deficits and reach maximum level of function.    Plan:     Patient to be seen 4 x/week to address the above listed problems via gait training, therapeutic activities, therapeutic exercises, neuromuscular re-education    Plan of Care Expires: 11/05/23  Plan of Care reviewed with: patient    Subjective:     Communicated with RN prior to re-evaluation, appropriate to see for re-evaluation.    Pt found supine in bed (HOB elevated) upon PT entry to room, agreeable to re-evaluation.    Patient commenting: "We can give it a try (sitting up) today."    Does this patient have any cultural, spiritual, Alevism conflicts given the current situation? Patient has no barriers to learning. Patient verbalizes understanding of his/her program and goals and demonstrates them correctly. No cultural, spiritual, or educational needs identified.    Past Medical History:   Diagnosis Date    Abnormal Pap smear     + HPV    Bipolar 1 disorder     Closed bimalleolar fracture of left ankle     Crohn's disease S/P surgery (ileum, possible gastroduodenal with ulcers)     1998-ileocectomy (9 inches ileum)    Encounter for blood transfusion     Gastric and duodenal ulcers of unclear etiology     Gastroparesis     H/O ETOH abuse     History of HSV-2 infection     Immunosuppressed status     Iron deficiency anemia, multiple blood transfusions     POTS (postural orthostatic tachycardia syndrome)     Seizures     states last seizure 2009      Past Surgical History:   Procedure Laterality Date    ANKLE HARDWARE REMOVAL Left 9/6/2023    Procedure: REMOVAL, HARDWARE, ANKLE;  Surgeon: Michael Lacey MD;  Location: [facility];  Service: Orthopedics;  Laterality: Left;    APPENDECTOMY      BRAIN SURGERY  2009    fx skull due to seizure " with 2 pins    BYPASS OF DUODENUM BY ANASTOMOSIS OF DUODENUM TO JEJUNUM N/A 10/4/2023    Procedure: CREATION, BYPASS, DUODENUM, BY ANASTOMOSIS OF DUODENUM TO JEJUNUM, PLACEMNENT OF GASTROSTOMY TUBE, JEJUNOSTOMY TUBE;  Surgeon: Kevin Mays MD;  Location: [facility] OR 2ND FLR;  Service: General;  Laterality: N/A;    CHOLECYSTECTOMY      COLON SURGERY      Partial colectomy    COLONOSCOPY Left 7/10/2019    Procedure: COLONOSCOPY;  Surgeon: Papi Martinez Jr., MD;  Location: [facility];  Service: Endoscopy;  Laterality: Left;    COLONOSCOPY N/A 12/6/2022    Procedure: COLONOSCOPY;  Surgeon: Zandra Mallory MD;  Location: [facility] (4TH FLR);  Service: Endoscopy;  Laterality: N/A;  poor prep on 11/28/22.  per Dr Mallory-Nicole 5 days prior with gastroparesis diet-3 days full liquid. also Mag citrate day before     instr handed to pt-GT    EGD, WITH BALLOON DILATION  7/24/2023    Procedure: EGD, WITH BALLOON DILATION;  Surgeon: Huang Licea MD;  Location: [facility];  Service: Endoscopy;;    ESOPHAGOGASTRODUODENOSCOPY Left 6/5/2019    Procedure: EGD (ESOPHAGOGASTRODUODENOSCOPY);  Surgeon: ROLA Villagran MD;  Location: [facility];  Service: Endoscopy;  Laterality: Left;    ESOPHAGOGASTRODUODENOSCOPY Left 3/16/2020    Procedure: EGD (ESOPHAGOGASTRODUODENOSCOPY);  Surgeon: Clive Núñez MD;  Location: [facility];  Service: Endoscopy;  Laterality: Left;    ESOPHAGOGASTRODUODENOSCOPY N/A 4/17/2020    Procedure: EGD (ESOPHAGOGASTRODUODENOSCOPY);  Surgeon: Papi Martinez Jr., MD;  Location: [facility];  Service: Endoscopy;  Laterality: N/A;  with Push Enteroscopy    ESOPHAGOGASTRODUODENOSCOPY N/A 7/6/2020    Procedure: EGD (ESOPHAGOGASTRODUODENOSCOPY)   possible peg;  Surgeon: Papi Martinez Jr., MD;  Location: [facility];  Service: Endoscopy;  Laterality: N/A;    ESOPHAGOGASTRODUODENOSCOPY N/A 7/9/2020    Procedure: EGD (ESOPHAGOGASTRODUODENOSCOPY);  Surgeon: Papi Martinez Jr., MD;  Location: [facility] ENDO;   Service: Endoscopy;  Laterality: N/A;  J or Peg tube    ESOPHAGOGASTRODUODENOSCOPY N/A 9/3/2020    Procedure: EGD (ESOPHAGOGASTRODUODENOSCOPY);  Surgeon: Papi Martinez Jr., MD;  Location: [facility];  Service: Endoscopy;  Laterality: N/A;  per drs order Stoma, Peds, w/single Lumen, J-Tube placement    ESOPHAGOGASTRODUODENOSCOPY N/A 12/29/2020    Procedure: EGD (ESOPHAGOGASTRODUODENOSCOPY);  Surgeon: Papi Martinez Jr., MD;  Location: [facility];  Service: Endoscopy;  Laterality: N/A;    ESOPHAGOGASTRODUODENOSCOPY N/A 2/23/2021    Procedure: EGD (ESOPHAGOGASTRODUODENOSCOPY);  Surgeon: Papi Martinez Jr., MD;  Location: [facility];  Service: Endoscopy;  Laterality: N/A;    ESOPHAGOGASTRODUODENOSCOPY N/A 2/3/2022    Procedure: EGD (ESOPHAGOGASTRODUODENOSCOPY);  Surgeon: Papi Martinez Jr., MD;  Location: [facility];  Service: Endoscopy;  Laterality: N/A;    ESOPHAGOGASTRODUODENOSCOPY N/A 8/13/2022    Procedure: EGD (ESOPHAGOGASTRODUODENOSCOPY);  Surgeon: Huang Licea MD;  Location: [facility];  Service: Gastroenterology;  Laterality: N/A;    ESOPHAGOGASTRODUODENOSCOPY N/A 10/16/2022    Procedure: EGD (ESOPHAGOGASTRODUODENOSCOPY);  Surgeon: Huang Licea MD;  Location: [facility];  Service: Gastroenterology;  Laterality: N/A;    ESOPHAGOGASTRODUODENOSCOPY N/A 11/28/2022    Procedure: EGD (ESOPHAGOGASTRODUODENOSCOPY);  Surgeon: Zandra Mallory MD;  Location: [address]);  Service: Endoscopy;  Laterality: N/A;    ESOPHAGOGASTRODUODENOSCOPY N/A 7/24/2023    Procedure: EGD (ESOPHAGOGASTRODUODENOSCOPY);  Surgeon: Huang Licea MD;  Location: [facility];  Service: Endoscopy;  Laterality: N/A;    ESOPHAGOGASTRODUODENOSCOPY N/A 9/15/2023    Procedure: EGD (ESOPHAGOGASTRODUODENOSCOPY);  Surgeon: ROLA Villagran MD;  Location: [facility];  Service: Endoscopy;  Laterality: N/A;    FEMUR FRACTURE SURGERY Right 2006    HYSTERECTOMY      INSERTION OF TUNNELED CENTRAL VENOUS CATHETER (CVC) WITH SUBCUTANEOUS PORT  Left 8/14/2019    Procedure: [id number];  Surgeon: Miquel Sargent MD;  Location: [facility] OR;  Service: General;  Laterality: Left;    INSERTION OR REPLACEMENT OF PERCUTANEOUS ENDOSCOPIC JEJUNOSTOMY TUBE  12/29/2020    Procedure: INSERTION OR REPLACEMENT, JEJUNOSTOMY TUBE, PERCUTANEOUS, ENDOSCOPIC;  Surgeon: Papi Martinez Jr., MD;  Location: [facility] ENDO;  Service: Endoscopy;;    laparoscopy for endometriosis      x5    LEFT HEART CATHETERIZATION Left 3/29/2023    Procedure: Left heart cath;  Surgeon: Riley Tipton MD;  Location: [facility] CATH;  Service: Cardiology;  Laterality: Left;    OOPHORECTOMY      right ovary removed only    OPEN REDUCTION AND INTERNAL FIXATION (ORIF) OF INJURY OF ANKLE Left 4/10/2023    Procedure: ORIF, ANKLE;  Surgeon: Michael Lacey MD;  Location: [facility] OR;  Service: Orthopedics;  Laterality: Left;    PEG TUBE REMOVAL  4/1/2021    Procedure: REMOVAL, PEG TUBE;  Surgeon: Papi Martinez Jr., MD;  Location: [facility] ENDO;  Service: Endoscopy;;    TONSILLECTOMY         Objective:     Patient found with: telemetry, pulse ox (continuous), blood pressure cuff, perineural catheter, SCD, NHAN drain, PICC line, G/J tube, wound vac    Pain:  Pain Rating 1: 8/10 at generalized abdomen; RN able to provide IV pain medication while pt sitting up  Pain Rating Post-Intervention 1: 8/10 (same, see above) after mobilization    Cognitive Exam:  Patient is oriented to Person, Place, Time, and Situation.  Patient follows 100% of single-step commands.    Sensation:   Intact at BLE to light touch    Lower Extremity Range of Motion:  Right Lower Extremity: WFL actively  Left Lower Extremity: WFL actively    Lower Extremity Strength:  Right Lower Extremity: grossly 3+/5 via MMT  Left Lower Extremity: grossly 3+/5 via MMT    Functional Mobility:    Bed Mobility:  Rolling to L: Contact-Guard Assist  Supine to Sitting: Min Assist  Sitting to Supine: Min Assist  Scooting towards HOB in supine: Total Assist x 2  persons via drawsheet    Transfers:  Sit to Stand: Min Assist from EOB with no AD x 1 trial, pt's hands on therapist's arms for support    Gait:  4 steps around bedside with min (A) of therapist via hand-held support. She also performed 10 reps of static standing marching with min (A) to increase activity levels; mobility limited due to surplus of medical lines as well as surroundings (in PACU).    Assist level: Min Assist  Device: Hand-held assist x 2    Balance:  Static Sit: Stand-By Assist at EOB    Static Stand: Contact-Guard Assist with Hand-held assist x 2    Additional Therapeutic Activity/Exercises:     1. Reports generalized 8/10 abdominal pain with activity, RN able to provide pain medication while she sat up at EOB with PT/OT.    2. Stood with min (A), took 4 steps around bedside with min (A) of therapist via hand-held support. She also performed 10 reps of static standing marching with min (A) to increase activity levels; mobility limited due to surplus of medical lines as well as surroundings (in PACU).    3. Discussed PT role, POC, and goals with patient; verbalized understanding.    AM-PAC 6 CLICK MOBILITY  Turning over in bed (including adjusting bedclothes, sheets and blankets)?: 3  Sitting down on and standing up from a chair with arms (e.g., wheelchair, bedside commode, etc.): 3  Moving from lying on back to sitting on the side of the bed?: 3  Moving to and from a bed to a chair (including a wheelchair)?: 3  Need to walk in hospital room?: 3  Climbing 3-5 steps with a railing?: 2  Basic Mobility Total Score: 17    Patient was left supine in bed (HOB elevated) with all lines intact, call button in reach, and RN notified.    GOALS:   Multidisciplinary Problems       Physical Therapy Goals          Problem: Physical Therapy    Goal Priority Disciplines Outcome Goal Variances Interventions   Physical Therapy Goal     PT, PT/OT      Description: Goals to be met by: 10/20/23     Patient will increase  functional independence with mobility by performin. Supine to sit with Stand-by Assistance - Not met  2. Sit to stand transfer with Stand-by Assistance - Not met  3. Bed to chair transfer with Stand-by Assistance using No Assistive Device - Not met  4. Gait x 200 feet with Stand-by Assistance using No Assistive Device - Not met                     Fransico Farooq, PT  10/6/2023

## 2023-10-06 NOTE — CODE/ RAPID DOCUMENTATION
RAPID RESPONSE RESPIRATORY THERAPY NOTE             Code Status: Full Code   : 1975  Bed: 1061/1061 A:   MRN: [id number]  Time page Received: 1749  Time Rapid Response RT at Bedside: 1752  Time Rapid Response RT left Bedside: 1802    SITUATION    Evaluated patient for: pain    BACKGROUND    Why is the patient in the hospital?: Duodenal stricture    Patient has a past medical history of Abnormal Pap smear, Bipolar 1 disorder, Closed bimalleolar fracture of left ankle, Crohn's disease S/P surgery (ileum, possible gastroduodenal with ulcers), Encounter for blood transfusion, Gastric and duodenal ulcers of unclear etiology, Gastroparesis, H/O ETOH abuse, History of HSV-2 infection, Immunosuppressed status, Iron deficiency anemia, multiple blood transfusions, POTS (postural orthostatic tachycardia syndrome), and Seizures.    24 Hours Vitals Range:  Temp:  [98.4 °F (36.9 °C)-100.9 °F (38.3 °C)]   Pulse:  [101-133]   Resp:  [12-39]   BP: (128-217)/(60-99)   SpO2:  [95 %-100 %]     Labs:    Recent Labs     10/04/23  0441 10/05/23  0505 10/06/23  0422    139 141   K 4.0 4.6 4.7    108 113*   CO2 28 26 24   BUN 14 28* 16   CREATININE 0.7 0.8 0.7   GLU 93 116* 147*   PHOS 3.9 2.0* 2.0*   MG 2.0 2.1 2.0        Recent Labs     10/05/23  1842   PH 7.363   PCO2 40.8   PO2 48   HCO3 23.3*   POCSATURATED 82   BE -2       ASSESSMENT/INTERVENTIONS  Patient having severe pain from surgery. Patient on 4L NC with O2 %. No respiratory concerns at this time.    Last Vitals: Temp: 99.2 °F (37.3 °C) (10/06 1600)  Pulse: 119 (10/06 1700)  Resp: 18 (10/06 1600)  BP: 202/99 (10/06 1700)  SpO2: 96 % (10/06 1657)  Level of Consciousness: Level of Consciousness (AVPU): alert  Respiratory Effort: Respiratory Effort: Unlabored  Expansion/Accessory Muscle Usage: Expansion/Accessory Muscles/Retractions: no use of accessory muscles, no retractions, expansion symmetric  All Lung Field Breath Sounds: All Lung Fields Breath Sounds:  Anterior:, Lateral:, clear, equal bilaterally  O2 Device/Concentration: 4L NC  NIPPV: No Surgical airway: No Vent: No  ETCO2 monitored: ETCO2 (mmHg): 40 mmHg  Ambu at bedside:      Active Orders   Respiratory Care    ACAPELLA TREATMENT Q6H WAKE     Frequency: Q6H WAKE     Number of Occurrences: Until Specified     Order Comments: Q10x Q1 hour w/a      END TIDAL CO2 MONITOR Q12H     Frequency: Q12H     Number of Occurrences: Until Specified    Incentive spirometry     Frequency: Q6H WAKE     Number of Occurrences: Until Specified     Order Comments: Q10x q1 hour w/a      Inhalation Treatment Q8H PRN     Frequency: Q8H PRN     Number of Occurrences: 2 Days    Pulse Oximetry Continuous     Frequency: Continuous     Number of Occurrences: Until Specified       RECOMMENDATIONS  ?  We recommend: RRT Recs: Continue POC per primary team.    ESCALATION    Orders received and case discussed with Dr. Carrasco.    FOLLOW-UP    Disposition: Remain in room 1061.    Please call back the Rapid Response RT, Rosio Church, RRT at x [phone] for any questions or concerns.

## 2023-10-06 NOTE — ANESTHESIA POST-OP PAIN MANAGEMENT
[facility]  Anesthesiology - Acute Pain Service  Consult Note      SUBJECTIVE:     Chief Complaint/Reason for Consult: Refractory abdominal pain    Surgical Procedure:   CREATION, BYPASS, DUODENUM, BY ANASTOMOSIS OF DUODENUM TO JEJUNUM,   PLACEMNENT OF GASTROSTOMY TUBE,   JEJUNOSTOMY TUBE (N/A)    Post Op Day: POD #1    History of Present Illness:  Patient is a 48 y.o. female with a past medical history significant for POTS, alcohol use, bipolar disorder, and Crohn's disease with recent gastric outlet obstruction admitted to Surgical Oncology for management of obstruction. Patient underwent duodenal-jejunal bypass with placement of  G and J tubes on Wed 10/4 and has had persistent midline abdominal pain extending from the xiphoid to the pubis which has been refractory to current medications including IV Tylenol and a Dilaudid PCA.    Interval History:  Patient returned to OR yesterday for repeat ex-lap with hematoma evacuation. Received multiple blood products and remained intubated/sedated overnight. Patient intubated this morning, sedated with propofol and fentanyl, but very alert and able to respond appropriately. States pain is better than yesterday with current analgesics.    Home Pain Medications:   Hydromorphone 2mg Q4H PRN       In-patient Scheduled Medications   ARIPiprazole  5 mg Per G Tube Daily    fludrocortisone  0.1 mg Per G Tube Daily    lamoTRIgine  150 mg Per G Tube BID    lipid (SMOFLIPID)  250 mL Intravenous Daily    methocarbamol (ROBAXIN) IVPB  500 mg Intravenous Q6H    mirtazapine  15 mg Per G Tube Nightly    mupirocin        pantoprazole  40 mg Intravenous BID    scopolamine  1 patch Transdermal Q3 Days    sodium chloride 0.9%  10 mL Intravenous Q6H       In-Patient PRN Medications  0.9%  NaCl infusion (for blood administration), 0.9%  NaCl infusion (for blood administration), acetaminophen, calcium gluconate IVPB, calcium gluconate IVPB, calcium gluconate IVPB,  "haloperidol lactate, HYDROmorphone, labetalol, levalbuterol, LIDOcaine (PF) 10 mg/ml (1%), lorazepam, magnesium sulfate IVPB, magnesium sulfate IVPB, mupirocin, naloxone, ondansetron, oxyCODONE **AND** oxyCODONE, potassium chloride in water **AND** potassium chloride in water **AND** potassium chloride in water, prochlorperazine, sodium chloride 0.9%, Flushing PICC/Midline Protocol **AND** sodium chloride 0.9% **AND** sodium chloride 0.9%, sodium chloride 0.9%, sodium phosphate 15 mmol in dextrose 5 % (D5W) 250 mL IVPB, sodium phosphate 20.01 mmol in dextrose 5 % (D5W) 250 mL IVPB, sodium phosphate 30 mmol in dextrose 5 % (D5W) 250 mL IVPB      Review of patient's allergies indicates:   Allergen Reactions    Tramadol Other (See Comments)     Seizures      Adhesive Other (See Comments)     STATES, " TEARS SKIN"     Demerol [meperidine]      Seizures      Hydrocodone Hallucinations    Reglan [metoclopramide]         Past Medical History:   Diagnosis Date    Abnormal Pap smear     + HPV    Bipolar 1 disorder     Closed bimalleolar fracture of left ankle     Crohn's disease S/P surgery (ileum, possible gastroduodenal with ulcers)     1998-ileocectomy (9 inches ileum)    Encounter for blood transfusion     Gastric and duodenal ulcers of unclear etiology     Gastroparesis     H/O ETOH abuse     History of HSV-2 infection     Immunosuppressed status     Iron deficiency anemia, multiple blood transfusions     POTS (postural orthostatic tachycardia syndrome)     Seizures     states last seizure 2009     Past Surgical History:   Procedure Laterality Date    ANKLE HARDWARE REMOVAL Left 9/6/2023    Procedure: REMOVAL, HARDWARE, ANKLE;  Surgeon: Michael Lacey MD;  Location: [facility];  Service: Orthopedics;  Laterality: Left;    APPENDECTOMY      BRAIN SURGERY  2009    fx skull due to seizure with 2 pins    BYPASS OF DUODENUM BY ANASTOMOSIS OF DUODENUM TO JEJUNUM N/A 10/4/2023    Procedure: CREATION, " BYPASS, DUODENUM, BY ANASTOMOSIS OF DUODENUM TO JEJUNUM, PLACEMNENT OF GASTROSTOMY TUBE, JEJUNOSTOMY TUBE;  Surgeon: Kevin Mays MD;  Location: [facility] OR 2ND FLR;  Service: General;  Laterality: N/A;    CHOLECYSTECTOMY      COLON SURGERY      Partial colectomy    COLONOSCOPY Left 7/10/2019    Procedure: COLONOSCOPY;  Surgeon: Papi Martinez Jr., MD;  Location: [facility];  Service: Endoscopy;  Laterality: Left;    COLONOSCOPY N/A 12/6/2022    Procedure: COLONOSCOPY;  Surgeon: Zandra Mallory MD;  Location: [facility] (4TH FLR);  Service: Endoscopy;  Laterality: N/A;  poor prep on 11/28/22.  per Dr Mallory-Miralax 5 days prior with gastroparesis diet-3 days full liquid. also Mag citrate day before     instr handed to pt-GT    EGD, WITH BALLOON DILATION  7/24/2023    Procedure: EGD, WITH BALLOON DILATION;  Surgeon: Huang Licea MD;  Location: [facility];  Service: Endoscopy;;    ESOPHAGOGASTRODUODENOSCOPY Left 6/5/2019    Procedure: EGD (ESOPHAGOGASTRODUODENOSCOPY);  Surgeon: ROLA Villagran MD;  Location: [facility];  Service: Endoscopy;  Laterality: Left;    ESOPHAGOGASTRODUODENOSCOPY Left 3/16/2020    Procedure: EGD (ESOPHAGOGASTRODUODENOSCOPY);  Surgeon: Clive Núñez MD;  Location: [facility];  Service: Endoscopy;  Laterality: Left;    ESOPHAGOGASTRODUODENOSCOPY N/A 4/17/2020    Procedure: EGD (ESOPHAGOGASTRODUODENOSCOPY);  Surgeon: Papi Martinez Jr., MD;  Location: [facility];  Service: Endoscopy;  Laterality: N/A;  with Push Enteroscopy    ESOPHAGOGASTRODUODENOSCOPY N/A 7/6/2020    Procedure: EGD (ESOPHAGOGASTRODUODENOSCOPY)   possible peg;  Surgeon: Papi Martinez Jr., MD;  Location: [facility];  Service: Endoscopy;  Laterality: N/A;    ESOPHAGOGASTRODUODENOSCOPY N/A 7/9/2020    Procedure: EGD (ESOPHAGOGASTRODUODENOSCOPY);  Surgeon: Papi Martinez Jr., MD;  Location: [facility];  Service: Endoscopy;  Laterality: N/A;  J or Peg tube    ESOPHAGOGASTRODUODENOSCOPY N/A 9/3/2020     Procedure: EGD (ESOPHAGOGASTRODUODENOSCOPY);  Surgeon: Papi Martinez Jr., MD;  Location: [facility];  Service: Endoscopy;  Laterality: N/A;  per drs order Stoma, Peds, w/single Lumen, J-Tube placement    ESOPHAGOGASTRODUODENOSCOPY N/A 12/29/2020    Procedure: EGD (ESOPHAGOGASTRODUODENOSCOPY);  Surgeon: Papi Martinez Jr., MD;  Location: [facility];  Service: Endoscopy;  Laterality: N/A;    ESOPHAGOGASTRODUODENOSCOPY N/A 2/23/2021    Procedure: EGD (ESOPHAGOGASTRODUODENOSCOPY);  Surgeon: Papi Martinez Jr., MD;  Location: [facility];  Service: Endoscopy;  Laterality: N/A;    ESOPHAGOGASTRODUODENOSCOPY N/A 2/3/2022    Procedure: EGD (ESOPHAGOGASTRODUODENOSCOPY);  Surgeon: Papi Martinez Jr., MD;  Location: [facility];  Service: Endoscopy;  Laterality: N/A;    ESOPHAGOGASTRODUODENOSCOPY N/A 8/13/2022    Procedure: EGD (ESOPHAGOGASTRODUODENOSCOPY);  Surgeon: Huang Licea MD;  Location: [facility];  Service: Gastroenterology;  Laterality: N/A;    ESOPHAGOGASTRODUODENOSCOPY N/A 10/16/2022    Procedure: EGD (ESOPHAGOGASTRODUODENOSCOPY);  Surgeon: uHang Licea MD;  Location: [facility];  Service: Gastroenterology;  Laterality: N/A;    ESOPHAGOGASTRODUODENOSCOPY N/A 11/28/2022    Procedure: EGD (ESOPHAGOGASTRODUODENOSCOPY);  Surgeon: Zandra Mallory MD;  Location: [address]);  Service: Endoscopy;  Laterality: N/A;    ESOPHAGOGASTRODUODENOSCOPY N/A 7/24/2023    Procedure: EGD (ESOPHAGOGASTRODUODENOSCOPY);  Surgeon: Huang Licea MD;  Location: [facility];  Service: Endoscopy;  Laterality: N/A;    ESOPHAGOGASTRODUODENOSCOPY N/A 9/15/2023    Procedure: EGD (ESOPHAGOGASTRODUODENOSCOPY);  Surgeon: ROLA Villagran MD;  Location: [facility];  Service: Endoscopy;  Laterality: N/A;    FEMUR FRACTURE SURGERY Right 2006    HYSTERECTOMY      INSERTION OF TUNNELED CENTRAL VENOUS CATHETER (CVC) WITH SUBCUTANEOUS PORT Left 8/14/2019    Procedure: [id number];  Surgeon: Miquel Sargent MD;   Location: [facility] OR;  Service: General;  Laterality: Left;    INSERTION OR REPLACEMENT OF PERCUTANEOUS ENDOSCOPIC JEJUNOSTOMY TUBE  2020    Procedure: INSERTION OR REPLACEMENT, JEJUNOSTOMY TUBE, PERCUTANEOUS, ENDOSCOPIC;  Surgeon: Papi Martinez Jr., MD;  Location: [facility] ENDO;  Service: Endoscopy;;    laparoscopy for endometriosis      x5    LEFT HEART CATHETERIZATION Left 3/29/2023    Procedure: Left heart cath;  Surgeon: Riley Tipton MD;  Location: [facility] CATH;  Service: Cardiology;  Laterality: Left;    OOPHORECTOMY      right ovary removed only    OPEN REDUCTION AND INTERNAL FIXATION (ORIF) OF INJURY OF ANKLE Left 4/10/2023    Procedure: ORIF, ANKLE;  Surgeon: Michael Lacey MD;  Location: [facility] OR;  Service: Orthopedics;  Laterality: Left;    PEG TUBE REMOVAL  2021    Procedure: REMOVAL, PEG TUBE;  Surgeon: Papi Martinez Jr., MD;  Location: [facility] ENDO;  Service: Endoscopy;;    TONSILLECTOMY       Family History   Adopted: Yes     Social History     Tobacco Use    Smoking status: Former     Current packs/day: 0.00     Average packs/day: 0.5 packs/day for 6.0 years (3.0 ttl pk-yrs)     Types: Cigarettes     Start date: 5/10/2005     Quit date: 5/10/2011     Years since quittin.4    Smokeless tobacco: Never   Substance Use Topics    Alcohol use: Yes     Comment: socially    Drug use: Never           OBJECTIVE:     Current Vital Signs  Temp: (!) 38.1 °C (100.6 °F) (10/06/23 1300)  Pulse: (!) 111 (10/06/23 1400)  Resp: 15 (10/06/23 1400)  BP: (!) 152/85 (10/06/23 1330)  SpO2: 96 % (10/06/23 1400)    Vital Signs Range (Last 24H):  Temp:  [36.9 °C (98.4 °F)-38.3 °C (100.9 °F)]   Pulse:  [101-133]   Resp:  [12-39]   BP: (128-217)/(60-97)   SpO2:  [95 %-100 %]       Physical Exam:  At Rest:   10    Numbers    Laboratory:  CBC:   Recent Labs     10/05/23  1925 10/06/23  0422   WBC 10.73 10.37   RBC 3.24* 3.00*   HGB 9.5* 8.7*   HCT 30.5* 28.0*   * 128*   MCV 94 93   MCH 29.3 29.0   MCHC  31.1* 31.1*       CMP:   Recent Labs     10/05/23  0505 10/06/23  0422    141   K 4.6 4.7   CO2 26 24    113*   BUN 28* 16   CREATININE 0.8 0.7   * 147*   MG 2.1 2.0   PHOS 2.0* 2.0*   CALCIUM 9.4 8.7   ALBUMIN 3.1* 2.8*   PROT 6.4 6.0   ALKPHOS 109 80   ALT 46* 28   * 52*   BILITOT 0.3 0.4          ASSESSMENT/PLAN:     Active Hospital Problems    Diagnosis  POA    *Duodenal stricture [K31.5]  Yes      Resolved Hospital Problems   No resolved problems to display.         Plan:   1) Continue multimodal analgesia as able given NPO status as follows:    - Continue acetaminophen 1000mg IV Q6H    - Continue methocarbamol 500mg IV Q6H    - Add oxycodone 10mg/15mg per JTube Q4H PRN moderate/severe pain    - Add hydromorphone 1mg IV Q4H PRN breakthrough pain     2) Bilateral JESENIA catheters placed this morning by [facility] team.    - Continue bilateral JESENIA catheters at current infusion rate.      Case discussed with staff, Dr. Nguyen; final recommendations per attestation above.     Thank you for your consult and allowing us to participate in the care of this patient. We will continue to follow along. Please call the Acute Pain Service at [phone] or Anesthesia at [phone] if you have any questions or concerns.    Jean-Claude Burris MD, PGY-4  Department of Anesthesiology  Acute Pain Service - [phone] or [phone]  [facility]

## 2023-10-06 NOTE — PLAN OF CARE
Problem: Occupational Therapy  Goal: Occupational Therapy Goal  Description: Goals to be met by: 10/27/23     Patient will increase functional independence with ADLs by performing:    UE Dressing with Supervision.  LE Dressing with Supervision.  Grooming while standing at sink with Supervision.  Toileting from toilet with Supervision for hygiene and clothing management.   Supine to sit with Supervision.  Step transfer with Supervision.    Outcome: Ongoing, Progressing     OT eval completed. The above goals are established to improve functional (I) and mobility.

## 2023-10-06 NOTE — ANESTHESIA PROCEDURE NOTES
Bilateral JESENIA Continuous Catheters    Patient location during procedure: post-op   Block not for primary anesthetic.  Reason for block: at surgeon's request and post-op pain management   Post-op Pain Location: Bilateral Abdominal Pain   Start time: 10/6/2023 8:25 AM  Timeout: 10/6/2023 8:24 AM   End time: 10/6/2023 9:00 AM    Staffing  Authorizing Provider: Griffin Ricks MD  Performing Provider: Compa Whiteside MD    Staffing  Performed by: Compa Whiteside MD  Authorized by: Griffin Ricks MD    Preanesthetic Checklist  Completed: patient identified, IV checked, site marked, risks and benefits discussed, surgical consent, monitors and equipment checked, pre-op evaluation and timeout performed  Peripheral Block  Patient position: sitting  Prep: ChloraPrep  Patient monitoring: heart rate, cardiac monitor, continuous pulse ox, continuous capnometry and frequent blood pressure checks  Block type: erector spinae plane (Erector Spinae Plane)  Laterality: bilateral  Injection technique: continuous  Interspace: T7-8    Needle  Needle type: Tuohy   Needle gauge: 17 G  Needle length: 3.5 in  Needle localization: anatomical landmarks and ultrasound guidance  Catheter type: spring wound  Catheter size: 19 G  Test dose: lidocaine 1.5% with Epi 1-to-200,000 and negative   -ultrasound image captured on disc.  Assessment  Injection assessment: negative aspiration, negative parasthesia and local visualized surrounding nerve  Paresthesia pain: none  Heart rate change: no  Slow fractionated injection: yes  Pain Tolerance: comfortable throughout block and no complaints  Medications:    Medications: ropivacaine (NAROPIN) injection 0.5% - Perineural   30 mL - 10/6/2023 8:45:00 AM    Additional Notes  Patient tolerated very well.  Vital signs stable.  See PACU RN charting for vital signs.

## 2023-10-06 NOTE — SUBJECTIVE & OBJECTIVE
06/29/19 1800   Therapeutic Soothing Survey   What Helps When Having a Hard Time Exercising;Taking a hot shower;Eating a snack;Wrapping self in a blanket or sheet   What Makes it More Difficult When Already Upset Being touched;People in uniform;Yelling     My Safety/Recovery Plan    1) Warning Signs: fidgeting eyes, sweats, shaking    2) Personal Coping Strategies to Calm Myself: exercise, hot shower    3) Reasons for Living/Recovery: healthy, family, and friends    4) Activities/Social Interactions for Distraction: crowds, arguing, fighting    5) Who to Contact While on the Unit: nurse/bht    6) Making My Surroundings Safe: stay away from certain areas/people/drugs/alcohol     Interval History/Significant Events: No acute events overnight. Some issues with pain control last night and keeping her comfortable, started a fentanyl gtt.     Follow-up For: Procedure(s) (LRB):  LAPAROTOMY, EXPLORATORY (N/A)  WASHOUT, HEMATOMA    Post-Operative Day: 1 Day Post-Op    Objective:     Vital Signs (Most Recent):  Temp: 98.4 °F (36.9 °C) (10/06/23 0400)  Pulse: 107 (10/06/23 0338)  Resp: 16 (10/06/23 0338)  BP: (!) 145/65 (10/06/23 0300)  SpO2: 100 % (10/06/23 0338) Vital Signs (24h Range):  Temp:  [96.2 °F (35.7 °C)-100.6 °F (38.1 °C)] 98.4 °F (36.9 °C)  Pulse:  [101-143] 107  Resp:  [15-28] 16  SpO2:  [99 %-100 %] 100 %  BP: (111-204)/(57-93) 145/65     Weight: 53.1 kg (117 lb)  Body mass index is 20.07 kg/m².      Intake/Output Summary (Last 24 hours) at 10/6/2023 0637  Last data filed at 10/6/2023 0400  Gross per 24 hour   Intake 4614.85 ml   Output 6110 ml   Net -1495.15 ml          Physical Exam  Vitals and nursing note reviewed.   Constitutional:       Interventions: She is sedated and intubated.   HENT:      Head: Normocephalic and atraumatic.   Cardiovascular:      Rate and Rhythm: Regular rhythm. Tachycardia present.   Pulmonary:      Effort: She is intubated.   Abdominal:      General: Abdomen is flat.      Palpations: Abdomen is soft.      Comments: Provena in place, g-tube, j-tube in place.   Skin:     General: Skin is warm and dry.            Vents:  Vent Mode: A/C (10/06/23 0338)  Ventilator Initiated: Yes (10/05/23 1819)  Set Rate: 16 BPM (10/06/23 0338)  Vt Set: 360 mL (10/06/23 0338)  PEEP/CPAP: 5 cmH20 (10/06/23 0338)  Oxygen Concentration (%): 50 (10/06/23 0338)  Peak Airway Pressure: 17 cmH20 (10/06/23 0338)  Plateau Pressure: 19 cmH20 (10/06/23 0338)  Total Ve: 6.37 L/m (10/06/23 0338)  Negative Inspiratory Force (cm H2O): 0 (10/06/23 0338)  F/VT Ratio<105 (RSBI): (!) 39.8 (10/06/23 0338)    Lines/Drains/Airways       Peripherally Inserted Central Catheter Line  Duration              PICC Double Lumen 09/29/23 1431 right basilic 6 days              Central Venous Catheter Line  Duration                  PowerPort A Cath Single Lumen 10/05/23 1320 Atrial Left <1 day              Drain  Duration                  Gastrostomy/Enterostomy 10/04/23 1117 Gastrostomy tube w/o balloon LUQ 1 day         Gastrostomy/Enterostomy 10/04/23 1149 Jejunostomy tube LUQ 1 day         Urethral Catheter 10/04/23 0820 Non-latex;Straight-tip 16 Fr. 1 day         Closed/Suction Drain 10/05/23 1722 Tube - 1 Right Abdomen Bulb 19 Fr. <1 day              Airway  Duration                  Airway - Non-Surgical 10/05/23 1534 <1 day                    Significant Labs:    CBC/Anemia Profile:  Recent Labs   Lab 10/05/23  1117 10/05/23  1842 10/05/23  1925 10/06/23  0422   WBC 11.08  --  10.73 10.37   HGB 6.7*  --  9.5* 8.7*   HCT 22.9* 26* 30.5* 28.0*   *  --  131* 128*   *  --  94 93   RDW 14.3  --  20.1* 20.9*        Chemistries:  Recent Labs   Lab 10/05/23  0505 10/06/23  0422    141   K 4.6 4.7    113*   CO2 26 24   BUN 28* 16   CREATININE 0.8 0.7   CALCIUM 9.4 8.7   ALBUMIN 3.1* 2.8*   PROT 6.4 6.0   BILITOT 0.3 0.4   ALKPHOS 109 80   ALT 46* 28   * 52*   MG 2.1 2.0   PHOS 2.0* 2.0*       All pertinent labs within the past 24 hours have been reviewed.    Significant Imaging:  I have reviewed all pertinent imaging results/findings within the past 24 hours.

## 2023-10-06 NOTE — ASSESSMENT & PLAN NOTE
  Neuro/Psych:   -- Sedation: propofol   -- Pain: fentanyl gtt, robaxin             Cards:   -- HDS  -- tachycardic      Pulm:   -- Goal O2 sat > 90%  --intubated on a/c rate 16/ FiO2 50%, PEEP 5, Vt 360  -- Wean as able      Renal:  -- Keep harmon for strict I/O  -- BUN/Cr 16/0.7      FEN / GI:   -- Net -1495  -- Replace lytes as needed  -- Nutrition: NPO  --TPN       ID:   -- Tm: afebrile; WBC 10.37  -- Abx none      Heme/Onc:   -- H/H  8.7/28  -- Daily CBC      Endo:   -- Gluc goal 140-180      PPx:   Feeding: NPO  Analgesia/Sedation: fentanyl gtt, robaxin / propofol  Thromboembolic prevention: SCDs  HOB >30:   Stress Ulcer ppx: protonix  Glucose control: Critical care goal 140-180 g/dl, ISS    Lines/Drains/Airway: NHAN drain, j-tube, g-tube, provena wound vac, PICC, port, ETT      Dispo/Code Status/Palliative:   -- SICU / Full Code

## 2023-10-06 NOTE — ADDENDUM NOTE
Addendum  created 10/06/23 0824 by Luis Fernando Roper MD    Order list changed, Pharmacy for encounter modified

## 2023-10-06 NOTE — HPI
Angela Trascher Lejeune is a 49yo F with PMH Crohn's disease, POTS, gastric varices, alcohol abuse, bipolar disorder who was recently seen in surgical oncology clinic 9/26/23 for evaluation of a gastric outlet obstruction. She reports her Crohns has been well controlled over the last couple of years with Stelara. Her last dose was in July. She has had past dilations of a duodenal stricture from her Crohn's, last on 9/15/23. Pathology revealed acute peptic duodenitis, antral bx negative for H pylori. In terms of operations, previously for her crohns disease she endorses a small bowel resection that was performed in the 1990s.     She [place] [facility] [place] from [facility]. She presented to the ER at OSH the night of 9/27/23 with worsening symptoms of n/v and abdominal pain. She is now s/p Delfino-Y gastrojejunostomy, g-tube, j-tube placement on 10/4 for gastric outlet obstruction. She was taken back to the OR today for evacuation of a hematoma. She remained extubated following the case and is being transferred to SICU post-operatively. She has had issues with pain control, hypertension, and tachycardia. She was seen in the PACU due to limited availability of beds in the SICU. She is intubated and sedated with propofol. She is hemodynamically stable, in no apparent distress.

## 2023-10-06 NOTE — ASSESSMENT & PLAN NOTE
  Neuro/Psych:   -- Sedation: propofol   -- Pain: fentanyl gtt             Cards:   -- HDS  -- tachycardic      Pulm:   -- Goal O2 sat > 90%  --intubated on a/c rate 16/ FiO2 50%, PEEP 5, Vt 360  -- Wean as able      Renal:  -- Keep harmon for strict I/O  -- BUN/Cr 28/0.8      FEN / GI:   -- Net -753  -- Replace lytes as needed  -- Nutrition: NPO  --TPN       ID:   -- Tm: afebrile; WBC 10.73  -- Abx none      Heme/Onc:   -- H/H stable 9.5/30.5  -- Daily CBC      Endo:   -- Gluc goal 140-180      PPx:   Feeding: NPO  Analgesia/Sedation: fentanyl gtt / propofol  Thromboembolic prevention: SCDs  HOB >30:   Stress Ulcer ppx: protonix  Glucose control: Critical care goal 140-180 g/dl, ISS    Lines/Drains/Airway: NHAN drain, j-tube, g-tube, provena wound vac, PICC, port, ETT      Dispo/Code Status/Palliative:   -- SICU / Full Code

## 2023-10-06 NOTE — SUBJECTIVE & OBJECTIVE
Follow-up For: Procedure(s) (LRB):  LAPAROTOMY, EXPLORATORY (N/A)  WASHOUT, HEMATOMA    Post-Operative Day: Day of Surgery     Past Medical History:   Diagnosis Date    Abnormal Pap smear     + HPV    Bipolar 1 disorder     Closed bimalleolar fracture of left ankle     Crohn's disease S/P surgery (ileum, possible gastroduodenal with ulcers)     1998-ileocectomy (9 inches ileum)    Encounter for blood transfusion     Gastric and duodenal ulcers of unclear etiology     Gastroparesis     H/O ETOH abuse     History of HSV-2 infection     Immunosuppressed status     Iron deficiency anemia, multiple blood transfusions     POTS (postural orthostatic tachycardia syndrome)     Seizures     states last seizure 2009       Past Surgical History:   Procedure Laterality Date    ANKLE HARDWARE REMOVAL Left 9/6/2023    Procedure: REMOVAL, HARDWARE, ANKLE;  Surgeon: Michael Lacey MD;  Location: [facility] OR;  Service: Orthopedics;  Laterality: Left;    APPENDECTOMY      BRAIN SURGERY  2009    fx skull due to seizure with 2 pins    BYPASS OF DUODENUM BY ANASTOMOSIS OF DUODENUM TO JEJUNUM N/A 10/4/2023    Procedure: CREATION, BYPASS, DUODENUM, BY ANASTOMOSIS OF DUODENUM TO JEJUNUM, PLACEMNENT OF GASTROSTOMY TUBE, JEJUNOSTOMY TUBE;  Surgeon: Kevin Mays MD;  Location: [facility] OR 2ND FLR;  Service: General;  Laterality: N/A;    CHOLECYSTECTOMY      COLON SURGERY      Partial colectomy    COLONOSCOPY Left 7/10/2019    Procedure: COLONOSCOPY;  Surgeon: Papi Martinez Jr., MD;  Location: [facility];  Service: Endoscopy;  Laterality: Left;    COLONOSCOPY N/A 12/6/2022    Procedure: COLONOSCOPY;  Surgeon: Zandra Mallory MD;  Location: [facility] (4TH FLR);  Service: Endoscopy;  Laterality: N/A;  poor prep on 11/28/22.  per Dr Mallory-Miralax 5 days prior with gastroparesis diet-3 days full liquid. also Mag citrate day before     instr handed to pt-GT    EGD, WITH BALLOON DILATION  7/24/2023    Procedure: EGD, WITH BALLOON DILATION;   Surgeon: Huang Licea MD;  Location: [facility];  Service: Endoscopy;;    ESOPHAGOGASTRODUODENOSCOPY Left 6/5/2019    Procedure: EGD (ESOPHAGOGASTRODUODENOSCOPY);  Surgeon: ROLA Villagran MD;  Location: [facility] ENDO;  Service: Endoscopy;  Laterality: Left;    ESOPHAGOGASTRODUODENOSCOPY Left 3/16/2020    Procedure: EGD (ESOPHAGOGASTRODUODENOSCOPY);  Surgeon: Clive Núñez MD;  Location: [facility];  Service: Endoscopy;  Laterality: Left;    ESOPHAGOGASTRODUODENOSCOPY N/A 4/17/2020    Procedure: EGD (ESOPHAGOGASTRODUODENOSCOPY);  Surgeon: Papi Martinez Jr., MD;  Location: [facility];  Service: Endoscopy;  Laterality: N/A;  with Push Enteroscopy    ESOPHAGOGASTRODUODENOSCOPY N/A 7/6/2020    Procedure: EGD (ESOPHAGOGASTRODUODENOSCOPY)   possible peg;  Surgeon: Papi Martinez Jr., MD;  Location: [facility];  Service: Endoscopy;  Laterality: N/A;    ESOPHAGOGASTRODUODENOSCOPY N/A 7/9/2020    Procedure: EGD (ESOPHAGOGASTRODUODENOSCOPY);  Surgeon: Papi Martinez Jr., MD;  Location: [facility];  Service: Endoscopy;  Laterality: N/A;  J or Peg tube    ESOPHAGOGASTRODUODENOSCOPY N/A 9/3/2020    Procedure: EGD (ESOPHAGOGASTRODUODENOSCOPY);  Surgeon: Papi Martinez Jr., MD;  Location: [facility];  Service: Endoscopy;  Laterality: N/A;  per drs order Stoma, Peds, w/single Lumen, J-Tube placement    ESOPHAGOGASTRODUODENOSCOPY N/A 12/29/2020    Procedure: EGD (ESOPHAGOGASTRODUODENOSCOPY);  Surgeon: Papi Martinez Jr., MD;  Location: [facility];  Service: Endoscopy;  Laterality: N/A;    ESOPHAGOGASTRODUODENOSCOPY N/A 2/23/2021    Procedure: EGD (ESOPHAGOGASTRODUODENOSCOPY);  Surgeon: Papi Martinez Jr., MD;  Location: [facility];  Service: Endoscopy;  Laterality: N/A;    ESOPHAGOGASTRODUODENOSCOPY N/A 2/3/2022    Procedure: EGD (ESOPHAGOGASTRODUODENOSCOPY);  Surgeon: Papi Martinez Jr., MD;  Location: [facility];  Service: Endoscopy;  Laterality: N/A;    ESOPHAGOGASTRODUODENOSCOPY N/A 8/13/2022    Procedure: EGD  (ESOPHAGOGASTRODUODENOSCOPY);  Surgeon: Huang Licea MD;  Location: [facility] ENDO;  Service: Gastroenterology;  Laterality: N/A;    ESOPHAGOGASTRODUODENOSCOPY N/A 10/16/2022    Procedure: EGD (ESOPHAGOGASTRODUODENOSCOPY);  Surgeon: Huang Licea MD;  Location: [facility] ENDO;  Service: Gastroenterology;  Laterality: N/A;    ESOPHAGOGASTRODUODENOSCOPY N/A 11/28/2022    Procedure: EGD (ESOPHAGOGASTRODUODENOSCOPY);  Surgeon: Zandra Mallory MD;  Location: [facility] ENDO ([facility] FLR);  Service: Endoscopy;  Laterality: N/A;    ESOPHAGOGASTRODUODENOSCOPY N/A 7/24/2023    Procedure: EGD (ESOPHAGOGASTRODUODENOSCOPY);  Surgeon: Huang Licea MD;  Location: [facility] ENDO;  Service: Endoscopy;  Laterality: N/A;    ESOPHAGOGASTRODUODENOSCOPY N/A 9/15/2023    Procedure: EGD (ESOPHAGOGASTRODUODENOSCOPY);  Surgeon: ROLA Villagran MD;  Location: [facility];  Service: Endoscopy;  Laterality: N/A;    FEMUR FRACTURE SURGERY Right 2006    HYSTERECTOMY      INSERTION OF TUNNELED CENTRAL VENOUS CATHETER (CVC) WITH SUBCUTANEOUS PORT Left 8/14/2019    Procedure: [id number];  Surgeon: Miquel Sargent MD;  Location: [facility];  Service: General;  Laterality: Left;    INSERTION OR REPLACEMENT OF PERCUTANEOUS ENDOSCOPIC JEJUNOSTOMY TUBE  12/29/2020    Procedure: INSERTION OR REPLACEMENT, JEJUNOSTOMY TUBE, PERCUTANEOUS, ENDOSCOPIC;  Surgeon: Papi Martinez Jr., MD;  Location: [facility];  Service: Endoscopy;;    laparoscopy for endometriosis      x5    LEFT HEART CATHETERIZATION Left 3/29/2023    Procedure: Left heart cath;  Surgeon: Riley Tipton MD;  Location: [facility] CATH;  Service: Cardiology;  Laterality: Left;    OOPHORECTOMY      right ovary removed only    OPEN REDUCTION AND INTERNAL FIXATION (ORIF) OF INJURY OF ANKLE Left 4/10/2023    Procedure: ORIF, ANKLE;  Surgeon: Michael Lacey MD;  Location: [facility] OR;  Service: Orthopedics;  Laterality: Left;    PEG TUBE REMOVAL  4/1/2021    Procedure: REMOVAL, PEG TUBE;  Surgeon: Papi CONWAY  "Michelle Santamaria MD;  Location: [facility];  Service: Endoscopy;;    TONSILLECTOMY         Review of patient's allergies indicates:   Allergen Reactions    Tramadol Other (See Comments)     Seizures      Adhesive Other (See Comments)     STATES, " TEARS SKIN"     Demerol [meperidine]      Seizures      Hydrocodone Hallucinations    Reglan [metoclopramide]        Family History    None       Tobacco Use    Smoking status: Former     Current packs/day: 0.00     Average packs/day: 0.5 packs/day for 6.0 years (3.0 ttl pk-yrs)     Types: Cigarettes     Start date: 5/10/2005     Quit date: 5/10/2011     Years since quittin.4    Smokeless tobacco: Never   Substance and Sexual Activity    Alcohol use: Yes     Comment: socially    Drug use: Never    Sexual activity: Yes     Partners: Male      Review of Systems  Objective:     Vital Signs (Most Recent):  Temp: 100.1 °F (37.8 °C) (10/05/23 1801)  Pulse: (!) 121 (10/05/23 1915)  Resp: (!) 24 (10/05/23 1955)  BP: (!) 157/72 (10/05/23 1930)  SpO2: 100 % (10/05/23 1915) Vital Signs (24h Range):  Temp:  [96.2 °F (35.7 °C)-100.1 °F (37.8 °C)] 100.1 °F (37.8 °C)  Pulse:  [101-143] 121  Resp:  [15-27] 24  SpO2:  [98 %-100 %] 100 %  BP: (111-204)/(57-93) 157/72     Weight: 53.1 kg (117 lb)  Body mass index is 20.07 kg/m².      Intake/Output Summary (Last 24 hours) at 10/5/2023 2012  Last data filed at 10/5/2023 1812  Gross per 24 hour   Intake 1897.5 ml   Output 4050 ml   Net -2152.5 ml          Physical Exam  Vitals and nursing note reviewed.   Constitutional:       Interventions: She is sedated and intubated.   HENT:      Head: Normocephalic and atraumatic.   Cardiovascular:      Rate and Rhythm: Regular rhythm. Tachycardia present.   Pulmonary:      Effort: She is intubated.   Abdominal:      General: Abdomen is flat.      Palpations: Abdomen is soft.      Comments: Provena in place, g-tube, j-tube in place.   Skin:     General: Skin is warm and dry.            Vents:  Vent Mode: " A/C (10/05/23 1904)  Ventilator Initiated: Yes (10/05/23 1819)  Set Rate: 16 BPM (10/05/23 1904)  Vt Set: 360 mL (10/05/23 1904)  PEEP/CPAP: 5 cmH20 (10/05/23 1904)  Oxygen Concentration (%): 50 (10/05/23 1904)  Peak Airway Pressure: 20 cmH20 (10/05/23 1904)  Plateau Pressure: 0 cmH20 (10/05/23 1904)  Total Ve: 6.9 L/m (10/05/23 1904)  Negative Inspiratory Force (cm H2O): 0 (10/05/23 1904)    Lines/Drains/Airways       Peripherally Inserted Central Catheter Line  Duration             PICC Double Lumen 09/29/23 1431 right basilic 6 days              Central Venous Catheter Line  Duration                  PowerPort A Cath Single Lumen 10/05/23 1320 Atrial Left <1 day              Drain  Duration                  Gastrostomy/Enterostomy 10/04/23 1117 Gastrostomy tube w/o balloon LUQ 1 day         Gastrostomy/Enterostomy 10/04/23 1149 Jejunostomy tube LUQ 1 day         Urethral Catheter 10/04/23 0820 Non-latex;Straight-tip 16 Fr. 1 day         Closed/Suction Drain 10/05/23 1722 Tube - 1 Right Abdomen Bulb 19 Fr. <1 day              Airway  Duration                  Airway - Non-Surgical 10/05/23 1534 <1 day                    Significant Labs:    CBC/Anemia Profile:  Recent Labs   Lab 10/05/23  0505 10/05/23  1117 10/05/23  1842 10/05/23  1925   WBC 14.48* 11.08  --  10.73   HGB 7.6* 6.7*  --  9.5*   HCT 24.7* 22.9* 26* 30.5*    146*  --  131*   * 103*  --  94   RDW 14.5 14.3  --  20.1*        Chemistries:  Recent Labs   Lab 10/04/23  0441 10/05/23  0505    139   K 4.0 4.6    108   CO2 28 26   BUN 14 28*   CREATININE 0.7 0.8   CALCIUM 9.0 9.4   ALBUMIN 3.1* 3.1*   PROT 6.5 6.4   BILITOT 0.1 0.3   ALKPHOS 96 109   ALT 15 46*   AST 34 106*   MG 2.0 2.1   PHOS 3.9 2.0*       All pertinent labs within the past 24 hours have been reviewed.    Significant Imaging: I have reviewed all pertinent imaging results/findings within the past 24 hours.

## 2023-10-06 NOTE — SUBJECTIVE & OBJECTIVE
Interval History: Take back to OR yesterday for hematoma evacuation. Left intubated overnight due to pain control issues. This morning on propofol and fentanyl gtt. Blocks/ perineural catheter palcement by anesthesia pain this AM for better pain control post op. Now extubated. Continued to be intermittently hypertensive overnight.    Medications:  Continuous Infusions:   fentanyl 200 mcg/hr (10/06/23 0700)    lactated ringers 125 mL/hr at 10/06/23 0700    propofoL Stopped (10/06/23 0819)    TPN ADULT CENTRAL LINE CUSTOM 100 mL/hr at 10/06/23 0700     Scheduled Meds:   ARIPiprazole  5 mg Per G Tube Daily    fludrocortisone  0.1 mg Per G Tube Daily    lamoTRIgine  150 mg Per G Tube BID    LIDOcaine  1 patch Transdermal Q24H    lipid (SMOFLIPID)  250 mL Intravenous Daily    methocarbamol (ROBAXIN) IVPB  500 mg Intravenous Q6H    midodrine  5 mg Per G Tube Q12H    mirtazapine  15 mg Per G Tube Nightly    pantoprazole  40 mg Intravenous BID    scopolamine  1 patch Transdermal Q3 Days    sodium chloride 0.9%  10 mL Intravenous Q6H     PRN Meds:0.9%  NaCl infusion (for blood administration), 0.9%  NaCl infusion (for blood administration), calcium gluconate IVPB, calcium gluconate IVPB, calcium gluconate IVPB, haloperidol lactate, labetalol, levalbuterol, LIDOcaine (PF) 10 mg/ml (1%), magnesium sulfate IVPB, magnesium sulfate IVPB, naloxone, ondansetron, potassium chloride in water **AND** potassium chloride in water **AND** potassium chloride in water, prochlorperazine, sodium chloride 0.9%, Flushing PICC/Midline Protocol **AND** sodium chloride 0.9% **AND** sodium chloride 0.9%, sodium chloride 0.9%, sodium phosphate 15 mmol in dextrose 5 % (D5W) 250 mL IVPB, sodium phosphate 20.01 mmol in dextrose 5 % (D5W) 250 mL IVPB, sodium phosphate 30 mmol in dextrose 5 % (D5W) 250 mL IVPB     Review of patient's allergies indicates:   Allergen Reactions    Tramadol Other (See Comments)     Seizures      Adhesive Other (See  "Comments)     STATES, " TEARS SKIN"     Demerol [meperidine]      Seizures      Hydrocodone Hallucinations    Reglan [metoclopramide]      Objective:     Vital Signs (Most Recent):  Temp: (!) 100.8 °F (38.2 °C) (10/06/23 0700)  Pulse: 110 (10/06/23 0805)  Resp: 18 (10/06/23 0800)  BP: 128/60 (10/06/23 0745)  SpO2: 100 % (10/06/23 0800) Vital Signs (24h Range):  Temp:  [98.4 °F (36.9 °C)-100.8 °F (38.2 °C)] 100.8 °F (38.2 °C)  Pulse:  [101-137] 110  Resp:  [15-28] 18  SpO2:  [99 %-100 %] 100 %  BP: (128-204)/(60-93) 128/60     Weight: 53.1 kg (117 lb)  Body mass index is 20.08 kg/m².    Intake/Output - Last 3 Shifts         10/04 0700  10/05 0659 10/05 0700  10/06 0659 10/06 0700  10/07 0659    I.V. (mL/kg)  2340.8 (44.1) 384.9 (7.2)    Blood  897.5     IV Piggyback 1200 1376.6 193.9    TPN   1155.6    Total Intake(mL/kg) 1200 (22.6) 4614.9 (86.9) 1734.4 (32.7)    Urine (mL/kg/hr) 2650 (2.1) 5270 (4.1) 325 (4.3)    Drains 100 840     Stool 0      Total Output 2750 6110 325    Net -1550 -1495.2 +1409.4           Stool Occurrence 0 x               Physical Exam  Constitutional:       Appearance: Normal appearance.   HENT:      Head: Normocephalic and atraumatic.   Cardiovascular:      Rate and Rhythm: Normal rate.   Pulmonary:      Effort: Pulmonary effort is normal.   Abdominal:      General: Abdomen is flat.      Palpations: Abdomen is soft.      Tenderness: There is abdominal tenderness.      Comments: Multiple abdominal scars from previous surgical history  Midline incision now with skin staples and prevena in place to suction. G and J tube in place.  Ecchymosis around incision, not raised or edematous this AM.  Subcutaneous NHAN in place with 40cc SS output overnight.   Skin:     General: Skin is warm and dry.   Neurological:      General: No focal deficit present.      Mental Status: She is alert and oriented to person, place, and time.          Significant Labs:  I have reviewed all pertinent lab results within " the past 24 hours.    Significant Diagnostics:  I have reviewed all pertinent imaging results/findings within the past 24 hours.

## 2023-10-07 LAB
ALBUMIN SERPL BCP-MCNC: 2.8 G/DL (ref 3.5–5.2)
ALP SERPL-CCNC: 86 U/L (ref 55–135)
ALT SERPL W/O P-5'-P-CCNC: 25 U/L (ref 10–44)
ANION GAP SERPL CALC-SCNC: 10 MMOL/L (ref 8–16)
AST SERPL-CCNC: 48 U/L (ref 10–40)
BASOPHILS # BLD AUTO: 0.04 K/UL (ref 0–0.2)
BASOPHILS NFR BLD: 0.4 % (ref 0–1.9)
BILIRUB SERPL-MCNC: 0.5 MG/DL (ref 0.1–1)
BUN SERPL-MCNC: 12 MG/DL (ref 6–20)
CALCIUM SERPL-MCNC: 8.9 MG/DL (ref 8.7–10.5)
CHLORIDE SERPL-SCNC: 108 MMOL/L (ref 95–110)
CO2 SERPL-SCNC: 25 MMOL/L (ref 23–29)
CREAT SERPL-MCNC: 0.6 MG/DL (ref 0.5–1.4)
DIFFERENTIAL METHOD: ABNORMAL
EOSINOPHIL # BLD AUTO: 0.2 K/UL (ref 0–0.5)
EOSINOPHIL NFR BLD: 1.6 % (ref 0–8)
ERYTHROCYTE [DISTWIDTH] IN BLOOD BY AUTOMATED COUNT: 19.5 % (ref 11.5–14.5)
EST. GFR  (NO RACE VARIABLE): >60 ML/MIN/1.73 M^2
GLUCOSE SERPL-MCNC: 95 MG/DL (ref 70–110)
HCT VFR BLD AUTO: 26.6 % (ref 37–48.5)
HGB BLD-MCNC: 8.5 G/DL (ref 12–16)
IMM GRANULOCYTES # BLD AUTO: 0.04 K/UL (ref 0–0.04)
IMM GRANULOCYTES NFR BLD AUTO: 0.4 % (ref 0–0.5)
LYMPHOCYTES # BLD AUTO: 1.7 K/UL (ref 1–4.8)
LYMPHOCYTES NFR BLD: 17.6 % (ref 18–48)
MAGNESIUM SERPL-MCNC: 1.6 MG/DL (ref 1.6–2.6)
MCH RBC QN AUTO: 29.5 PG (ref 27–31)
MCHC RBC AUTO-ENTMCNC: 32 G/DL (ref 32–36)
MCV RBC AUTO: 92 FL (ref 82–98)
MONOCYTES # BLD AUTO: 1.2 K/UL (ref 0.3–1)
MONOCYTES NFR BLD: 12.2 % (ref 4–15)
NEUTROPHILS # BLD AUTO: 6.6 K/UL (ref 1.8–7.7)
NEUTROPHILS NFR BLD: 67.8 % (ref 38–73)
NRBC BLD-RTO: 0 /100 WBC
PHOSPHATE SERPL-MCNC: 3.3 MG/DL (ref 2.7–4.5)
PLATELET # BLD AUTO: 138 K/UL (ref 150–450)
PMV BLD AUTO: 11.4 FL (ref 9.2–12.9)
POTASSIUM SERPL-SCNC: 3.2 MMOL/L (ref 3.5–5.1)
PROT SERPL-MCNC: 6.2 G/DL (ref 6–8.4)
RBC # BLD AUTO: 2.88 M/UL (ref 4–5.4)
SODIUM SERPL-SCNC: 143 MMOL/L (ref 136–145)
TRIGL SERPL-MCNC: 112 MG/DL (ref 30–150)
WBC # BLD AUTO: 9.78 K/UL (ref 3.9–12.7)

## 2023-10-07 PROCEDURE — 63600175 PHARM REV CODE 636 W HCPCS: Performed by: STUDENT IN AN ORGANIZED HEALTH CARE EDUCATION/TRAINING PROGRAM

## 2023-10-07 PROCEDURE — 99900035 HC TECH TIME PER 15 MIN (STAT)

## 2023-10-07 PROCEDURE — 84100 ASSAY OF PHOSPHORUS: CPT | Performed by: STUDENT IN AN ORGANIZED HEALTH CARE EDUCATION/TRAINING PROGRAM

## 2023-10-07 PROCEDURE — 25000242 PHARM REV CODE 250 ALT 637 W/ HCPCS

## 2023-10-07 PROCEDURE — 25000242 PHARM REV CODE 250 ALT 637 W/ HCPCS: Performed by: SURGERY

## 2023-10-07 PROCEDURE — 25000003 PHARM REV CODE 250: Performed by: SURGERY

## 2023-10-07 PROCEDURE — 80053 COMPREHEN METABOLIC PANEL: CPT | Performed by: STUDENT IN AN ORGANIZED HEALTH CARE EDUCATION/TRAINING PROGRAM

## 2023-10-07 PROCEDURE — B4185 PARENTERAL SOL 10 GM LIPIDS: HCPCS

## 2023-10-07 PROCEDURE — 63600175 PHARM REV CODE 636 W HCPCS: Performed by: SURGERY

## 2023-10-07 PROCEDURE — 20600001 HC STEP DOWN PRIVATE ROOM

## 2023-10-07 PROCEDURE — 25000003 PHARM REV CODE 250

## 2023-10-07 PROCEDURE — 27000646 HC AEROBIKA DEVICE

## 2023-10-07 PROCEDURE — 85025 COMPLETE CBC W/AUTO DIFF WBC: CPT | Performed by: STUDENT IN AN ORGANIZED HEALTH CARE EDUCATION/TRAINING PROGRAM

## 2023-10-07 PROCEDURE — 25000003 PHARM REV CODE 250: Performed by: STUDENT IN AN ORGANIZED HEALTH CARE EDUCATION/TRAINING PROGRAM

## 2023-10-07 PROCEDURE — 63600175 PHARM REV CODE 636 W HCPCS

## 2023-10-07 PROCEDURE — 83735 ASSAY OF MAGNESIUM: CPT | Performed by: STUDENT IN AN ORGANIZED HEALTH CARE EDUCATION/TRAINING PROGRAM

## 2023-10-07 PROCEDURE — 99231 SBSQ HOSP IP/OBS SF/LOW 25: CPT | Mod: ,,, | Performed by: ANESTHESIOLOGY

## 2023-10-07 PROCEDURE — 94664 DEMO&/EVAL PT USE INHALER: CPT

## 2023-10-07 PROCEDURE — 84478 ASSAY OF TRIGLYCERIDES: CPT | Performed by: STUDENT IN AN ORGANIZED HEALTH CARE EDUCATION/TRAINING PROGRAM

## 2023-10-07 PROCEDURE — 94761 N-INVAS EAR/PLS OXIMETRY MLT: CPT

## 2023-10-07 PROCEDURE — A4217 STERILE WATER/SALINE, 500 ML: HCPCS | Performed by: SURGERY

## 2023-10-07 PROCEDURE — 99231 PR SUBSEQUENT HOSPITAL CARE,LEVL I: ICD-10-PCS | Mod: ,,, | Performed by: ANESTHESIOLOGY

## 2023-10-07 PROCEDURE — C9113 INJ PANTOPRAZOLE SODIUM, VIA: HCPCS | Performed by: STUDENT IN AN ORGANIZED HEALTH CARE EDUCATION/TRAINING PROGRAM

## 2023-10-07 PROCEDURE — 94799 UNLISTED PULMONARY SVC/PX: CPT

## 2023-10-07 PROCEDURE — A4216 STERILE WATER/SALINE, 10 ML: HCPCS | Performed by: STUDENT IN AN ORGANIZED HEALTH CARE EDUCATION/TRAINING PROGRAM

## 2023-10-07 RX ORDER — HYDROMORPHONE HYDROCHLORIDE 1 MG/ML
0.5 INJECTION, SOLUTION INTRAMUSCULAR; INTRAVENOUS; SUBCUTANEOUS ONCE
Status: COMPLETED | OUTPATIENT
Start: 2023-10-07 | End: 2023-10-07

## 2023-10-07 RX ORDER — HYDROMORPHONE HYDROCHLORIDE 2 MG/ML
INJECTION, SOLUTION INTRAMUSCULAR; INTRAVENOUS; SUBCUTANEOUS EVERY 6 HOURS PRN
Status: CANCELLED | OUTPATIENT
Start: 2023-10-07

## 2023-10-07 RX ORDER — OXYCODONE HCL 5 MG/5 ML
5 SOLUTION, ORAL ORAL EVERY 4 HOURS PRN
Status: DISCONTINUED | OUTPATIENT
Start: 2023-10-07 | End: 2023-10-08

## 2023-10-07 RX ORDER — OXYCODONE HCL 5 MG/5 ML
10 SOLUTION, ORAL ORAL EVERY 4 HOURS PRN
Status: DISCONTINUED | OUTPATIENT
Start: 2023-10-07 | End: 2023-10-08

## 2023-10-07 RX ORDER — HYDROMORPHONE HYDROCHLORIDE 1 MG/ML
1 INJECTION, SOLUTION INTRAMUSCULAR; INTRAVENOUS; SUBCUTANEOUS ONCE
Status: COMPLETED | OUTPATIENT
Start: 2023-10-07 | End: 2023-10-07

## 2023-10-07 RX ORDER — POTASSIUM CHLORIDE 14.9 MG/ML
20 INJECTION INTRAVENOUS
Status: COMPLETED | OUTPATIENT
Start: 2023-10-07 | End: 2023-10-07

## 2023-10-07 RX ORDER — MELATONIN 1 MG/ML
5 LIQUID (ML) ORAL NIGHTLY PRN
Status: DISCONTINUED | OUTPATIENT
Start: 2023-10-07 | End: 2023-10-09

## 2023-10-07 RX ADMIN — LORAZEPAM 1 MG: 2 INJECTION INTRAMUSCULAR; INTRAVENOUS at 04:10

## 2023-10-07 RX ADMIN — OXYCODONE HYDROCHLORIDE 15 MG: 5 SOLUTION ORAL at 12:10

## 2023-10-07 RX ADMIN — LAMOTRIGINE 150 MG: 100 TABLET ORAL at 09:10

## 2023-10-07 RX ADMIN — POTASSIUM CHLORIDE 20 MEQ: 14.9 INJECTION, SOLUTION INTRAVENOUS at 11:10

## 2023-10-07 RX ADMIN — PROCHLORPERAZINE EDISYLATE 2.5 MG: 5 INJECTION INTRAMUSCULAR; INTRAVENOUS at 05:10

## 2023-10-07 RX ADMIN — OXYCODONE HYDROCHLORIDE 10 MG: 5 SOLUTION ORAL at 11:10

## 2023-10-07 RX ADMIN — SMOFLIPID 250 ML: 6; 6; 5; 3 INJECTION, EMULSION INTRAVENOUS at 10:10

## 2023-10-07 RX ADMIN — AMLODIPINE 5 MG: 1 SUSPENSION ORAL at 09:10

## 2023-10-07 RX ADMIN — HYDROMORPHONE HYDROCHLORIDE 1 MG: 1 INJECTION, SOLUTION INTRAMUSCULAR; INTRAVENOUS; SUBCUTANEOUS at 02:10

## 2023-10-07 RX ADMIN — ACETAMINOPHEN 999.01 MG: 650 SOLUTION ORAL at 10:10

## 2023-10-07 RX ADMIN — Medication 10 ML: at 06:10

## 2023-10-07 RX ADMIN — LAMOTRIGINE 150 MG: 100 TABLET ORAL at 10:10

## 2023-10-07 RX ADMIN — ARIPIPRAZOLE 5 MG: 5 TABLET ORAL at 09:10

## 2023-10-07 RX ADMIN — ACETAMINOPHEN 999.01 MG: 650 SOLUTION ORAL at 02:10

## 2023-10-07 RX ADMIN — ACETAMINOPHEN 999.01 MG: 650 SOLUTION ORAL at 06:10

## 2023-10-07 RX ADMIN — FLUDROCORTISONE ACETATE 0.1 MG: 0.1 TABLET ORAL at 09:10

## 2023-10-07 RX ADMIN — POTASSIUM CHLORIDE 20 MEQ: 14.9 INJECTION, SOLUTION INTRAVENOUS at 02:10

## 2023-10-07 RX ADMIN — METHOCARBAMOL 500 MG: 100 INJECTION, SOLUTION INTRAMUSCULAR; INTRAVENOUS at 11:10

## 2023-10-07 RX ADMIN — MIRTAZAPINE 15 MG: 15 TABLET, ORALLY DISINTEGRATING ORAL at 10:10

## 2023-10-07 RX ADMIN — METHOCARBAMOL 500 MG: 100 INJECTION, SOLUTION INTRAMUSCULAR; INTRAVENOUS at 02:10

## 2023-10-07 RX ADMIN — HYDROMORPHONE HYDROCHLORIDE 0.5 MG: 1 INJECTION, SOLUTION INTRAMUSCULAR; INTRAVENOUS; SUBCUTANEOUS at 08:10

## 2023-10-07 RX ADMIN — OXYCODONE HYDROCHLORIDE 15 MG: 5 SOLUTION ORAL at 06:10

## 2023-10-07 RX ADMIN — METHOCARBAMOL 500 MG: 100 INJECTION, SOLUTION INTRAMUSCULAR; INTRAVENOUS at 06:10

## 2023-10-07 RX ADMIN — LABETALOL HYDROCHLORIDE 10 MG: 5 INJECTION, SOLUTION INTRAVENOUS at 12:10

## 2023-10-07 RX ADMIN — Medication 10 ML: at 11:10

## 2023-10-07 RX ADMIN — HYDROMORPHONE HYDROCHLORIDE 1 MG: 1 INJECTION, SOLUTION INTRAMUSCULAR; INTRAVENOUS; SUBCUTANEOUS at 09:10

## 2023-10-07 RX ADMIN — OXYCODONE HYDROCHLORIDE 10 MG: 5 SOLUTION ORAL at 10:10

## 2023-10-07 RX ADMIN — MAGNESIUM SULFATE HEPTAHYDRATE: 500 INJECTION, SOLUTION INTRAMUSCULAR; INTRAVENOUS at 10:10

## 2023-10-07 RX ADMIN — PANTOPRAZOLE SODIUM 40 MG: 40 INJECTION, POWDER, FOR SOLUTION INTRAVENOUS at 10:10

## 2023-10-07 RX ADMIN — OXYCODONE HYDROCHLORIDE 10 MG: 5 SOLUTION ORAL at 05:10

## 2023-10-07 RX ADMIN — PANTOPRAZOLE SODIUM 40 MG: 40 INJECTION, POWDER, FOR SOLUTION INTRAVENOUS at 09:10

## 2023-10-07 RX ADMIN — ONDANSETRON 4 MG: 2 INJECTION INTRAMUSCULAR; INTRAVENOUS at 02:10

## 2023-10-07 NOTE — ANESTHESIA POST-OP PAIN MANAGEMENT
"[facility]  Anesthesiology - Acute Pain Service  Consult Note      SUBJECTIVE:     Chief Complaint/Reason for Consult: Refractory abdominal pain    Surgical Procedure:   CREATION, BYPASS, DUODENUM, BY ANASTOMOSIS OF DUODENUM TO JEJUNUM,   PLACEMNENT OF GASTROSTOMY TUBE,   JEJUNOSTOMY TUBE (N/A)    Post Op Day: POD #2    History of Present Illness:  Patient is a 48 y.o. female with a past medical history significant for POTS, alcohol use, bipolar disorder, and Crohn's disease with recent gastric outlet obstruction admitted to Surgical Oncology for management of obstruction. Patient underwent duodenal-jejunal bypass with placement of  G and J tubes on Wed 10/4 and has had persistent midline abdominal pain extending from the xiphoid to the pubis which has been refractory to current medications including IV Tylenol and a Dilaudid PCA.    Interval History:  Patient comfortable in the AM. Pain control improved with addition of bilateral JESENIA catheters.    Home Pain Medications:   Hydromorphone 2mg Q4H PRN         Review of patient's allergies indicates:   Allergen Reactions    Tramadol Other (See Comments)     Seizures      Adhesive Other (See Comments)     STATES, " TEARS SKIN"     Demerol [meperidine]      Seizures      Hydrocodone Hallucinations    Reglan [metoclopramide]         Past Medical History:   Diagnosis Date    Abnormal Pap smear     + HPV    Bipolar 1 disorder     Closed bimalleolar fracture of left ankle     Crohn's disease S/P surgery (ileum, possible gastroduodenal with ulcers)     1998-ileocectomy (9 inches ileum)    Encounter for blood transfusion     Gastric and duodenal ulcers of unclear etiology     Gastroparesis     H/O ETOH abuse     History of HSV-2 infection     Immunosuppressed status     Iron deficiency anemia, multiple blood transfusions     POTS (postural orthostatic tachycardia syndrome)     Seizures     states last seizure 2009     Past Surgical History: "   Procedure Laterality Date    ANKLE HARDWARE REMOVAL Left 9/6/2023    Procedure: REMOVAL, HARDWARE, ANKLE;  Surgeon: Michael Lacey MD;  Location: [facility] OR;  Service: Orthopedics;  Laterality: Left;    APPENDECTOMY      BRAIN SURGERY  2009    fx skull due to seizure with 2 pins    BYPASS OF DUODENUM BY ANASTOMOSIS OF DUODENUM TO JEJUNUM N/A 10/4/2023    Procedure: CREATION, BYPASS, DUODENUM, BY ANASTOMOSIS OF DUODENUM TO JEJUNUM, PLACEMNENT OF GASTROSTOMY TUBE, JEJUNOSTOMY TUBE;  Surgeon: Kevin Mays MD;  Location: [facility] OR 2ND FLR;  Service: General;  Laterality: N/A;    CHOLECYSTECTOMY      COLON SURGERY      Partial colectomy    COLONOSCOPY Left 7/10/2019    Procedure: COLONOSCOPY;  Surgeon: Papi Martinez Jr., MD;  Location: [facility] ENDO;  Service: Endoscopy;  Laterality: Left;    COLONOSCOPY N/A 12/6/2022    Procedure: COLONOSCOPY;  Surgeon: Zandra Mallory MD;  Location: [facility] ENDO (4TH FLR);  Service: Endoscopy;  Laterality: N/A;  poor prep on 11/28/22.  per Dr Mallory-Miralaana 5 days prior with gastroparesis diet-3 days full liquid. also Mag citrate day before     instr handed to pt-GT    EGD, WITH BALLOON DILATION  7/24/2023    Procedure: EGD, WITH BALLOON DILATION;  Surgeon: Huang Licea MD;  Location: [facility];  Service: Endoscopy;;    ESOPHAGOGASTRODUODENOSCOPY Left 6/5/2019    Procedure: EGD (ESOPHAGOGASTRODUODENOSCOPY);  Surgeon: ROLA Villagran MD;  Location: [facility];  Service: Endoscopy;  Laterality: Left;    ESOPHAGOGASTRODUODENOSCOPY Left 3/16/2020    Procedure: EGD (ESOPHAGOGASTRODUODENOSCOPY);  Surgeon: Clive Núñez MD;  Location: [facility] ENDO;  Service: Endoscopy;  Laterality: Left;    ESOPHAGOGASTRODUODENOSCOPY N/A 4/17/2020    Procedure: EGD (ESOPHAGOGASTRODUODENOSCOPY);  Surgeon: Papi Martinez Jr., MD;  Location: [facility];  Service: Endoscopy;  Laterality: N/A;  with Push Enteroscopy    ESOPHAGOGASTRODUODENOSCOPY N/A 7/6/2020    Procedure: EGD  (ESOPHAGOGASTRODUODENOSCOPY)   possible peg;  Surgeon: Papi Martinez Jr., MD;  Location: [facility] ENDO;  Service: Endoscopy;  Laterality: N/A;    ESOPHAGOGASTRODUODENOSCOPY N/A 7/9/2020    Procedure: EGD (ESOPHAGOGASTRODUODENOSCOPY);  Surgeon: Papi Martinez Jr., MD;  Location: [facility] ENDO;  Service: Endoscopy;  Laterality: N/A;  J or Peg tube    ESOPHAGOGASTRODUODENOSCOPY N/A 9/3/2020    Procedure: EGD (ESOPHAGOGASTRODUODENOSCOPY);  Surgeon: Papi Martinez Jr., MD;  Location: [facility] ENDO;  Service: Endoscopy;  Laterality: N/A;  per drs order Stoma, Peds, w/single Lumen, J-Tube placement    ESOPHAGOGASTRODUODENOSCOPY N/A 12/29/2020    Procedure: EGD (ESOPHAGOGASTRODUODENOSCOPY);  Surgeon: Papi Martinez Jr., MD;  Location: [facility] ENDO;  Service: Endoscopy;  Laterality: N/A;    ESOPHAGOGASTRODUODENOSCOPY N/A 2/23/2021    Procedure: EGD (ESOPHAGOGASTRODUODENOSCOPY);  Surgeon: Papi Martinez Jr., MD;  Location: [facility] ENDO;  Service: Endoscopy;  Laterality: N/A;    ESOPHAGOGASTRODUODENOSCOPY N/A 2/3/2022    Procedure: EGD (ESOPHAGOGASTRODUODENOSCOPY);  Surgeon: Papi Martinez Jr., MD;  Location: [facility] ENDO;  Service: Endoscopy;  Laterality: N/A;    ESOPHAGOGASTRODUODENOSCOPY N/A 8/13/2022    Procedure: EGD (ESOPHAGOGASTRODUODENOSCOPY);  Surgeon: Huang Licea MD;  Location: [facility] ENDO;  Service: Gastroenterology;  Laterality: N/A;    ESOPHAGOGASTRODUODENOSCOPY N/A 10/16/2022    Procedure: EGD (ESOPHAGOGASTRODUODENOSCOPY);  Surgeon: Huang Licea MD;  Location: [facility] ENDO;  Service: Gastroenterology;  Laterality: N/A;    ESOPHAGOGASTRODUODENOSCOPY N/A 11/28/2022    Procedure: EGD (ESOPHAGOGASTRODUODENOSCOPY);  Surgeon: Zandra Mallory MD;  [facility]  Service: Endoscopy;  Laterality: N/A;    ESOPHAGOGASTRODUODENOSCOPY N/A 7/24/2023    Procedure: EGD (ESOPHAGOGASTRODUODENOSCOPY);  Surgeon: Huang Licea MD;  Location: [facility];  Service: Endoscopy;  Laterality: N/A;     ESOPHAGOGASTRODUODENOSCOPY N/A 9/15/2023    Procedure: EGD (ESOPHAGOGASTRODUODENOSCOPY);  Surgeon: ROLA Villagran MD;  Location: [facility] ENDO;  Service: Endoscopy;  Laterality: N/A;    FEMUR FRACTURE SURGERY Right 2006    HYSTERECTOMY      INSERTION OF TUNNELED CENTRAL VENOUS CATHETER (CVC) WITH SUBCUTANEOUS PORT Left 2019    Procedure: [id number];  Surgeon: Miquel Sargent MD;  Location: [facility] OR;  Service: General;  Laterality: Left;    INSERTION OR REPLACEMENT OF PERCUTANEOUS ENDOSCOPIC JEJUNOSTOMY TUBE  2020    Procedure: INSERTION OR REPLACEMENT, JEJUNOSTOMY TUBE, PERCUTANEOUS, ENDOSCOPIC;  Surgeon: Papi Martinez Jr., MD;  Location: [facility] ENDO;  Service: Endoscopy;;    laparoscopy for endometriosis      x5    LEFT HEART CATHETERIZATION Left 3/29/2023    Procedure: Left heart cath;  Surgeon: Riley Tipton MD;  Location: [facility] CATH;  Service: Cardiology;  Laterality: Left;    OOPHORECTOMY      right ovary removed only    OPEN REDUCTION AND INTERNAL FIXATION (ORIF) OF INJURY OF ANKLE Left 4/10/2023    Procedure: ORIF, ANKLE;  Surgeon: Michael Lacey MD;  Location: [facility] OR;  Service: Orthopedics;  Laterality: Left;    PEG TUBE REMOVAL  2021    Procedure: REMOVAL, PEG TUBE;  Surgeon: Papi Martinez Jr., MD;  Location: [facility];  Service: Endoscopy;;    TONSILLECTOMY       Family History   Adopted: Yes     Social History     Tobacco Use    Smoking status: Former     Current packs/day: 0.00     Average packs/day: 0.5 packs/day for 6.0 years (3.0 ttl pk-yrs)     Types: Cigarettes     Start date: 5/10/2005     Quit date: 5/10/2011     Years since quittin.4    Smokeless tobacco: Never   Substance Use Topics    Alcohol use: Yes     Comment: socially    Drug use: Never           OBJECTIVE:     Current Vital Signs       Vital Signs Range (Last 24H):  Temp:  [35.8 °C (96.5 °F)-38.3 °C (100.9 °F)]   Pulse:  [100-121]   Resp:  [12-24]   BP: (142-215)/(71-99)   SpO2:  [95  %-100 %]       Physical Exam:  At Rest:   6    Faces  Physical Exam  Constitutional:       General: She is not in acute distress.     Appearance: She is not toxic-appearing.   HENT:      Head: Normocephalic and atraumatic.      Nose: Nose normal.   Cardiovascular:      Rate and Rhythm: Normal rate and regular rhythm.   Pulmonary:      Effort: Pulmonary effort is normal. No respiratory distress.   Neurological:      Mental Status: Mental status is at baseline.         Laboratory:  CBC:   Recent Labs     10/06/23  1805 10/07/23  0437   WBC 11.02 9.78   RBC 3.01* 2.88*   HGB 8.9* 8.5*   HCT 28.9* 26.6*   * 138*   MCV 96 92   MCH 29.6 29.5   MCHC 30.8* 32.0       CMP:   Recent Labs     10/06/23  0422 10/06/23  1805 10/07/23  0437    144 143   K 4.7 3.6 3.2*   CO2 24 24 25   * 110 108   BUN 16 12 12   CREATININE 0.7 0.6 0.6   * 89 95   MG 2.0  --  1.6   PHOS 2.0*  --  3.3   CALCIUM 8.7 9.0 8.9   ALBUMIN 2.8* 2.9* 2.8*   PROT 6.0 6.3 6.2   ALKPHOS 80 84 86   ALT 28 26 25   AST 52* 51* 48*   BILITOT 0.4 0.6 0.5          ASSESSMENT/PLAN:     Active Hospital Problems    Diagnosis  POA    *Duodenal stricture [K31.5]  Yes    Encounter for weaning from ventilator [Z99.11]  Not Applicable      Resolved Hospital Problems   No resolved problems to display.         Plan:   1) Continue multimodal analgesia as able given NPO status as follows:    - Continue acetaminophen 1000mg IV Q6H    - Continue methocarbamol 500mg IV Q6H    - oxycodone 10mg/15mg per JTube Q4H PRN moderate/severe pain    - hydromorphone 1mg IV Q4H PRN breakthrough pain     2) Continue bilateral JESENIA catheters at current infusion rate.      Case discussed with staff, Dr. Nguyen; final recommendations per attestation above.     Thank you for your consult and allowing us to participate in the care of this patient. We will continue to follow along. Please call the Acute Pain Service at [phone] or Anesthesia at [phone] if you have any  questions or concerns.    Eliot Escalante DO  [facility] Resident CA-2/PGY-3  Department of Anesthesiology  Acute Pain Service - [phone] or [phone]  [facility]

## 2023-10-07 NOTE — PROGRESS NOTES
[facility]  General Surgery  Progress Note    Subjective:     History of Present Illness:  No notes on file    Post-Op Info:  Procedure(s) (LRB):  LAPAROTOMY, EXPLORATORY (N/A)  WASHOUT, HEMATOMA   2 Days Post-Op     Interval History: Extubated yesterday morning and bilateral ESPs placed. Much better pain control this morning and resting comfortably in bed this morning. Some issues with hypertension felt to be pain related. Will start on scheduled antihypertensive this morning. Also discussed TF initiation with her.    Medications:  Continuous Infusions:   lactated ringers 125 mL/hr at 10/07/23 0825    ROPIvacaine (PF) 2 mg/ml (0.2%) 0.1 mL/hr (10/06/23 0905)    ROPIvacaine (PF) 2 mg/ml (0.2%) 0.1 mL/hr (10/06/23 0905)    TPN ADULT CENTRAL LINE CUSTOM 100 mL/hr at 10/06/23 2243    TPN ADULT CENTRAL LINE CUSTOM       Scheduled Meds:   acetaminophen  999.0148 mg Oral Q8H    amLODIPine benzoate  5 mg Per J Tube Daily    ARIPiprazole  5 mg Oral Daily    fludrocortisone  0.1 mg Oral Daily    lamoTRIgine  150 mg Oral BID    lipid (SMOFLIPID)  250 mL Intravenous Daily    lipid (SMOFLIPID)  250 mL Intravenous Daily    methocarbamol (ROBAXIN) IVPB  500 mg Intravenous Q6H    mirtazapine  15 mg Oral Nightly    pantoprazole  40 mg Intravenous BID    potassium chloride in water  20 mEq Intravenous Q2H    scopolamine  1 patch Transdermal Q3 Days    sodium chloride 0.9%  10 mL Intravenous Q6H     PRN Meds:0.9%  NaCl infusion (for blood administration), 0.9%  NaCl infusion (for blood administration), HYDROmorphone, labetalol, levalbuterol, LIDOcaine (PF) 10 mg/ml (1%), naloxone, ondansetron, oxyCODONE **AND** oxyCODONE, prochlorperazine, sodium chloride 0.9%, Flushing PICC/Midline Protocol **AND** sodium chloride 0.9% **AND** sodium chloride 0.9%, sodium chloride 0.9%     Review of patient's allergies indicates:   Allergen Reactions    Tramadol Other (See Comments)     Seizures      Adhesive Other (See  "Comments)     STATES, " TEARS SKIN"     Demerol [meperidine]      Seizures      Hydrocodone Hallucinations    Reglan [metoclopramide]      Objective:     Vital Signs (Most Recent):  Temp: 98 °F (36.7 °C) (10/07/23 0739)  Pulse: (!) 115 (10/07/23 0739)  Resp: 18 (10/07/23 0915)  BP: (!) 189/94 (10/07/23 0739)  SpO2: 97 % (10/07/23 0739) Vital Signs (24h Range):  Temp:  [96.5 °F (35.8 °C)-100.9 °F (38.3 °C)] 98 °F (36.7 °C)  Pulse:  [100-121] 115  Resp:  [12-24] 18  SpO2:  [95 %-100 %] 97 %  BP: (142-215)/(71-99) 189/94     Weight: 53.1 kg (117 lb)  Body mass index is 20.08 kg/m².    Intake/Output - Last 3 Shifts         10/05 0700  10/06 0659 10/06 0700  10/07 0659 10/07 0700  10/08 0659    I.V. (mL/kg) 2340.8 (44.1) 1072.4 (20.2) 1658.5 (31.2)    Blood 897.5      NG/GT  80     IV Piggyback 1376.6 434.6 98.8    TPN  1992     Total Intake(mL/kg) 4614.9 (86.9) 3579.1 (67.4) 1757.3 (33.1)    Urine (mL/kg/hr) 5270 (4.1) 4571 (3.6)     Drains 840 50     Stool       Total Output 6110 4621     Net -1495.2 -1041.9 +1757.3                    Physical Exam  Constitutional:       Appearance: Normal appearance.   HENT:      Head: Normocephalic and atraumatic.   Cardiovascular:      Rate and Rhythm: Normal rate.   Pulmonary:      Effort: Pulmonary effort is normal.   Abdominal:      General: Abdomen is flat.      Palpations: Abdomen is soft.      Tenderness: There is abdominal tenderness.      Comments: Multiple abdominal scars from previous surgical history  Midline incision now with skin staples and prevena in place to suction. G to gravity and J tube in place.  Ecchymosis around incision, not raised or edematous this AM.  Subcutaneous NHAN in place with SS output   Genitourinary:     Comments: Alba in place  Skin:     General: Skin is warm and dry.   Neurological:      General: No focal deficit present.      Mental Status: She is alert and oriented to person, place, and time.          Significant Labs:  I have reviewed all " pertinent lab results within the past 24 hours.  CBC:   Recent Labs   Lab 10/07/23  0437   WBC 9.78   RBC 2.88*   HGB 8.5*   HCT 26.6*   *   MCV 92   MCH 29.5   MCHC 32.0     CMP:   Recent Labs   Lab 10/07/23  0437   GLU 95   CALCIUM 8.9   ALBUMIN 2.8*   PROT 6.2      K 3.2*   CO2 25      BUN 12   CREATININE 0.6   ALKPHOS 86   ALT 25   AST 48*   BILITOT 0.5       Significant Diagnostics:  I have reviewed all pertinent imaging results/findings within the past 24 hours.        Assessment/Plan:     * Duodenal stricture  Angela Trascher Lejeune is a 48 y.o. female with a PMH Crohn's disease, POTS, gastric varices, alcohol abuse, bipolar disorder who was recently seen in surgical oncology clinic for evaluation of a gastric outlet obstruction. Now s/p Delfino-Y gastrojejunostomy, G tube, and J tube placement 10/4/23 c/b hematoma s/p takback to OR on 10/5/23    - MM pain meds. Tylenol, robaxin, oxycodone. Bilateral ESPs per APS  - PRN nausea meds  - NPO, okay for PO meds  - G tube to gravity. Clamp after medication administration  - Start on J-tube feeds today  - TPN. Increase the K today  - DC harmon. No pure wick  - Start norvasc 5 for HTN. PRN antihypertensives  - OOB/ambulate  - PT/OT  - Home meds  - DVT ppx          Ledy Espinoza MD  General Surgery  [facility]

## 2023-10-07 NOTE — NURSING
[facility] Plan of Care Note  Dx PMH Crohn's disease, POTS, gastric varices, alcohol abuse, bipolar disorder who was recently seen in surgical oncology clinic for evaluation of a gastric outlet obstruction.         Shift Events pain controlled See Mar.     Goals of Care: Pain control and keeping patient hemodynamically stable     Neuro: aao x 3     Vital Signs: Hypertensive   Respiratory: RA     Diet: low \fiber low reside     Is patient tolerating current diet? yes     GTTS: TPN infusing      Urine Output/Bowel Movement: oob to bathroom harmon     Drains/Tubes/Tube Feeds (include total output/shift):    Lines: PICC line to DUANE and accessed port to left chest wall        Accuchecks:none     Skin: healing surgical incision to left leg with steristrips     Fall Risk Score: see chart     Activity level? bed rest this shift

## 2023-10-07 NOTE — SUBJECTIVE & OBJECTIVE
"Interval History: Extubated yesterday morning and bilateral ESPs placed. Much better pain control this morning and resting comfortably in bed this morning. Some issues with hypertension felt to be pain related. Will start on scheduled antihypertensive this morning. Also discussed TF initiation with her.    Medications:  Continuous Infusions:   lactated ringers 125 mL/hr at 10/07/23 0825    ROPIvacaine (PF) 2 mg/ml (0.2%) 0.1 mL/hr (10/06/23 0905)    ROPIvacaine (PF) 2 mg/ml (0.2%) 0.1 mL/hr (10/06/23 0905)    TPN ADULT CENTRAL LINE CUSTOM 100 mL/hr at 10/06/23 2243    TPN ADULT CENTRAL LINE CUSTOM       Scheduled Meds:   acetaminophen  999.0148 mg Oral Q8H    amLODIPine benzoate  5 mg Per J Tube Daily    ARIPiprazole  5 mg Oral Daily    fludrocortisone  0.1 mg Oral Daily    lamoTRIgine  150 mg Oral BID    lipid (SMOFLIPID)  250 mL Intravenous Daily    lipid (SMOFLIPID)  250 mL Intravenous Daily    methocarbamol (ROBAXIN) IVPB  500 mg Intravenous Q6H    mirtazapine  15 mg Oral Nightly    pantoprazole  40 mg Intravenous BID    potassium chloride in water  20 mEq Intravenous Q2H    scopolamine  1 patch Transdermal Q3 Days    sodium chloride 0.9%  10 mL Intravenous Q6H     PRN Meds:0.9%  NaCl infusion (for blood administration), 0.9%  NaCl infusion (for blood administration), HYDROmorphone, labetalol, levalbuterol, LIDOcaine (PF) 10 mg/ml (1%), naloxone, ondansetron, oxyCODONE **AND** oxyCODONE, prochlorperazine, sodium chloride 0.9%, Flushing PICC/Midline Protocol **AND** sodium chloride 0.9% **AND** sodium chloride 0.9%, sodium chloride 0.9%     Review of patient's allergies indicates:   Allergen Reactions    Tramadol Other (See Comments)     Seizures      Adhesive Other (See Comments)     STATES, " TEARS SKIN"     Demerol [meperidine]      Seizures      Hydrocodone Hallucinations    Reglan [metoclopramide]      Objective:     Vital Signs (Most Recent):  Temp: 98 °F (36.7 °C) (10/07/23 0739)  Pulse: (!) 115 (10/07/23 " 0739)  Resp: 18 (10/07/23 0915)  BP: (!) 189/94 (10/07/23 0739)  SpO2: 97 % (10/07/23 0739) Vital Signs (24h Range):  Temp:  [96.5 °F (35.8 °C)-100.9 °F (38.3 °C)] 98 °F (36.7 °C)  Pulse:  [100-121] 115  Resp:  [12-24] 18  SpO2:  [95 %-100 %] 97 %  BP: (142-215)/(71-99) 189/94     Weight: 53.1 kg (117 lb)  Body mass index is 20.08 kg/m².    Intake/Output - Last 3 Shifts         10/05 0700  10/06 0659 10/06 0700  10/07 0659 10/07 0700  10/08 0659    I.V. (mL/kg) 2340.8 (44.1) 1072.4 (20.2) 1658.5 (31.2)    Blood 897.5      NG/GT  80     IV Piggyback 1376.6 434.6 98.8    TPN  1992     Total Intake(mL/kg) 4614.9 (86.9) 3579.1 (67.4) 1757.3 (33.1)    Urine (mL/kg/hr) 5270 (4.1) 4571 (3.6)     Drains 840 50     Stool       Total Output 6110 4621     Net -1495.2 -1041.9 +1757.3                    Physical Exam  Constitutional:       Appearance: Normal appearance.   HENT:      Head: Normocephalic and atraumatic.   Cardiovascular:      Rate and Rhythm: Normal rate.   Pulmonary:      Effort: Pulmonary effort is normal.   Abdominal:      General: Abdomen is flat.      Palpations: Abdomen is soft.      Tenderness: There is abdominal tenderness.      Comments: Multiple abdominal scars from previous surgical history  Midline incision now with skin staples and prevena in place to suction. G to gravity and J tube in place.  Ecchymosis around incision, not raised or edematous this AM.  Subcutaneous NHAN in place with SS output   Genitourinary:     Comments: Alba in place  Skin:     General: Skin is warm and dry.   Neurological:      General: No focal deficit present.      Mental Status: She is alert and oriented to person, place, and time.          Significant Labs:  I have reviewed all pertinent lab results within the past 24 hours.  CBC:   Recent Labs   Lab 10/07/23  0437   WBC 9.78   RBC 2.88*   HGB 8.5*   HCT 26.6*   *   MCV 92   MCH 29.5   MCHC 32.0     CMP:   Recent Labs   Lab 10/07/23  0437   GLU 95   CALCIUM 8.9    ALBUMIN 2.8*   PROT 6.2      K 3.2*   CO2 25      BUN 12   CREATININE 0.6   ALKPHOS 86   ALT 25   AST 48*   BILITOT 0.5       Significant Diagnostics:  I have reviewed all pertinent imaging results/findings within the past 24 hours.

## 2023-10-07 NOTE — CARE UPDATE
RAPID RESPONSE NURSE ROUND       Rounding completed with charge RNRhianna for pain management reports patient is doing fine and medications for pain management are being administered safely and timely. No additional concerns verbalized at this time. Instructed to call [phone] for further concerns or assistance.        Patient is a 59y old  Female who presents with a chief complaint of Back pain due to T2-T3 compression fracture with concern for osteomyelitis and developing abscess (06 Jul 2023 07:38)      SUBJECTIVE / OVERNIGHT EVENTS: Pt reports no acute events overnight -- states she is feeling well and denies chest pain, SOB, N/V, fever/chills.    MEDICATIONS  (STANDING):  cefepime   IVPB 2000 milliGRAM(s) IV Intermittent every 12 hours  cholecalciferol 2000 Unit(s) Oral daily  famotidine    Tablet 20 milliGRAM(s) Oral daily  gabapentin 200 milliGRAM(s) Oral three times a day  insulin lispro (HumaLOG) Pump 1 Each SubCutaneous Continuous Pump  losartan 25 milliGRAM(s) Oral daily  metoprolol succinate ER 25 milliGRAM(s) Oral daily  multivitamin 1 Tablet(s) Oral daily  polyethylene glycol 3350 17 Gram(s) Oral daily  senna 2 Tablet(s) Oral at bedtime  sertraline 100 milliGRAM(s) Oral daily  vancomycin  IVPB 1000 milliGRAM(s) IV Intermittent every 24 hours    MEDICATIONS  (PRN):  morphine  - Injectable 4 milliGRAM(s) IV Push every 4 hours PRN Severe Pain (7 - 10)  oxyCODONE    IR 5 milliGRAM(s) Oral every 4 hours PRN Moderate Pain (4 - 6)      CAPILLARY BLOOD GLUCOSE      POCT Blood Glucose.: 106 mg/dL (06 Jul 2023 08:35)  POCT Blood Glucose.: 206 mg/dL (05 Jul 2023 21:40)  POCT Blood Glucose.: 157 mg/dL (05 Jul 2023 17:23)    I&O's Summary    05 Jul 2023 07:01  -  06 Jul 2023 07:00  --------------------------------------------------------  IN: 770 mL / OUT: 2 mL / NET: 768 mL    06 Jul 2023 07:01  -  06 Jul 2023 11:04  --------------------------------------------------------  IN: 240 mL / OUT: 0 mL / NET: 240 mL        Vital Signs Last 24 Hrs  T(C): 37 (06 Jul 2023 04:46), Max: 37 (05 Jul 2023 14:16)  T(F): 98.6 (06 Jul 2023 04:46), Max: 98.6 (05 Jul 2023 14:16)  HR: 83 (06 Jul 2023 04:46) (78 - 83)  BP: 108/65 (06 Jul 2023 04:46) (104/92 - 108/65)  BP(mean): --  RR: 18 (06 Jul 2023 04:46) (18 - 18)  SpO2: 93% (06 Jul 2023 04:46) (93% - 96%)    Parameters below as of 06 Jul 2023 04:46  Patient On (Oxygen Delivery Method): room air        PHYSICAL EXAM:  General: Well-groomed, NAD, laying in bed  HEENT: non-icteric  Neck:  symmetric,  JVD absent  Respiratory: Clear to ascultation bilaterally, no crackles/rales, no Resp distress; no accessory muscle use  Cardiovascular:  RRR, no murmurs/rubs/gallops  Abdomen: NTTP, normal bowel sounds heard  Neuro: no neurological deficits noted, 5/5 strength in LE b/l  Psych: AOx3    LABS:                        10.9   18.11 )-----------( 318      ( 06 Jul 2023 07:35 )             34.7      07-06    137  |  100  |  36<H>  ----------------------------<  159<H>  5.2   |  25  |  1.32<H>    Ca    9.8      06 Jul 2023 07:35  Phos  2.1     07-06  Mg     2.4     07-06    TPro  6.9  /  Alb  3.2<L>  /  TBili  0.2  /  DBili  x   /  AST  16  /  ALT  13  /  AlkPhos  118  07-06          Urinalysis Basic - ( 06 Jul 2023 07:35 )    Color: x / Appearance: x / SG: x / pH: x  Gluc: 159 mg/dL / Ketone: x  / Bili: x / Urobili: x   Blood: x / Protein: x / Nitrite: x   Leuk Esterase: x / RBC: x / WBC x   Sq Epi: x / Non Sq Epi: x / Bacteria: x        RADIOLOGY & ADDITIONAL TESTS:    Imaging Personally Reviewed:    Consultant(s) Notes Reviewed:      Care Discussed with Consultants/Other Providers:

## 2023-10-07 NOTE — ASSESSMENT & PLAN NOTE
Angela Trascher Lejeune is a 48 y.o. female with a PMH Crohn's disease, POTS, gastric varices, alcohol abuse, bipolar disorder who was recently seen in surgical oncology clinic for evaluation of a gastric outlet obstruction. Now s/p Delfino-Y gastrojejunostomy, G tube, and J tube placement 10/4/23 c/b hematoma s/p takback to OR on 10/5/23    - MM pain meds. Tylenol, robaxin, oxycodone. Bilateral ESPs per APS  - PRN nausea meds  - NPO, okay for PO meds  - G tube to gravity. Clamp after medication administration  - Start on J-tube feeds today  - TPN. Increase the K today  - DC harmon. No pure wick  - Start norvasc 5 for HTN. PRN antihypertensives  - OOB/ambulate  - PT/OT  - Home meds  - DVT ppx

## 2023-10-08 LAB
ALBUMIN SERPL BCP-MCNC: 2.9 G/DL (ref 3.5–5.2)
ALP SERPL-CCNC: 125 U/L (ref 55–135)
ALT SERPL W/O P-5'-P-CCNC: 26 U/L (ref 10–44)
ANION GAP SERPL CALC-SCNC: 8 MMOL/L (ref 8–16)
AST SERPL-CCNC: 35 U/L (ref 10–40)
BASOPHILS # BLD AUTO: 0.04 K/UL (ref 0–0.2)
BASOPHILS NFR BLD: 0.4 % (ref 0–1.9)
BILIRUB SERPL-MCNC: 0.4 MG/DL (ref 0.1–1)
BLD PROD TYP BPU: NORMAL
BLD PROD TYP BPU: NORMAL
BLOOD UNIT EXPIRATION DATE: NORMAL
BLOOD UNIT EXPIRATION DATE: NORMAL
BLOOD UNIT TYPE CODE: 5100
BLOOD UNIT TYPE CODE: 5100
BLOOD UNIT TYPE: NORMAL
BLOOD UNIT TYPE: NORMAL
BUN SERPL-MCNC: 17 MG/DL (ref 6–20)
CALCIUM SERPL-MCNC: 9.1 MG/DL (ref 8.7–10.5)
CHLORIDE SERPL-SCNC: 109 MMOL/L (ref 95–110)
CO2 SERPL-SCNC: 26 MMOL/L (ref 23–29)
CODING SYSTEM: NORMAL
CODING SYSTEM: NORMAL
CREAT SERPL-MCNC: 0.6 MG/DL (ref 0.5–1.4)
CROSSMATCH INTERPRETATION: NORMAL
CROSSMATCH INTERPRETATION: NORMAL
DIFFERENTIAL METHOD: ABNORMAL
DISPENSE STATUS: NORMAL
DISPENSE STATUS: NORMAL
EOSINOPHIL # BLD AUTO: 0.2 K/UL (ref 0–0.5)
EOSINOPHIL NFR BLD: 2.1 % (ref 0–8)
ERYTHROCYTE [DISTWIDTH] IN BLOOD BY AUTOMATED COUNT: 18.6 % (ref 11.5–14.5)
EST. GFR  (NO RACE VARIABLE): >60 ML/MIN/1.73 M^2
GLUCOSE SERPL-MCNC: 117 MG/DL (ref 70–110)
HCT VFR BLD AUTO: 29.1 % (ref 37–48.5)
HGB BLD-MCNC: 9.4 G/DL (ref 12–16)
IMM GRANULOCYTES # BLD AUTO: 0.07 K/UL (ref 0–0.04)
IMM GRANULOCYTES NFR BLD AUTO: 0.7 % (ref 0–0.5)
LYMPHOCYTES # BLD AUTO: 1.3 K/UL (ref 1–4.8)
LYMPHOCYTES NFR BLD: 13.6 % (ref 18–48)
MAGNESIUM SERPL-MCNC: 1.9 MG/DL (ref 1.6–2.6)
MCH RBC QN AUTO: 29.7 PG (ref 27–31)
MCHC RBC AUTO-ENTMCNC: 32.3 G/DL (ref 32–36)
MCV RBC AUTO: 92 FL (ref 82–98)
MONOCYTES # BLD AUTO: 1.1 K/UL (ref 0.3–1)
MONOCYTES NFR BLD: 11.5 % (ref 4–15)
NEUTROPHILS # BLD AUTO: 6.8 K/UL (ref 1.8–7.7)
NEUTROPHILS NFR BLD: 71.7 % (ref 38–73)
NRBC BLD-RTO: 0 /100 WBC
NUM UNITS TRANS PACKED RBC: NORMAL
NUM UNITS TRANS PACKED RBC: NORMAL
PHOSPHATE SERPL-MCNC: 3.1 MG/DL (ref 2.7–4.5)
PLATELET # BLD AUTO: 191 K/UL (ref 150–450)
PMV BLD AUTO: 11.1 FL (ref 9.2–12.9)
POCT GLUCOSE: 112 MG/DL (ref 70–110)
POTASSIUM SERPL-SCNC: 4 MMOL/L (ref 3.5–5.1)
PROT SERPL-MCNC: 6.5 G/DL (ref 6–8.4)
RBC # BLD AUTO: 3.16 M/UL (ref 4–5.4)
SODIUM SERPL-SCNC: 143 MMOL/L (ref 136–145)
WBC # BLD AUTO: 9.49 K/UL (ref 3.9–12.7)

## 2023-10-08 PROCEDURE — 80053 COMPREHEN METABOLIC PANEL: CPT | Performed by: STUDENT IN AN ORGANIZED HEALTH CARE EDUCATION/TRAINING PROGRAM

## 2023-10-08 PROCEDURE — 84100 ASSAY OF PHOSPHORUS: CPT | Performed by: STUDENT IN AN ORGANIZED HEALTH CARE EDUCATION/TRAINING PROGRAM

## 2023-10-08 PROCEDURE — B4185 PARENTERAL SOL 10 GM LIPIDS: HCPCS

## 2023-10-08 PROCEDURE — A4216 STERILE WATER/SALINE, 10 ML: HCPCS | Performed by: STUDENT IN AN ORGANIZED HEALTH CARE EDUCATION/TRAINING PROGRAM

## 2023-10-08 PROCEDURE — 99231 PR SUBSEQUENT HOSPITAL CARE,LEVL I: ICD-10-PCS | Mod: ,,, | Performed by: ANESTHESIOLOGY

## 2023-10-08 PROCEDURE — 85025 COMPLETE CBC W/AUTO DIFF WBC: CPT | Performed by: STUDENT IN AN ORGANIZED HEALTH CARE EDUCATION/TRAINING PROGRAM

## 2023-10-08 PROCEDURE — 63600175 PHARM REV CODE 636 W HCPCS: Performed by: STUDENT IN AN ORGANIZED HEALTH CARE EDUCATION/TRAINING PROGRAM

## 2023-10-08 PROCEDURE — 83735 ASSAY OF MAGNESIUM: CPT | Performed by: STUDENT IN AN ORGANIZED HEALTH CARE EDUCATION/TRAINING PROGRAM

## 2023-10-08 PROCEDURE — 25000003 PHARM REV CODE 250: Performed by: STUDENT IN AN ORGANIZED HEALTH CARE EDUCATION/TRAINING PROGRAM

## 2023-10-08 PROCEDURE — 27000646 HC AEROBIKA DEVICE

## 2023-10-08 PROCEDURE — 99231 SBSQ HOSP IP/OBS SF/LOW 25: CPT | Mod: ,,, | Performed by: ANESTHESIOLOGY

## 2023-10-08 PROCEDURE — C9113 INJ PANTOPRAZOLE SODIUM, VIA: HCPCS | Performed by: STUDENT IN AN ORGANIZED HEALTH CARE EDUCATION/TRAINING PROGRAM

## 2023-10-08 PROCEDURE — 94664 DEMO&/EVAL PT USE INHALER: CPT

## 2023-10-08 PROCEDURE — 63600175 PHARM REV CODE 636 W HCPCS

## 2023-10-08 PROCEDURE — 94761 N-INVAS EAR/PLS OXIMETRY MLT: CPT

## 2023-10-08 PROCEDURE — 25000003 PHARM REV CODE 250

## 2023-10-08 PROCEDURE — 25000242 PHARM REV CODE 250 ALT 637 W/ HCPCS

## 2023-10-08 PROCEDURE — 20600001 HC STEP DOWN PRIVATE ROOM

## 2023-10-08 PROCEDURE — 99900035 HC TECH TIME PER 15 MIN (STAT)

## 2023-10-08 RX ORDER — AMLODIPINE BESYLATE 5 MG/1
5 TABLET ORAL DAILY
Status: DISCONTINUED | OUTPATIENT
Start: 2023-10-08 | End: 2023-10-11

## 2023-10-08 RX ORDER — HYDROMORPHONE HYDROCHLORIDE 1 MG/ML
0.5 INJECTION, SOLUTION INTRAMUSCULAR; INTRAVENOUS; SUBCUTANEOUS EVERY 6 HOURS PRN
Status: DISCONTINUED | OUTPATIENT
Start: 2023-10-08 | End: 2023-10-09

## 2023-10-08 RX ORDER — OXYCODONE HYDROCHLORIDE 5 MG/1
5 TABLET ORAL EVERY 4 HOURS PRN
Status: DISCONTINUED | OUTPATIENT
Start: 2023-10-08 | End: 2023-10-11 | Stop reason: HOSPADM

## 2023-10-08 RX ORDER — OXYCODONE HYDROCHLORIDE 10 MG/1
10 TABLET ORAL EVERY 4 HOURS PRN
Status: DISCONTINUED | OUTPATIENT
Start: 2023-10-08 | End: 2023-10-11 | Stop reason: HOSPADM

## 2023-10-08 RX ADMIN — PANTOPRAZOLE SODIUM 40 MG: 40 INJECTION, POWDER, FOR SOLUTION INTRAVENOUS at 08:10

## 2023-10-08 RX ADMIN — PROCHLORPERAZINE EDISYLATE 2.5 MG: 5 INJECTION INTRAMUSCULAR; INTRAVENOUS at 10:10

## 2023-10-08 RX ADMIN — OXYCODONE HYDROCHLORIDE 10 MG: 5 SOLUTION ORAL at 02:10

## 2023-10-08 RX ADMIN — AMLODIPINE BESYLATE 5 MG: 5 TABLET ORAL at 08:10

## 2023-10-08 RX ADMIN — LAMOTRIGINE 150 MG: 100 TABLET ORAL at 09:10

## 2023-10-08 RX ADMIN — HYDROMORPHONE HYDROCHLORIDE 0.5 MG: 0.5 INJECTION, SOLUTION INTRAMUSCULAR; INTRAVENOUS; SUBCUTANEOUS at 03:10

## 2023-10-08 RX ADMIN — OXYCODONE HYDROCHLORIDE 10 MG: 5 SOLUTION ORAL at 06:10

## 2023-10-08 RX ADMIN — ROPIVACAINE HYDROCHLORIDE 0.1 ML/HR: 2 INJECTION, SOLUTION EPIDURAL; INFILTRATION at 11:10

## 2023-10-08 RX ADMIN — HYDROMORPHONE HYDROCHLORIDE 0.5 MG: 0.5 INJECTION, SOLUTION INTRAMUSCULAR; INTRAVENOUS; SUBCUTANEOUS at 08:10

## 2023-10-08 RX ADMIN — METHOCARBAMOL 500 MG: 100 INJECTION, SOLUTION INTRAMUSCULAR; INTRAVENOUS at 12:10

## 2023-10-08 RX ADMIN — ARIPIPRAZOLE 5 MG: 5 TABLET ORAL at 08:10

## 2023-10-08 RX ADMIN — ACETAMINOPHEN 999.01 MG: 650 SOLUTION ORAL at 06:10

## 2023-10-08 RX ADMIN — Medication 10 ML: at 12:10

## 2023-10-08 RX ADMIN — PROCHLORPERAZINE EDISYLATE 2.5 MG: 5 INJECTION INTRAMUSCULAR; INTRAVENOUS at 06:10

## 2023-10-08 RX ADMIN — ACETAMINOPHEN 999.01 MG: 650 SOLUTION ORAL at 03:10

## 2023-10-08 RX ADMIN — FLUDROCORTISONE ACETATE 0.1 MG: 0.1 TABLET ORAL at 08:10

## 2023-10-08 RX ADMIN — METHOCARBAMOL 500 MG: 100 INJECTION, SOLUTION INTRAMUSCULAR; INTRAVENOUS at 05:10

## 2023-10-08 RX ADMIN — SCOPALAMINE 1 PATCH: 1 PATCH, EXTENDED RELEASE TRANSDERMAL at 05:10

## 2023-10-08 RX ADMIN — MIRTAZAPINE 15 MG: 15 TABLET, ORALLY DISINTEGRATING ORAL at 09:10

## 2023-10-08 RX ADMIN — SMOFLIPID 250 ML: 6; 6; 5; 3 INJECTION, EMULSION INTRAVENOUS at 10:10

## 2023-10-08 RX ADMIN — HYDROMORPHONE HYDROCHLORIDE 0.5 MG: 0.5 INJECTION, SOLUTION INTRAMUSCULAR; INTRAVENOUS; SUBCUTANEOUS at 10:10

## 2023-10-08 RX ADMIN — METHOCARBAMOL 500 MG: 100 INJECTION, SOLUTION INTRAMUSCULAR; INTRAVENOUS at 06:10

## 2023-10-08 RX ADMIN — OXYCODONE HYDROCHLORIDE 10 MG: 10 TABLET ORAL at 11:10

## 2023-10-08 RX ADMIN — PANTOPRAZOLE SODIUM 40 MG: 40 INJECTION, POWDER, FOR SOLUTION INTRAVENOUS at 09:10

## 2023-10-08 RX ADMIN — Medication 10 ML: at 06:10

## 2023-10-08 RX ADMIN — LAMOTRIGINE 150 MG: 100 TABLET ORAL at 08:10

## 2023-10-08 NOTE — ASSESSMENT & PLAN NOTE
Angela Trascher Lejeune is a 48 y.o. female with a PMH Crohn's disease, POTS, gastric varices, alcohol abuse, bipolar disorder who was recently seen in surgical oncology clinic for evaluation of a gastric outlet obstruction. Now s/p Delfino-Y gastrojejunostomy, G tube, and J tube placement 10/4/23 c/b hematoma s/p takback to OR on 10/5/23    - MM pain meds. Tylenol, robaxin, oxycodone (change to PO instead of per J tube). Bilateral ESPs per APS. Ideally pause tomorrow morning to see how her pain is and then dc if okay  - PRN nausea meds  - NPO, okay for PO meds  - Unclamp GT q4 hrs. Can clamp otherwise so she can be ambulatory  - Increase JT feeds  - TPN. Plan to order for today. Likely allow to run out tomorrow then DC if still doing well with tube feeds  - Norvasc 5  - OOB/ambulate  - PT/OT  - Home meds  - DVT ppx    - Dispo: Plan for DC Tuesday vs Wednesday

## 2023-10-08 NOTE — PLAN OF CARE
[facility] Plan of Care Note  Dx: duodenal stricture     Shift Events: frequent urination, uncontrolled pain, all liquid meds through J-tube; intermittent nausea    Goals of Care: manage pain, monitor HR, ambulation, bowel care    Neuro: ANOx4    Vital Signs: WNL     Respiratory: RA     Diet: NPO/ [place] 1.4 TF @ 20    Is patient tolerating current diet? Yes     GTTS: LR @ 125 /  TPN @ 100 / lipids 20.8     Urine Output/Bowel Movement: UOP clear yellow per toilet; LBM 10/4    Drains/Tubes/Tube Feeds (include total output/shift): RLQ NHAN - 23.5 cc serosang/ LLQ PEG - 200cc green / LLQ J tube     Lines: DUANE PICC/ L chest port    Accuchecks: N/A    Skin: ML + provena wound vac     Fall Risk Score: 11    Activity level? Up with 1 assist     Any scheduled procedures? No     Any safety concerns? Adequate pain control/ fall risk / bleeding risk     Other: encouraging patient independence/ mitigating anxiety      Problem: Adult Inpatient Plan of Care  Goal: Optimal Comfort and Wellbeing  Outcome: Ongoing, Progressing     Problem: Adult Inpatient Plan of Care  Goal: Absence of Hospital-Acquired Illness or Injury  Outcome: Ongoing, Progressing     Problem: Communication Impairment (Artificial Airway)  Goal: Effective Communication  Outcome: Ongoing, Progressing     Problem: Device-Related Complication Risk (Artificial Airway)  Goal: Optimal Device Function  Outcome: Ongoing, Progressing     Problem: Skin and Tissue Injury (Artificial Airway)  Goal: Absence of Device-Related Skin or Tissue Injury  Outcome: Ongoing, Progressing    Problem: Fall Injury Risk  Goal: Absence of Fall and Fall-Related Injury  Outcome: Ongoing, Progressing

## 2023-10-08 NOTE — NURSING
[facility] Plan of Care Note  Dx PMH Crohn's disease, POTS, gastric varices, alcohol abuse, bipolar disorder who was recently seen in surgical oncology clinic for evaluation of a gastric outlet obstruction.         Shift Events pain controlled See Mar.     Goals of Care: Pain control and keeping patient hemodynamically stable     Neuro: aao x 3     Vital Signs: Hypertensive   Respiratory: RA     Diet: low \fiber low reside     Is patient tolerating current diet? yes     GTTS: TPN infusing      Urine Output/Bowel Movement: oob to bedside commode      Drains/Tubes/Tube Feeds (include total output/shift):      Lines: PICC line to DUANE and accessed port to left chest wall        Accuchecks:none     Skin: healing surgical incision to left leg with steristrips     Fall Risk Score: see chart     Activity level? bed rest this shift

## 2023-10-08 NOTE — SUBJECTIVE & OBJECTIVE
"Interval History: Had some issues with pain control last night. Tolerated tube feeds at 20 mL/hr and is passing flatus. No Bms tho.    Medications:  Continuous Infusions:   lactated ringers 125 mL/hr at 10/07/23 0825    ROPIvacaine (PF) 2 mg/ml (0.2%) 0.1 mL/hr (10/06/23 0905)    ROPIvacaine (PF) 2 mg/ml (0.2%) 0.1 mL/hr (10/06/23 0905)    TPN ADULT CENTRAL LINE CUSTOM 100 mL/hr at 10/07/23 2232    TPN ADULT CENTRAL LINE CUSTOM       Scheduled Meds:   acetaminophen  999.0148 mg Oral Q8H    amLODIPine  5 mg Oral Daily    ARIPiprazole  5 mg Oral Daily    fludrocortisone  0.1 mg Oral Daily    lamoTRIgine  150 mg Oral BID    lipid (SMOFLIPID)  250 mL Intravenous Daily    lipid (SMOFLIPID)  250 mL Intravenous Daily    methocarbamol (ROBAXIN) IVPB  500 mg Intravenous Q6H    mirtazapine  15 mg Oral Nightly    pantoprazole  40 mg Intravenous BID    scopolamine  1 patch Transdermal Q3 Days    sodium chloride 0.9%  10 mL Intravenous Q6H     PRN Meds:HYDROmorphone, labetalol, levalbuterol, LIDOcaine (PF) 10 mg/ml (1%), melatonin, naloxone, ondansetron, oxyCODONE, oxyCODONE, prochlorperazine, sodium chloride 0.9%, Flushing PICC/Midline Protocol **AND** sodium chloride 0.9% **AND** sodium chloride 0.9%, sodium chloride 0.9%     Review of patient's allergies indicates:   Allergen Reactions    Tramadol Other (See Comments)     Seizures      Adhesive Other (See Comments)     STATES, " TEARS SKIN"     Demerol [meperidine]      Seizures      Hydrocodone Hallucinations    Reglan [metoclopramide]      Objective:     Vital Signs (Most Recent):  Temp: 98.3 °F (36.8 °C) (10/08/23 0739)  Pulse: (!) 116 (10/08/23 0739)  Resp: 18 (10/08/23 0739)  BP: (!) 144/82 (10/08/23 0739)  SpO2: 99 % (10/08/23 0739) Vital Signs (24h Range):  Temp:  [98.3 °F (36.8 °C)-99.1 °F (37.3 °C)] 98.3 °F (36.8 °C)  Pulse:  [] 116  Resp:  [14-20] 18  SpO2:  [96 %-99 %] 99 %  BP: (144-182)/(75-84) 144/82     Weight: 53.1 kg (117 lb)  Body mass index is 20.08 " kg/m².    Intake/Output - Last 3 Shifts         10/06 0700  10/07 0659 10/07 0700  10/08 0659 10/08 0700  10/09 0659    I.V. (mL/kg) 1072.4 (20.2) 2041.9 (38.5)     Blood       NG/GT 80 638     IV Piggyback 434.6 462.9     TPN 1992      Total Intake(mL/kg) 3579.1 (67.4) 3142.8 (59.2)     Urine (mL/kg/hr) 4571 (3.6) 4375 (3.4)     Drains 50 503.5     Stool  0     Total Output 4621 4878.5     Net -1041.9 -1735.7            Stool Occurrence  0 x              Physical Exam  Constitutional:       Appearance: Normal appearance.   HENT:      Head: Normocephalic and atraumatic.   Cardiovascular:      Rate and Rhythm: Normal rate.   Pulmonary:      Effort: Pulmonary effort is normal.   Abdominal:      General: Abdomen is flat.      Palpations: Abdomen is soft.      Tenderness: There is abdominal tenderness.      Comments: Multiple abdominal scars from previous surgical history  Midline incision now with skin staples and prevena in place to suction. G to gravity and J tube in place.  Ecchymosis around incision, not raised or edematous this AM.  Subcutaneous NHAN in place with SS output   Genitourinary:     Comments: Alba in place  Skin:     General: Skin is warm and dry.   Neurological:      General: No focal deficit present.      Mental Status: She is alert and oriented to person, place, and time.          Significant Labs:  I have reviewed all pertinent lab results within the past 24 hours.  CBC:   Recent Labs   Lab 10/08/23  0509   WBC 9.49   RBC 3.16*   HGB 9.4*   HCT 29.1*      MCV 92   MCH 29.7   MCHC 32.3     CMP:   Recent Labs   Lab 10/08/23  0509   *   CALCIUM 9.1   ALBUMIN 2.9*   PROT 6.5      K 4.0   CO2 26      BUN 17   CREATININE 0.6   ALKPHOS 125   ALT 26   AST 35   BILITOT 0.4       Significant Diagnostics:  I have reviewed all pertinent imaging results/findings within the past 24 hours.

## 2023-10-08 NOTE — ANESTHESIA POST-OP PAIN MANAGEMENT
"[facility]  Anesthesiology - Acute Pain Service  Consult Note      SUBJECTIVE:     Chief Complaint/Reason for Consult: Refractory abdominal pain    Surgical Procedure:   CREATION, BYPASS, DUODENUM, BY ANASTOMOSIS OF DUODENUM TO JEJUNUM,   PLACEMNENT OF GASTROSTOMY TUBE,   JEJUNOSTOMY TUBE (N/A)    Post Op Day: POD #3    History of Present Illness:  Patient is a 48 y.o. female with a past medical history significant for POTS, alcohol use, bipolar disorder, and Crohn's disease with recent gastric outlet obstruction admitted to Surgical Oncology for management of obstruction. Patient underwent duodenal-jejunal bypass with placement of  G and J tubes on Wed 10/4 and has had persistent midline abdominal pain extending from the xiphoid to the pubis which has been refractory to current medications including IV Tylenol and a Dilaudid PCA.    Interval History:  Patient with some discomfort overnight with decrease in opoid, received 2 one time pushes of IV dilaudid. Upon seeing in AM patient pain well controlled, she was comfortable in bed. Discussed possibility of pausing catheters tomorrow and she was in agreement.     Home Pain Medications:   Hydromorphone 2mg Q4H PRN         Review of patient's allergies indicates:   Allergen Reactions    Tramadol Other (See Comments)     Seizures      Adhesive Other (See Comments)     STATES, " TEARS SKIN"     Demerol [meperidine]      Seizures      Hydrocodone Hallucinations    Reglan [metoclopramide]         Past Medical History:   Diagnosis Date    Abnormal Pap smear     + HPV    Bipolar 1 disorder     Closed bimalleolar fracture of left ankle     Crohn's disease S/P surgery (ileum, possible gastroduodenal with ulcers)     1998-ileocectomy (9 inches ileum)    Encounter for blood transfusion     Gastric and duodenal ulcers of unclear etiology     Gastroparesis     H/O ETOH abuse     History of HSV-2 infection     Immunosuppressed status     Iron deficiency " anemia, multiple blood transfusions     POTS (postural orthostatic tachycardia syndrome)     Seizures     states last seizure 2009     Past Surgical History:   Procedure Laterality Date    ANKLE HARDWARE REMOVAL Left 9/6/2023    Procedure: REMOVAL, HARDWARE, ANKLE;  Surgeon: Michael Lacey MD;  Location: [facility];  Service: Orthopedics;  Laterality: Left;    APPENDECTOMY      BRAIN SURGERY  2009    fx skull due to seizure with 2 pins    BYPASS OF DUODENUM BY ANASTOMOSIS OF DUODENUM TO JEJUNUM N/A 10/4/2023    Procedure: CREATION, BYPASS, DUODENUM, BY ANASTOMOSIS OF DUODENUM TO JEJUNUM, PLACEMNENT OF GASTROSTOMY TUBE, JEJUNOSTOMY TUBE;  Surgeon: Kevin Mays MD;  [facility] 2[facility];  Service: General;  Laterality: N/A;    CHOLECYSTECTOMY      COLON SURGERY      Partial colectomy    COLONOSCOPY Left 7/10/2019    Procedure: COLONOSCOPY;  Surgeon: Papi Martinez Jr., MD;  Location: [facility];  Service: Endoscopy;  Laterality: Left;    COLONOSCOPY N/A 12/6/2022    Procedure: COLONOSCOPY;  Surgeon: Zandra Mallory MD;  Location: [facility] (4TH FLR);  Service: Endoscopy;  Laterality: N/A;  poor prep on 11/28/22.  per Dr Mallory-Miralax 5 days prior with gastroparesis diet-3 days full liquid. also Mag citrate day before     instr handed to pt-GT    EGD, WITH BALLOON DILATION  7/24/2023    Procedure: EGD, WITH BALLOON DILATION;  Surgeon: Huang Licea MD;  Location: [facility];  Service: Endoscopy;;    ESOPHAGOGASTRODUODENOSCOPY Left 6/5/2019    Procedure: EGD (ESOPHAGOGASTRODUODENOSCOPY);  Surgeon: ROLA Villagran MD;  Location: [facility];  Service: Endoscopy;  Laterality: Left;    ESOPHAGOGASTRODUODENOSCOPY Left 3/16/2020    Procedure: EGD (ESOPHAGOGASTRODUODENOSCOPY);  Surgeon: Clive Núñez MD;  Location: [facility];  Service: Endoscopy;  Laterality: Left;    ESOPHAGOGASTRODUODENOSCOPY N/A 4/17/2020    Procedure: EGD (ESOPHAGOGASTRODUODENOSCOPY);  Surgeon: Papi Martinez  MD Hina;  Location: [facility];  Service: Endoscopy;  Laterality: N/A;  with Push Enteroscopy    ESOPHAGOGASTRODUODENOSCOPY N/A 7/6/2020    Procedure: EGD (ESOPHAGOGASTRODUODENOSCOPY)   possible peg;  Surgeon: Papi Martinez Jr., MD;  Location: [facility];  Service: Endoscopy;  Laterality: N/A;    ESOPHAGOGASTRODUODENOSCOPY N/A 7/9/2020    Procedure: EGD (ESOPHAGOGASTRODUODENOSCOPY);  Surgeon: Papi Martinez Jr., MD;  Location: [facility];  Service: Endoscopy;  Laterality: N/A;  J or Peg tube    ESOPHAGOGASTRODUODENOSCOPY N/A 9/3/2020    Procedure: EGD (ESOPHAGOGASTRODUODENOSCOPY);  Surgeon: Papi Martinez Jr., MD;  Location: [facility];  Service: Endoscopy;  Laterality: N/A;  per drs order Stoma, Peds, w/single Lumen, J-Tube placement    ESOPHAGOGASTRODUODENOSCOPY N/A 12/29/2020    Procedure: EGD (ESOPHAGOGASTRODUODENOSCOPY);  Surgeon: Papi Martinez Jr., MD;  Location: [facility];  Service: Endoscopy;  Laterality: N/A;    ESOPHAGOGASTRODUODENOSCOPY N/A 2/23/2021    Procedure: EGD (ESOPHAGOGASTRODUODENOSCOPY);  Surgeon: Papi Martinez Jr., MD;  Location: [facility];  Service: Endoscopy;  Laterality: N/A;    ESOPHAGOGASTRODUODENOSCOPY N/A 2/3/2022    Procedure: EGD (ESOPHAGOGASTRODUODENOSCOPY);  Surgeon: Papi Martinez Jr., MD;  Location: [facility];  Service: Endoscopy;  Laterality: N/A;    ESOPHAGOGASTRODUODENOSCOPY N/A 8/13/2022    Procedure: EGD (ESOPHAGOGASTRODUODENOSCOPY);  Surgeon: Huang Licea MD;  Location: [facility];  Service: Gastroenterology;  Laterality: N/A;    ESOPHAGOGASTRODUODENOSCOPY N/A 10/16/2022    Procedure: EGD (ESOPHAGOGASTRODUODENOSCOPY);  Surgeon: Huang Licea MD;  Location: [facility];  Service: Gastroenterology;  Laterality: N/A;    ESOPHAGOGASTRODUODENOSCOPY N/A 11/28/2022    Procedure: EGD (ESOPHAGOGASTRODUODENOSCOPY);  Surgeon: Zandra Mallory MD;  Location: [address]);  Service: Endoscopy;  Laterality: N/A;    ESOPHAGOGASTRODUODENOSCOPY N/A 7/24/2023     Procedure: EGD (ESOPHAGOGASTRODUODENOSCOPY);  Surgeon: Huang Licea MD;  Location: [facility] ENDO;  Service: Endoscopy;  Laterality: N/A;    ESOPHAGOGASTRODUODENOSCOPY N/A 9/15/2023    Procedure: EGD (ESOPHAGOGASTRODUODENOSCOPY);  Surgeon: ROLA Villagran MD;  Location: [facility];  Service: Endoscopy;  Laterality: N/A;    FEMUR FRACTURE SURGERY Right 2006    HYSTERECTOMY      INSERTION OF TUNNELED CENTRAL VENOUS CATHETER (CVC) WITH SUBCUTANEOUS PORT Left 2019    Procedure: [id number];  Surgeon: Miquel Sargent MD;  Location: [facility];  Service: General;  Laterality: Left;    INSERTION OR REPLACEMENT OF PERCUTANEOUS ENDOSCOPIC JEJUNOSTOMY TUBE  2020    Procedure: INSERTION OR REPLACEMENT, JEJUNOSTOMY TUBE, PERCUTANEOUS, ENDOSCOPIC;  Surgeon: Papi Martniez Jr., MD;  Location: [facility];  Service: Endoscopy;;    laparoscopy for endometriosis      x5    LEFT HEART CATHETERIZATION Left 3/29/2023    Procedure: Left heart cath;  Surgeon: Riley Tipton MD;  Location: [facility] CATH;  Service: Cardiology;  Laterality: Left;    OOPHORECTOMY      right ovary removed only    OPEN REDUCTION AND INTERNAL FIXATION (ORIF) OF INJURY OF ANKLE Left 4/10/2023    Procedure: ORIF, ANKLE;  Surgeon: Michael Lacey MD;  Location: [facility];  Service: Orthopedics;  Laterality: Left;    PEG TUBE REMOVAL  2021    Procedure: REMOVAL, PEG TUBE;  Surgeon: Papi Martinez Jr., MD;  Location: [facility];  Service: Endoscopy;;    TONSILLECTOMY       Family History   Adopted: Yes     Social History     Tobacco Use    Smoking status: Former     Current packs/day: 0.00     Average packs/day: 0.5 packs/day for 6.0 years (3.0 ttl pk-yrs)     Types: Cigarettes     Start date: 5/10/2005     Quit date: 5/10/2011     Years since quittin.4    Smokeless tobacco: Never   Substance Use Topics    Alcohol use: Yes     Comment: socially    Drug use: Never           OBJECTIVE:     Current Vital Signs       Vital Signs  Range (Last 24H):  Temp:  [36.7 °C (98 °F)-37.3 °C (99.1 °F)]   Pulse:  []   Resp:  [14-20]   BP: (144-168)/(75-82)   SpO2:  [96 %-100 %]       Physical Exam:  At Rest:   3    Faces  Physical Exam  Constitutional:       General: She is not in acute distress.     Appearance: She is not toxic-appearing.   HENT:      Head: Normocephalic and atraumatic.      Nose: Nose normal.   Cardiovascular:      Rate and Rhythm: Normal rate and regular rhythm.   Pulmonary:      Effort: Pulmonary effort is normal. No respiratory distress.   Neurological:      Mental Status: Mental status is at baseline.         Laboratory:  CBC:   Recent Labs     10/07/23  0437 10/08/23  0509   WBC 9.78 9.49   RBC 2.88* 3.16*   HGB 8.5* 9.4*   HCT 26.6* 29.1*   * 191   MCV 92 92   MCH 29.5 29.7   MCHC 32.0 32.3       CMP:   Recent Labs     10/07/23  0437 10/08/23  0509    143   K 3.2* 4.0   CO2 25 26    109   BUN 12 17   CREATININE 0.6 0.6   GLU 95 117*   MG 1.6 1.9   PHOS 3.3 3.1   CALCIUM 8.9 9.1   ALBUMIN 2.8* 2.9*   PROT 6.2 6.5   ALKPHOS 86 125   ALT 25 26   AST 48* 35   BILITOT 0.5 0.4          ASSESSMENT/PLAN:     Active Hospital Problems    Diagnosis  POA    *Duodenal stricture [K31.5]  Yes    Encounter for weaning from ventilator [Z99.11]  Not Applicable      Resolved Hospital Problems   No resolved problems to display.         Plan:   1) Continue multimodal analgesia as able given NPO status as follows:    - Continue acetaminophen 1000mg IV Q6H    - Continue methocarbamol 500mg IV Q6H    - oxycodone 5mg/10mg per JTube Q4H PRN moderate/severe pain     2) If doing well in AM (10/9/23) will plan to pause bilateral JESENIA catheters and monitor response.     Case discussed with staff, Dr. Nguyen; final recommendations per attestation above.     Thank you for your consult and allowing us to participate in the care of this patient. We will continue to follow along. Please call the Acute Pain Service at [phone] or Anesthesia  at [phone] if you have any questions or concerns.    Eliot Escalante DO  [facility] Resident CA-2/PGY-3  Department of Anesthesiology  Acute Pain Service - [phone] or [phone]  [facility]

## 2023-10-08 NOTE — PROGRESS NOTES
"[facility] - [facility]  General Surgery  Progress Note    Subjective:     History of Present Illness:  No notes on file    Post-Op Info:  Procedure(s) (LRB):  LAPAROTOMY, EXPLORATORY (N/A)  WASHOUT, HEMATOMA   3 Days Post-Op     Interval History: Had some issues with pain control last night. Tolerated tube feeds at 20 mL/hr and is passing flatus. No Bms tho.    Medications:  Continuous Infusions:   lactated ringers 125 mL/hr at 10/07/23 0825    ROPIvacaine (PF) 2 mg/ml (0.2%) 0.1 mL/hr (10/06/23 0905)    ROPIvacaine (PF) 2 mg/ml (0.2%) 0.1 mL/hr (10/06/23 0905)    TPN ADULT CENTRAL LINE CUSTOM 100 mL/hr at 10/07/23 2232    TPN ADULT CENTRAL LINE CUSTOM       Scheduled Meds:   acetaminophen  999.0148 mg Oral Q8H    amLODIPine  5 mg Oral Daily    ARIPiprazole  5 mg Oral Daily    fludrocortisone  0.1 mg Oral Daily    lamoTRIgine  150 mg Oral BID    lipid (SMOFLIPID)  250 mL Intravenous Daily    lipid (SMOFLIPID)  250 mL Intravenous Daily    methocarbamol (ROBAXIN) IVPB  500 mg Intravenous Q6H    mirtazapine  15 mg Oral Nightly    pantoprazole  40 mg Intravenous BID    scopolamine  1 patch Transdermal Q3 Days    sodium chloride 0.9%  10 mL Intravenous Q6H     PRN Meds:HYDROmorphone, labetalol, levalbuterol, LIDOcaine (PF) 10 mg/ml (1%), melatonin, naloxone, ondansetron, oxyCODONE, oxyCODONE, prochlorperazine, sodium chloride 0.9%, Flushing PICC/Midline Protocol **AND** sodium chloride 0.9% **AND** sodium chloride 0.9%, sodium chloride 0.9%     Review of patient's allergies indicates:   Allergen Reactions    Tramadol Other (See Comments)     Seizures      Adhesive Other (See Comments)     STATES, " TEARS SKIN"     Demerol [meperidine]      Seizures      Hydrocodone Hallucinations    Reglan [metoclopramide]      Objective:     Vital Signs (Most Recent):  Temp: 98.3 °F (36.8 °C) (10/08/23 0739)  Pulse: (!) 116 (10/08/23 0739)  Resp: 18 (10/08/23 0739)  BP: (!) 144/82 (10/08/23 0739)  SpO2: 99 % (10/08/23 " 0739) Vital Signs (24h Range):  Temp:  [98.3 °F (36.8 °C)-99.1 °F (37.3 °C)] 98.3 °F (36.8 °C)  Pulse:  [] 116  Resp:  [14-20] 18  SpO2:  [96 %-99 %] 99 %  BP: (144-182)/(75-84) 144/82     Weight: 53.1 kg (117 lb)  Body mass index is 20.08 kg/m².    Intake/Output - Last 3 Shifts         10/06 0700  10/07 0659 10/07 0700  10/08 0659 10/08 0700  10/09 0659    I.V. (mL/kg) 1072.4 (20.2) 2041.9 (38.5)     Blood       NG/GT 80 638     IV Piggyback 434.6 462.9     TPN 1992      Total Intake(mL/kg) 3579.1 (67.4) 3142.8 (59.2)     Urine (mL/kg/hr) 4571 (3.6) 4375 (3.4)     Drains 50 503.5     Stool  0     Total Output 4621 4878.5     Net -1041.9 -1735.7            Stool Occurrence  0 x              Physical Exam  Constitutional:       Appearance: Normal appearance.   HENT:      Head: Normocephalic and atraumatic.   Cardiovascular:      Rate and Rhythm: Normal rate.   Pulmonary:      Effort: Pulmonary effort is normal.   Abdominal:      General: Abdomen is flat.      Palpations: Abdomen is soft.      Tenderness: There is abdominal tenderness.      Comments: Multiple abdominal scars from previous surgical history  Midline incision now with skin staples and prevena in place to suction. G to gravity and J tube in place.  Ecchymosis around incision, not raised or edematous this AM.  Subcutaneous NHAN in place with SS output   Genitourinary:     Comments: Alba in place  Skin:     General: Skin is warm and dry.   Neurological:      General: No focal deficit present.      Mental Status: She is alert and oriented to person, place, and time.          Significant Labs:  I have reviewed all pertinent lab results within the past 24 hours.  CBC:   Recent Labs   Lab 10/08/23  0509   WBC 9.49   RBC 3.16*   HGB 9.4*   HCT 29.1*      MCV 92   MCH 29.7   MCHC 32.3     CMP:   Recent Labs   Lab 10/08/23  0509   *   CALCIUM 9.1   ALBUMIN 2.9*   PROT 6.5      K 4.0   CO2 26      BUN 17   CREATININE 0.6   ALKPHOS 125    ALT 26   AST 35   BILITOT 0.4       Significant Diagnostics:  I have reviewed all pertinent imaging results/findings within the past 24 hours.    Assessment/Plan:     * Duodenal stricture  Angela Trascher Lejeune is a 48 y.o. female with a PMH Crohn's disease, POTS, gastric varices, alcohol abuse, bipolar disorder who was recently seen in surgical oncology clinic for evaluation of a gastric outlet obstruction. Now s/p Delfino-Y gastrojejunostomy, G tube, and J tube placement 10/4/23 c/b hematoma s/p takback to OR on 10/5/23    - MM pain meds. Tylenol, robaxin, oxycodone (change to PO instead of per J tube). Bilateral ESPs per APS. Ideally pause tomorrow morning to see how her pain is and then dc if okay  - PRN nausea meds  - NPO, okay for PO meds  - Unclamp GT q4 hrs. Can clamp otherwise so she can be ambulatory  - Increase JT feeds  - TPN. Plan to order for today. Likely allow to run out tomorrow then DC if still doing well with tube feeds  - Norvasc 5  - OOB/ambulate  - PT/OT  - Home meds  - DVT ppx    - Dispo: Plan for DC Tuesday vs Wednesday          Ledy Espinoza MD  General Surgery  [facility]

## 2023-10-09 LAB
ALBUMIN SERPL BCP-MCNC: 2.5 G/DL (ref 3.5–5.2)
ALP SERPL-CCNC: 103 U/L (ref 55–135)
ALT SERPL W/O P-5'-P-CCNC: 19 U/L (ref 10–44)
ANION GAP SERPL CALC-SCNC: 9 MMOL/L (ref 8–16)
AST SERPL-CCNC: 19 U/L (ref 10–40)
BASOPHILS # BLD AUTO: 0.05 K/UL (ref 0–0.2)
BASOPHILS NFR BLD: 0.8 % (ref 0–1.9)
BILIRUB SERPL-MCNC: 0.3 MG/DL (ref 0.1–1)
BUN SERPL-MCNC: 30 MG/DL (ref 6–20)
CALCIUM SERPL-MCNC: 8.7 MG/DL (ref 8.7–10.5)
CHLORIDE SERPL-SCNC: 106 MMOL/L (ref 95–110)
CO2 SERPL-SCNC: 24 MMOL/L (ref 23–29)
CREAT SERPL-MCNC: 0.9 MG/DL (ref 0.5–1.4)
DIFFERENTIAL METHOD: ABNORMAL
EOSINOPHIL # BLD AUTO: 0.3 K/UL (ref 0–0.5)
EOSINOPHIL NFR BLD: 4 % (ref 0–8)
ERYTHROCYTE [DISTWIDTH] IN BLOOD BY AUTOMATED COUNT: 17.8 % (ref 11.5–14.5)
EST. GFR  (NO RACE VARIABLE): >60 ML/MIN/1.73 M^2
GLUCOSE SERPL-MCNC: 92 MG/DL (ref 70–110)
HCT VFR BLD AUTO: 25.3 % (ref 37–48.5)
HGB BLD-MCNC: 7.8 G/DL (ref 12–16)
IMM GRANULOCYTES # BLD AUTO: 0.05 K/UL (ref 0–0.04)
IMM GRANULOCYTES NFR BLD AUTO: 0.8 % (ref 0–0.5)
LYMPHOCYTES # BLD AUTO: 1.5 K/UL (ref 1–4.8)
LYMPHOCYTES NFR BLD: 23.1 % (ref 18–48)
MAGNESIUM SERPL-MCNC: 2.1 MG/DL (ref 1.6–2.6)
MCH RBC QN AUTO: 30 PG (ref 27–31)
MCHC RBC AUTO-ENTMCNC: 30.8 G/DL (ref 32–36)
MCV RBC AUTO: 97 FL (ref 82–98)
MONOCYTES # BLD AUTO: 0.8 K/UL (ref 0.3–1)
MONOCYTES NFR BLD: 12.3 % (ref 4–15)
NEUTROPHILS # BLD AUTO: 3.8 K/UL (ref 1.8–7.7)
NEUTROPHILS NFR BLD: 59 % (ref 38–73)
NRBC BLD-RTO: 0 /100 WBC
PHOSPHATE SERPL-MCNC: 4.3 MG/DL (ref 2.7–4.5)
PLATELET # BLD AUTO: 161 K/UL (ref 150–450)
PMV BLD AUTO: 11.2 FL (ref 9.2–12.9)
POTASSIUM SERPL-SCNC: 3.8 MMOL/L (ref 3.5–5.1)
PROT SERPL-MCNC: 5.4 G/DL (ref 6–8.4)
RBC # BLD AUTO: 2.6 M/UL (ref 4–5.4)
SODIUM SERPL-SCNC: 139 MMOL/L (ref 136–145)
WBC # BLD AUTO: 6.48 K/UL (ref 3.9–12.7)

## 2023-10-09 PROCEDURE — 25000003 PHARM REV CODE 250

## 2023-10-09 PROCEDURE — 80053 COMPREHEN METABOLIC PANEL: CPT | Performed by: STUDENT IN AN ORGANIZED HEALTH CARE EDUCATION/TRAINING PROGRAM

## 2023-10-09 PROCEDURE — 63600175 PHARM REV CODE 636 W HCPCS

## 2023-10-09 PROCEDURE — 63600175 PHARM REV CODE 636 W HCPCS: Performed by: STUDENT IN AN ORGANIZED HEALTH CARE EDUCATION/TRAINING PROGRAM

## 2023-10-09 PROCEDURE — 20600001 HC STEP DOWN PRIVATE ROOM

## 2023-10-09 PROCEDURE — 84100 ASSAY OF PHOSPHORUS: CPT | Performed by: STUDENT IN AN ORGANIZED HEALTH CARE EDUCATION/TRAINING PROGRAM

## 2023-10-09 PROCEDURE — 25000003 PHARM REV CODE 250: Performed by: STUDENT IN AN ORGANIZED HEALTH CARE EDUCATION/TRAINING PROGRAM

## 2023-10-09 PROCEDURE — A4216 STERILE WATER/SALINE, 10 ML: HCPCS | Performed by: STUDENT IN AN ORGANIZED HEALTH CARE EDUCATION/TRAINING PROGRAM

## 2023-10-09 PROCEDURE — 94761 N-INVAS EAR/PLS OXIMETRY MLT: CPT

## 2023-10-09 PROCEDURE — 99231 SBSQ HOSP IP/OBS SF/LOW 25: CPT | Mod: ,,, | Performed by: SURGERY

## 2023-10-09 PROCEDURE — 97116 GAIT TRAINING THERAPY: CPT | Mod: CQ

## 2023-10-09 PROCEDURE — 83735 ASSAY OF MAGNESIUM: CPT | Performed by: STUDENT IN AN ORGANIZED HEALTH CARE EDUCATION/TRAINING PROGRAM

## 2023-10-09 PROCEDURE — 99231 PR SUBSEQUENT HOSPITAL CARE,LEVL I: ICD-10-PCS | Mod: ,,, | Performed by: SURGERY

## 2023-10-09 PROCEDURE — 97530 THERAPEUTIC ACTIVITIES: CPT | Mod: CQ

## 2023-10-09 PROCEDURE — 85025 COMPLETE CBC W/AUTO DIFF WBC: CPT | Performed by: STUDENT IN AN ORGANIZED HEALTH CARE EDUCATION/TRAINING PROGRAM

## 2023-10-09 PROCEDURE — 94664 DEMO&/EVAL PT USE INHALER: CPT

## 2023-10-09 PROCEDURE — 99900035 HC TECH TIME PER 15 MIN (STAT)

## 2023-10-09 RX ORDER — TALC
6 POWDER (GRAM) TOPICAL NIGHTLY PRN
Status: DISCONTINUED | OUTPATIENT
Start: 2023-10-09 | End: 2023-10-11 | Stop reason: HOSPADM

## 2023-10-09 RX ORDER — PANTOPRAZOLE SODIUM 40 MG/1
40 TABLET, DELAYED RELEASE ORAL DAILY
Status: DISCONTINUED | OUTPATIENT
Start: 2023-10-09 | End: 2023-10-11 | Stop reason: HOSPADM

## 2023-10-09 RX ORDER — ENOXAPARIN SODIUM 100 MG/ML
40 INJECTION SUBCUTANEOUS EVERY 24 HOURS
Status: DISCONTINUED | OUTPATIENT
Start: 2023-10-09 | End: 2023-10-11 | Stop reason: HOSPADM

## 2023-10-09 RX ORDER — ACETAMINOPHEN 325 MG/1
650 TABLET ORAL EVERY 6 HOURS
Status: DISCONTINUED | OUTPATIENT
Start: 2023-10-09 | End: 2023-10-11 | Stop reason: HOSPADM

## 2023-10-09 RX ADMIN — ARIPIPRAZOLE 5 MG: 5 TABLET ORAL at 09:10

## 2023-10-09 RX ADMIN — METHOCARBAMOL 500 MG: 100 INJECTION, SOLUTION INTRAMUSCULAR; INTRAVENOUS at 01:10

## 2023-10-09 RX ADMIN — PANTOPRAZOLE SODIUM 40 MG: 40 TABLET, DELAYED RELEASE ORAL at 09:10

## 2023-10-09 RX ADMIN — FLUDROCORTISONE ACETATE 0.1 MG: 0.1 TABLET ORAL at 09:10

## 2023-10-09 RX ADMIN — OXYCODONE HYDROCHLORIDE 10 MG: 10 TABLET ORAL at 01:10

## 2023-10-09 RX ADMIN — AMLODIPINE BESYLATE 5 MG: 5 TABLET ORAL at 09:10

## 2023-10-09 RX ADMIN — OXYCODONE HYDROCHLORIDE 10 MG: 10 TABLET ORAL at 03:10

## 2023-10-09 RX ADMIN — OXYCODONE HYDROCHLORIDE 10 MG: 10 TABLET ORAL at 08:10

## 2023-10-09 RX ADMIN — HYDROMORPHONE HYDROCHLORIDE 0.5 MG: 0.5 INJECTION, SOLUTION INTRAMUSCULAR; INTRAVENOUS; SUBCUTANEOUS at 05:10

## 2023-10-09 RX ADMIN — OXYCODONE HYDROCHLORIDE 10 MG: 10 TABLET ORAL at 09:10

## 2023-10-09 RX ADMIN — METHOCARBAMOL 500 MG: 100 INJECTION, SOLUTION INTRAMUSCULAR; INTRAVENOUS at 06:10

## 2023-10-09 RX ADMIN — Medication 10 ML: at 06:10

## 2023-10-09 RX ADMIN — ENOXAPARIN SODIUM 40 MG: 40 INJECTION SUBCUTANEOUS at 05:10

## 2023-10-09 RX ADMIN — ACETAMINOPHEN 650 MG: 325 TABLET ORAL at 01:10

## 2023-10-09 RX ADMIN — Medication 10 ML: at 12:10

## 2023-10-09 RX ADMIN — LAMOTRIGINE 150 MG: 100 TABLET ORAL at 09:10

## 2023-10-09 RX ADMIN — ACETAMINOPHEN 650 MG: 325 TABLET ORAL at 05:10

## 2023-10-09 RX ADMIN — MIRTAZAPINE 15 MG: 15 TABLET, ORALLY DISINTEGRATING ORAL at 08:10

## 2023-10-09 RX ADMIN — LAMOTRIGINE 150 MG: 100 TABLET ORAL at 08:10

## 2023-10-09 NOTE — PROGRESS NOTES
"[facility]ly Ocasio - [facility]  General Surgery  Progress Note    Subjective:     History of Present Illness:  No notes on file    Post-Op Info:  Procedure(s) (LRB):  LAPAROTOMY, EXPLORATORY (N/A)  WASHOUT, HEMATOMA   4 Days Post-Op     Interval History: Pain better controlled yesterday.     Medications:  Continuous Infusions:   lactated ringers 125 mL/hr at 10/08/23 1833    ROPIvacaine (PF) 2 mg/ml (0.2%) 0.1 mL/hr (10/08/23 1136)    ROPIvacaine (PF) 2 mg/ml (0.2%) 0.1 mL/hr (10/08/23 1100)     Scheduled Meds:   acetaminophen  999.0148 mg Oral Q8H    amLODIPine  5 mg Oral Daily    ARIPiprazole  5 mg Oral Daily    fludrocortisone  0.1 mg Oral Daily    lamoTRIgine  150 mg Oral BID    lipid (SMOFLIPID)  250 mL Intravenous Daily    methocarbamol (ROBAXIN) IVPB  500 mg Intravenous Q6H    mirtazapine  15 mg Oral Nightly    pantoprazole  40 mg Intravenous BID    scopolamine  1 patch Transdermal Q3 Days    sodium chloride 0.9%  10 mL Intravenous Q6H     PRN Meds:labetalol, levalbuterol, LIDOcaine (PF) 10 mg/ml (1%), melatonin, naloxone, ondansetron, oxyCODONE, oxyCODONE, prochlorperazine, sodium chloride 0.9%, Flushing PICC/Midline Protocol **AND** sodium chloride 0.9% **AND** sodium chloride 0.9%, sodium chloride 0.9%     Review of patient's allergies indicates:   Allergen Reactions    Tramadol Other (See Comments)     Seizures      Adhesive Other (See Comments)     STATES, " TEARS SKIN"     Demerol [meperidine]      Seizures      Hydrocodone Hallucinations    Reglan [metoclopramide]      Objective:     Vital Signs (Most Recent):  Temp: 98.3 °F (36.8 °C) (10/09/23 0532)  Pulse: 89 (10/09/23 0532)  Resp: 17 (10/09/23 0543)  BP: (!) 99/51 (10/09/23 0532)  SpO2: 98 % (10/09/23 0532) Vital Signs (24h Range):  Temp:  [97.5 °F (36.4 °C)-98.3 °F (36.8 °C)] 98.3 °F (36.8 °C)  Pulse:  [] 89  Resp:  [17-18] 17  SpO2:  [98 %-100 %] 98 %  BP: ()/(51-77) 99/51     Weight: 53.1 kg (117 lb)  Body mass index is 20.08 " kg/m².    Intake/Output - Last 3 Shifts         10/07 0700  10/08 0659 10/08 0700  10/09 0659 10/09 0700  10/10 0659    I.V. (mL/kg) 2041.9 (38.5) 1324 (24.9)     NG/ 60     IV Piggyback 462.9      TPN       Total Intake(mL/kg) 3142.8 (59.2) 1384 (26.1)     Urine (mL/kg/hr) 4375 (3.4) 3000 (2.4)     Drains 503.5 115     Stool 0 0     Total Output 4878.5 3115     Net -1735.7 -1731            Urine Occurrence  1 x     Stool Occurrence 0 x 1 x              Physical Exam  Constitutional:       Appearance: Normal appearance.   HENT:      Head: Normocephalic and atraumatic.   Cardiovascular:      Rate and Rhythm: Normal rate.   Pulmonary:      Effort: Pulmonary effort is normal.   Abdominal:      General: Abdomen is flat.      Palpations: Abdomen is soft.      Tenderness: There is abdominal tenderness.      Comments: Multiple abdominal scars from previous surgical history  Midline incision now with skin staples and prevena in place to suction. G to gravity and J tube in place.  Ecchymosis around incision, not raised or edematous this AM.  Subcutaneous NHAN in place with SS output   Skin:     General: Skin is warm and dry.   Neurological:      General: No focal deficit present.      Mental Status: She is alert and oriented to person, place, and time.          Significant Labs:  I have reviewed all pertinent lab results within the past 24 hours.    Significant Diagnostics:  I have reviewed all pertinent imaging results/findings within the past 24 hours.    Assessment/Plan:     * Duodenal stricture  Angela Trascher Lejeune is a 48 y.o. female with a PMH Crohn's disease, POTS, gastric varices, alcohol abuse, bipolar disorder who was recently seen in surgical oncology clinic for evaluation of a gastric outlet obstruction. Now s/p Delfino-Y gastrojejunostomy, G tube, and J tube placement 10/4/23 c/b hematoma s/p takback to OR on 10/5/23    - MM pain meds. Tylenol, robaxin, oxycodone (change to PO instead of per J tube).  Bilateral ESPs per APS. Ideally dc these today  - PRN nausea meds  - advance to full liquid diet today  - Unclamp GT q4 hrs. Can clamp otherwise so she can be ambulatory  - Increase JT feeds to 40  - NHAN with minimal SS output, remove today  - TPN off  - Norvasc 5  - OOB/ambulate  - PT/OT  - Home meds  - DVT ppx    - Dispo: Plan for DC Tuesday vs Wednesday          Shanta Tran MD  General Surgery  [facility]

## 2023-10-09 NOTE — PROGRESS NOTES
"[facility]  Adult Nutrition  Progress Note    SUMMARY       Recommendations    1) Continue TF: Margie Tubbs Standard 1.4 @ 40ml/hr to provide 1344 kcals, 60 g PRO, 691 ml fluid w/ additional FWF per MD  2) Continue FLD, ADAT per MD   3) Following  Goals: Meet % een/epn by next rd f/u  Nutrition Goal Status: progressing towards goal  Communication of RD Recs: other (comment) (POC)    Assessment and Plan    Nutrition Problem  Inadequate energy intake     Related to (etiology):   Inadequate oral intake     Signs and Symptoms (as evidenced by):   CLD, on TPN      Interventions/Recommendations (treatment strategy):  Collaboration with other providers     Nutrition Diagnosis Status:   Continues     Reason for Assessment    Reason For Assessment: RD follow-up  Diagnosis: gastrointestinal disease  Relevant Medical History: Crohn's disease, POTS, gastric varices, alcohol abuse  Interdisciplinary Rounds: did not attend  General Information Comments: RD f/u. Pt endorses good appetite but dislikes food and isn't eating much. Endorses nausea. TF running at 40ml/hr. No smoflipids running. RD following    10/10: RD provided FLD diet per NP request w/ handout and RD contact information.     Nutrition Discharge Planning: adequate intake    Nutrition Risk Screen    Nutrition Risk Screen: tube feeding or parenteral nutrition    Nutrition/Diet History    Spiritual, Cultural Beliefs, Jew Practices, Values that Affect Care: no  Food Allergies: NKFA    Anthropometrics    Temp: 98.7 °F (37.1 °C)  Height Method: Stated  Height: 5' 4" (162.6 cm)  Height (inches): 64 in  Weight Method: Bed Scale  Weight: 53.1 kg (117 lb)  Weight (lb): 117 lb  Ideal Body Weight (IBW), Female: 120 lb  % Ideal Body Weight, Female (lb): 97.42 %  BMI (Calculated): 20.1  BMI Grade: 18.5-24.9 - normal       Lab/Procedures/Meds    Pertinent Labs Reviewed: reviewed  Pertinent Labs Comments: H/H: 7.8/25.3, MCHC: 30.8, BUN: 30  Pertinent Medications " Reviewed: reviewed  Pertinent Medications Comments: pantoprazole, NaCl       Estimated/Assessed Needs    Weight Used For Calorie Calculations: 53.1 kg (117 lb)  Energy Calorie Requirements (kcal): 8419-8027 (25-30 kcal/kg)  Energy Need Method: Kcal/kg  Protein Requirements: 63-74 (1.2-1.4 g/kg)  Weight Used For Protein Calculations: 53.1 kg (117 lb)     Estimated Fluid Requirement Method: RDA Method  RDA Method (mL): 1326         Nutrition Prescription Ordered    Current Diet Order: FLD  Current Nutrition Support Formula Ordered: Other (Comment) (215D + 95 AA + IV Lipids)    Evaluation of Received Nutrient/Fluid Intake    Parenteral Calories (kcal): 1611  Parenteral Protein (gm): 95  Lipid Calories (kcals): 500 kcals  GIR (Glucose Infusion Rate) (mg/kg/min): 2.79 mg/kg/min  % Kcal Needs: 100  % Protein Needs: 128  I/O: -8.1 L since admit  Comments: LBM 10/4  Tolerance: tolerating  % Intake of Estimated Energy Needs: 75 - 100 %  % Meal Intake: 0 - 25 %    Nutrition Risk    Level of Risk/Frequency of Follow-up: low (f/u 1x/week)     Monitor and Evaluation    Food and Nutrient Intake: energy intake, food and beverage intake, parenteral nutrition intake  Food and Nutrient Adminstration: diet order, enteral and parenteral nutrition administration  Physical Activity and Function: nutrition-related ADLs and IADLs  Anthropometric Measurements: height/length, weight, weight change, body mass index  Biochemical Data, Medical Tests and Procedures: electrolyte and renal panel, gastrointestinal profile, glucose/endocrine profile, inflammatory profile, lipid profile     Nutrition Follow-Up    RD Follow-up?: Yes    Brenda Sutton RD, LDN

## 2023-10-09 NOTE — NURSING
Patient requested not to be woken up at midnight once pain is relieved. Medications administered ,VSS. Resting comfortably.

## 2023-10-09 NOTE — ASSESSMENT & PLAN NOTE
Angela Trascher Lejeune is a 48 y.o. female with a PMH Crohn's disease, POTS, gastric varices, alcohol abuse, bipolar disorder who was recently seen in surgical oncology clinic for evaluation of a gastric outlet obstruction. Now s/p Delfino-Y gastrojejunostomy, G tube, and J tube placement 10/4/23 c/b hematoma s/p takback to OR on 10/5/23    - MM pain meds. Tylenol, robaxin, oxycodone (change to PO instead of per J tube). Bilateral ESPs per APS. Ideally dc these today  - PRN nausea meds  - advance to full liquid diet today  - Unclamp GT q4 hrs. Can clamp otherwise so she can be ambulatory  - Increase JT feeds to 40  - NHAN with minimal SS output, remove today  - TPN off  - Norvasc 5  - OOB/ambulate  - PT/OT  - Home meds  - DVT ppx    - Dispo: Plan for DC Tuesday vs Wednesday

## 2023-10-09 NOTE — CONSULTS
[facility]  Anesthesiology - Acute Pain Service  Consult Note    Consult received and patient discussed with Surgical Oncology team. Will plan to place bilateral JESENIA catheters for increased pain control.      Please see full consult note for complete evaluation, assessment, and recommendations.    Jean-Claude Burris MD, PGY-4  Department of Anesthesiology  Acute Pain Service - [phone] or [phone]  [facility]

## 2023-10-09 NOTE — ANESTHESIA POST-OP PAIN MANAGEMENT
"[facility]  Anesthesiology - Acute Pain Service  Consult Note      SUBJECTIVE:     Chief Complaint/Reason for Consult: Refractory abdominal pain    Surgical Procedure:   CREATION, BYPASS, DUODENUM, BY ANASTOMOSIS OF DUODENUM TO JEJUNUM,   PLACEMNENT OF GASTROSTOMY TUBE,   JEJUNOSTOMY TUBE (N/A)    Post Op Day: POD #5 s/p initial procedure above, POD #4 s/p hematoma evacuation.     Bilateral JESENIA Catheter Day #4    History of Present Illness:  Patient is a 48 y.o. female with a past medical history significant for POTS, alcohol use, bipolar disorder, and Crohn's disease with recent gastric outlet obstruction admitted to Surgical Oncology for management of obstruction. Patient underwent duodenal-jejunal bypass with placement of  G and J tubes on Wed 10/4 and has had persistent midline abdominal pain extending from the xiphoid to the pubis which has been refractory to current medications including IV Tylenol and a Dilaudid PCA.    Interval History:  No significant changes overnight. Patient received PRN oxycodone 10mg x 2 and PRN hydromorphone 0.5mg IV x 4 in last 24 hours. Discussed importance of opting for PO medications in lieu of IV medications for longer pain control. Patient in agreement with plan to discontinue catheters today.    Home Pain Medications:   Hydromorphone 2mg Q4H PRN       Review of patient's allergies indicates:   Allergen Reactions    Tramadol Other (See Comments)     Seizures      Adhesive Other (See Comments)     STATES, " TEARS SKIN"     Demerol [meperidine]      Seizures      Hydrocodone Hallucinations    Reglan [metoclopramide]         Past Medical History:   Diagnosis Date    Abnormal Pap smear     + HPV    Bipolar 1 disorder     Closed bimalleolar fracture of left ankle     Crohn's disease S/P surgery (ileum, possible gastroduodenal with ulcers)     1998-ileocectomy (9 inches ileum)    Encounter for blood transfusion     Gastric and duodenal ulcers of unclear etiology "     Gastroparesis     H/O ETOH abuse     History of HSV-2 infection     Immunosuppressed status     Iron deficiency anemia, multiple blood transfusions     POTS (postural orthostatic tachycardia syndrome)     Seizures     states last seizure 2009     Past Surgical History:   Procedure Laterality Date    ANKLE HARDWARE REMOVAL Left 9/6/2023    Procedure: REMOVAL, HARDWARE, ANKLE;  Surgeon: Michael Lacey MD;  Location: [facility];  Service: Orthopedics;  Laterality: Left;    APPENDECTOMY      BRAIN SURGERY  2009    fx skull due to seizure with 2 pins    BYPASS OF DUODENUM BY ANASTOMOSIS OF DUODENUM TO JEJUNUM N/A 10/4/2023    Procedure: CREATION, BYPASS, DUODENUM, BY ANASTOMOSIS OF DUODENUM TO JEJUNUM, PLACEMNENT OF GASTROSTOMY TUBE, JEJUNOSTOMY TUBE;  Surgeon: Kevin Mays MD;  Location: [facility] OR 2ND FLR;  Service: General;  Laterality: N/A;    CHOLECYSTECTOMY      COLON SURGERY      Partial colectomy    COLONOSCOPY Left 7/10/2019    Procedure: COLONOSCOPY;  Surgeon: Papi Martinez Jr., MD;  Location: [facility];  Service: Endoscopy;  Laterality: Left;    COLONOSCOPY N/A 12/6/2022    Procedure: COLONOSCOPY;  Surgeon: Zandra Mallory MD;  Location: [facility] (4TH FLR);  Service: Endoscopy;  Laterality: N/A;  poor prep on 11/28/22.  per Dr Mallory-Miralax 5 days prior with gastroparesis diet-3 days full liquid. also Mag citrate day before     instr handed to pt-GT    EGD, WITH BALLOON DILATION  7/24/2023    Procedure: EGD, WITH BALLOON DILATION;  Surgeon: Huang Licea MD;  Location: [facility];  Service: Endoscopy;;    ESOPHAGOGASTRODUODENOSCOPY Left 6/5/2019    Procedure: EGD (ESOPHAGOGASTRODUODENOSCOPY);  Surgeon: ROLA Villagran MD;  Location: [facility];  Service: Endoscopy;  Laterality: Left;    ESOPHAGOGASTRODUODENOSCOPY Left 3/16/2020    Procedure: EGD (ESOPHAGOGASTRODUODENOSCOPY);  Surgeon: Clive Núñez MD;  Location: [facility];  Service: Endoscopy;  Laterality: Left;     ESOPHAGOGASTRODUODENOSCOPY N/A 4/17/2020    Procedure: EGD (ESOPHAGOGASTRODUODENOSCOPY);  Surgeon: Papi Martinez Jr., MD;  Location: [facility] ENDO;  Service: Endoscopy;  Laterality: N/A;  with Push Enteroscopy    ESOPHAGOGASTRODUODENOSCOPY N/A 7/6/2020    Procedure: EGD (ESOPHAGOGASTRODUODENOSCOPY)   possible peg;  Surgeon: Papi Martinez Jr., MD;  Location: [facility] ENDO;  Service: Endoscopy;  Laterality: N/A;    ESOPHAGOGASTRODUODENOSCOPY N/A 7/9/2020    Procedure: EGD (ESOPHAGOGASTRODUODENOSCOPY);  Surgeon: Papi Martinez Jr., MD;  Location: [facility] ENDO;  Service: Endoscopy;  Laterality: N/A;  J or Peg tube    ESOPHAGOGASTRODUODENOSCOPY N/A 9/3/2020    Procedure: EGD (ESOPHAGOGASTRODUODENOSCOPY);  Surgeon: Papi Martinez Jr., MD;  Location: [facility] ENDO;  Service: Endoscopy;  Laterality: N/A;  per drs order Stoma, Peds, w/single Lumen, J-Tube placement    ESOPHAGOGASTRODUODENOSCOPY N/A 12/29/2020    Procedure: EGD (ESOPHAGOGASTRODUODENOSCOPY);  Surgeon: Papi Martinez Jr., MD;  Location: [facility];  Service: Endoscopy;  Laterality: N/A;    ESOPHAGOGASTRODUODENOSCOPY N/A 2/23/2021    Procedure: EGD (ESOPHAGOGASTRODUODENOSCOPY);  Surgeon: Papi Martinez Jr., MD;  Location: [facility] ENDO;  Service: Endoscopy;  Laterality: N/A;    ESOPHAGOGASTRODUODENOSCOPY N/A 2/3/2022    Procedure: EGD (ESOPHAGOGASTRODUODENOSCOPY);  Surgeon: Papi Martinez Jr., MD;  Location: [facility] ENDO;  Service: Endoscopy;  Laterality: N/A;    ESOPHAGOGASTRODUODENOSCOPY N/A 8/13/2022    Procedure: EGD (ESOPHAGOGASTRODUODENOSCOPY);  Surgeon: Huang Licea MD;  Location: [facility] ENDO;  Service: Gastroenterology;  Laterality: N/A;    ESOPHAGOGASTRODUODENOSCOPY N/A 10/16/2022    Procedure: EGD (ESOPHAGOGASTRODUODENOSCOPY);  Surgeon: Huang Licea MD;  Location: [facility];  Service: Gastroenterology;  Laterality: N/A;    ESOPHAGOGASTRODUODENOSCOPY N/A 11/28/2022    Procedure: EGD (ESOPHAGOGASTRODUODENOSCOPY);  Surgeon: Zandra Mallory MD;   Location: [facility] ENDO (4TH FLR);  Service: Endoscopy;  Laterality: N/A;    ESOPHAGOGASTRODUODENOSCOPY N/A 7/24/2023    Procedure: EGD (ESOPHAGOGASTRODUODENOSCOPY);  Surgeon: Huang Licea MD;  Location: [facility] ENDO;  Service: Endoscopy;  Laterality: N/A;    ESOPHAGOGASTRODUODENOSCOPY N/A 9/15/2023    Procedure: EGD (ESOPHAGOGASTRODUODENOSCOPY);  Surgeon: ROLA Villagran MD;  Location: [facility] ENDO;  Service: Endoscopy;  Laterality: N/A;    FEMUR FRACTURE SURGERY Right 2006    HYSTERECTOMY      INSERTION OF TUNNELED CENTRAL VENOUS CATHETER (CVC) WITH SUBCUTANEOUS PORT Left 8/14/2019    Procedure: [id number];  Surgeon: Miquel Sargent MD;  Location: [facility] OR;  Service: General;  Laterality: Left;    INSERTION OR REPLACEMENT OF PERCUTANEOUS ENDOSCOPIC JEJUNOSTOMY TUBE  12/29/2020    Procedure: INSERTION OR REPLACEMENT, JEJUNOSTOMY TUBE, PERCUTANEOUS, ENDOSCOPIC;  Surgeon: Papi Martinez Jr., MD;  Location: [facility];  Service: Endoscopy;;    laparoscopy for endometriosis      x5    LAPAROTOMY, EXPLORATORY N/A 10/5/2023    Procedure: LAPAROTOMY, EXPLORATORY;  Surgeon: Kevin Mays MD;  Location: [facility] OR 2ND FLR;  Service: General;  Laterality: N/A;    LEFT HEART CATHETERIZATION Left 3/29/2023    Procedure: Left heart cath;  Surgeon: Riley Tipton MD;  Location: [facility] CATH;  Service: Cardiology;  Laterality: Left;    OOPHORECTOMY      right ovary removed only    OPEN REDUCTION AND INTERNAL FIXATION (ORIF) OF INJURY OF ANKLE Left 4/10/2023    Procedure: ORIF, ANKLE;  Surgeon: Michael Lacey MD;  Location: [facility] OR;  Service: Orthopedics;  Laterality: Left;    PEG TUBE REMOVAL  4/1/2021    Procedure: REMOVAL, PEG TUBE;  Surgeon: Papi Martinez Jr., MD;  Location: [facility] ENDO;  Service: Endoscopy;;    TONSILLECTOMY      WASHOUT  10/5/2023    Procedure: WASHOUT, HEMATOMA;  Surgeon: Kevin Mays MD;  Location: [facility] OR 2ND FLR;  Service: General;;     Family History   Adopted:  Yes     Social History     Tobacco Use    Smoking status: Former     Current packs/day: 0.00     Average packs/day: 0.5 packs/day for 6.0 years (3.0 ttl pk-yrs)     Types: Cigarettes     Start date: 5/10/2005     Quit date: 5/10/2011     Years since quittin.4    Smokeless tobacco: Never   Substance Use Topics    Alcohol use: Yes     Comment: socially    Drug use: Never           OBJECTIVE:     Current Vital Signs  Temp: 36.8 °C (98.3 °F) (10/09/23 0532)  Pulse: 89 (10/09/23 0532)  Resp: 17 (10/09/23 0543)  BP: (!) 99/51 (10/09/23 0532)  SpO2: 98 % (10/09/23 0532)    Vital Signs Range (Last 24H):  Temp:  [36.4 °C (97.5 °F)-36.8 °C (98.3 °F)]   Pulse:  []   Resp:  [17-18]   BP: ()/(51-82)   SpO2:  [98 %-100 %]       Physical Exam:  At Rest:   2    Numbers   With Activity: 10  Numbers  Physical Exam  Constitutional:       General: She is not in acute distress.     Appearance: She is not toxic-appearing.   HENT:      Head: Normocephalic and atraumatic.      Nose: Nose normal.   Cardiovascular:      Rate and Rhythm: Normal rate and regular rhythm.   Pulmonary:      Effort: Pulmonary effort is normal. No respiratory distress.   Neurological:      Mental Status: Mental status is at baseline.         Laboratory:  CBC:   Recent Labs     10/07/23  0437 10/08/23  0509   WBC 9.78 9.49   RBC 2.88* 3.16*   HGB 8.5* 9.4*   HCT 26.6* 29.1*   * 191   MCV 92 92   MCH 29.5 29.7   MCHC 32.0 32.3       CMP:   Recent Labs     10/08/23  0509 10/09/23  0600    139   K 4.0 3.8   CO2 26 24    106   BUN 17 30*   CREATININE 0.6 0.9   * 92   MG 1.9 2.1   PHOS 3.1 4.3   CALCIUM 9.1 8.7   ALBUMIN 2.9* 2.5*   PROT 6.5 5.4*   ALKPHOS 125 103   ALT 26 19   AST 35 19   BILITOT 0.4 0.3          ASSESSMENT/PLAN:     Active Hospital Problems    Diagnosis  POA    *Duodenal stricture [K31.5]  Yes    Encounter for weaning from ventilator [Z99.11]  Not Applicable      Resolved Hospital Problems   No resolved  problems to display.       Plan:   1) Continue multimodal analgesia as able given NPO status as follows:    - Continue acetaminophen 1000mg PO Q6H    - Continue methocarbamol 500mg PO Q6H    - Continue oxycodone 5mg/10mg PO Q4H PRN moderate/severe pain     2) Bilateral JESENIA catheters paused this morning; if continues to do well, will remove catheters later today.      Case discussed with staff, Dr. Ricks; final recommendations per attestation above.    Thank you for the consult and allowing us to participate in the care of this patient. We will sign off now. Please call Acute Pain Service/Anesthesia if you have any further questions or concerns.    Jean-Claude Burris MD, PGY-4  Department of Anesthesiology  Acute Pain Service - [phone] or [phone]  [facility]

## 2023-10-09 NOTE — PROGRESS NOTES
This nurse just provided patient care and noted patient NHAN drain lying in bed sheets at the end of patient bed. Patient told me the resident took it out this morning. This nurse reached out to Dr. Tran and requested nurse communication in the future when such drains are pulled and for infection control and cleanliness for drain to be placed in proper biohazard bag and disposed of appropriately in the future.

## 2023-10-09 NOTE — SUBJECTIVE & OBJECTIVE
"Interval History: Pain better controlled yesterday.     Medications:  Continuous Infusions:   lactated ringers 125 mL/hr at 10/08/23 1833    ROPIvacaine (PF) 2 mg/ml (0.2%) 0.1 mL/hr (10/08/23 1136)    ROPIvacaine (PF) 2 mg/ml (0.2%) 0.1 mL/hr (10/08/23 1100)     Scheduled Meds:   acetaminophen  999.0148 mg Oral Q8H    amLODIPine  5 mg Oral Daily    ARIPiprazole  5 mg Oral Daily    fludrocortisone  0.1 mg Oral Daily    lamoTRIgine  150 mg Oral BID    lipid (SMOFLIPID)  250 mL Intravenous Daily    methocarbamol (ROBAXIN) IVPB  500 mg Intravenous Q6H    mirtazapine  15 mg Oral Nightly    pantoprazole  40 mg Intravenous BID    scopolamine  1 patch Transdermal Q3 Days    sodium chloride 0.9%  10 mL Intravenous Q6H     PRN Meds:labetalol, levalbuterol, LIDOcaine (PF) 10 mg/ml (1%), melatonin, naloxone, ondansetron, oxyCODONE, oxyCODONE, prochlorperazine, sodium chloride 0.9%, Flushing PICC/Midline Protocol **AND** sodium chloride 0.9% **AND** sodium chloride 0.9%, sodium chloride 0.9%     Review of patient's allergies indicates:   Allergen Reactions    Tramadol Other (See Comments)     Seizures      Adhesive Other (See Comments)     STATES, " TEARS SKIN"     Demerol [meperidine]      Seizures      Hydrocodone Hallucinations    Reglan [metoclopramide]      Objective:     Vital Signs (Most Recent):  Temp: 98.3 °F (36.8 °C) (10/09/23 0532)  Pulse: 89 (10/09/23 0532)  Resp: 17 (10/09/23 0543)  BP: (!) 99/51 (10/09/23 0532)  SpO2: 98 % (10/09/23 0532) Vital Signs (24h Range):  Temp:  [97.5 °F (36.4 °C)-98.3 °F (36.8 °C)] 98.3 °F (36.8 °C)  Pulse:  [] 89  Resp:  [17-18] 17  SpO2:  [98 %-100 %] 98 %  BP: ()/(51-77) 99/51     Weight: 53.1 kg (117 lb)  Body mass index is 20.08 kg/m².    Intake/Output - Last 3 Shifts         10/07 0700  10/08 0659 10/08 0700  10/09 0659 10/09 0700  10/10 0659    I.V. (mL/kg) 2041.9 (38.5) 1324 (24.9)     NG/ 60     IV Piggyback 462.9      TPN       Total Intake(mL/kg) 3142.8 " (59.2) 1384 (26.1)     Urine (mL/kg/hr) 4375 (3.4) 3000 (2.4)     Drains 503.5 115     Stool 0 0     Total Output 4878.5 3115     Net -1735.7 -1731            Urine Occurrence  1 x     Stool Occurrence 0 x 1 x              Physical Exam  Constitutional:       Appearance: Normal appearance.   HENT:      Head: Normocephalic and atraumatic.   Cardiovascular:      Rate and Rhythm: Normal rate.   Pulmonary:      Effort: Pulmonary effort is normal.   Abdominal:      General: Abdomen is flat.      Palpations: Abdomen is soft.      Tenderness: There is abdominal tenderness.      Comments: Multiple abdominal scars from previous surgical history  Midline incision now with skin staples and prevena in place to suction. G to gravity and J tube in place.  Ecchymosis around incision, not raised or edematous this AM.  Subcutaneous NHAN in place with SS output   Skin:     General: Skin is warm and dry.   Neurological:      General: No focal deficit present.      Mental Status: She is alert and oriented to person, place, and time.          Significant Labs:  I have reviewed all pertinent lab results within the past 24 hours.    Significant Diagnostics:  I have reviewed all pertinent imaging results/findings within the past 24 hours.

## 2023-10-09 NOTE — PT/OT/SLP PROGRESS
Physical Therapy Treatment    Patient Name:  Angela Trascher Lejeune   MRN:  [id number]    Recommendations:     Discharge Recommendations: other (see comments)  Discharge Equipment Recommendations: none  Barriers to discharge: None    Assessment:     Angela Trascher Lejeune is a 48 y.o. female admitted with a medical diagnosis of Duodenal stricture.  She presents with the following impairments/functional limitations: weakness, impaired endurance, impaired self care skills, impaired functional mobility, gait instability, impaired balance, pain, impaired skin, impaired cardiopulmonary response to activity . Patient showed improved transfer ability and walking range..    Rehab Prognosis: Good; patient would benefit from acute skilled PT services to address these deficits and reach maximum level of function.    Recent Surgery: Procedure(s) (LRB):  LAPAROTOMY, EXPLORATORY (N/A)  WASHOUT, HEMATOMA 4 Days Post-Op    Plan:     During this hospitalization, patient to be seen 4 x/week to address the identified rehab impairments via gait training, therapeutic activities, therapeutic exercises, neuromuscular re-education and progress toward the following goals:    Plan of Care Expires:  11/05/23    Subjective     Chief Complaint: a little tired  Patient/Family Comments/goals: to heal well, so She can go home.  Pain/Comfort:  Pain Rating 1: 1/10  Location - Side 1: Right  Location - Orientation 1: lateral  Location 1: abdomen  Pain Addressed 1: Pre-medicate for activity, Reposition  Pain Rating Post-Intervention 1: 2/10      Objective:     Communicated with NSG prior to session.  Patient found HOB elevated with telemetry, peripheral IV, PEG Tube, wound vac upon PT entry to room.     General Precautions: Standard, fall, NPO  Orthopedic Precautions: N/A  Braces: N/A  Respiratory Status: Room air     Functional Mobility:  Bed Mobility:     Rolling Left:  modified independence  Scooting: modified independence  Supine to Sit: modified  independence  Sit to Supine: modified independence  Transfers:     Sit to Stand:  supervision with no AD  Gait: ~244 ft with IV pole for support with SBA      AM-PAC 6 CLICK MOBILITY  Turning over in bed (including adjusting bedclothes, sheets and blankets)?: 4  Sitting down on and standing up from a chair with arms (e.g., wheelchair, bedside commode, etc.): 4  Moving from lying on back to sitting on the side of the bed?: 4  Moving to and from a bed to a chair (including a wheelchair)?: 3  Need to walk in hospital room?: 3  Climbing 3-5 steps with a railing?: 2  Basic Mobility Total Score: 20       Treatment & Education:  Patient sat at EOB to don a gown and to manage medical lines. Patient educated on the importance of daily mobility in order to prevent muscle atrophy and complications related to the lack of mobility.    Patient left HOB elevated with all lines intact and call button in reach..    GOALS:   Multidisciplinary Problems       Physical Therapy Goals          Problem: Physical Therapy    Goal Priority Disciplines Outcome Goal Variances Interventions   Physical Therapy Goal     PT, PT/OT Ongoing, Progressing     Description: Goals to be met by: 10/20/23     Patient will increase functional independence with mobility by performin. Supine to sit with Stand-by Assistance - Not met  2. Sit to stand transfer with Stand-by Assistance - Not met  3. Bed to chair transfer with Stand-by Assistance using No Assistive Device - Not met  4. Gait x 200 feet with Stand-by Assistance using No Assistive Device - Not met                       Time Tracking:     PT Received On: 10/09/23  PT Start Time: 1510     PT Stop Time: 1534  PT Total Time (min): 24 min     Billable Minutes: Gait Training 12 and Therapeutic Activity 12    Treatment Type: Treatment  PT/PTA: PTA     Number of PTA visits since last PT visit: 1     10/09/2023

## 2023-10-09 NOTE — CARE UPDATE
[facility]  Anesthesiology - Acute Pain Service    Perineural Catheter Follow-Up    10/09/2023  11:11 AM    Angela Trascher Lejeune is now POD #5 (catheter day #4) with bilateral JESENIA perineural catheters in place. Pain has remained well controlled with current regimen and denies any issues or significant side effects. Patient's ropivacaine infusion to perineural catheter paused this morning. Patient in agreement with plan for discontinuation today.     Pain remained well controlled and catheters discontinued on team rounds this morning with blue tips intact and no complications.     Please feel free to reach out to the Anesthesia Acute Pain Service with any questions or concerns.      Jean-Claude Burris MD, PGY-4  Department of Anesthesiology  Acute Pain Service - [phone] or [phone]  [facility]

## 2023-10-09 NOTE — PLAN OF CARE
[facility] Plan of Care Note  Dx PMH Crohn's disease, POTS, gastric varices, alcohol abuse, bipolar disorder who was recently seen in surgical oncology clinic for evaluation of a gastric outlet obstruction.         Shift Events pain controlled See Mar. Ambulated to bathroom with RN assistance, 1 BM.   Ambulation, sitting in chair encouraged.      Goals of Care: safety, pain control and keeping patient hemodynamically stable,  NHAN drained removed by resident, Margie nguyen 1.4 increased to 40 ml/hr (tolerating well) G-tube clamped (tolerating clamping well)     Neuro: AAOx4     Vital Signs: WNL  Respiratory: RA     Diet: Full liquid diet      Is patient tolerating current diet? yes     GTTS: SL     Urine Output/Bowel Movement: oob to bathroom     Drains/Tubes/Tube Feeds (include total output/shift):  see I/o     Lines: PICC line to DUANE and accessed port to left chest wall        Accuchecks:none     Skin: healing surgical incision to left leg with steristrips     Fall Risk Score: see chart     Activity level? up as tolerated with nurse assistance

## 2023-10-09 NOTE — PHYSICIAN QUERY
PT Name: Angela Trascher Lejeune  MR #: [id number]    DOCUMENTATION CLARIFICATION     CDS: Brandy Capley, RN  Email: [email or web address]  This form is a permanent document in the medical record.     Query Date: October 9, 2023    By submitting this query, we are merely seeking further clarification of documentation. Please utilize your independent clinical judgment when addressing the question(s) below.    Supporting Clinical Findings Location in Medical Record   Date of Procedure: 10/5/2023     Operative Findings:    1. Subcutaneous hematoma  2. No intraabdominal bleeding     Procedures:  1. Relaparotomy for bleeding  2. Wound vac placement >100 cm2    The prior midline skin incision was reopened.  A subcutanous hematoma which had propagated through the subcutaneous tissues was encountered and evacuated.  The midline was reopened and the abdomen was explored. There was some expected postoperative blood but no significant intraabdominal hemorrhage.      Op note 10/5, filed 10/6   Date of Procedure: 10/4/2023       Pre-Operative Diagnosis:   1. Duodenal stricture [K31.5]  2. Gastric outlet obstruction  3. Severe protein-calorie malnutrition  4. Crohn's disease     Post-Operative Diagnosis:   1. Same     Procedures:  1. Delfino-Y gastrojejunostomy -22  2. Exploration of duodenum via duodenotomy  3. Gastrostomy tube placement  4. Feeding Jejunostomy tube placement.   Op note 10/4   enoxaparin injection 40 mg  Dose: 40 mg  Freq: Every 24 hours Route: SubQ  Indications of Use: deep vein thrombosis prevention  Start: 09/29/23 1700 End: 10/05/23 0931   MAR 9/29-10/5    10/05/23 19:25   Protime 10.2   INR 0.9   aPTT 28.5    Labs 10/5       Provider, please clarify if there is any clinical correlation between ___enoxaparin___ and ____hematoma__.           Are the conditions:      [ xx ] Due to or associated with each other   [  ] Unrelated to each other   [  ] Other explanation (Please Specify): ______________   [  ] Clinically  Undetermined                                                                               Please document in your progress notes daily for the duration of treatment until resolved and include in your discharge summary.

## 2023-10-09 NOTE — PLAN OF CARE
[facility] Plan of Care Note  Dx PMH Crohn's disease, POTS, gastric varices, alcohol abuse, bipolar disorder who was recently seen in surgical oncology clinic for evaluation of a gastric outlet obstruction.         Shift Events pain controlled See Mar. Ambulated to bathroom with RN assistance, 1 BM.   Ambulation, sitting in chair encouraged.      Goals of Care: safety, pain control and keeping patient hemodynamically stable     Neuro: AAOx4     Vital Signs: WNL  Respiratory: RA     Diet: clear liquid diet     Is patient tolerating current diet? yes     GTTS: LR at 125ml/hr and ripovacine x 2 left and right upper back.     Urine Output/Bowel Movement: oob to bathroom     Drains/Tubes/Tube Feeds (include total output/shift):  see I/o     Lines: PICC line to DUANE and accessed port to left chest wall        Accuchecks:none     Skin: healing surgical incision to left leg with steristrips     Fall Risk Score: see chart     Activity level? up as tolerated with nurse assistance

## 2023-10-09 NOTE — PT/OT/SLP PROGRESS
Occupational Therapy      Patient Name:  Angela Trascher Lejeune   MRN:  [id number]    Patient not seen today secondary to patient refusal on first attempt, asked OT to return later. Second attempt patient stated she had just finished with PT and asked OT to return tomorrow. Will follow-up as appropriate.    10/9/2023

## 2023-10-09 NOTE — PLAN OF CARE
Recommendations    1) Continue TF: Tellja Standard 1.4 @ 40ml/hr to provide 1344 kcals, 60 g PRO, 691 ml fluid w/ additional FWF per MD  2) Continue FLD, ADAT per MD   3) Following    Goals: Meet % een/epn by next rd f/u  Nutrition Goal Status: progressing towards goal  Communication of RD Recs: other (comment) (POC)

## 2023-10-10 LAB
ALBUMIN SERPL BCP-MCNC: 2.6 G/DL (ref 3.5–5.2)
ALP SERPL-CCNC: 125 U/L (ref 55–135)
ALT SERPL W/O P-5'-P-CCNC: 19 U/L (ref 10–44)
ANION GAP SERPL CALC-SCNC: 7 MMOL/L (ref 8–16)
AST SERPL-CCNC: 18 U/L (ref 10–40)
BASOPHILS # BLD AUTO: 0.04 K/UL (ref 0–0.2)
BASOPHILS NFR BLD: 0.7 % (ref 0–1.9)
BILIRUB SERPL-MCNC: 0.3 MG/DL (ref 0.1–1)
BUN SERPL-MCNC: 25 MG/DL (ref 6–20)
CALCIUM SERPL-MCNC: 8.7 MG/DL (ref 8.7–10.5)
CHLORIDE SERPL-SCNC: 107 MMOL/L (ref 95–110)
CO2 SERPL-SCNC: 25 MMOL/L (ref 23–29)
CREAT SERPL-MCNC: 0.8 MG/DL (ref 0.5–1.4)
DIFFERENTIAL METHOD: ABNORMAL
EOSINOPHIL # BLD AUTO: 0.2 K/UL (ref 0–0.5)
EOSINOPHIL NFR BLD: 3.9 % (ref 0–8)
ERYTHROCYTE [DISTWIDTH] IN BLOOD BY AUTOMATED COUNT: 18.1 % (ref 11.5–14.5)
EST. GFR  (NO RACE VARIABLE): >60 ML/MIN/1.73 M^2
GLUCOSE SERPL-MCNC: 101 MG/DL (ref 70–110)
HCT VFR BLD AUTO: 25.5 % (ref 37–48.5)
HGB BLD-MCNC: 7.9 G/DL (ref 12–16)
IMM GRANULOCYTES # BLD AUTO: 0.03 K/UL (ref 0–0.04)
IMM GRANULOCYTES NFR BLD AUTO: 0.5 % (ref 0–0.5)
LYMPHOCYTES # BLD AUTO: 1.2 K/UL (ref 1–4.8)
LYMPHOCYTES NFR BLD: 19.6 % (ref 18–48)
MAGNESIUM SERPL-MCNC: 1.9 MG/DL (ref 1.6–2.6)
MCH RBC QN AUTO: 30 PG (ref 27–31)
MCHC RBC AUTO-ENTMCNC: 31 G/DL (ref 32–36)
MCV RBC AUTO: 97 FL (ref 82–98)
MONOCYTES # BLD AUTO: 0.8 K/UL (ref 0.3–1)
MONOCYTES NFR BLD: 13 % (ref 4–15)
NEUTROPHILS # BLD AUTO: 3.7 K/UL (ref 1.8–7.7)
NEUTROPHILS NFR BLD: 62.3 % (ref 38–73)
NRBC BLD-RTO: 0 /100 WBC
PHOSPHATE SERPL-MCNC: 4.7 MG/DL (ref 2.7–4.5)
PLATELET # BLD AUTO: 177 K/UL (ref 150–450)
PMV BLD AUTO: 11.2 FL (ref 9.2–12.9)
POTASSIUM SERPL-SCNC: 4.1 MMOL/L (ref 3.5–5.1)
PROT SERPL-MCNC: 5.8 G/DL (ref 6–8.4)
RBC # BLD AUTO: 2.63 M/UL (ref 4–5.4)
SODIUM SERPL-SCNC: 139 MMOL/L (ref 136–145)
WBC # BLD AUTO: 5.93 K/UL (ref 3.9–12.7)

## 2023-10-10 PROCEDURE — 25000003 PHARM REV CODE 250

## 2023-10-10 PROCEDURE — 94664 DEMO&/EVAL PT USE INHALER: CPT

## 2023-10-10 PROCEDURE — 25000003 PHARM REV CODE 250: Performed by: STUDENT IN AN ORGANIZED HEALTH CARE EDUCATION/TRAINING PROGRAM

## 2023-10-10 PROCEDURE — 84100 ASSAY OF PHOSPHORUS: CPT | Performed by: STUDENT IN AN ORGANIZED HEALTH CARE EDUCATION/TRAINING PROGRAM

## 2023-10-10 PROCEDURE — 85025 COMPLETE CBC W/AUTO DIFF WBC: CPT | Performed by: STUDENT IN AN ORGANIZED HEALTH CARE EDUCATION/TRAINING PROGRAM

## 2023-10-10 PROCEDURE — A4216 STERILE WATER/SALINE, 10 ML: HCPCS | Performed by: STUDENT IN AN ORGANIZED HEALTH CARE EDUCATION/TRAINING PROGRAM

## 2023-10-10 PROCEDURE — 80053 COMPREHEN METABOLIC PANEL: CPT | Performed by: STUDENT IN AN ORGANIZED HEALTH CARE EDUCATION/TRAINING PROGRAM

## 2023-10-10 PROCEDURE — 83735 ASSAY OF MAGNESIUM: CPT | Performed by: STUDENT IN AN ORGANIZED HEALTH CARE EDUCATION/TRAINING PROGRAM

## 2023-10-10 PROCEDURE — 99900035 HC TECH TIME PER 15 MIN (STAT)

## 2023-10-10 PROCEDURE — 20600001 HC STEP DOWN PRIVATE ROOM

## 2023-10-10 PROCEDURE — 94761 N-INVAS EAR/PLS OXIMETRY MLT: CPT

## 2023-10-10 PROCEDURE — 63600175 PHARM REV CODE 636 W HCPCS: Performed by: STUDENT IN AN ORGANIZED HEALTH CARE EDUCATION/TRAINING PROGRAM

## 2023-10-10 RX ADMIN — FLUDROCORTISONE ACETATE 0.1 MG: 0.1 TABLET ORAL at 08:10

## 2023-10-10 RX ADMIN — Medication 10 ML: at 11:10

## 2023-10-10 RX ADMIN — OXYCODONE HYDROCHLORIDE 10 MG: 10 TABLET ORAL at 04:10

## 2023-10-10 RX ADMIN — AMLODIPINE BESYLATE 5 MG: 5 TABLET ORAL at 08:10

## 2023-10-10 RX ADMIN — PANTOPRAZOLE SODIUM 40 MG: 40 TABLET, DELAYED RELEASE ORAL at 08:10

## 2023-10-10 RX ADMIN — METHOCARBAMOL 500 MG: 100 INJECTION, SOLUTION INTRAMUSCULAR; INTRAVENOUS at 05:10

## 2023-10-10 RX ADMIN — ACETAMINOPHEN 650 MG: 325 TABLET ORAL at 06:10

## 2023-10-10 RX ADMIN — OXYCODONE HYDROCHLORIDE 10 MG: 10 TABLET ORAL at 08:10

## 2023-10-10 RX ADMIN — Medication 10 ML: at 06:10

## 2023-10-10 RX ADMIN — LAMOTRIGINE 150 MG: 100 TABLET ORAL at 08:10

## 2023-10-10 RX ADMIN — MIRTAZAPINE 15 MG: 15 TABLET, ORALLY DISINTEGRATING ORAL at 08:10

## 2023-10-10 RX ADMIN — ARIPIPRAZOLE 5 MG: 5 TABLET ORAL at 08:10

## 2023-10-10 RX ADMIN — OXYCODONE HYDROCHLORIDE 5 MG: 5 TABLET ORAL at 04:10

## 2023-10-10 RX ADMIN — ACETAMINOPHEN 650 MG: 325 TABLET ORAL at 01:10

## 2023-10-10 RX ADMIN — Medication 10 ML: at 12:10

## 2023-10-10 RX ADMIN — ACETAMINOPHEN 650 MG: 325 TABLET ORAL at 11:10

## 2023-10-10 NOTE — PLAN OF CARE
[facility] Plan of Care Note  Dx PMH Crohn's disease, POTS, gastric varices, alcohol abuse, bipolar disorder who was recently seen in surgical oncology clinic for evaluation of a gastric outlet obstruction.         Shift Events pain controlled See Mar. Ambulated to bathroom with RN assistance, 1 BM.   Ambulation, sitting in chair encouraged.      Goals of Care: safety, pain control and keeping patient hemodynamically stable,  NHAN drained removed by resident,  G-tube clamped (tolerating clamping well)     Neuro: AAOx4     Vital Signs: WNL  Respiratory: RA     Diet: Full liquid diet      Is patient tolerating current diet? yes     GTTS: SL     Urine Output/Bowel Movement: oob to bathroom     Drains/Tubes/Tube Feeds (include total output/shift):  see I/o     Lines: PICC line to DUANE and accessed port to left chest wall        Accuchecks:none     Skin: healing surgical incision to left leg with steristrips     Fall Risk Score: see chart     Activity level? up as tolerated with nurse assistance

## 2023-10-10 NOTE — PROGRESS NOTES
"[facility]  General Surgery  Progress Note    Subjective:     History of Present Illness:  No notes on file    Post-Op Info:  Procedure(s) (LRB):  LAPAROTOMY, EXPLORATORY (N/A)  WASHOUT, HEMATOMA   5 Days Post-Op     Interval History: Pain well controlled today with JESENIA catheters removed. No nausea or vomiting. Tolerates full liquids. Has been OOB and ambulating.    Medications:  Continuous Infusions:  Scheduled Meds:   acetaminophen  650 mg Oral Q6H    amLODIPine  5 mg Oral Daily    ARIPiprazole  5 mg Oral Daily    enoxparin  40 mg Subcutaneous Q24H (prophylaxis, 1700)    fludrocortisone  0.1 mg Oral Daily    lamoTRIgine  150 mg Oral BID    methocarbamol (ROBAXIN) IVPB  500 mg Intravenous Q8H    mirtazapine  15 mg Oral Nightly    pantoprazole  40 mg Oral Daily    scopolamine  1 patch Transdermal Q3 Days    sodium chloride 0.9%  10 mL Intravenous Q6H     PRN Meds:labetalol, levalbuterol, LIDOcaine (PF) 10 mg/ml (1%), melatonin, naloxone, ondansetron, oxyCODONE, oxyCODONE, prochlorperazine, sodium chloride 0.9%, Flushing PICC/Midline Protocol **AND** sodium chloride 0.9% **AND** sodium chloride 0.9%, sodium chloride 0.9%     Review of patient's allergies indicates:   Allergen Reactions    Tramadol Other (See Comments)     Seizures      Adhesive Other (See Comments)     STATES, " TEARS SKIN"     Demerol [meperidine]      Seizures      Hydrocodone Hallucinations    Reglan [metoclopramide]      Objective:     Vital Signs (Most Recent):  Temp: 98.3 °F (36.8 °C) (10/10/23 0421)  Pulse: 75 (10/10/23 0421)  Resp: 18 (10/10/23 0439)  BP: (!) 103/52 (10/10/23 0421)  SpO2: 96 % (10/10/23 0421) Vital Signs (24h Range):  Temp:  [98 °F (36.7 °C)-99.2 °F (37.3 °C)] 98.3 °F (36.8 °C)  Pulse:  [] 75  Resp:  [18-20] 18  SpO2:  [96 %-100 %] 96 %  BP: ()/(41-71) 103/52     Weight: 53.1 kg (117 lb)  Body mass index is 20.08 kg/m².    Intake/Output - Last 3 Shifts         10/08 0700  10/09 0659 10/09 " 0700  10/10 0659 10/10 0700  10/11 0659    I.V. (mL/kg) 1324 (24.9)      NG/GT 60      IV Piggyback       Total Intake(mL/kg) 1384 (26.1)      Urine (mL/kg/hr) 3000 (2.4) 950 (0.7)     Drains 115      Stool 0      Total Output 3115 950     Net -1731 -950            Urine Occurrence 1 x      Stool Occurrence 1 x               Physical Exam  Constitutional:       Appearance: Normal appearance.   HENT:      Head: Normocephalic and atraumatic.   Cardiovascular:      Rate and Rhythm: Normal rate.   Pulmonary:      Effort: Pulmonary effort is normal.   Abdominal:      General: Abdomen is flat.      Palpations: Abdomen is soft.      Tenderness: There is abdominal tenderness.      Comments: Multiple abdominal scars from previous surgical history  Midline incision now with skin staples and prevena in place to suction. G tube clamped and J tube in place.  Ecchymosis around incision, not raised or edematous this AM.  Subcutaneous NHAN removed.   Skin:     General: Skin is warm and dry.   Neurological:      General: No focal deficit present.      Mental Status: She is alert and oriented to person, place, and time.          Significant Labs:  I have reviewed all pertinent lab results within the past 24 hours.    Significant Diagnostics:  I have reviewed all pertinent imaging results/findings within the past 24 hours.    Assessment/Plan:     * Duodenal stricture  Angela Trascher Lejeune is a 48 y.o. female with a PMH Crohn's disease, POTS, gastric varices, alcohol abuse, bipolar disorder who was recently seen in surgical oncology clinic for evaluation of a gastric outlet obstruction. Now s/p Delfino-Y gastrojejunostomy, G tube, and J tube placement 10/4/23 c/b hematoma s/p takeback to OR on 10/5/23    - MM pain meds. Tylenol, robaxin, oxycodone (change to PO instead of per J tube). Bilateral ESPs removed 10/9  - PRN nausea meds  - keep full liquid diet today  - Unclamp GT q4 hrs. Can clamp otherwise so she can be ambulatory  -  Increase JT feeds to 50  - NHAN removed  - TPN off   - Norvasc 5  - OOB/ambulate  - PT/OT  - Home meds  - DVT ppx    - Dispo: Plan for DC Wednesday          Shanta Tran MD  General Surgery  [facility]

## 2023-10-10 NOTE — PLAN OF CARE
"                                         [facility]       HOME  HEALTH ORDERS                                    FACE TO FACE ENCOUNTER      Patient Name: Angela Trascher Lejeune  YOB: 1975    PCP: Haven Gary MD   PCP Address: [address] [place] [id number]  PCP Phone Number: [phone]  PCP Fax: [phone]    Encounter Date: 10/11/2023    Admit to Home Health    Diagnoses:  Active Hospital Problems    Diagnosis  POA    *Duodenal stricture [K31.5]  Yes    Encounter for weaning from ventilator [Z99.11]  Not Applicable    Hypertension [I10]  Yes    Seizure disorder [G40.909]  Yes    Bipolar I disorder [F31.9]  Yes      Resolved Hospital Problems   No resolved problems to display.       Future Appointments   Date Time Provider Department Center   10/17/2023 11:00 AM Kevin Mays MD [facility]   12/15/2023  2:45 PM Haven Gary MD [facility]   2/9/2024  1:30 PM Haven Gary MD [facility] [place]       I have seen and examined this patient face to face today. My clinical findings that support the need for the home health skilled services and home bound status are the following:  Weakness/numbness causing balance and gait disturbance due to Surgery making it taxing to leave home.    Allergies:  Review of patient's allergies indicates:   Allergen Reactions    Tramadol Other (See Comments)     Seizures      Adhesive Other (See Comments)     STATES, " TEARS SKIN"     Demerol [meperidine]      Seizures      Hydrocodone Hallucinations    Reglan [metoclopramide]        Diet: full liquid diet    Activities: activity as tolerated    Nursing:   SN to complete comprehensive assessment including routine vital signs. Instruct on disease process and s/s of complications to report to MD. Review/verify medication list sent home with the patient at time of discharge  and instruct patient/caregiver as needed. Frequency may be " adjusted depending on start of care date.    Notify MD if SBP > 160 or < 90; DBP > 90 or < 50; HR > 120 or < 50; Temp > 101      WOUND CARE ORDERS  - Surgical Wound:  Location: midline abdomen with derma bond    Flush jejunostomy tube with 60cc water TID.     Open gastrostomy tube for nausea or vomiting     Free water flushes via jejunostomy tube @ 55cc/hour while tube feeds are infusing.     Monitor abdomen for redness, oozing from staple site, abdominal distension, or pain not controlled by pain medication.     TUBE FEEDS via jejunostomy tube: Isosource 1.5 (or equivalent) @ 50cc/hour (10/11/2023) from 2p-8a.  Goal = 50cc/hour.    Vitals signs daily. Call MD with Temperature > 101, SBP > 180, HR < 50.     Physician to follow patient's care (the person listed here will be responsible for signing ongoing orders): Other: Dr. Yehuda Iqbal, Dr. ДМИТРИЙ Mays, Dr. Jose Iqbal, Dr. Josse Villeda, Dr. Jean-Claude Agrawal at [phone], [phone] or [phone] office; [phone] fax Requested Start of Care Date: 10/10/2023      Medications: Review discharge medications with patient and family and provide education.      Current Discharge Medication List        START taking these medications    Details   acetaminophen (TYLENOL) 325 MG tablet Take 2 tablets (650 mg total) by mouth every 6 (six) hours as needed for Pain.  Qty: 30 tablet, Refills: 0      ondansetron (ZOFRAN) 4 MG tablet Take 1 tablet (4 mg total) by mouth every 6 (six) hours as needed for Nausea.  Qty: 56 tablet, Refills: 0      oxyCODONE (ROXICODONE) 5 MG immediate release tablet Take 1 tablet (5 mg total) by mouth every 4 (four) hours as needed for Pain.  Qty: 20 tablet, Refills: 0    Comments: Quantity prescribed more than 7 day supply? No           CONTINUE these medications which have NOT CHANGED    Details   propranoloL (INDERAL) 40 MG tablet Take 40 mg by mouth once daily.      ARIPiprazole (ABILIFY) 5 MG Tab Take 1 tablet (5 mg total) by mouth once  daily.  Qty: 30 tablet, Refills: 11      fludrocortisone (FLORINEF) 0.1 mg Tab Take 0.1 mg by mouth Daily.      HYDROmorphone (DILAUDID) 2 MG tablet Take 1 tablet (2 mg total) by mouth every 4 (four) hours as needed for Pain.  Qty: 20 tablet, Refills: 0    Comments: Quantity prescribed more than 7 day supply? Yes, quantity medically necessary      lamotrigine (LAMICTAL) 150 MG Tab Take 150 mg by mouth 2 (two) times daily.      LIDOcaine-prilocaine (EMLA) cream Apply topically as needed (use prior to treatment with port access).  Qty: 30 g, Refills: 0      LORazepam (ATIVAN) 0.5 MG tablet Take 1 tablet (0.5 mg total) by mouth every 6 (six) hours as needed for Anxiety.  Qty: 30 tablet, Refills: 2      midodrine (PROAMATINE) 5 MG Tab Take 5 mg by mouth 3 (three) times daily with meals.      mirtazapine (REMERON) 7.5 MG Tab Take 1 tablet (7.5 mg total) by mouth every evening.  Qty: 30 tablet, Refills: 11      pantoprazole (PROTONIX) 40 MG tablet Take 1 tablet (40 mg total) by mouth 2 (two) times daily.  Qty: 60 tablet, Refills: 1      promethazine (PHENERGAN) 25 MG tablet Take 1 tablet (25 mg total) by mouth every 6 (six) hours as needed for Nausea.  Qty: 30 tablet, Refills: 5      sodium chloride 1,000 mg TbSO oral tablet Take 1,000 mg by mouth 2 (two) times a day.      sucralfate (CARAFATE) 1 gram tablet Take 1 tablet (1 g total) by mouth 4 (four) times daily before meals and nightly.  Qty: 28 tablet, Refills: 0      tiZANidine (ZANAFLEX) 4 MG tablet 1-2 tid prn  Qty: 60 tablet, Refills: 5      ustekinumab (STELARA) 90 mg/mL Syrg syringe Inject 90 mg into the skin every 8 weeks. Indications: Crohn's disease             I certify that this patient is confined to her home and needs intermittent skilled nursing care.      Electronically Signed  _________________________________  Jordyn Carey, NP  10/11/2023

## 2023-10-10 NOTE — PROGRESS NOTES
While at bedside with patient, Dr Tran rounded and gave verbal orders to remove telemetry monitoring. TELE removed at this time

## 2023-10-10 NOTE — ASSESSMENT & PLAN NOTE
Angela Trascher Lejeune is a 48 y.o. female with a PMH Crohn's disease, POTS, gastric varices, alcohol abuse, bipolar disorder who was recently seen in surgical oncology clinic for evaluation of a gastric outlet obstruction. Now s/p Delfino-Y gastrojejunostomy, G tube, and J tube placement 10/4/23 c/b hematoma s/p takeback to OR on 10/5/23    - MM pain meds. Tylenol, robaxin, oxycodone (change to PO instead of per J tube). Bilateral ESPs removed 10/9  - PRN nausea meds  - keep full liquid diet today  - Unclamp GT q4 hrs. Can clamp otherwise so she can be ambulatory  - Increase JT feeds to 50  - NHAN removed  - TPN off   - Norvasc 5  - OOB/ambulate  - PT/OT  - Home meds  - DVT ppx    - Dispo: Plan for DC Wednesday

## 2023-10-10 NOTE — PT/OT/SLP PROGRESS
Occupational Therapy      Patient Name:  Angela Trascher Lejeune   MRN:  [id number]    Patient reports that she is (I) with ADLs and mobility and does not need OT services. Orders D/Cd by patient's request.    10/10/2023

## 2023-10-10 NOTE — PLAN OF CARE
Recommendations     1) TF RECS: Margie Expensify Standard 1.4 @ 55ml/hr x 18 hours to provide 1386 kcals, 61g PRO, 702 ml fluid with additional FWF per MD    2) Continue FLD, ADAT per MD     3) Following    Goals: Meet % een/epn by next rd f/u  Nutrition Goal Status: progressing towards goal  Communication of RD Recs: other (comment) (POC)

## 2023-10-10 NOTE — SUBJECTIVE & OBJECTIVE
"Interval History: Pain well controlled today with JESENIA catheters removed. No nausea or vomiting. Tolerates full liquids. Has been OOB and ambulating.    Medications:  Continuous Infusions:  Scheduled Meds:   acetaminophen  650 mg Oral Q6H    amLODIPine  5 mg Oral Daily    ARIPiprazole  5 mg Oral Daily    enoxparin  40 mg Subcutaneous Q24H (prophylaxis, 1700)    fludrocortisone  0.1 mg Oral Daily    lamoTRIgine  150 mg Oral BID    methocarbamol (ROBAXIN) IVPB  500 mg Intravenous Q8H    mirtazapine  15 mg Oral Nightly    pantoprazole  40 mg Oral Daily    scopolamine  1 patch Transdermal Q3 Days    sodium chloride 0.9%  10 mL Intravenous Q6H     PRN Meds:labetalol, levalbuterol, LIDOcaine (PF) 10 mg/ml (1%), melatonin, naloxone, ondansetron, oxyCODONE, oxyCODONE, prochlorperazine, sodium chloride 0.9%, Flushing PICC/Midline Protocol **AND** sodium chloride 0.9% **AND** sodium chloride 0.9%, sodium chloride 0.9%     Review of patient's allergies indicates:   Allergen Reactions    Tramadol Other (See Comments)     Seizures      Adhesive Other (See Comments)     STATES, " TEARS SKIN"     Demerol [meperidine]      Seizures      Hydrocodone Hallucinations    Reglan [metoclopramide]      Objective:     Vital Signs (Most Recent):  Temp: 98.3 °F (36.8 °C) (10/10/23 0421)  Pulse: 75 (10/10/23 0421)  Resp: 18 (10/10/23 0439)  BP: (!) 103/52 (10/10/23 0421)  SpO2: 96 % (10/10/23 0421) Vital Signs (24h Range):  Temp:  [98 °F (36.7 °C)-99.2 °F (37.3 °C)] 98.3 °F (36.8 °C)  Pulse:  [] 75  Resp:  [18-20] 18  SpO2:  [96 %-100 %] 96 %  BP: ()/(41-71) 103/52     Weight: 53.1 kg (117 lb)  Body mass index is 20.08 kg/m².    Intake/Output - Last 3 Shifts         10/08 0700  10/09 0659 10/09 0700  10/10 0659 10/10 0700  10/11 0659    I.V. (mL/kg) 1324 (24.9)      NG/GT 60      IV Piggyback       Total Intake(mL/kg) 1384 (26.1)      Urine (mL/kg/hr) 3000 (2.4) 950 (0.7)     Drains 115      Stool 0      Total Output 3115 950     " Net -1731 -446            Urine Occurrence 1 x      Stool Occurrence 1 x               Physical Exam  Constitutional:       Appearance: Normal appearance.   HENT:      Head: Normocephalic and atraumatic.   Cardiovascular:      Rate and Rhythm: Normal rate.   Pulmonary:      Effort: Pulmonary effort is normal.   Abdominal:      General: Abdomen is flat.      Palpations: Abdomen is soft.      Tenderness: There is abdominal tenderness.      Comments: Multiple abdominal scars from previous surgical history  Midline incision now with skin staples and prevena in place to suction. G tube clamped and J tube in place.  Ecchymosis around incision, not raised or edematous this AM.  Subcutaneous NHAN removed.   Skin:     General: Skin is warm and dry.   Neurological:      General: No focal deficit present.      Mental Status: She is alert and oriented to person, place, and time.          Significant Labs:  I have reviewed all pertinent lab results within the past 24 hours.    Significant Diagnostics:  I have reviewed all pertinent imaging results/findings within the past 24 hours.

## 2023-10-10 NOTE — PROGRESS NOTES
On bedside shift report, patient states her left ankle is swollen. On assement, interior ankle appeared more swollen than other side. Reached out to Dr Tran and made aware.

## 2023-10-10 NOTE — PT/OT/SLP PROGRESS
Physical Therapy D/C Summary      Patient Name:  Angela Trascher Lejeune   MRN:  [id number]    Patient not seen today secondary to pt states she is able to perform functional mobility and no further PT needed. PT educated pt in safety with mobility upon D/C, pt expressed understanding. PT D/Bashir at this time. Barbara Sheppard PT 10/10/23

## 2023-10-10 NOTE — PLAN OF CARE
[facility] Plan of Care Note  Dx PMH Crohn's disease, POTS, gastric varices, alcohol abuse, bipolar disorder who was recently seen in surgical oncology clinic for evaluation of a gastric outlet obstruction.         Shift Events pain controlled See Mar. Ambulated to bathroom with RN assistance, 1 BM.   Ambulation, sitting in chair encouraged.     Borderline hypotensive Map remains 65-70 , asymptomatic. Frequent rechecks for safety, patient has hx of POTs syndrome.     Goals of Care: safety, pain control and keeping patient hemodynamically stable,  NHAN drained removed by resident, Margie nguyen 1.4 increased to 40 ml/hr (tolerating well) G-tube clamped (tolerating clamping well)     Neuro: AAOx4     Vital Signs: WNL  Respiratory: RA     Diet: Full liquid diet      Is patient tolerating current diet? yes     GTTS: SL     Urine Output/Bowel Movement: oob to bathroom     Drains/Tubes/Tube Feeds (include total output/shift):  see I/o     Lines: PICC line to DUANE and accessed port to left chest wall        Accuchecks:none     Skin: healing surgical incision to left leg with steristrips     Fall Risk Score: see chart     Activity level? up as tolerated with nurse assistance

## 2023-10-11 VITALS
WEIGHT: 117 LBS | BODY MASS INDEX: 19.97 KG/M2 | OXYGEN SATURATION: 99 % | HEIGHT: 64 IN | HEART RATE: 82 BPM | RESPIRATION RATE: 20 BRPM | TEMPERATURE: 99 F | DIASTOLIC BLOOD PRESSURE: 58 MMHG | SYSTOLIC BLOOD PRESSURE: 122 MMHG

## 2023-10-11 LAB
ALBUMIN SERPL BCP-MCNC: 2.8 G/DL (ref 3.5–5.2)
ALP SERPL-CCNC: 138 U/L (ref 55–135)
ALT SERPL W/O P-5'-P-CCNC: 17 U/L (ref 10–44)
ANION GAP SERPL CALC-SCNC: 9 MMOL/L (ref 8–16)
AST SERPL-CCNC: 17 U/L (ref 10–40)
BASOPHILS # BLD AUTO: 0.06 K/UL (ref 0–0.2)
BASOPHILS NFR BLD: 0.9 % (ref 0–1.9)
BILIRUB SERPL-MCNC: 0.2 MG/DL (ref 0.1–1)
BUN SERPL-MCNC: 23 MG/DL (ref 6–20)
CALCIUM SERPL-MCNC: 8.8 MG/DL (ref 8.7–10.5)
CHLORIDE SERPL-SCNC: 105 MMOL/L (ref 95–110)
CO2 SERPL-SCNC: 24 MMOL/L (ref 23–29)
CREAT SERPL-MCNC: 0.8 MG/DL (ref 0.5–1.4)
DIFFERENTIAL METHOD: ABNORMAL
EOSINOPHIL # BLD AUTO: 0.4 K/UL (ref 0–0.5)
EOSINOPHIL NFR BLD: 5.3 % (ref 0–8)
ERYTHROCYTE [DISTWIDTH] IN BLOOD BY AUTOMATED COUNT: 17.6 % (ref 11.5–14.5)
EST. GFR  (NO RACE VARIABLE): >60 ML/MIN/1.73 M^2
GLUCOSE SERPL-MCNC: 105 MG/DL (ref 70–110)
HCT VFR BLD AUTO: 27.1 % (ref 37–48.5)
HGB BLD-MCNC: 8.2 G/DL (ref 12–16)
IMM GRANULOCYTES # BLD AUTO: 0.07 K/UL (ref 0–0.04)
IMM GRANULOCYTES NFR BLD AUTO: 1 % (ref 0–0.5)
LYMPHOCYTES # BLD AUTO: 1.5 K/UL (ref 1–4.8)
LYMPHOCYTES NFR BLD: 21.1 % (ref 18–48)
MAGNESIUM SERPL-MCNC: 1.8 MG/DL (ref 1.6–2.6)
MCH RBC QN AUTO: 29.7 PG (ref 27–31)
MCHC RBC AUTO-ENTMCNC: 30.3 G/DL (ref 32–36)
MCV RBC AUTO: 98 FL (ref 82–98)
MONOCYTES # BLD AUTO: 0.7 K/UL (ref 0.3–1)
MONOCYTES NFR BLD: 9.8 % (ref 4–15)
NEUTROPHILS # BLD AUTO: 4.3 K/UL (ref 1.8–7.7)
NEUTROPHILS NFR BLD: 61.9 % (ref 38–73)
NRBC BLD-RTO: 0 /100 WBC
PHOSPHATE SERPL-MCNC: 4.6 MG/DL (ref 2.7–4.5)
PLATELET # BLD AUTO: 241 K/UL (ref 150–450)
PMV BLD AUTO: 11.5 FL (ref 9.2–12.9)
POTASSIUM SERPL-SCNC: 4.1 MMOL/L (ref 3.5–5.1)
PROT SERPL-MCNC: 6.1 G/DL (ref 6–8.4)
RBC # BLD AUTO: 2.76 M/UL (ref 4–5.4)
SODIUM SERPL-SCNC: 138 MMOL/L (ref 136–145)
WBC # BLD AUTO: 6.92 K/UL (ref 3.9–12.7)

## 2023-10-11 PROCEDURE — 63600175 PHARM REV CODE 636 W HCPCS: Performed by: STUDENT IN AN ORGANIZED HEALTH CARE EDUCATION/TRAINING PROGRAM

## 2023-10-11 PROCEDURE — 25000003 PHARM REV CODE 250: Performed by: NURSE PRACTITIONER

## 2023-10-11 PROCEDURE — 25000003 PHARM REV CODE 250: Performed by: STUDENT IN AN ORGANIZED HEALTH CARE EDUCATION/TRAINING PROGRAM

## 2023-10-11 PROCEDURE — 80053 COMPREHEN METABOLIC PANEL: CPT | Performed by: STUDENT IN AN ORGANIZED HEALTH CARE EDUCATION/TRAINING PROGRAM

## 2023-10-11 PROCEDURE — 83735 ASSAY OF MAGNESIUM: CPT | Performed by: STUDENT IN AN ORGANIZED HEALTH CARE EDUCATION/TRAINING PROGRAM

## 2023-10-11 PROCEDURE — 25000003 PHARM REV CODE 250

## 2023-10-11 PROCEDURE — 94761 N-INVAS EAR/PLS OXIMETRY MLT: CPT

## 2023-10-11 PROCEDURE — 85025 COMPLETE CBC W/AUTO DIFF WBC: CPT | Performed by: STUDENT IN AN ORGANIZED HEALTH CARE EDUCATION/TRAINING PROGRAM

## 2023-10-11 PROCEDURE — A4216 STERILE WATER/SALINE, 10 ML: HCPCS | Performed by: STUDENT IN AN ORGANIZED HEALTH CARE EDUCATION/TRAINING PROGRAM

## 2023-10-11 PROCEDURE — 84100 ASSAY OF PHOSPHORUS: CPT | Performed by: STUDENT IN AN ORGANIZED HEALTH CARE EDUCATION/TRAINING PROGRAM

## 2023-10-11 RX ORDER — ONDANSETRON 4 MG/1
4 TABLET, FILM COATED ORAL EVERY 6 HOURS PRN
Status: DISCONTINUED | OUTPATIENT
Start: 2023-10-11 | End: 2023-10-11 | Stop reason: HOSPADM

## 2023-10-11 RX ORDER — LANOLIN ALCOHOL/MO/W.PET/CERES
800 CREAM (GRAM) TOPICAL ONCE
Status: COMPLETED | OUTPATIENT
Start: 2023-10-11 | End: 2023-10-11

## 2023-10-11 RX ORDER — HEPARIN 100 UNIT/ML
5 SYRINGE INTRAVENOUS ONCE AS NEEDED
Status: DISCONTINUED | OUTPATIENT
Start: 2023-10-11 | End: 2023-10-11 | Stop reason: HOSPADM

## 2023-10-11 RX ORDER — HYDROMORPHONE HYDROCHLORIDE 1 MG/ML
1 INJECTION, SOLUTION INTRAMUSCULAR; INTRAVENOUS; SUBCUTANEOUS ONCE
Status: COMPLETED | OUTPATIENT
Start: 2023-10-11 | End: 2023-10-11

## 2023-10-11 RX ORDER — ONDANSETRON 4 MG/1
4 TABLET, FILM COATED ORAL EVERY 6 HOURS PRN
Qty: 56 TABLET | Refills: 0 | Status: SHIPPED | OUTPATIENT
Start: 2023-10-11

## 2023-10-11 RX ORDER — ACETAMINOPHEN 325 MG/1
650 TABLET ORAL EVERY 6 HOURS PRN
Qty: 30 TABLET | Refills: 0 | Status: SHIPPED | OUTPATIENT
Start: 2023-10-11 | End: 2023-12-15

## 2023-10-11 RX ORDER — HYDROMORPHONE HYDROCHLORIDE 1 MG/ML
0.5 INJECTION, SOLUTION INTRAMUSCULAR; INTRAVENOUS; SUBCUTANEOUS ONCE
Status: CANCELLED | OUTPATIENT
Start: 2023-10-11

## 2023-10-11 RX ORDER — OXYCODONE HYDROCHLORIDE 5 MG/1
5 TABLET ORAL EVERY 4 HOURS PRN
Qty: 20 TABLET | Refills: 0 | Status: ON HOLD | OUTPATIENT
Start: 2023-10-11 | End: 2023-11-15 | Stop reason: HOSPADM

## 2023-10-11 RX ADMIN — ARIPIPRAZOLE 5 MG: 5 TABLET ORAL at 08:10

## 2023-10-11 RX ADMIN — OXYCODONE HYDROCHLORIDE 10 MG: 10 TABLET ORAL at 05:10

## 2023-10-11 RX ADMIN — HYDROMORPHONE HYDROCHLORIDE 1 MG: 1 INJECTION, SOLUTION INTRAMUSCULAR; INTRAVENOUS; SUBCUTANEOUS at 10:10

## 2023-10-11 RX ADMIN — OXYCODONE HYDROCHLORIDE 10 MG: 10 TABLET ORAL at 12:10

## 2023-10-11 RX ADMIN — SCOPALAMINE 1 PATCH: 1 PATCH, EXTENDED RELEASE TRANSDERMAL at 04:10

## 2023-10-11 RX ADMIN — LAMOTRIGINE 150 MG: 100 TABLET ORAL at 08:10

## 2023-10-11 RX ADMIN — Medication 10 ML: at 05:10

## 2023-10-11 RX ADMIN — PANTOPRAZOLE SODIUM 40 MG: 40 TABLET, DELAYED RELEASE ORAL at 08:10

## 2023-10-11 RX ADMIN — FLUDROCORTISONE ACETATE 0.1 MG: 0.1 TABLET ORAL at 08:10

## 2023-10-11 RX ADMIN — Medication 800 MG: at 12:10

## 2023-10-11 RX ADMIN — ACETAMINOPHEN 650 MG: 325 TABLET ORAL at 05:10

## 2023-10-11 NOTE — HPI
Angela Trascher Lejeune is a 47yo F with PMH Crohn's disease, POTS, gastric varices, alcohol abuse, bipolar disorder who was recently seen in surgical oncology clinic 9/26/23 for evaluation of a gastric outlet obstruction. She reports her Crohns has been well controlled over the last couple of years with Stelara. Her last dose was in July. She has had past dilations of a duodenal stricture from her Crohn's, last on 9/15/23. Pathology revealed acute peptic duodenitis, antral bx negative for H pylori. In terms of operations, previously for her crohns disease she endorses a small bowel resection that was performed in the 1990s.     She presents to [facility] today as a transfer from OSH. She presented to the ER at OSH the night of 9/27/23 with worsening symptoms of n/v and abdominal pain. She is now s/p Delfino-Y gastrojejunostomy, g-tube, j-tube placement on 10/4 for gastric outlet obstruction. She was taken back to the OR today for evacuation of a hematoma. She remained extubated following the case and is being transferred to SICU post-operatively. She has had issues with pain control, hypertension, and tachycardia. She was seen in the PACU due to limited availability of beds in the SICU. She is intubated and sedated with propofol. She is hemodynamically stable, in no apparent distress.

## 2023-10-11 NOTE — PROGRESS NOTES
[facility] Home (Pump) Tube Feeding Education/Discharge Planning Note:     Bedside home Scotty Pump education completed with patient on 10/11/2023. Home tube feeding regimen (Isosource 1.5 @ 50ml/hr x 18 hours + water flush of 55ml/hr for every hour on feeds+ 60 ml syringe flush 3x/day via J-tube while off pump per HH orders) reviewed and provided. Teach back by  demonstrated. Patient stated that he has been on a similar enteral nutrition therapy at home, so she was familiar with the MOA/Scotty Pump. Patient is also aware that home pump is not programmed, and is comfortable enough with programming pump independently once home. Also provided review/education on formula safety, HOB elevation requirements, giving medications through feeding tube, and feeding tube site care. Additional education materials also provided. Time allotted for questions; questions addressed appropriately. Patient and family agreeable with the enteral discharge plan. Provided patient with [facility] support number [phone]. Patients home tube feeding supplies and formula have been delivered to the bedside. Patient is ready for discharge home from [facility] perspective. Patient's discharge planning team and bedside nurse updated with the information above.        Please do not hesitate reach out for any additional needs.      Kevin Amor MS, RDN, LDN  Clinical Liaison & Dietitian  [facility]   Phone: [phone]  [email or web address]

## 2023-10-11 NOTE — PLAN OF CARE
Problem: Adult Inpatient Plan of Care  Goal: Plan of Care Review  Outcome: Ongoing, Progressing  Goal: Patient-Specific Goal (Individualized)  Outcome: Ongoing, Progressing  Goal: Absence of Hospital-Acquired Illness or Injury  Outcome: Ongoing, Progressing  Goal: Optimal Comfort and Wellbeing  Outcome: Ongoing, Progressing  Goal: Readiness for Transition of Care  Outcome: Ongoing, Progressing     Problem: Infection  Goal: Absence of Infection Signs and Symptoms  Outcome: Ongoing, Progressing     Problem: Fall Injury Risk  Goal: Absence of Fall and Fall-Related Injury  Outcome: Ongoing, Progressing     Problem: Skin Injury Risk Increased  Goal: Skin Health and Integrity  Outcome: Ongoing, Progressing     Problem: Skin and Tissue Injury (Mechanical Ventilation, Invasive)  Goal: Absence of Device-Related Skin and Tissue Injury  Outcome: Ongoing, Progressing     Problem: Communication Impairment (Artificial Airway)  Goal: Effective Communication  Outcome: Ongoing, Progressing     Problem: Device-Related Complication Risk (Artificial Airway)  Goal: Optimal Device Function  Outcome: Ongoing, Progressing     Problem: Skin and Tissue Injury (Artificial Airway)  Goal: Absence of Device-Related Skin or Tissue Injury  Outcome: Ongoing, Progressing

## 2023-10-11 NOTE — PROGRESS NOTES
..[facility] Plan of Care Note  Dx duodenal stricture    Shift Events none    Goals of Care: increase diet    Neuro: intact    Vital Signs: wdl    Respiratory: wdl    Diet: clears, j tube KF 1.4 @ 50cc/hr    Is patient tolerating current diet? yes    GTTS: none    Urine Output/Bowel Movement: lbm 10/9/23, urine adequate    Drains/Tubes/Tube Feeds (include total output/shift): g tube- clamped, j tube TF    Lines: L port accessed, R picc      Accuchecks:none    Skin: ml dermabond    Fall Risk Score: 11    Activity level? x1    Any scheduled procedures? no    Any safety concerns? No    Other: n/a

## 2023-10-11 NOTE — ASSESSMENT & PLAN NOTE
Angela Trascher Lejeune is a 48 y.o. female with a PMH Crohn's disease, POTS, gastric varices, alcohol abuse, bipolar disorder who was recently seen in surgical oncology clinic for evaluation of a gastric outlet obstruction. Now s/p Delfino-Y gastrojejunostomy, G tube, and J tube placement 10/4/23 c/b hematoma s/p takeback to OR on 10/5/23    - MM pain meds. Tylenol, robaxin, oxycodone (change to PO instead of per J tube). Bilateral ESPs removed 10/9/2023  - PRN nausea meds  - full liquid diet  - Unclamp GT for nausea/vomiting  - Increase JT feeds to 55. Goal TF = 55cc/hour (2pm-8am)   - hypotension d/c amlodipine  - OOB/ambulate  - PT/OT  - Home meds  - DVT ppx    - Dispo: Plan for DC home today with home TF and home health

## 2023-10-11 NOTE — DISCHARGE SUMMARY
[facility]  General Surgery  Discharge Summary      Patient Name: Angela Trascher Lejeune  MRN: [id number]  Admission Date: 9/28/2023  Hospital Length of Stay: 13 days  Discharge Date and Time:  10/11/2023 9:54 AM  Attending Physician: Kevin Mays, *   Discharging Provider: Jordyn Carey NP  Primary Care Provider: Haven Gary MD    HPI:   Angela Trascher Lejeune is a 49yo F with PMH Crohn's disease, POTS, gastric varices, alcohol abuse, bipolar disorder who was recently seen in surgical oncology clinic 9/26/23 for evaluation of a gastric outlet obstruction. She reports her Crohns has been well controlled over the last couple of years with Stelara. Her last dose was in July. She has had past dilations of a duodenal stricture from her Crohn's, last on 9/15/23. Pathology revealed acute peptic duodenitis, antral bx negative for H pylori. In terms of operations, previously for her crohns disease she endorses a small bowel resection that was performed in the 1990s.     She presents to [facility] today as a transfer from OSH. She presented to the ER at OSH the night of 9/27/23 with worsening symptoms of n/v and abdominal pain. She is now s/p Delfino-Y gastrojejunostomy, g-tube, j-tube placement on 10/4 for gastric outlet obstruction. She was taken back to the OR today for evacuation of a hematoma. She remained extubated following the case and is being transferred to SICU post-operatively. She has had issues with pain control, hypertension, and tachycardia. She was seen in the PACU due to limited availability of beds in the SICU. She is intubated and sedated with propofol. She is hemodynamically stable, in no apparent distress.       Procedure(s) (LRB):  LAPAROTOMY, EXPLORATORY (N/A)  WASHOUT, HEMATOMA      Indwelling Lines/Drains at time of discharge:   Lines/Drains/Airways       Peripherally Inserted Central Catheter Line  Duration             PICC Double Lumen 09/29/23 1431 right basilic 11 days               Central Venous Catheter Line  Duration                  PowerPort A Cath Single Lumen 10/05/23 1320 Atrial Left 5 days              Drain  Duration                  Gastrostomy/Enterostomy 10/04/23 1117 Gastrostomy tube w/o balloon LUQ 6 days         Gastrostomy/Enterostomy 10/04/23 1149 Jejunostomy tube LUQ 6 days                  Hospital Course: Angela Trascher Lejeune is a 48 y.o. female with a PMH Crohn's disease, POTS, gastric varices, alcohol abuse, bipolar disorder who was recently seen in surgical oncology clinic for evaluation of a gastric outlet obstruction. She is s/p Delfino-Y gastrojejunostomy, G tube, and J tube placement on 10/4/23.  She is s/p takeback to the OR on 10/5/23 due abdominal hematoma.  Once stable the patient was transferred from the SICU to the [facility].  Initially post operatively the patient was with c/o pain and the APS was consulted for pain control. The JESENIA's were removed on 10/9/2023 and her pain is controlled with PO pain medication prn. The patient is tolerating a full liquid diet and Isosource 1.5 wesley TF at 50cc/hour from 2pm-8am with goal TF rate = 50cc/hour.  She is voiding and ambulating without difficulty.  Patient with no complaints of nausea, vomiting, chest pain or shortness of breath.  Her vital signs stable. She is afebrile. She is positive for flatus and positive for bowel sounds.    Physical Exam  Constitutional:       Appearance: Normal appearance.   HENT:      Head: Normocephalic and atraumatic.   Cardiovascular:      Rate and Rhythm: Normal rate.   Pulmonary:      Effort: Pulmonary effort is normal.   Abdominal:      General: Abdomen is flat.      Palpations: Abdomen is soft.      Tenderness: There is abdominal tenderness.      Comments: Multiple abdominal scars from previous surgical history  Midline incision now with skin staples. G tube clamped and J tube in place.  Ecchymosis around incision, not raised or edematous this AM.  Subcutaneous NHAN removed.    Skin:     General: Skin is warm and dry.   Neurological:      General: No focal deficit present.      Mental Status: She is alert and oriented to person, place, and time.      Goals of Care Treatment Preferences:  Code Status: Full Code      Consults:   Consults (From admission, onward)          Status Ordering Provider     Inpatient consult to Pain Management  Once        Provider:  (Not yet assigned)    Completed EARLENE GUTIERREZ     Inpatient consult to PICC team ([facility])  Once        Provider:  (Not yet assigned)    Completed ABILIO JIMÉNEZ            Significant Diagnostic Studies: Labs:   CMP   Recent Labs   Lab 10/10/23  0440 10/11/23  0453    138   K 4.1 4.1    105   CO2 25 24    105   BUN 25* 23*   CREATININE 0.8 0.8   CALCIUM 8.7 8.8   PROT 5.8* 6.1   ALBUMIN 2.6* 2.8*   BILITOT 0.3 0.2   ALKPHOS 125 138*   AST 18 17   ALT 19 17   ANIONGAP 7* 9    and CBC   Recent Labs   Lab 10/10/23  0440 10/11/23  0453   WBC 5.93 6.92   HGB 7.9* 8.2*   HCT 25.5* 27.1*    241       Pending Diagnostic Studies:       Procedure Component Value Units Date/Time    CBC Auto Differential [[id number]] Collected: 10/01/23 0322    Order Status: Sent Lab Status: In process Updated: 10/01/23 0323    Specimen: Blood     Comprehensive Metabolic Panel [[id number]] Collected: 10/01/23 0322    Order Status: Sent Lab Status: In process Updated: 10/01/23 0323    Specimen: Blood     Magnesium [[id number]] Collected: 10/01/23 0322    Order Status: Sent Lab Status: In process Updated: 10/01/23 0323    Specimen: Blood     Phosphorus [[id number]] Collected: 10/01/23 0322    Order Status: Sent Lab Status: In process Updated: 10/01/23 0323    Specimen: Blood           Final Active Diagnoses:    Diagnosis Date Noted POA    PRINCIPAL PROBLEM:  Duodenal stricture [K31.5] 08/30/2023 Yes    Encounter for weaning from ventilator [Z99.11] 10/06/2023 Not Applicable    Hypertension [I10] 07/22/2023 Yes    Seizure disorder  [G40.909] 05/13/2019 Yes    Bipolar I disorder [F31.9] 05/23/2017 Yes      Problems Resolved During this Admission:      Discharged Condition: good    Disposition: Home-Health Care [facility]    Follow Up:    Patient Instructions:      Diet full liquid     Lifting restrictions   Order Comments: No lifting > 10 pounds.  May shower.  Open gastrostomy tube for nausea/vomiting.  Flush jejunostomy tube with 60cc water TID.     Notify your health care provider if you experience any of the following:  temperature >100.4     Notify your health care provider if you experience any of the following:  persistent nausea and vomiting or diarrhea     Notify your health care provider if you experience any of the following:  severe uncontrolled pain     Notify your health care provider if you experience any of the following:  redness, tenderness, or signs of infection (pain, swelling, redness, odor or green/yellow discharge around incision site)     Notify your health care provider if you experience any of the following:  difficulty breathing or increased cough     Notify your health care provider if you experience any of the following:  severe persistent headache     Notify your health care provider if you experience any of the following:  worsening rash     Notify your health care provider if you experience any of the following:  persistent dizziness, light-headedness, or visual disturbances     Notify your health care provider if you experience any of the following:  increased confusion or weakness     No dressing needed     Tube Feedings/Formulas   Order Comments: On from 2pm-8am     Order Specific Question Answer Comments   Select Adult Formula: Other [employer] 1.4 wesley   Route: Jejunostomy    Formula Rate (mL/hr): 55      Activity as tolerated     Medications:  Reconciled Home Medications:      Medication List        START taking these medications      acetaminophen 325 MG tablet  Commonly known as: TYLENOL  Take 2 tablets (650 mg  total) by mouth every 6 (six) hours as needed for Pain.     ondansetron 4 MG tablet  Commonly known as: ZOFRAN  Take 1 tablet (4 mg total) by mouth every 6 (six) hours as needed for Nausea.     oxyCODONE 5 MG immediate release tablet  Commonly known as: ROXICODONE  Take 1 tablet (5 mg total) by mouth every 4 (four) hours as needed for Pain.            CHANGE how you take these medications      tiZANidine 4 MG tablet  Commonly known as: ZANAFLEX  1-2 tid prn  What changed:   how much to take  how to take this  when to take this  reasons to take this  additional instructions            CONTINUE taking these medications      ARIPiprazole 5 MG Tab  Commonly known as: ABILIFY  Take 1 tablet (5 mg total) by mouth once daily.     fludrocortisone 0.1 mg Tab  Commonly known as: FLORINEF  Take 0.1 mg by mouth Daily.     HYDROmorphone 2 MG tablet  Commonly known as: DILAUDID  Take 1 tablet (2 mg total) by mouth every 4 (four) hours as needed for Pain.     lamoTRIgine 150 MG Tab  Commonly known as: LAMICTAL  Take 150 mg by mouth 2 (two) times daily.     LIDOcaine-prilocaine cream  Commonly known as: EMLA  Apply topically as needed (use prior to treatment with port access).     LORazepam 0.5 MG tablet  Commonly known as: ATIVAN  Take 1 tablet (0.5 mg total) by mouth every 6 (six) hours as needed for Anxiety.     midodrine 5 MG Tab  Commonly known as: PROAMATINE  Take 5 mg by mouth 3 (three) times daily with meals.     mirtazapine 7.5 MG Tab  Commonly known as: REMERON  Take 1 tablet (7.5 mg total) by mouth every evening.     pantoprazole 40 MG tablet  Commonly known as: PROTONIX  Take 1 tablet (40 mg total) by mouth 2 (two) times daily.     promethazine 25 MG tablet  Commonly known as: PHENERGAN  Take 1 tablet (25 mg total) by mouth every 6 (six) hours as needed for Nausea.     propranoloL 40 MG tablet  Commonly known as: INDERAL  Take 40 mg by mouth once daily.     sodium chloride 1,000 mg Tbso oral tablet  Take 1,000 mg by  mouth 2 (two) times a day.     sucralfate 1 gram tablet  Commonly known as: CARAFATE  Take 1 tablet (1 g total) by mouth 4 (four) times daily before meals and nightly.     ustekinumab 90 mg/mL Syrg syringe  Commonly known as: stelara  Inject 90 mg into the skin every 8 weeks. Indications: Crohn's disease            Time spent on the discharge of patient: 20 minutes    Jordyn Carey NP  General Surgery  [facility]

## 2023-10-11 NOTE — PROGRESS NOTES
[facility]  General Surgery  Progress Note    Subjective:     History of Present Illness:  Angela Trascher Lejeune is a 49yo F with PMH Crohn's disease, POTS, gastric varices, alcohol abuse, bipolar disorder who was recently seen in surgical oncology clinic 9/26/23 for evaluation of a gastric outlet obstruction. She reports her Crohns has been well controlled over the last couple of years with Stelara. Her last dose was in July. She has had past dilations of a duodenal stricture from her Crohn's, last on 9/15/23. Pathology revealed acute peptic duodenitis, antral bx negative for H pylori. In terms of operations, previously for her crohns disease she endorses a small bowel resection that was performed in the 1990s.     She presents to [facility] today as a transfer from OSH. She presented to the ER at OSH the night of 9/27/23 with worsening symptoms of n/v and abdominal pain. She is now s/p Delfino-Y gastrojejunostomy, g-tube, j-tube placement on 10/4 for gastric outlet obstruction. She was taken back to the OR today for evacuation of a hematoma. She remained extubated following the case and is being transferred to SICU post-operatively. She has had issues with pain control, hypertension, and tachycardia. She was seen in the PACU due to limited availability of beds in the SICU. She is intubated and sedated with propofol. She is hemodynamically stable, in no apparent distress.       Post-Op Info:  Procedure(s) (LRB):  LAPAROTOMY, EXPLORATORY (N/A)  WASHOUT, HEMATOMA   6 Days Post-Op     Interval History: Tolerating tube feeds via j-tube and FL diet.  No c/o nausea or vomiting.  + BM today. Pain controlled. + ambulation down halls without difficulty.     Medications:  Continuous Infusions:  Scheduled Meds:   acetaminophen  650 mg Oral Q6H    ARIPiprazole  5 mg Oral Daily    enoxparin  40 mg Subcutaneous Q24H (prophylaxis, 1700)    fludrocortisone  0.1 mg Oral Daily    lamoTRIgine  150 mg Oral BID    mirtazapine  15  "mg Oral Nightly    pantoprazole  40 mg Oral Daily    scopolamine  1 patch Transdermal Q3 Days    sodium chloride 0.9%  10 mL Intravenous Q6H     PRN Meds:labetalol, levalbuterol, LIDOcaine (PF) 10 mg/ml (1%), melatonin, naloxone, ondansetron, oxyCODONE, oxyCODONE, prochlorperazine, sodium chloride 0.9%, Flushing PICC/Midline Protocol **AND** sodium chloride 0.9% **AND** sodium chloride 0.9%, sodium chloride 0.9%     Review of patient's allergies indicates:   Allergen Reactions    Tramadol Other (See Comments)     Seizures      Adhesive Other (See Comments)     STATES, " TEARS SKIN"     Demerol [meperidine]      Seizures      Hydrocodone Hallucinations    Reglan [metoclopramide]      Objective:     Vital Signs (Most Recent):  Temp: 98.6 °F (37 °C) (10/11/23 0805)  Pulse: 78 (10/11/23 0805)  Resp: 20 (10/11/23 0805)  BP: (!) 92/52 (10/11/23 0814)  SpO2: 95 % (10/11/23 0805) Vital Signs (24h Range):  Temp:  [97.6 °F (36.4 °C)-99.8 °F (37.7 °C)] 98.6 °F (37 °C)  Pulse:  [73-89] 78  Resp:  [14-20] 20  SpO2:  [95 %-99 %] 95 %  BP: ()/(45-54) 92/52     Weight: 53.1 kg (117 lb)  Body mass index is 20.08 kg/m².    Intake/Output - Last 3 Shifts         10/09 0700  10/10 0659 10/10 0700  10/11 0659 10/11 0700  10/12 0659    I.V. (mL/kg)       NG/GT  120     Total Intake(mL/kg)  120 (2.3)     Urine (mL/kg/hr) 950 (0.7) 650 (0.5)     Drains  0     Stool       Total Output 950 650     Net -950 -530                       Physical Exam  Constitutional:       Appearance: Normal appearance.   HENT:      Head: Normocephalic and atraumatic.   Cardiovascular:      Rate and Rhythm: Normal rate.   Pulmonary:      Effort: Pulmonary effort is normal.   Abdominal:      General: Abdomen is flat.      Palpations: Abdomen is soft.      Tenderness: There is abdominal tenderness.      Comments: Multiple abdominal scars from previous surgical history  Midline incision now with skin staples. G tube clamped and J tube in " place.  Ecchymosis around incision, not raised or edematous this AM.  Subcutaneous NHAN removed.   Skin:     General: Skin is warm and dry.   Neurological:      General: No focal deficit present.      Mental Status: She is alert and oriented to person, place, and time.      Significant Labs:  CBC:   Recent Labs   Lab 10/11/23  0453   WBC 6.92   RBC 2.76*   HGB 8.2*   HCT 27.1*      MCV 98   MCH 29.7   MCHC 30.3*     CMP:   Recent Labs   Lab 10/11/23  0453      CALCIUM 8.8   ALBUMIN 2.8*   PROT 6.1      K 4.1   CO2 24      BUN 23*   CREATININE 0.8   ALKPHOS 138*   ALT 17   AST 17   BILITOT 0.2       Significant Diagnostics:  I have reviewed all pertinent imaging results/findings within the past 24 hours.    Assessment/Plan:     * Duodenal stricture  Angela Trascher Lejeune is a 48 y.o. female with a PMH Crohn's disease, POTS, gastric varices, alcohol abuse, bipolar disorder who was recently seen in surgical oncology clinic for evaluation of a gastric outlet obstruction. Now s/p Delfino-Y gastrojejunostomy, G tube, and J tube placement 10/4/23 c/b hematoma s/p takeback to OR on 10/5/23    - MM pain meds. Tylenol, robaxin, oxycodone (change to PO instead of per J tube). Bilateral ESPs removed 10/9/2023  - PRN nausea meds  - full liquid diet  - Unclamp GT for nausea/vomiting  - Increase JT feeds to 55. Goal TF = 55cc/hour (2pm-8am)   - hypotension d/c amlodipine  - OOB/ambulate  - PT/OT  - Home meds  - DVT ppx    - Dispo: Plan for DC home today with home TF and home health      Hypertension  Continue home medications    Seizure disorder  Continue home medications    Bipolar I disorder  Continue home medications        Jordyn Carey NP  General Surgery  [facility]

## 2023-10-11 NOTE — SUBJECTIVE & OBJECTIVE
"Interval History: Tolerating tube feeds via j-tube and FL diet.  No c/o nausea or vomiting.  + BM today. Pain controlled. + ambulation down halls without difficulty.     Medications:  Continuous Infusions:  Scheduled Meds:   acetaminophen  650 mg Oral Q6H    ARIPiprazole  5 mg Oral Daily    enoxparin  40 mg Subcutaneous Q24H (prophylaxis, 1700)    fludrocortisone  0.1 mg Oral Daily    lamoTRIgine  150 mg Oral BID    mirtazapine  15 mg Oral Nightly    pantoprazole  40 mg Oral Daily    scopolamine  1 patch Transdermal Q3 Days    sodium chloride 0.9%  10 mL Intravenous Q6H     PRN Meds:labetalol, levalbuterol, LIDOcaine (PF) 10 mg/ml (1%), melatonin, naloxone, ondansetron, oxyCODONE, oxyCODONE, prochlorperazine, sodium chloride 0.9%, Flushing PICC/Midline Protocol **AND** sodium chloride 0.9% **AND** sodium chloride 0.9%, sodium chloride 0.9%     Review of patient's allergies indicates:   Allergen Reactions    Tramadol Other (See Comments)     Seizures      Adhesive Other (See Comments)     STATES, " TEARS SKIN"     Demerol [meperidine]      Seizures      Hydrocodone Hallucinations    Reglan [metoclopramide]      Objective:     Vital Signs (Most Recent):  Temp: 98.6 °F (37 °C) (10/11/23 0805)  Pulse: 78 (10/11/23 0805)  Resp: 20 (10/11/23 0805)  BP: (!) 92/52 (10/11/23 0814)  SpO2: 95 % (10/11/23 0805) Vital Signs (24h Range):  Temp:  [97.6 °F (36.4 °C)-99.8 °F (37.7 °C)] 98.6 °F (37 °C)  Pulse:  [73-89] 78  Resp:  [14-20] 20  SpO2:  [95 %-99 %] 95 %  BP: ()/(45-54) 92/52     Weight: 53.1 kg (117 lb)  Body mass index is 20.08 kg/m².    Intake/Output - Last 3 Shifts         10/09 0700  10/10 0659 10/10 0700  10/11 0659 10/11 0700  10/12 0659    I.V. (mL/kg)       NG/GT  120     Total Intake(mL/kg)  120 (2.3)     Urine (mL/kg/hr) 950 (0.7) 650 (0.5)     Drains  0     Stool       Total Output 950 650     Net -950 -530                       Physical Exam  Constitutional:       Appearance: Normal appearance.   HENT: "      Head: Normocephalic and atraumatic.   Cardiovascular:      Rate and Rhythm: Normal rate.   Pulmonary:      Effort: Pulmonary effort is normal.   Abdominal:      General: Abdomen is flat.      Palpations: Abdomen is soft.      Tenderness: There is abdominal tenderness.      Comments: Multiple abdominal scars from previous surgical history  Midline incision now with skin staples. G tube clamped and J tube in place.  Ecchymosis around incision, not raised or edematous this AM.  Subcutaneous NHAN removed.   Skin:     General: Skin is warm and dry.   Neurological:      General: No focal deficit present.      Mental Status: She is alert and oriented to person, place, and time.      Significant Labs:  CBC:   Recent Labs   Lab 10/11/23  0453   WBC 6.92   RBC 2.76*   HGB 8.2*   HCT 27.1*      MCV 98   MCH 29.7   MCHC 30.3*     CMP:   Recent Labs   Lab 10/11/23  0453      CALCIUM 8.8   ALBUMIN 2.8*   PROT 6.1      K 4.1   CO2 24      BUN 23*   CREATININE 0.8   ALKPHOS 138*   ALT 17   AST 17   BILITOT 0.2       Significant Diagnostics:  I have reviewed all pertinent imaging results/findings within the past 24 hours.

## 2023-10-12 ENCOUNTER — PATIENT OUTREACH (OUTPATIENT)
Dept: ADMINISTRATIVE | Facility: CLINIC | Age: 48
End: 2023-10-12
Payer: COMMERCIAL

## 2023-10-12 PROCEDURE — G0180 PR HOME HEALTH MD CERTIFICATION: ICD-10-PCS | Mod: ,,, | Performed by: SURGERY

## 2023-10-12 PROCEDURE — G0180 MD CERTIFICATION HHA PATIENT: HCPCS | Mod: ,,, | Performed by: SURGERY

## 2023-10-12 NOTE — PROGRESS NOTES
C3 nurse attempted to contact Angela Trascher Lejeune  for a TCC post hospital discharge follow up call. No answer. Left voicemail with callback information. The patient does not have a scheduled HOSFU appointment. Message sent to PCP staff for assistance with scheduling visit with patient.

## 2023-10-12 NOTE — NURSING
D/C instructions reviewed with pt, all questions answered. Port flushed and heparinized, then de-accessed per MD, PICC line also removed per MD and pressure applied. Pt tolerated well and showed no s/s bleeding post PICC removal. Teaching provided earlier regarding J and G tube, and then Amany to bedside for education on home pump with tube feeds. Pt left in stable condition via wheelchair at approx 1300

## 2023-10-12 NOTE — PLAN OF CARE
10/12/23 1155   Final Note   Assessment Type Final Discharge Note   Anticipated Discharge Disposition Home-[facility]   Hospital Resources/Appts/Education Provided Provided patient/caregiver with written discharge plan information   Post-Acute Status   Post-Acute Authorization Home Health;IV Infusion   Home Health Status Set-up Complete/Auth obtained   IV Infusion Status Set-up Complete/Auth obtained   Discharge Delays None known at this time       Pt to d/c home w/ family. [facility]  to begin services post d/c. [facility] IV competed teaching at bedside and delivered supplies. No additional needs at this time.     Christine Vegas LCSW  Case Management/[facility]  [phone]    Spoke to patient with results verbatim per Andrea. Verbalized understanding to recheck ultrasound in 3 months. Remind me created.

## 2023-10-12 NOTE — PHYSICIAN QUERY
PT Name: Angela Trascher Lejeune  MR #: [id number]    DOCUMENTATION CLARIFICATION     CDS: Brandy Capley, RN  Email: [email or web address]      This form is a permanent document in the medical record.     Query Date: October 12, 2023    By submitting this query, we are merely seeking further clarification of documentation.. Please utilize your independent clinical judgment when addressing the question(s) below.    The medical record contains the following:   Indicators  Supporting Clinical Findings Location in Medical Record   X Registered Dietician Diagnosis   Inadequate energy intake  Related to (etiology): Inadequate oral intake  Signs and Symptoms (as evidenced by):  CLD, on TPN      Nutrition progress notes 10/3   X Energy Intake Per chart review pt consuming 50-75% of CLD. TPN meeting nutrition needs.   Nutrition progress notes 10/3   X Weight Loss Noted with 4% wt loss in 1 month (not significant).    Nutrition progress notes 10/3    Fat Loss      Muscle Loss      Edema/Fluid Accumulation      Reduced  Strength (by dynamometer)     X Weight, BMI, Usual Body Weight Weight: 53.1 kg (117 lb)  Weight (lb): 117 lb  Ideal Body Weight (IBW), Female: 120.1 lb  % Ideal Body Weight, Female (lb): 97.42 %  BMI (Calculated): 20.1  BMI Grade: 18.5-24.9 - normal       Delayed Wound Healing     X Acute or Chronic Illness Severe protein-calorie malnutrition    Operative Findings:    Stricture lateral to crossing of CBD, focal, soft and non mass-like  Bypass via retrocolic mg-y gastrojejunostomy (across pylorus), 45 cm limb, total of >350 cm of small bowel in continuity  Grossly normal small bowel and colon    [place] [facility] [place] [facility]. She presented to the ER at OSH the night of 9/27/23 with worsening symptoms of n/v and abdominal pain.     She reports her Crohns has been well controlled over the last couple of years with Stelara. Her last dose was in July. She has had past dilations of a duodenal  stricture from her Crohn's, last on 9/15/23. Pathology revealed acute peptic duodenitis, antral bx negative for H pylori. In terms of operations, previously for her crohns disease she endorses a small bowel resection that was performed in the 1990s.    * Duodenal stricture  Angela Trascher Lejeune is a 49yo F with PMH Crohn's disease, POTS, gastric varices, alcohol abuse, bipolar disorder who was recently seen in surgical oncology clinic for evaluation of a gastric outlet obstruction    Medications Prior to Admission  mirtazapine (REMERON) 7.5 MG Tab Take 1 tablet (7.5 mg total) by mouth every evening.    Progressive GOO    Rec: PICC, TPN will need some time to reverse catabolic state and correct electrolytes prior to surgery    Op note 10/4              H&P 9/28, filed 9/29                              General surgery progress notes 9/29    Social or Environmental Circumstances     X Treatment TPN    Procedures:  Delfino-Y gastrojejunostomy -22  Exploration of duodenum via duodenotomy  Gastrostomy tube placement  Feeding Jejunostomy tube placement    Tube Feedings/Formulas [facility]; Yoovi Standard 1.4; 20; Jejunostomy; Tube Feeding Bag:   Select Adult Formula: Yoovi Standard 1.4  Formula Rate (mL/hr): 20  Route: Jejunostomy    Bedside home Scotty Pump education completed with patient on 10/11/2023. Home tube feeding regimen (Isosource 1.5 @ 50ml/hr x 18 hours + water flush of 55ml/hr for every hour on feeds+ 60 ml syringe flush 3x/day via J-tube while off pump per  orders) reviewed and provided.    General surgery progress notes 9/30    Op note 10/4            Orders 10/7          Nutrition progress notes 10/11    Other       Academy of Nutrition and Dietetics (Academy) and the American Society for Parenteral and Enteral Nutrition (A.S.P.E.N.) Clinical Characteristics to support Malnutrition      Criteria for mild malnutrition is defined as 1 characteristic outlined above within the established  moderate or severe parameters.  A minimum of 2 out of the 6 characteristics noted above are recommended for a diagnosis of moderate or severe malnutrition.  Chronic illness/injury is a disease/condition lasting 3 months or longer.    The noted clinical guidelines are only system guidelines and do not replace the providers clinical judgment.    Due to the conflicting clinical picture, please clinically validate the diagnosis of _severe malnutrition__.    If validated, please provide additional clinical support for the diagnosis.       [   ] Above stated diagnosis is not confirmed and/or it has been ruled out     [  xx ] Above stated diagnosis is confirmed. Please specify clinical support (signs, symptoms, and treatment) for the confirmed diagnosis: ____________________   [   ] Other clarification (please specify): ___________________           Please document in your progress notes daily for the duration of treatment until resolved and  include in your discharge summary.      References:    RIMA Mccarthy, & ДМИТРИЙ Wylie (2022, April). Assessment and management of anorexia and cachexia in palliative care. Retrieved May 23, 2022, from [email or web address]-[email or web address]-[email or web address]-[email or web address]-care?[email or web address]_link     ALEXIA Gary, PhD, RD, Grey BEST P., PhD, RN, MAN Chanel MD, PhD, Shawn FRANCISCO A., MS, RD, [facility], ANA Medina, MS, RD, The Academy Malnutrition Work Group, The A.S.P.E.N. Board of Directors. (2012). Consensus Statement: Academy of Nutrition and Dietetics and American Society for Parenteral and Enteral Nutrition: Characteristics Recommended for the Identification and Documentation of Adult Malnutrition (Undernutrition). Journal of Parenteral and Enteral Nutrition, 36(3), 275-283. doi:10.1177/4000940554194088     Form No. [id number]

## 2023-10-13 NOTE — PROGRESS NOTES
"Oncology Nutrition Assessment for Medical Nutrition Therapy  Initial Visit    Angela Trascher Lejeune   1975    Referring Provider: Kevin Mays,*      Reason for Visit: nutrition counseling and education    PMHx:   Past Medical History:   Diagnosis Date    Abnormal Pap smear     + HPV    Bipolar 1 disorder     Closed bimalleolar fracture of left ankle     Crohn's disease S/P surgery (ileum, possible gastroduodenal with ulcers)     1998-ileocectomy (9 inches ileum)    Encounter for blood transfusion     Gastric and duodenal ulcers of unclear etiology     Gastroparesis     H/O ETOH abuse     History of HSV-2 infection     Immunosuppressed status     Iron deficiency anemia, multiple blood transfusions     POTS (postural orthostatic tachycardia syndrome)     Seizures     states last seizure 2009       Nutrition Assessment    This is a 48 y.o.female with a medical diagnosis of gastric outlet obstruction s/p mg-en-y gastrojejunostomy and G-tube & J-tube placement 10/4. This was c/b by hematoma requiring takeback to OR 10/5. She was discharged on FL diet and TF of Isosource 1.5 at 50mL/hr x 18hrs (provides 1350kcal/day and 61g protein/day) + water flushes at 55mL/hr for every hour on feeds. She states this has gone well, denies cramping/bloating. She reports that she was unsure of what she could be eating on FL diet. She has lost~4lb since discharge.     Weight: 52.95 kg (116 lb 11.7 oz)  Height: 5' 4" (162.6 cm)  BMI: 20    Usual BW: 120lb  Weight Change: 4lb loss    Allergies: Tramadol, Adhesive, Demerol [meperidine], Hydrocodone, and Reglan [metoclopramide]    Current Medications:  Current Outpatient Medications:     acetaminophen (TYLENOL) 325 MG tablet, Take 2 tablets (650 mg total) by mouth every 6 (six) hours as needed for Pain., Disp: 30 tablet, Rfl: 0    ARIPiprazole (ABILIFY) 5 MG Tab, Take 1 tablet (5 mg total) by mouth once daily., Disp: 30 tablet, Rfl: 11    fludrocortisone (FLORINEF) 0.1 mg " Tab, Take 0.1 mg by mouth Daily., Disp: , Rfl:     HYDROmorphone (DILAUDID) 2 MG tablet, Take 1 tablet (2 mg total) by mouth every 4 (four) hours as needed for Pain. (Patient not taking: Reported on 10/13/2023), Disp: 20 tablet, Rfl: 0    lamotrigine (LAMICTAL) 150 MG Tab, Take 150 mg by mouth 2 (two) times daily., Disp: , Rfl:     LIDOcaine-prilocaine (EMLA) cream, Apply topically as needed (use prior to treatment with port access)., Disp: 30 g, Rfl: 0    LORazepam (ATIVAN) 0.5 MG tablet, Take 1 tablet (0.5 mg total) by mouth every 6 (six) hours as needed for Anxiety., Disp: 30 tablet, Rfl: 2    midodrine (PROAMATINE) 5 MG Tab, Take 5 mg by mouth 3 (three) times daily with meals., Disp: , Rfl:     mirtazapine (REMERON) 7.5 MG Tab, Take 1 tablet (7.5 mg total) by mouth every evening., Disp: 30 tablet, Rfl: 11    ondansetron (ZOFRAN) 4 MG tablet, Take 1 tablet (4 mg total) by mouth every 6 (six) hours as needed for Nausea. (Patient not taking: Reported on 10/13/2023), Disp: 56 tablet, Rfl: 0    oxyCODONE (ROXICODONE) 5 MG immediate release tablet, Take 1 tablet (5 mg total) by mouth every 4 (four) hours as needed for Pain., Disp: 20 tablet, Rfl: 0    pantoprazole (PROTONIX) 40 MG tablet, Take 1 tablet (40 mg total) by mouth 2 (two) times daily., Disp: 60 tablet, Rfl: 1    promethazine (PHENERGAN) 25 MG tablet, Take 1 tablet (25 mg total) by mouth every 6 (six) hours as needed for Nausea., Disp: 30 tablet, Rfl: 5    propranoloL (INDERAL) 40 MG tablet, Take 40 mg by mouth once daily., Disp: , Rfl:     sodium chloride 1,000 mg TbSO oral tablet, Take 1,000 mg by mouth 2 (two) times a day., Disp: , Rfl:     sucralfate (CARAFATE) 1 gram tablet, Take 1 tablet (1 g total) by mouth 4 (four) times daily before meals and nightly., Disp: 28 tablet, Rfl: 0    tiZANidine (ZANAFLEX) 4 MG tablet, 1-2 tid prn (Patient taking differently: Take 4-8 mg by mouth every 8 (eight) hours as needed (muscle spasms).), Disp: 60 tablet, Rfl: 5     ustekinumab (STELARA) 90 mg/mL Syrg syringe, Inject 90 mg into the skin every 8 weeks. Indications: Crohn's disease, Disp: , Rfl:     Food/medication interactions noted: none    Vitamins/Supplements: none    Labs: Reviewed    Nutrition Diagnosis    Problem: inadequate enteral nutrition infusion  Etiology: tube feeding prescription  Signs/Symptoms: tube feeding providing < nutrition prescription and weight loss    Nutrition Intervention    Nutrition Prescription   5602-2280 kcals (25-30kcal/kg)  69g protein (1.3g/kg)   1324-1589mL fluid (25-30mL/kg)    Recommendations:  Increase TF to Isosource 1.5 at 100mL/hr x 10hrs (4 cartons/day)  Continue water flushes at 55mL/hr for every hour on feeds  Flush J-tube with at least 60mL before and after feeds  Discussed FL examples    Materials Provided/Reviewed: Post-op Diet Progression handout    Nutrition Monitoring and Evaluation    Monitor: energy intake, rate/formulation of tube feeding, and weight status    Goals: weight maintenance/prevent further weight loss    Follow up: in 1-2 weeks    Communication to referring provider/care team: note available in chart; discussed with Dr. Mays/ALEXIA Arias NP    Counseling time: 10 minutes    Paris Ross, MS, RD, LDN

## 2023-10-13 NOTE — PROGRESS NOTES
C3 nurse spoke with Angela Trascher Lejeune  for a TCC post hospital discharge follow up call. [facility]  [facility] @ 0155.

## 2023-10-13 NOTE — PHYSICIAN QUERY
PT Name: Angela Trascher Lejeune  MR #: [id number]    DOCUMENTATION CLARIFICATION     CDS: Brandy Capley, RN  Email: [email or web address]      This form is a permanent document in the medical record.     Query Date: October 13, 2023    By submitting this query, we are merely seeking further clarification of documentation.. Please utilize your independent clinical judgment when addressing the question(s) below.    The medical record contains the following:   Indicators  Supporting Clinical Findings Location in Medical Record   X Registered Dietician Diagnosis   Inadequate energy intake  Related to (etiology): Inadequate oral intake  Signs and Symptoms (as evidenced by):  CLD, on TPN      Nutrition progress notes 10/3   X Energy Intake Per chart review pt consuming 50-75% of CLD. TPN meeting nutrition needs.   Nutrition progress notes 10/3   X Weight Loss Noted with 4% wt loss in 1 month (not significant).    Nutrition progress notes 10/3    Fat Loss      Muscle Loss      Edema/Fluid Accumulation      Reduced  Strength (by dynamometer)     X Weight, BMI, Usual Body Weight Weight: 53.1 kg (117 lb)  Weight (lb): 117 lb  Ideal Body Weight (IBW), Female: 120.1 lb  % Ideal Body Weight, Female (lb): 97.42 %  BMI (Calculated): 20.1  BMI Grade: 18.5-24.9 - normal       Delayed Wound Healing     X Acute or Chronic Illness Severe protein-calorie malnutrition    Operative Findings:    Stricture lateral to crossing of CBD, focal, soft and non mass-like  Bypass via retrocolic mg-y gastrojejunostomy (across pylorus), 45 cm limb, total of >350 cm of small bowel in continuity  Grossly normal small bowel and colon    [place] [facility] [place] [facility]. She presented to the ER at OSH the night of 9/27/23 with worsening symptoms of n/v and abdominal pain.     She reports her Crohns has been well controlled over the last couple of years with Stelara. Her last dose was in July. She has had past dilations of a duodenal  stricture from her Crohn's, last on 9/15/23. Pathology revealed acute peptic duodenitis, antral bx negative for H pylori. In terms of operations, previously for her crohns disease she endorses a small bowel resection that was performed in the 1990s.    * Duodenal stricture  Angela Trascher Lejeune is a 49yo F with PMH Crohn's disease, POTS, gastric varices, alcohol abuse, bipolar disorder who was recently seen in surgical oncology clinic for evaluation of a gastric outlet obstruction    Medications Prior to Admission  mirtazapine (REMERON) 7.5 MG Tab Take 1 tablet (7.5 mg total) by mouth every evening.    Progressive GOO    Rec: PICC, TPN will need some time to reverse catabolic state and correct electrolytes prior to surgery    Op note 10/4              H&P 9/28, filed 9/29                              General surgery progress notes 9/29    Social or Environmental Circumstances     X Treatment TPN    Procedures:  Delfino-Y gastrojejunostomy -22  Exploration of duodenum via duodenotomy  Gastrostomy tube placement  Feeding Jejunostomy tube placement    Tube Feedings/Formulas [facility]; USEREADY Standard 1.4; 20; Jejunostomy; Tube Feeding Bag:   Select Adult Formula: USEREADY Standard 1.4  Formula Rate (mL/hr): 20  Route: Jejunostomy    Bedside home Scotty Pump education completed with patient on 10/11/2023. Home tube feeding regimen (Isosource 1.5 @ 50ml/hr x 18 hours + water flush of 55ml/hr for every hour on feeds+ 60 ml syringe flush 3x/day via J-tube while off pump per  orders) reviewed and provided.    General surgery progress notes 9/30    Op note 10/4            Orders 10/7          Nutrition progress notes 10/11   X Other Due to the conflicting clinical picture, please clinically validate the diagnosis of severe malnutrition.    If validated, please provide additional clinical support for the diagnosis.     [  xx ] Above stated diagnosis is confirmed. Please specify clinical support (signs, symptoms,  and treatment) for the confirmed diagnosis: ____________________   Physician query filed 10/12     Academy of Nutrition and Dietetics (Academy) and the American Society for Parenteral and Enteral Nutrition (A.S.P.E.N.) Clinical Characteristics to support Malnutrition      Criteria for mild malnutrition is defined as 1 characteristic outlined above within the established moderate or severe parameters.  A minimum of 2 out of the 6 characteristics noted above are recommended for a diagnosis of moderate or severe malnutrition.  Chronic illness/injury is a disease/condition lasting 3 months or longer.    The noted clinical guidelines are only system guidelines and do not replace the providers clinical judgment.      Severe malnutrition has been confirmed as a diagnosis via query signed 10/12 5:56pm.    Based on your medical judgment and in order to clinically support the documented diagnosis, please document the clinical indicators (signs, symptoms, & treatment) that were utilized to support this diagnosis:    [ xx  ]  Signs, Symptoms, & Treatment: __>5% weight loss/1M, chronic dz (crohn's for years), Intake <75% >1 month and an obstruction that required admission, TPN______________________________________     [   ]  Above stated diagnosis is not confirmed and/or it has been ruled out     [   ]  Other clarification (please specify): ___________________________________         Please document in your progress notes daily for the duration of treatment until resolved and  include in your discharge summary.      References:    RIMA Mccarthy, & CAROLINA Wylie. (2022, April). Assessment and management of anorexia and cachexia in palliative care. Retrieved May 23, 2022, from [email or web address]-[email or web address]-[email or web address]-care?[email or web address]_link     ALEXIA Gary, PhD, RD, FADGEORGETTE, ALTHEA Edmonds, PhD, RN, MAN Chanel MD, PhD, Shawn FRANCISCO A., MS, RD, CNSC, ANA Medina, MS, RD,  The Academy Malnutrition Work Group, The A.S.P.E.N. Board of Directors. (2012). Consensus Statement: Academy of Nutrition and Dietetics and American Society for Parenteral and Enteral Nutrition: Characteristics Recommended for the Identification and Documentation of Adult Malnutrition (Undernutrition). Journal of Parenteral and Enteral Nutrition, 36(3), 275-283. doi:10.1177/6878100114590152     Form No. [id number]

## 2023-10-16 PROBLEM — E87.20 METABOLIC ACIDOSIS: Status: ACTIVE | Noted: 2023-10-16

## 2023-10-17 ENCOUNTER — CLINICAL SUPPORT (OUTPATIENT)
Dept: HEMATOLOGY/ONCOLOGY | Facility: CLINIC | Age: 48
End: 2023-10-17
Payer: COMMERCIAL

## 2023-10-17 ENCOUNTER — OFFICE VISIT (OUTPATIENT)
Dept: SURGERY | Facility: CLINIC | Age: 48
End: 2023-10-17
Payer: COMMERCIAL

## 2023-10-17 VITALS
DIASTOLIC BLOOD PRESSURE: 65 MMHG | BODY MASS INDEX: 19.93 KG/M2 | SYSTOLIC BLOOD PRESSURE: 127 MMHG | HEART RATE: 98 BPM | WEIGHT: 116.75 LBS | OXYGEN SATURATION: 100 % | HEIGHT: 64 IN

## 2023-10-17 DIAGNOSIS — K31.1 GASTRIC OUTLET OBSTRUCTION: ICD-10-CM

## 2023-10-17 DIAGNOSIS — Z71.3 NUTRITIONAL COUNSELING: Primary | ICD-10-CM

## 2023-10-17 DIAGNOSIS — K31.1 GASTRIC OUT LET OBSTRUCTION: Primary | ICD-10-CM

## 2023-10-17 PROCEDURE — 3074F PR MOST RECENT SYSTOLIC BLOOD PRESSURE < 130 MM HG: ICD-10-PCS | Mod: CPTII,S$GLB,, | Performed by: SURGERY

## 2023-10-17 PROCEDURE — 3078F PR MOST RECENT DIASTOLIC BLOOD PRESSURE < 80 MM HG: ICD-10-PCS | Mod: CPTII,S$GLB,, | Performed by: SURGERY

## 2023-10-17 PROCEDURE — 97802 MEDICAL NUTRITION INDIV IN: CPT | Mod: S$GLB,,, | Performed by: DIETITIAN, REGISTERED

## 2023-10-17 PROCEDURE — 99999 PR PBB SHADOW E&M-EST. PATIENT-LVL IV: ICD-10-PCS | Mod: PBBFAC,,, | Performed by: SURGERY

## 2023-10-17 PROCEDURE — 1159F MED LIST DOCD IN RCRD: CPT | Mod: CPTII,S$GLB,, | Performed by: SURGERY

## 2023-10-17 PROCEDURE — 1159F PR MEDICATION LIST DOCUMENTED IN MEDICAL RECORD: ICD-10-PCS | Mod: CPTII,S$GLB,, | Performed by: SURGERY

## 2023-10-17 PROCEDURE — 99999 PR PBB SHADOW E&M-EST. PATIENT-LVL IV: CPT | Mod: PBBFAC,,, | Performed by: SURGERY

## 2023-10-17 PROCEDURE — 3074F SYST BP LT 130 MM HG: CPT | Mod: CPTII,S$GLB,, | Performed by: SURGERY

## 2023-10-17 PROCEDURE — 99024 PR POST-OP FOLLOW-UP VISIT: ICD-10-PCS | Mod: S$GLB,,, | Performed by: SURGERY

## 2023-10-17 PROCEDURE — 3078F DIAST BP <80 MM HG: CPT | Mod: CPTII,S$GLB,, | Performed by: SURGERY

## 2023-10-17 PROCEDURE — 97802 PR MED NUTR THER, 1ST, INDIV, EA 15 MIN: ICD-10-PCS | Mod: S$GLB,,, | Performed by: DIETITIAN, REGISTERED

## 2023-10-17 PROCEDURE — 99024 POSTOP FOLLOW-UP VISIT: CPT | Mod: S$GLB,,, | Performed by: SURGERY

## 2023-10-17 PROCEDURE — 99999 PR PBB SHADOW E&M-EST. PATIENT-LVL I: CPT | Mod: PBBFAC,,, | Performed by: DIETITIAN, REGISTERED

## 2023-10-17 PROCEDURE — 99999 PR PBB SHADOW E&M-EST. PATIENT-LVL I: ICD-10-PCS | Mod: PBBFAC,,, | Performed by: DIETITIAN, REGISTERED

## 2023-10-17 NOTE — PROGRESS NOTES
Encounter Date:  10/17/2023    Patient ID: Angela Trascher Lejeune  Age:  48 y.o. :  1975    Chief Complaint:  No chief complaint on file.      History:    Ms. Lejeune is a 48 y.o. female with PMH Crohn's disease, POTS, gastric varices, alcohol abuse, and bipolar disorder who was recently seen in surgical oncology clinic 23 for evaluation of a gastric outlet obstruction. She reports her Crohns has been well controlled over the last couple of years with Stelara. Her last dose was in July. She has had past dilations of a duodenal stricture from her Crohn's, last on 9/15/23. Pathology revealed acute peptic duodenitis, antral bx negative for H pylori. In terms of operations, previously for her crohns disease she endorses a small bowel resection that was performed in the .    She presented to the ER at [facility] the night of 23 with worsening symptoms of n/v and abdominal pain. She is now s/p Delfino-Y gastrojejunostomy, G-tube, and J-tube placement on 10/4/23 for gastric outlet obstruction. She was taken back to the OR 10/5/23 for evacuation and washout of a hematoma. Her post op course was complicated by intractable pain which was eventually controlled with JESENIA catheters placed by anesthesia pain service. She was discharged on PO pain medications and was tolerating J tube feeds at goal.    Interval History 10/17/23:  She presents today in clinic for follow up after discharge. She states she has been doing well with pain at home. She was tolerating full liquids, but states she can't eat that consistency anymore because it makes her nauseous. She has had two episodes of vomiting since leaving the hospital. Her J tube feeds have been at goal, but she states yesterday afternoon she realized the tube was clogged and she wasn't able to restart tube feeding since. Her bowel movements have been normal since discharge and she is venting the G tube daily.    Past Medical History:   Diagnosis Date    Abnormal Pap  smear     + HPV    Bipolar 1 disorder     Closed bimalleolar fracture of left ankle     Crohn's disease S/P surgery (ileum, possible gastroduodenal with ulcers)     1998-ileocectomy (9 inches ileum)    Encounter for blood transfusion     Gastric and duodenal ulcers of unclear etiology     Gastroparesis     H/O ETOH abuse     History of HSV-2 infection     Immunosuppressed status     Iron deficiency anemia, multiple blood transfusions     POTS (postural orthostatic tachycardia syndrome)     Seizures     states last seizure 2009     Past Surgical History:   Procedure Laterality Date    ANKLE HARDWARE REMOVAL Left 9/6/2023    Procedure: REMOVAL, HARDWARE, ANKLE;  Surgeon: Michael Lacey MD;  Location: [facility];  Service: Orthopedics;  Laterality: Left;    APPENDECTOMY      BRAIN SURGERY  2009    fx skull due to seizure with 2 pins    BYPASS OF DUODENUM BY ANASTOMOSIS OF DUODENUM TO JEJUNUM N/A 10/4/2023    Procedure: CREATION, BYPASS, DUODENUM, BY ANASTOMOSIS OF DUODENUM TO JEJUNUM, PLACEMNENT OF GASTROSTOMY TUBE, JEJUNOSTOMY TUBE;  Surgeon: Kevin Mays MD;  Location: [facility] 2ND FLR;  Service: General;  Laterality: N/A;    CHOLECYSTECTOMY      COLON SURGERY      Partial colectomy    COLONOSCOPY Left 7/10/2019    Procedure: COLONOSCOPY;  Surgeon: Papi Martinez Jr., MD;  Location: [facility];  Service: Endoscopy;  Laterality: Left;    COLONOSCOPY N/A 12/6/2022    Procedure: COLONOSCOPY;  Surgeon: Zandra Mallory MD;  Location: [facility] (4TH FLR);  Service: Endoscopy;  Laterality: N/A;  poor prep on 11/28/22.  per Dr Mallory-Miralax 5 days prior with gastroparesis diet-3 days full liquid. also Mag citrate day before     instr handed to pt-GT    EGD, WITH BALLOON DILATION  7/24/2023    Procedure: EGD, WITH BALLOON DILATION;  Surgeon: Huang Licea MD;  Location: [facility];  Service: Endoscopy;;    ESOPHAGOGASTRODUODENOSCOPY Left 6/5/2019    Procedure: EGD (ESOPHAGOGASTRODUODENOSCOPY);  Surgeon: ROLA  Saúl Villagran MD;  Location: [facility];  Service: Endoscopy;  Laterality: Left;    ESOPHAGOGASTRODUODENOSCOPY Left 3/16/2020    Procedure: EGD (ESOPHAGOGASTRODUODENOSCOPY);  Surgeon: Clive Núñez MD;  Location: [facility];  Service: Endoscopy;  Laterality: Left;    ESOPHAGOGASTRODUODENOSCOPY N/A 4/17/2020    Procedure: EGD (ESOPHAGOGASTRODUODENOSCOPY);  Surgeon: Papi Martinez Jr., MD;  Location: [facility];  Service: Endoscopy;  Laterality: N/A;  with Push Enteroscopy    ESOPHAGOGASTRODUODENOSCOPY N/A 7/6/2020    Procedure: EGD (ESOPHAGOGASTRODUODENOSCOPY)   possible peg;  Surgeon: Papi Martinez Jr., MD;  Location: [facility];  Service: Endoscopy;  Laterality: N/A;    ESOPHAGOGASTRODUODENOSCOPY N/A 7/9/2020    Procedure: EGD (ESOPHAGOGASTRODUODENOSCOPY);  Surgeon: Papi Martinez Jr., MD;  Location: [facility];  Service: Endoscopy;  Laterality: N/A;  J or Peg tube    ESOPHAGOGASTRODUODENOSCOPY N/A 9/3/2020    Procedure: EGD (ESOPHAGOGASTRODUODENOSCOPY);  Surgeon: Papi Martinez Jr., MD;  Location: [facility];  Service: Endoscopy;  Laterality: N/A;  per drs order Stoma, Peds, w/single Lumen, J-Tube placement    ESOPHAGOGASTRODUODENOSCOPY N/A 12/29/2020    Procedure: EGD (ESOPHAGOGASTRODUODENOSCOPY);  Surgeon: Papi Martinez Jr., MD;  Location: [facility];  Service: Endoscopy;  Laterality: N/A;    ESOPHAGOGASTRODUODENOSCOPY N/A 2/23/2021    Procedure: EGD (ESOPHAGOGASTRODUODENOSCOPY);  Surgeon: Papi Martinez Jr., MD;  Location: [facility];  Service: Endoscopy;  Laterality: N/A;    ESOPHAGOGASTRODUODENOSCOPY N/A 2/3/2022    Procedure: EGD (ESOPHAGOGASTRODUODENOSCOPY);  Surgeon: Papi Martinez Jr., MD;  Location: [facility];  Service: Endoscopy;  Laterality: N/A;    ESOPHAGOGASTRODUODENOSCOPY N/A 8/13/2022    Procedure: EGD (ESOPHAGOGASTRODUODENOSCOPY);  Surgeon: Huang Licea MD;  Location: [facility];  Service: Gastroenterology;  Laterality: N/A;    ESOPHAGOGASTRODUODENOSCOPY N/A 10/16/2022     Procedure: EGD (ESOPHAGOGASTRODUODENOSCOPY);  Surgeon: Huang Licea MD;  Location: [facility] ENDO;  Service: Gastroenterology;  Laterality: N/A;    ESOPHAGOGASTRODUODENOSCOPY N/A 11/28/2022    Procedure: EGD (ESOPHAGOGASTRODUODENOSCOPY);  Surgeon: Zandra Mallory MD;  Location: [facility] (4TH FLR);  Service: Endoscopy;  Laterality: N/A;    ESOPHAGOGASTRODUODENOSCOPY N/A 7/24/2023    Procedure: EGD (ESOPHAGOGASTRODUODENOSCOPY);  Surgeon: Huang Licea MD;  Location: [facility];  Service: Endoscopy;  Laterality: N/A;    ESOPHAGOGASTRODUODENOSCOPY N/A 9/15/2023    Procedure: EGD (ESOPHAGOGASTRODUODENOSCOPY);  Surgeon: ROLA Villagran MD;  Location: [facility];  Service: Endoscopy;  Laterality: N/A;    FEMUR FRACTURE SURGERY Right 2006    HYSTERECTOMY      INSERTION OF TUNNELED CENTRAL VENOUS CATHETER (CVC) WITH SUBCUTANEOUS PORT Left 8/14/2019    Procedure: [id number];  Surgeon: Miquel Sargent MD;  Location: [facility];  Service: General;  Laterality: Left;    INSERTION OR REPLACEMENT OF PERCUTANEOUS ENDOSCOPIC JEJUNOSTOMY TUBE  12/29/2020    Procedure: INSERTION OR REPLACEMENT, JEJUNOSTOMY TUBE, PERCUTANEOUS, ENDOSCOPIC;  Surgeon: Papi Martinez Jr., MD;  Location: [facility];  Service: Endoscopy;;    laparoscopy for endometriosis      x5    LAPAROTOMY, EXPLORATORY N/A 10/5/2023    Procedure: LAPAROTOMY, EXPLORATORY;  Surgeon: Kevin Mays MD;  Location: [facility] OR 2ND FLR;  Service: General;  Laterality: N/A;    LEFT HEART CATHETERIZATION Left 3/29/2023    Procedure: Left heart cath;  Surgeon: Riley Tipton MD;  Location: [facility] CATH;  Service: Cardiology;  Laterality: Left;    OOPHORECTOMY      right ovary removed only    OPEN REDUCTION AND INTERNAL FIXATION (ORIF) OF INJURY OF ANKLE Left 4/10/2023    Procedure: ORIF, ANKLE;  Surgeon: Michael Lacey MD;  Location: [facility];  Service: Orthopedics;  Laterality: Left;    PEG TUBE REMOVAL  4/1/2021    Procedure: REMOVAL, PEG TUBE;  Surgeon:  Papi Martinez Jr., MD;  Location: [facility];  Service: Endoscopy;;    TONSILLECTOMY      WASHOUT  10/5/2023    Procedure: WASHOUT, HEMATOMA;  Surgeon: Kevin Mays MD;  Location: [facility] OR [address] [place] [address];  Service: General;;     Current Outpatient Medications on File Prior to Visit   Medication Sig Dispense Refill    acetaminophen (TYLENOL) 325 MG tablet Take 2 tablets (650 mg total) by mouth every 6 (six) hours as needed for Pain. 30 tablet 0    ARIPiprazole (ABILIFY) 5 MG Tab Take 1 tablet (5 mg total) by mouth once daily. 30 tablet 11    fludrocortisone (FLORINEF) 0.1 mg Tab Take 0.1 mg by mouth Daily.      lamotrigine (LAMICTAL) 150 MG Tab Take 150 mg by mouth 2 (two) times daily.      LIDOcaine-prilocaine (EMLA) cream Apply topically as needed (use prior to treatment with port access). 30 g 0    midodrine (PROAMATINE) 5 MG Tab Take 5 mg by mouth 3 (three) times daily with meals.      mirtazapine (REMERON) 7.5 MG Tab Take 1 tablet (7.5 mg total) by mouth every evening. 30 tablet 11    oxyCODONE (ROXICODONE) 5 MG immediate release tablet Take 1 tablet (5 mg total) by mouth every 4 (four) hours as needed for Pain. 20 tablet 0    pantoprazole (PROTONIX) 40 MG tablet Take 1 tablet (40 mg total) by mouth 2 (two) times daily. 60 tablet 1    propranoloL (INDERAL) 40 MG tablet Take 40 mg by mouth once daily.      sodium chloride 1,000 mg TbSO oral tablet Take 1,000 mg by mouth 2 (two) times a day.      sucralfate (CARAFATE) 1 gram tablet Take 1 tablet (1 g total) by mouth 4 (four) times daily before meals and nightly. 28 tablet 0    tiZANidine (ZANAFLEX) 4 MG tablet 1-2 tid prn (Patient taking differently: Take 4-8 mg by mouth every 8 (eight) hours as needed (muscle spasms).) 60 tablet 5    HYDROmorphone (DILAUDID) 2 MG tablet Take 1 tablet (2 mg total) by mouth every 4 (four) hours as needed for Pain. (Patient not taking: Reported on 10/13/2023) 20 tablet 0    ondansetron (ZOFRAN) 4 MG tablet Take 1 tablet (4 mg  "total) by mouth every 6 (six) hours as needed for Nausea. (Patient not taking: Reported on 10/13/2023) 56 tablet 0    ustekinumab (STELARA) 90 mg/mL Syrg syringe Inject 90 mg into the skin every 8 weeks. Indications: Crohn's disease      [DISCONTINUED] LORazepam (ATIVAN) 0.5 MG tablet Take 1 tablet (0.5 mg total) by mouth every 6 (six) hours as needed for Anxiety. 30 tablet 2    [DISCONTINUED] prazosin (MINIPRESS) 1 MG Cap       [DISCONTINUED] promethazine (PHENERGAN) 25 MG tablet Take 1 tablet (25 mg total) by mouth every 6 (six) hours as needed for Nausea. 30 tablet 5     No current facility-administered medications on file prior to visit.     Review of patient's allergies indicates:   Allergen Reactions    Tramadol Other (See Comments)     Seizures      Adhesive Other (See Comments)     STATES, " TEARS SKIN"     Demerol [meperidine]      Seizures      Hydrocodone Hallucinations    Reglan [metoclopramide]        Family History:  Her family history is not on file. She was adopted.     Social History:  She reports that she quit smoking about 12 years ago. Her smoking use included cigarettes. She started smoking about 18 years ago. She has a 3.0 pack-year smoking history. She has never used smokeless tobacco. She reports current alcohol use. She reports that she does not use drugs.     ROS:     Review of Systems  Pertinent positive/negatives detailed in HPI, all other systems negative.     Physical Exam:  /65   Pulse 98   Ht 5' 4" (1.626 m)   Wt 53 kg (116 lb 11.7 oz)   LMP 08/21/2010 (LMP Unknown)   SpO2 100%   BMI 20.04 kg/m²     Physical Exam    Constitutional:  Non-toxic, no acute distress.  Eyes:  Sclerae anicteric, gaze symmetrical  Neck:  Trachea midline, thyroid, non enlarged without palpable nodules,  FROM  Resp:  Easy work of breathing, no wheezes  CV:  Regular pulse, no JVD  Abd:  Soft, non-tender, midline incision c/d/I with skin staples. G tube and J tube in place. Prior NHAN site healing " well.  Lymphatics:  No cervical, supraclavicular, axillary, or inguinal lymphadenopathy  Musculoskeletal:  Ambulatory, normal gait, no muscle wasting  Neuro:  No gross deficits  Psych:  Awake, alert, oriented.  Answers and asks questions appropriately      No diagnosis found.Plan     1. Unclogged J tube in office today, now flushing easily  2. Continue J tube feeds  3.  Continue venting G tube  4.  Encouraged more PO intake of full liquids  5.  Leave staples for now    Plan:  Mrs. Lejeune is a 49 yo F s/p Delfino-Y gastrojejunostomy, G-tube, and J-tube placement on 10/4/23. Course was complicated by takeback 10/5/23 for hematoma evacuation. She is doing well post operatively. Continue J tube feeds at goal and encouraged increased PO intake. Will plan to leave skin staples for now. Will follow up in 1 week.      Shanta Tran MD  [facility]  General Surgery PGY-1

## 2023-10-24 NOTE — PROGRESS NOTES
"Oncology Nutrition Assessment for Medical Nutrition Therapy  Follow-up Visit    Angela Trascher Lejeune   1975    Referring Provider: No ref. provider found      Reason for Visit: nutrition counseling and education    PMHx:   Past Medical History:   Diagnosis Date    Abnormal Pap smear     + HPV    Bipolar 1 disorder     Closed bimalleolar fracture of left ankle     Crohn's disease S/P surgery (ileum, possible gastroduodenal with ulcers)     1998-ileocectomy (9 inches ileum)    Encounter for blood transfusion     Gastric and duodenal ulcers of unclear etiology     Gastroparesis     H/O ETOH abuse     History of HSV-2 infection     Immunosuppressed status     Iron deficiency anemia, multiple blood transfusions     POTS (postural orthostatic tachycardia syndrome)     Seizures     states last seizure 2009       Nutrition Assessment    This is a 48 y.o.female with a medical diagnosis of gastric outlet obstruction s/p mg-en-y gastrojejunostomy and G-tube & J-tube placement 10/4. This was c/b by hematoma requiring takeback to OR 10/5. She was discharged on FL diet and TF of Isosource 1.5. Last appointment we increased TF to 4 cartons/day. She has lost ~3lb since then. She reports she stopped TF about a week ago. She reports that her appetite is improving and eating more (chicken nuggets, jambalaya). She denies any nausea/vomiting. She is eating small portions frequently throughout the day.    Weight: 51.3 kg (113 lb 1.5 oz)   Height: 5' 4" (162.6 cm)  BMI: 19.4    Usual BW: 120lb  Weight Change: 7lb loss    Allergies: Tramadol, Adhesive, Demerol [meperidine], Hydrocodone, and Reglan [metoclopramide]    Current Medications:  Current Outpatient Medications:     acetaminophen (TYLENOL) 325 MG tablet, Take 2 tablets (650 mg total) by mouth every 6 (six) hours as needed for Pain., Disp: 30 tablet, Rfl: 0    ARIPiprazole (ABILIFY) 5 MG Tab, Take 1 tablet (5 mg total) by mouth once daily., Disp: 30 tablet, Rfl: 11    " fludrocortisone (FLORINEF) 0.1 mg Tab, Take 0.1 mg by mouth Daily., Disp: , Rfl:     HYDROmorphone (DILAUDID) 2 MG tablet, Take 1 tablet (2 mg total) by mouth every 4 (four) hours as needed for Pain. (Patient not taking: Reported on 10/13/2023), Disp: 20 tablet, Rfl: 0    lamotrigine (LAMICTAL) 150 MG Tab, Take 150 mg by mouth 2 (two) times daily., Disp: , Rfl:     LIDOcaine-prilocaine (EMLA) cream, Apply topically as needed (use prior to treatment with port access)., Disp: 30 g, Rfl: 0    LORazepam (ATIVAN) 0.5 MG tablet, Take 1 tablet (0.5 mg total) by mouth nightly as needed for Anxiety (sleep)., Disp: 30 tablet, Rfl: 2    midodrine (PROAMATINE) 5 MG Tab, Take 5 mg by mouth 3 (three) times daily with meals., Disp: , Rfl:     mirtazapine (REMERON) 7.5 MG Tab, Take 1 tablet (7.5 mg total) by mouth every evening., Disp: 30 tablet, Rfl: 11    ondansetron (ZOFRAN) 4 MG tablet, Take 1 tablet (4 mg total) by mouth every 6 (six) hours as needed for Nausea. (Patient not taking: Reported on 10/13/2023), Disp: 56 tablet, Rfl: 0    oxyCODONE (ROXICODONE) 5 MG immediate release tablet, Take 1 tablet (5 mg total) by mouth every 4 (four) hours as needed for Pain., Disp: 20 tablet, Rfl: 0    pantoprazole (PROTONIX) 40 MG tablet, Take 1 tablet (40 mg total) by mouth 2 (two) times daily., Disp: 60 tablet, Rfl: 1    promethazine (PHENERGAN) 25 MG tablet, Take 1 tablet (25 mg total) by mouth every 6 (six) hours as needed for Nausea., Disp: 30 tablet, Rfl: 5    propranoloL (INDERAL) 40 MG tablet, Take 40 mg by mouth once daily., Disp: , Rfl:     sodium chloride 1,000 mg TbSO oral tablet, Take 1,000 mg by mouth 2 (two) times a day., Disp: , Rfl:     sucralfate (CARAFATE) 1 gram tablet, Take 1 tablet (1 g total) by mouth 4 (four) times daily before meals and nightly., Disp: 28 tablet, Rfl: 0    tiZANidine (ZANAFLEX) 4 MG tablet, 1-2 tid prn (Patient taking differently: Take 4-8 mg by mouth every 8 (eight) hours as needed (muscle  spasms).), Disp: 60 tablet, Rfl: 5    ustekinumab (STELARA) 90 mg/mL Syrg syringe, Inject 90 mg into the skin every 8 weeks. Indications: Crohn's disease, Disp: , Rfl:     zolpidem (AMBIEN) 5 MG Tab, Take 1 tablet (5 mg total) by mouth nightly as needed., Disp: 30 tablet, Rfl: 5    Food/medication interactions noted: none    Vitamins/Supplements: none    Labs: Reviewed    Nutrition Diagnosis    Problem: inadequate energy & protein intake  Etiology: appetite loss and early satiety  Signs/Symptoms: reports of inadequate PO intake and weight loss    Nutrition Intervention    Nutrition Prescription   1539 kcals (30kcal/kg)  72g protein (1.4g/kg)   1539mL fluid (30mL/kg)    Recommendations:  Ok to stop TF per Dr. Mays  Eat small frequent meals/snacks  Make meals/snacks high in calories and protein - examples reviewed  Drink at least 64oz fluid/day    Materials Provided/Reviewed: none    Nutrition Monitoring and Evaluation    Monitor: diet education needs, energy intake, and weight status    Goals: weight maintenance/prevent further weight loss    Follow up: Patient provided with contact information and advised to call/message with questions or to make future appointment if further intervention is needed.    Communication to referring provider/care team: note available in chart; discussed with Dr. Mays/ALEXIA Arias NP    Counseling time: 10 minutes    Paris Ross, MS, RD, LDN

## 2023-10-31 ENCOUNTER — CLINICAL SUPPORT (OUTPATIENT)
Dept: HEMATOLOGY/ONCOLOGY | Facility: CLINIC | Age: 48
End: 2023-10-31
Payer: COMMERCIAL

## 2023-10-31 ENCOUNTER — OFFICE VISIT (OUTPATIENT)
Dept: SURGERY | Facility: CLINIC | Age: 48
End: 2023-10-31
Payer: COMMERCIAL

## 2023-10-31 VITALS
DIASTOLIC BLOOD PRESSURE: 56 MMHG | HEIGHT: 64 IN | BODY MASS INDEX: 19.31 KG/M2 | HEART RATE: 87 BPM | SYSTOLIC BLOOD PRESSURE: 120 MMHG | OXYGEN SATURATION: 100 % | WEIGHT: 113.13 LBS

## 2023-10-31 DIAGNOSIS — Z93.1 GASTROSTOMY TUBE IN PLACE: ICD-10-CM

## 2023-10-31 DIAGNOSIS — Z71.3 NUTRITIONAL COUNSELING: Primary | ICD-10-CM

## 2023-10-31 DIAGNOSIS — K31.1 GASTRIC OUTLET OBSTRUCTION: ICD-10-CM

## 2023-10-31 DIAGNOSIS — K31.1 GASTRIC OUT LET OBSTRUCTION: Primary | ICD-10-CM

## 2023-10-31 PROCEDURE — 99999 PR PBB SHADOW E&M-EST. PATIENT-LVL V: CPT | Mod: PBBFAC,,,

## 2023-10-31 PROCEDURE — 97803 PR MED NUTR THER, SUBSQ, INDIV, EA 15 MIN: ICD-10-PCS | Mod: S$GLB,,, | Performed by: DIETITIAN, REGISTERED

## 2023-10-31 PROCEDURE — 3074F SYST BP LT 130 MM HG: CPT | Mod: CPTII,S$GLB,,

## 2023-10-31 PROCEDURE — 97803 MED NUTRITION INDIV SUBSEQ: CPT | Mod: S$GLB,,, | Performed by: DIETITIAN, REGISTERED

## 2023-10-31 PROCEDURE — 1159F MED LIST DOCD IN RCRD: CPT | Mod: CPTII,S$GLB,,

## 2023-10-31 PROCEDURE — 3078F DIAST BP <80 MM HG: CPT | Mod: CPTII,S$GLB,,

## 2023-10-31 PROCEDURE — 99024 PR POST-OP FOLLOW-UP VISIT: ICD-10-PCS | Mod: S$GLB,,,

## 2023-10-31 PROCEDURE — 99999 PR PBB SHADOW E&M-EST. PATIENT-LVL I: CPT | Mod: PBBFAC,,, | Performed by: DIETITIAN, REGISTERED

## 2023-10-31 PROCEDURE — 3074F PR MOST RECENT SYSTOLIC BLOOD PRESSURE < 130 MM HG: ICD-10-PCS | Mod: CPTII,S$GLB,,

## 2023-10-31 PROCEDURE — 99024 POSTOP FOLLOW-UP VISIT: CPT | Mod: S$GLB,,,

## 2023-10-31 PROCEDURE — 1159F PR MEDICATION LIST DOCUMENTED IN MEDICAL RECORD: ICD-10-PCS | Mod: CPTII,S$GLB,,

## 2023-10-31 PROCEDURE — 99999 PR PBB SHADOW E&M-EST. PATIENT-LVL I: ICD-10-PCS | Mod: PBBFAC,,, | Performed by: DIETITIAN, REGISTERED

## 2023-10-31 PROCEDURE — 3078F PR MOST RECENT DIASTOLIC BLOOD PRESSURE < 80 MM HG: ICD-10-PCS | Mod: CPTII,S$GLB,,

## 2023-10-31 PROCEDURE — 99999 PR PBB SHADOW E&M-EST. PATIENT-LVL V: ICD-10-PCS | Mod: PBBFAC,,,

## 2023-11-01 ENCOUNTER — PATIENT MESSAGE (OUTPATIENT)
Dept: SURGERY | Facility: CLINIC | Age: 48
End: 2023-11-01
Payer: COMMERCIAL

## 2023-11-02 NOTE — PROGRESS NOTES
"  Post-Op Follow-up Visit:   10/31/2023  Patient ID: Angela Trascher Lejeune is a 48 y.o. female, born 1975    Chief Complaint   Patient presents with    Post-op Evaluation     Interval History: Mrs. Lejeune returns to the clinic for a post op evaluation after Delfino-Y gastrojejunostomy, G-tube, and J-tube placement on 10/4/23 for gastric outlet obstruction with Dr. Mays. She was taken back to the OR 10/5/23 for evacuation and washout of a hematoma. Her post op course was complicated by intractable pain which was eventually controlled with JESENIA catheters placed by anesthesia pain service.  Since our last appt she has stopped the tube feedings and is tolerating a full liquid diet. She states that the j tube and the g tube are causing her pain and she would like them removed. She is having bowel movements regularly. Denies fever ,chills, N/V/D, SOB, chest pain, redness, drainage.    Physical Exam:  BP (!) 120/56   Pulse 87   Ht 5' 4" (1.626 m)   Wt 51.3 kg (113 lb 1.5 oz)   LMP 08/21/2010 (LMP Unknown)   SpO2 100%   BMI 19.41 kg/m²     General:  Non-toxic, ambulatory  Abd:  Soft, non-tender  Incision:  CDI with staples in place- free from erythema or drainage       ICD-10-CM ICD-9-CM    1. Gastric out let obstruction  K31.1 537.0 Case Request Operating Room: REMOVAL, GASTROSTOMY TUBE, REMOVAL, JEJUNOSTOMY TUBE      2. Gastrostomy tube in place  Z93.1 V44.1 Case Request Operating Room: REMOVAL, GASTROSTOMY TUBE, REMOVAL, JEJUNOSTOMY TUBE      Plan   Mrs. Lejeune returns to the clinic for a post op evaluation after Delfino-Y gastrojejunostomy, G-tube, and J-tube placement on 10/4/23 for gastric outlet obstruction with Dr. Mays. Since our last appt she has stopped the tube feedings and is tolerating a full liquid diet. She states that the j tube and the g tube are causing her pain and she would like them removed. She is having bowel movements regularly. Her incision is healing nicely- staples removed. Will schedule " her for g/j removal in the OR in 2 weeks. Can advance diet to soft foods. Discussed the importance of small frequent meals with emphasis on calories and protein as well as maintaining adequate hydration. Questions were asked and answered to patient's satisfaction.  We discussed the need for continued clinical/radiographic/endoscopic follow-up.     Follow up in about 2 weeks (around 11/14/2023), or if symptoms worsen or fail to improve.    Patient seen in conjunction with Dr. Mays.          PEDRO Lobato, FNP-C  Upper GI / Hepatobiliary Surgical Oncology  [facility] [place], [place]  Office: [phone]  Fax: [phone]

## 2023-11-02 NOTE — H&P (VIEW-ONLY)
"  Post-Op Follow-up Visit:   10/31/2023  Patient ID: Angela Trascher Lejeune is a 48 y.o. female, born 1975    Chief Complaint   Patient presents with    Post-op Evaluation     Interval History: Mrs. Lejeune returns to the clinic for a post op evaluation after Delfino-Y gastrojejunostomy, G-tube, and J-tube placement on 10/4/23 for gastric outlet obstruction with Dr. Mays. She was taken back to the OR 10/5/23 for evacuation and washout of a hematoma. Her post op course was complicated by intractable pain which was eventually controlled with JESENIA catheters placed by anesthesia pain service.  Since our last appt she has stopped the tube feedings and is tolerating a full liquid diet. She states that the j tube and the g tube are causing her pain and she would like them removed. She is having bowel movements regularly. Denies fever ,chills, N/V/D, SOB, chest pain, redness, drainage.    Physical Exam:  BP (!) 120/56   Pulse 87   Ht 5' 4" (1.626 m)   Wt 51.3 kg (113 lb 1.5 oz)   LMP 08/21/2010 (LMP Unknown)   SpO2 100%   BMI 19.41 kg/m²     General:  Non-toxic, ambulatory  Abd:  Soft, non-tender  Incision:  CDI with staples in place- free from erythema or drainage       ICD-10-CM ICD-9-CM    1. Gastric out let obstruction  K31.1 537.0 Case Request Operating Room: REMOVAL, GASTROSTOMY TUBE, REMOVAL, JEJUNOSTOMY TUBE      2. Gastrostomy tube in place  Z93.1 V44.1 Case Request Operating Room: REMOVAL, GASTROSTOMY TUBE, REMOVAL, JEJUNOSTOMY TUBE      Plan   Mrs. Lejeune returns to the clinic for a post op evaluation after Delfino-Y gastrojejunostomy, G-tube, and J-tube placement on 10/4/23 for gastric outlet obstruction with Dr. Mays. Since our last appt she has stopped the tube feedings and is tolerating a full liquid diet. She states that the j tube and the g tube are causing her pain and she would like them removed. She is having bowel movements regularly. Her incision is healing nicely- staples removed. Will schedule " her for g/j removal in the OR in 2 weeks. Can advance diet to soft foods. Discussed the importance of small frequent meals with emphasis on calories and protein as well as maintaining adequate hydration. Questions were asked and answered to patient's satisfaction.  We discussed the need for continued clinical/radiographic/endoscopic follow-up.     Follow up in about 2 weeks (around 11/14/2023), or if symptoms worsen or fail to improve.    Patient seen in conjunction with Dr. Mays.          PEDRO Lobato, FNP-C  Upper GI / Hepatobiliary Surgical Oncology  [facility] [place], [place]  Office: [phone]  Fax: [phone]

## 2023-11-09 ENCOUNTER — OFFICE VISIT (OUTPATIENT)
Dept: SURGERY | Facility: CLINIC | Age: 48
End: 2023-11-09
Payer: COMMERCIAL

## 2023-11-09 VITALS
DIASTOLIC BLOOD PRESSURE: 75 MMHG | BODY MASS INDEX: 19.23 KG/M2 | SYSTOLIC BLOOD PRESSURE: 117 MMHG | OXYGEN SATURATION: 99 % | HEART RATE: 146 BPM | HEIGHT: 64 IN | WEIGHT: 112.63 LBS

## 2023-11-09 DIAGNOSIS — Z93.1 GASTROSTOMY TUBE IN PLACE: Primary | ICD-10-CM

## 2023-11-09 PROCEDURE — 99024 PR POST-OP FOLLOW-UP VISIT: ICD-10-PCS | Mod: S$GLB,,,

## 2023-11-09 PROCEDURE — 3074F SYST BP LT 130 MM HG: CPT | Mod: CPTII,S$GLB,,

## 2023-11-09 PROCEDURE — 99999 PR PBB SHADOW E&M-EST. PATIENT-LVL IV: ICD-10-PCS | Mod: PBBFAC,,,

## 2023-11-09 PROCEDURE — 99999 PR PBB SHADOW E&M-EST. PATIENT-LVL IV: CPT | Mod: PBBFAC,,,

## 2023-11-09 PROCEDURE — 3078F DIAST BP <80 MM HG: CPT | Mod: CPTII,S$GLB,,

## 2023-11-09 PROCEDURE — 99024 POSTOP FOLLOW-UP VISIT: CPT | Mod: S$GLB,,,

## 2023-11-09 PROCEDURE — 3074F PR MOST RECENT SYSTOLIC BLOOD PRESSURE < 130 MM HG: ICD-10-PCS | Mod: CPTII,S$GLB,,

## 2023-11-09 PROCEDURE — 3078F PR MOST RECENT DIASTOLIC BLOOD PRESSURE < 80 MM HG: ICD-10-PCS | Mod: CPTII,S$GLB,,

## 2023-11-13 DIAGNOSIS — K31.1 GASTRIC OUT LET OBSTRUCTION: Primary | ICD-10-CM

## 2023-11-13 NOTE — PRE-PROCEDURE INSTRUCTIONS
PreOp Instructions given:   - Verbal medication information (what to hold and what to take)   - NPO guidelines   - Arrival place directions given; time to be given the day before procedure by the   Surgeon's Office DOSC  - Bathing with antibacterial soap   - Don't wear any jewelry or bring any valuables AM of surgery   - No makeup or moisturizer to face   - No perfume/cologne, powder, lotions or aftershave   Pt. verbalized understanding.   Pt denies any h/o Anesthesia/Sedation complications or side effects.  Patient does not know arrival time.  Explained that this information comes from the surgeon's office and if they haven't heard from them by 2 or 3 pm to call the office.  Patient stated an understanding.

## 2023-11-14 ENCOUNTER — PATIENT MESSAGE (OUTPATIENT)
Dept: SURGERY | Facility: CLINIC | Age: 48
End: 2023-11-14
Payer: COMMERCIAL

## 2023-11-14 ENCOUNTER — TELEPHONE (OUTPATIENT)
Dept: SURGERY | Facility: CLINIC | Age: 48
End: 2023-11-14
Payer: COMMERCIAL

## 2023-11-15 ENCOUNTER — ANESTHESIA EVENT (OUTPATIENT)
Dept: SURGERY | Facility: HOSPITAL | Age: 48
End: 2023-11-15
Payer: COMMERCIAL

## 2023-11-15 ENCOUNTER — ANESTHESIA (OUTPATIENT)
Dept: SURGERY | Facility: HOSPITAL | Age: 48
End: 2023-11-15
Payer: COMMERCIAL

## 2023-11-15 ENCOUNTER — HOSPITAL ENCOUNTER (OUTPATIENT)
Facility: HOSPITAL | Age: 48
Discharge: HOME | End: 2023-11-15
Attending: SURGERY | Admitting: SURGERY
Payer: COMMERCIAL

## 2023-11-15 VITALS
WEIGHT: 111.56 LBS | HEIGHT: 64 IN | BODY MASS INDEX: 19.04 KG/M2 | RESPIRATION RATE: 15 BRPM | SYSTOLIC BLOOD PRESSURE: 119 MMHG | TEMPERATURE: 98 F | HEART RATE: 88 BPM | OXYGEN SATURATION: 100 % | DIASTOLIC BLOOD PRESSURE: 77 MMHG

## 2023-11-15 DIAGNOSIS — K31.1 GASTRIC OUT LET OBSTRUCTION: Primary | ICD-10-CM

## 2023-11-15 PROCEDURE — 71000015 HC POSTOP RECOV 1ST HR: Performed by: SURGERY

## 2023-11-15 PROCEDURE — D9220A PRA ANESTHESIA: ICD-10-PCS | Mod: ANES,,, | Performed by: ANESTHESIOLOGY

## 2023-11-15 PROCEDURE — D9220A PRA ANESTHESIA: Mod: ANES,,, | Performed by: ANESTHESIOLOGY

## 2023-11-15 PROCEDURE — 36000707: Performed by: SURGERY

## 2023-11-15 PROCEDURE — 37000008 HC ANESTHESIA 1ST 15 MINUTES: Performed by: SURGERY

## 2023-11-15 PROCEDURE — 43999 UNLISTED PROCEDURE STOMACH: CPT | Mod: ,,, | Performed by: SURGERY

## 2023-11-15 PROCEDURE — 71000044 HC DOSC ROUTINE RECOVERY FIRST HOUR: Performed by: SURGERY

## 2023-11-15 PROCEDURE — 63600175 PHARM REV CODE 636 W HCPCS: Performed by: NURSE ANESTHETIST, CERTIFIED REGISTERED

## 2023-11-15 PROCEDURE — D9220A PRA ANESTHESIA: Mod: CRNA,,, | Performed by: NURSE ANESTHETIST, CERTIFIED REGISTERED

## 2023-11-15 PROCEDURE — 36000706: Performed by: SURGERY

## 2023-11-15 PROCEDURE — 37000009 HC ANESTHESIA EA ADD 15 MINS: Performed by: SURGERY

## 2023-11-15 PROCEDURE — 63600175 PHARM REV CODE 636 W HCPCS: Performed by: ANESTHESIOLOGY

## 2023-11-15 PROCEDURE — D9220A PRA ANESTHESIA: ICD-10-PCS | Mod: CRNA,,, | Performed by: NURSE ANESTHETIST, CERTIFIED REGISTERED

## 2023-11-15 PROCEDURE — 25000003 PHARM REV CODE 250: Performed by: NURSE ANESTHETIST, CERTIFIED REGISTERED

## 2023-11-15 RX ORDER — MIDAZOLAM HYDROCHLORIDE 1 MG/ML
INJECTION, SOLUTION INTRAMUSCULAR; INTRAVENOUS
Status: DISCONTINUED | OUTPATIENT
Start: 2023-11-15 | End: 2023-11-15

## 2023-11-15 RX ORDER — HEPARIN 100 UNIT/ML
100 SYRINGE INTRAVENOUS ONCE
Status: DISCONTINUED | OUTPATIENT
Start: 2023-11-15 | End: 2023-11-15

## 2023-11-15 RX ORDER — PROPOFOL 10 MG/ML
VIAL (ML) INTRAVENOUS
Status: DISCONTINUED | OUTPATIENT
Start: 2023-11-15 | End: 2023-11-15

## 2023-11-15 RX ORDER — FENTANYL CITRATE 50 UG/ML
INJECTION, SOLUTION INTRAMUSCULAR; INTRAVENOUS
Status: DISCONTINUED | OUTPATIENT
Start: 2023-11-15 | End: 2023-11-15

## 2023-11-15 RX ORDER — HEPARIN 100 UNIT/ML
5 SYRINGE INTRAVENOUS ONCE
Status: COMPLETED | OUTPATIENT
Start: 2023-11-15 | End: 2023-11-15

## 2023-11-15 RX ORDER — PROCHLORPERAZINE EDISYLATE 5 MG/ML
5 INJECTION INTRAMUSCULAR; INTRAVENOUS EVERY 30 MIN PRN
Status: DISCONTINUED | OUTPATIENT
Start: 2023-11-15 | End: 2023-11-15 | Stop reason: HOSPADM

## 2023-11-15 RX ORDER — LIDOCAINE HYDROCHLORIDE 20 MG/ML
INJECTION INTRAVENOUS
Status: DISCONTINUED | OUTPATIENT
Start: 2023-11-15 | End: 2023-11-15

## 2023-11-15 RX ADMIN — HEPARIN 500 UNITS: 100 SYRINGE at 09:11

## 2023-11-15 RX ADMIN — SODIUM CHLORIDE: 0.9 INJECTION, SOLUTION INTRAVENOUS at 08:11

## 2023-11-15 RX ADMIN — FENTANYL CITRATE 25 MCG: 50 INJECTION, SOLUTION INTRAMUSCULAR; INTRAVENOUS at 08:11

## 2023-11-15 RX ADMIN — PROPOFOL 50 MG: 10 INJECTION, EMULSION INTRAVENOUS at 08:11

## 2023-11-15 RX ADMIN — LIDOCAINE HYDROCHLORIDE 20 MG: 20 INJECTION INTRAVENOUS at 08:11

## 2023-11-15 RX ADMIN — MIDAZOLAM HYDROCHLORIDE 2 MG: 1 INJECTION, SOLUTION INTRAMUSCULAR; INTRAVENOUS at 08:11

## 2023-11-15 NOTE — DISCHARGE SUMMARY
[facility] - [facility] (2nd Fl)  Discharge Note  Short Stay    Procedure(s) (LRB):  REMOVAL, GASTROSTOMY TUBE (N/A)  REMOVAL, JEJUNOSTOMY TUBE (N/A)      OUTCOME: Patient tolerated treatment/procedure well without complication and is now ready for discharge.    DISPOSITION: Home or Self Care    FINAL DIAGNOSIS:  <principal problem not specified>    FOLLOWUP: None    DISCHARGE INSTRUCTIONS:  see orders  
show

## 2023-11-15 NOTE — ANESTHESIA PREPROCEDURE EVALUATION
[facility]  Anesthesia Pre-Operative Evaluation         Patient Name: Angela Trascher Lejeune  YOB: 1975  MRN: [id number]    SUBJECTIVE:     Pre-operative evaluation for Procedure(s) (LRB):  G tube removal       11/15/2023    Angela Trascher Lejeune is a 48 y.o. female w/ a significant PMHx of Crohn's disease, POTS, iron def anemia, gastroparesis, seizures, gastric varices, alcohol abuse, bipolar disorder who was recently seen in surgical oncology clinic 9/26/23 for evaluation of a gastric outlet obstruction due to duodenal stricture. Presented 9/27/23 to OSH with n/v abd pain. Reports prior hx of gastroparesis but that has resolved since previous surgery.     Patient now presents for the above procedure(s).    TTE 9/13/2023:    Left Ventricle: The left ventricle is normal in size. Normal wall   thickness. Normal wall motion. There is low normal systolic function.   There is normal diastolic function.    Right Ventricle: Normal right ventricular cavity size. Wall thickness   is normal. Systolic function is normal.    Aortic Valve: The aortic valve is a trileaflet valve. No cusp   calcification.    IVC/SVC: Normal venous pressure at 3 mmHg.    [facility] 3/29/2023:    The ejection fraction was calculated to be 60%.    The left ventricular ejection fraction was grossly normal.    The left ventricular systolic function was normal.    The left ventricular end diastolic pressure was normal.    The pre-procedure left ventricular end diastolic pressure was 14.    The post-procedure left ventricular end diastolic pressure was 14.    The estimated blood loss was none.    There was single vessel coronary artery disease. There was non-obstructive coronary artery disease..    LDA:   PICC Double Lumen 09/29/23 1431 right basilic (Active)   Line Necessity Review Longterm central access required 10/02/23 2049   Verification by X-ray Yes 10/02/23 0805   Site Assessment No  drainage;No redness;No warmth;No swelling 10/02/23 2049   Extremity Assessment Distal to IV No abnormal discoloration;No warmth;No swelling;No redness 10/02/23 2049   Line Securement Device Secured with sutureless device 10/02/23 0805   Dressing Type CHG impregnated dressing/sponge;Central line dressing 10/02/23 2049   Dressing Status Clean;Dry;Intact 10/02/23 2049   Dressing Intervention Integrity maintained 10/02/23 2049   Date on Dressing 09/29/23 10/01/23 2334   Dressing Due to be Changed 10/06/23 10/02/23 2049   Distal Patency/Care flushed w/o difficulty;infusing 10/02/23 2049   Proximal 1 Patency/Care flushed w/o difficulty;infusing 10/02/23 2049   Current Insertion Depth (cm) 33 cm 09/29/23 1434   Current Exposed Catheter (cm) 0 cm 09/29/23 1434   Extremity Circumference (cm) 32 cm 09/29/23 1434   Number of days: 3            PowerPort A Cath Single Lumen 09/27/23 2302 Subclavian Left (Active)   Line Necessity Review Poor venous access 09/29/23 0800   Verification by X-ray Yes 09/28/23 0543   Site Assessment No drainage;No redness;No swelling;No warmth 10/01/23 1615   Line Securement Device Secured with sutureless device 10/01/23 1615   Dressing Type CHG impregnated dressing/sponge 10/01/23 1615   Dressing Status Clean;Dry;Intact 10/01/23 1615   Dressing Intervention Integrity maintained 10/01/23 1615   Date on Dressing 09/27/23 10/01/23 0816   Dressing Due to be Changed 10/03/23 10/01/23 0816   Patency/Care flushed w/o difficulty;blood return present;normal saline locked 09/30/23 2000   Status Accessed 10/01/23 0816   Accessed by: Milvia Lam RN 09/27/23 2322   Needle Insertion Date 09/27/23 09/27/23 2322   Needle Insertion Time 2308 09/27/23 2322   Type of Needle PowerHuber 09/27/23 2322   Needle Length 1 in 09/27/23 2322   Needle Status Left in place 09/27/23 2322   Flush Performed Yes 09/29/23 0800   Number of days: 5       Prev airway: None documented.    Drips:       Patient Active Problem List  "  Diagnosis    Bipolar I disorder    Seizure disorder    Carpal tunnel syndrome    Anxiety state    Essential tremor    Iron deficiency anemia secondary to inadequate dietary iron intake    Left ovarian cyst    Asymptomatic bacteriuria    Fatigue associated with anemia    Hypokalemia    Venous insufficiency    Crohn's disease of small intestine with complication    Gastric ulcer without hemorrhage or perforation    Gastroparesis    Abnormal ECG    Immunosuppressed status    Duodenal ulcer    Closed bimalleolar fracture of left ankle    Gastroenteritis    Acute cystitis without hematuria    Decreased range of motion of left ankle    Weakness    Left ankle swelling    Impaired functional mobility, balance, gait, and endurance    Right upper quadrant pain    Bilious vomiting    Hypertension    History of alcohol abuse    POTS (postural orthostatic tachycardia syndrome)    N&V (nausea and vomiting)    Duodenal stricture    Mechanical complication of internal orthopedic implant    Encounter for weaning from ventilator    Metabolic acidosis       Review of patient's allergies indicates:   Allergen Reactions    Tramadol Other (See Comments)     Seizures      Adhesive Other (See Comments)     STATES, " TEARS SKIN"     Demerol [meperidine]      Seizures      Hydrocodone Hallucinations    Reglan [metoclopramide]        Current Inpatient Medications:      No current facility-administered medications on file prior to encounter.     Current Outpatient Medications on File Prior to Encounter   Medication Sig Dispense Refill    ARIPiprazole (ABILIFY) 5 MG Tab Take 1 tablet (5 mg total) by mouth once daily. 30 tablet 11    fludrocortisone (FLORINEF) 0.1 mg Tab Take 0.1 mg by mouth Daily.      lamotrigine (LAMICTAL) 150 MG Tab Take 150 mg by mouth 2 (two) times daily.      LORazepam (ATIVAN) 0.5 MG tablet Take 1 tablet (0.5 mg total) by mouth nightly as needed for Anxiety (sleep). 30 tablet 2    midodrine (PROAMATINE) 5 MG Tab Take 5 " mg by mouth 3 (three) times daily with meals.      mirtazapine (REMERON) 7.5 MG Tab Take 1 tablet (7.5 mg total) by mouth every evening. 30 tablet 11    pantoprazole (PROTONIX) 40 MG tablet Take 1 tablet (40 mg total) by mouth 2 (two) times daily. 60 tablet 1    promethazine (PHENERGAN) 25 MG tablet Take 1 tablet (25 mg total) by mouth every 6 (six) hours as needed for Nausea. 30 tablet 5    propranoloL (INDERAL) 40 MG tablet Take 40 mg by mouth once daily.      sodium chloride 1,000 mg TbSO oral tablet Take 1,000 mg by mouth 2 (two) times a day.      tiZANidine (ZANAFLEX) 4 MG tablet 1-2 tid prn (Patient taking differently: Take 4-8 mg by mouth every 8 (eight) hours as needed (muscle spasms).) 60 tablet 5    ustekinumab (STELARA) 90 mg/mL Syrg syringe Inject 90 mg into the skin every 8 weeks. Indications: Crohn's disease      zolpidem (AMBIEN) 5 MG Tab Take 1 tablet (5 mg total) by mouth nightly as needed. 30 tablet 5    acetaminophen (TYLENOL) 325 MG tablet Take 2 tablets (650 mg total) by mouth every 6 (six) hours as needed for Pain. 30 tablet 0    LIDOcaine-prilocaine (EMLA) cream Apply topically as needed (use prior to treatment with port access). 30 g 0    ondansetron (ZOFRAN) 4 MG tablet Take 1 tablet (4 mg total) by mouth every 6 (six) hours as needed for Nausea. 56 tablet 0    sucralfate (CARAFATE) 1 gram tablet Take 1 tablet (1 g total) by mouth 4 (four) times daily before meals and nightly. 28 tablet 0    [DISCONTINUED] prazosin (MINIPRESS) 1 MG Cap          Past Surgical History:   Procedure Laterality Date    ANKLE HARDWARE REMOVAL Left 9/6/2023    Procedure: REMOVAL, HARDWARE, ANKLE;  Surgeon: Michael Lacey MD;  Location: [facility];  Service: Orthopedics;  Laterality: Left;    APPENDECTOMY      BRAIN SURGERY  2009    fx skull due to seizure with 2 pins    BYPASS OF DUODENUM BY ANASTOMOSIS OF DUODENUM TO JEJUNUM N/A 10/4/2023    Procedure: CREATION, BYPASS, DUODENUM, BY ANASTOMOSIS OF DUODENUM TO  JEJUNUM, PLACEMNENT OF GASTROSTOMY TUBE, JEJUNOSTOMY TUBE;  Surgeon: Kevin Mays MD;  Location: [facility] OR 2ND FLR;  Service: General;  Laterality: N/A;    CHOLECYSTECTOMY      COLON SURGERY      Partial colectomy    COLONOSCOPY Left 7/10/2019    Procedure: COLONOSCOPY;  Surgeon: Papi Martinez Jr., MD;  Location: [facility];  Service: Endoscopy;  Laterality: Left;    COLONOSCOPY N/A 12/6/2022    Procedure: COLONOSCOPY;  Surgeon: Zandra Mallory MD;  Location: [facility] ENDO (4TH FLR);  Service: Endoscopy;  Laterality: N/A;  poor prep on 11/28/22.  per Dr Mallory-Miralax 5 days prior with gastroparesis diet-3 days full liquid. also Mag citrate day before     instr handed to pt-GT    EGD, WITH BALLOON DILATION  7/24/2023    Procedure: EGD, WITH BALLOON DILATION;  Surgeon: Huang Licea MD;  Location: [facility];  Service: Endoscopy;;    ESOPHAGOGASTRODUODENOSCOPY Left 6/5/2019    Procedure: EGD (ESOPHAGOGASTRODUODENOSCOPY);  Surgeon: ROLA Villagran MD;  Location: [facility];  Service: Endoscopy;  Laterality: Left;    ESOPHAGOGASTRODUODENOSCOPY Left 3/16/2020    Procedure: EGD (ESOPHAGOGASTRODUODENOSCOPY);  Surgeon: Clive Núñez MD;  Location: [facility];  Service: Endoscopy;  Laterality: Left;    ESOPHAGOGASTRODUODENOSCOPY N/A 4/17/2020    Procedure: EGD (ESOPHAGOGASTRODUODENOSCOPY);  Surgeon: Papi Martinez Jr., MD;  Location: [facility];  Service: Endoscopy;  Laterality: N/A;  with Push Enteroscopy    ESOPHAGOGASTRODUODENOSCOPY N/A 7/6/2020    Procedure: EGD (ESOPHAGOGASTRODUODENOSCOPY)   possible peg;  Surgeon: Papi Martinez Jr., MD;  Location: [facility];  Service: Endoscopy;  Laterality: N/A;    ESOPHAGOGASTRODUODENOSCOPY N/A 7/9/2020    Procedure: EGD (ESOPHAGOGASTRODUODENOSCOPY);  Surgeon: Papi Martinez Jr., MD;  Location: [facility];  Service: Endoscopy;  Laterality: N/A;  J or Peg tube    ESOPHAGOGASTRODUODENOSCOPY N/A 9/3/2020    Procedure: EGD (ESOPHAGOGASTRODUODENOSCOPY);  Surgeon: Papi  MAN Martinez Jr., MD;  Location: [facility];  Service: Endoscopy;  Laterality: N/A;  per drs order Stoma, Peds, w/single Lumen, J-Tube placement    ESOPHAGOGASTRODUODENOSCOPY N/A 12/29/2020    Procedure: EGD (ESOPHAGOGASTRODUODENOSCOPY);  Surgeon: Papi Martinez Jr., MD;  Location: [facility];  Service: Endoscopy;  Laterality: N/A;    ESOPHAGOGASTRODUODENOSCOPY N/A 2/23/2021    Procedure: EGD (ESOPHAGOGASTRODUODENOSCOPY);  Surgeon: Papi Martinez Jr., MD;  Location: [facility];  Service: Endoscopy;  Laterality: N/A;    ESOPHAGOGASTRODUODENOSCOPY N/A 2/3/2022    Procedure: EGD (ESOPHAGOGASTRODUODENOSCOPY);  Surgeon: Papi Martinez Jr., MD;  Location: [facility];  Service: Endoscopy;  Laterality: N/A;    ESOPHAGOGASTRODUODENOSCOPY N/A 8/13/2022    Procedure: EGD (ESOPHAGOGASTRODUODENOSCOPY);  Surgeon: Huang Licea MD;  Location: [facility];  Service: Gastroenterology;  Laterality: N/A;    ESOPHAGOGASTRODUODENOSCOPY N/A 10/16/2022    Procedure: EGD (ESOPHAGOGASTRODUODENOSCOPY);  Surgeon: Huang Licea MD;  Location: [facility];  Service: Gastroenterology;  Laterality: N/A;    ESOPHAGOGASTRODUODENOSCOPY N/A 11/28/2022    Procedure: EGD (ESOPHAGOGASTRODUODENOSCOPY);  Surgeon: Zandra Mallory MD;  Location: [address]);  Service: Endoscopy;  Laterality: N/A;    ESOPHAGOGASTRODUODENOSCOPY N/A 7/24/2023    Procedure: EGD (ESOPHAGOGASTRODUODENOSCOPY);  Surgeon: Huang Licea MD;  Location: [facility];  Service: Endoscopy;  Laterality: N/A;    ESOPHAGOGASTRODUODENOSCOPY N/A 9/15/2023    Procedure: EGD (ESOPHAGOGASTRODUODENOSCOPY);  Surgeon: ROLA Villagran MD;  Location: [facility];  Service: Endoscopy;  Laterality: N/A;    FEMUR FRACTURE SURGERY Right 2006    HYSTERECTOMY      INSERTION OF TUNNELED CENTRAL VENOUS CATHETER (CVC) WITH SUBCUTANEOUS PORT Left 8/14/2019    Procedure: [id number];  Surgeon: Miquel Sargent MD;  Location: [facility];  Service: General;  Laterality: Left;    INSERTION OR  REPLACEMENT OF PERCUTANEOUS ENDOSCOPIC JEJUNOSTOMY TUBE  2020    Procedure: INSERTION OR REPLACEMENT, JEJUNOSTOMY TUBE, PERCUTANEOUS, ENDOSCOPIC;  Surgeon: Papi Martinez Jr., MD;  Location: [facility] ENDO;  Service: Endoscopy;;    laparoscopy for endometriosis      x5    LAPAROTOMY, EXPLORATORY N/A 10/5/2023    Procedure: LAPAROTOMY, EXPLORATORY;  Surgeon: Kevin Mays MD;  Location: [facility] OR 2ND FLR;  Service: General;  Laterality: N/A;    LEFT HEART CATHETERIZATION Left 3/29/2023    Procedure: Left heart cath;  Surgeon: Riley Tipton MD;  Location: [facility] CATH;  Service: Cardiology;  Laterality: Left;    OOPHORECTOMY      right ovary removed only    OPEN REDUCTION AND INTERNAL FIXATION (ORIF) OF INJURY OF ANKLE Left 4/10/2023    Procedure: ORIF, ANKLE;  Surgeon: Michael Lacey MD;  Location: [facility] OR;  Service: Orthopedics;  Laterality: Left;    PEG TUBE REMOVAL  2021    Procedure: REMOVAL, PEG TUBE;  Surgeon: Papi Martinez Jr., MD;  Location: [facility] ENDO;  Service: Endoscopy;;    TONSILLECTOMY      WASHOUT  10/5/2023    Procedure: WASHOUT, HEMATOMA;  Surgeon: Kevin Mays MD;  Location: [facility] OR 2ND FLR;  Service: General;;       Social History     Socioeconomic History    Marital status:    Tobacco Use    Smoking status: Former     Current packs/day: 0.00     Average packs/day: 0.5 packs/day for 6.0 years (3.0 ttl pk-yrs)     Types: Cigarettes     Start date: 5/10/2005     Quit date: 5/10/2011     Years since quittin.5    Smokeless tobacco: Never   Substance and Sexual Activity    Alcohol use: Yes     Comment: socially    Drug use: Never    Sexual activity: Yes     Partners: Male     Social Determinants of Health     Financial Resource Strain: Medium Risk (2023)    Overall Financial Resource Strain (CARDIA)     Difficulty of Paying Living Expenses: Somewhat hard   Food Insecurity: Food Insecurity Present (2023)    Hunger Vital Sign     Worried About Running Out  of Food in the Last Year: Sometimes true     Ran Out of Food in the Last Year: Sometimes true   Transportation Needs: No Transportation Needs (9/28/2023)    PRAPARE - Transportation     Lack of Transportation (Medical): No     Lack of Transportation (Non-Medical): No   Physical Activity: Inactive (8/29/2023)    Exercise Vital Sign     Days of Exercise per Week: 0 days     Minutes of Exercise per Session: 0 min   Stress: Stress Concern Present (9/28/2023)    [place] [employer] - Occupational Stress Questionnaire     Feeling of Stress : Very much   Social Connections: Moderately Isolated (9/30/2023)    Social Connection and Isolation Panel [NHANES]     Frequency of Communication with Friends and Family: Twice a week     Frequency of Social Gatherings with Friends and Family: Once a week     Attends Religion Services: Never     Active Member of Clubs or Organizations: No     Attends Club or Organization Meetings: Never     Marital Status:    Housing Stability: High Risk (9/28/2023)    Housing Stability Vital Sign     Unable to Pay for Housing in the Last Year: Yes     Number of Places Lived in the Last Year: 1     Unstable Housing in the Last Year: No       OBJECTIVE:     Vital Signs Range (Last 24H):  Temp:  [36.6 °C (97.9 °F)-36.7 °C (98 °F)]   Pulse:  []   Resp:  [15-16]   BP: (107-137)/(71-84)   SpO2:  [99 %-100 %]       Significant Labs:  Lab Results   Component Value Date    WBC 6.92 10/11/2023    HGB 8.2 (L) 10/11/2023    HCT 27.1 (L) 10/11/2023     10/11/2023    CHOL 204 (H) 03/30/2020    TRIG 112 10/07/2023    HDL 52 03/30/2020    ALT 17 10/11/2023    AST 17 10/11/2023     10/11/2023    K 4.1 10/11/2023     10/11/2023    CREATININE 0.8 10/11/2023    BUN 23 (H) 10/11/2023    CO2 24 10/11/2023    TSH 0.483 04/20/2023    INR 0.9 10/05/2023    HGBA1C 4.9 03/30/2020       Diagnostic Studies: No relevant studies.    EKG:   Results for orders placed or performed  during the hospital encounter of 09/28/23   EKG 12-lead    Collection Time: 10/06/23  6:00 PM    Narrative    Test Reason : R00.0,    Vent. Rate : 104 BPM     Atrial Rate : 104 BPM     P-R Int : 154 ms          QRS Dur : 084 ms      QT Int : 334 ms       P-R-T Axes : 054 004 055 degrees     QTc Int : 439 ms    Sinus tachycardia  Otherwise normal ECG  When compared with ECG of 01-OCT-2023 11:10,  No significant change was found  Confirmed by ANNA MARIE HUSSEIN MD ([id number]) on 10/7/2023 10:16:00 AM    Referred By: KIMBERLY CANELA           Confirmed By:ANNA MARIE HUSSEIN MD         GARRETT:  No results found for this or any previous visit.    ASSESSMENT/PLAN:         Pre-op Assessment    I have reviewed the Patient Summary Reports.     I have reviewed the Nursing Notes. I have reviewed the NPO Status.      Review of Systems  Anesthesia Hx:  No problems with previous Anesthesia   History of prior surgery of interest to airway management or planning:          Denies Family Hx of Anesthesia complications.    Denies Personal Hx of Anesthesia complications.                    Social:  Former Smoker       Hematology/Oncology:       -- Anemia:                                  Cardiovascular:     Hypertension               POTS                         Pulmonary:    Denies COPD.  Denies Asthma.     Denies Sleep Apnea.                Hepatic/GI:   PUD,  GERD   Duodenal stricture, gastroparesis, crohns on immunosuppressive therapy          Neurological:    Denies CVA.    Seizures                                Psych:  Psychiatric History (bipolar I)                  Physical Exam  General: Well nourished, Cooperative, Alert and Oriented    Airway:  Mallampati: II   Tongue: Normal    Dental:        Anesthesia Plan  Type of Anesthesia, risks & benefits discussed:    Anesthesia Type: Gen Natural Airway  Intra-op Monitoring Plan: Standard ASA Monitors  Post Op Pain Control Plan: multimodal analgesia and IV/PO Opioids PRN  Induction:  IV  Informed  Consent: Informed consent signed with the Patient and all parties understand the risks and agree with anesthesia plan.  All questions answered.   ASA Score: 3  Day of Surgery Review of History & Physical: H&P Update referred to the surgeon/provider.    Ready For Surgery From Anesthesia Perspective.     .

## 2023-11-15 NOTE — PLAN OF CARE
Patient and spouse received discharge instructions, verbalize understanding.No complaints at this time. Will discharge home.

## 2023-11-15 NOTE — ANESTHESIA POSTPROCEDURE EVALUATION
Anesthesia Post Evaluation    Patient: Angela Trascher Lejeune    Procedure(s) Performed: Procedure(s) (LRB):  REMOVAL, GASTROSTOMY TUBE (N/A)  REMOVAL, JEJUNOSTOMY TUBE (N/A)    Final Anesthesia Type: general      Patient location during evaluation: PACU  Patient participation: Yes- Able to Participate  Level of consciousness: awake and alert and oriented  Post-procedure vital signs: reviewed and stable  Pain management: adequate  Airway patency: patent    PONV status at discharge: No PONV  Anesthetic complications: no      Cardiovascular status: blood pressure returned to baseline and hemodynamically stable  Respiratory status: unassisted, spontaneous ventilation and room air  Hydration status: euvolemic  Follow-up not needed.          Vitals Value Taken Time   /77 11/15/23 0901   Temp 36.7 °C (98 °F) 11/15/23 0900   Pulse 89 11/15/23 0909   Resp 22 11/15/23 0909   SpO2 100 % 11/15/23 0909   Vitals shown include unvalidated device data.      No case tracking events are documented in the log.      Pain/Rufus Score: Rufus Score: 10 (11/15/2023  8:45 AM)

## 2023-11-15 NOTE — OP NOTE
[facility] ([facility])  Surgery Department  Operative Note       Date of Procedure: 11/15/2023       Surgeon(s):  Surgeon(s) and Role:     * Kevin Mays MD - Primary  Assisting Surgeon: None    Pre-Operative Diagnosis:   Jejunostomy tube in place  Gastrostomy tube in place [Z93.1]    Post-Operative Diagnosis:   same     Anesthesia: Local MAC    Operative Findings:   G and J tubes removed intact and without defect.    Procedures:  Bedside removal of gastrostomy and jejunostomy tubes    Estimated Blood Loss (EBL):  <1 mL      Specimen(s):    Specimen (24h ago, onward)      None                   Indications:  Angela Trascher Lejeune presents for the above procedures.  Risks and benefits were reviewed including bleeding, infection, damage to local structures, need for additional procedures, death, and imponderables.  She understands and gave informed consent to proceed.    Details:  The patient was transported to the operating room and satisfactory anesthesia established.  The patient was placed in the supine position and extremities were padded and protected throughout.  A timeout was performed.  The sutures were cut on the tubes and they were removed with traction without difficulty.  The tubes were full length without defects.  The tracts were dressed with dry gauze and tape.    I communicated the intraoperative findings with the family following the procedure.     Condition: Good    Disposition: PACU - hemodynamically stable.    Attestation: I performed the procedure.

## 2023-11-15 NOTE — TRANSFER OF CARE
"Anesthesia Transfer of Care Note    Patient: Angela Trascher Lejeune    Procedure(s) Performed: Procedure(s) (LRB):  REMOVAL, GASTROSTOMY TUBE (N/A)  REMOVAL, JEJUNOSTOMY TUBE (N/A)    Patient location: PACU    Anesthesia Type: general    Transport from OR: Transported from OR on 6-10 L/min O2 by face mask with adequate spontaneous ventilation    Post pain: adequate analgesia    Post assessment: no apparent anesthetic complications and tolerated procedure well    Post vital signs: stable    Level of consciousness: sedated    Nausea/Vomiting: no nausea/vomiting    Complications: none    Transfer of care protocol was followed      Last vitals: Visit Vitals  /84 (BP Location: Right arm, Patient Position: Lying)   Pulse 100   Resp 16   Ht 5' 4" (1.626 m)   Wt 50.6 kg (111 lb 8.8 oz)   LMP 08/21/2010 (LMP Unknown)   SpO2 99%   Breastfeeding No   BMI 19.15 kg/m²     "

## 2023-11-15 NOTE — INTERVAL H&P NOTE
Day of surgery addendum     Current history and physical on file and completed as below.  There have been no significant clinical changes since the completion of the H&P below.  Patient identified by me and surgical site confirmed by patient and myself.    I have personally discussed the risks of surgery as detailed below, as well as the risks of anesthesia.  Mikaela Montes De Oca Lejeune understands the risks, any and all questions were answered to her satisfaction.     Completed by:  Kevin Mays MD  on 11/15/2023 at 7:57 AM

## 2023-11-20 ENCOUNTER — EXTERNAL HOME HEALTH (OUTPATIENT)
Dept: HOME HEALTH SERVICES | Facility: HOSPITAL | Age: 48
End: 2023-11-20
Payer: COMMERCIAL

## 2023-11-30 ENCOUNTER — DOCUMENT SCAN (OUTPATIENT)
Dept: HOME HEALTH SERVICES | Facility: HOSPITAL | Age: 48
End: 2023-11-30
Payer: COMMERCIAL

## 2023-11-30 NOTE — PROGRESS NOTES
"  Post-Op Follow-up Visit:   11/9/2023  Patient ID: Angela Trascher Lejeune is a 48 y.o. female, born 1975    Chief Complaint   Patient presents with    Other     Interval History: Mrs. Lejeune returns to the clinic for a tube check. She states that she had an episode of POTS and was about to faint and her  caught her and since she has had pain at her tube sites. She is not using the tubes for venting or for feeding. She is tolerating a regular diet and is having bowel function. Denies fever, chills, N/V/D, SOB, chest pain.     Physical Exam:  /75   Pulse (!) 146   Ht 5' 4" (1.626 m)   Wt 51.1 kg (112 lb 10.5 oz)   LMP 08/21/2010 (LMP Unknown)   SpO2 99%   BMI 19.34 kg/m²     General:  Non-toxic, ambulatory  Abd:  Soft, non-tender  Incision:  g and j tube in place- Cdi without signs of infection or issue         ICD-10-CM ICD-9-CM    1. Gastrostomy tube in place  Z93.1 V44.1       Plan   Mrs. Lejeune returns to the clinic for a tube check. She states that she had an episode of POTS and was about to faint and her  caught her and since she has had pain at her tube sites. She is not using the tubes for venting or for feeding. She is tolerating a regular diet and is having bowel function. Her g and j tubes are in place without evidence of infection, redness, or drainage noted. They flush easily. She is scheduled for removal in the OR next week. Questions were asked and answered to patient's satisfaction.  We discussed the need for continued clinical/radiographic/endoscopic follow-up.     Follow up if symptoms worsen or fail to improve.         PEDRO Lobato, FNP-C  Upper GI / Hepatobiliary Surgical Oncology  [facility] [place], [place]  Office: [phone]  Fax: [phone]       "

## 2023-12-20 ENCOUNTER — PATIENT MESSAGE (OUTPATIENT)
Dept: SURGERY | Facility: CLINIC | Age: 48
End: 2023-12-20
Payer: COMMERCIAL

## 2023-12-28 ENCOUNTER — INFUSION (OUTPATIENT)
Dept: INFUSION THERAPY | Facility: HOSPITAL | Age: 48
End: 2023-12-28
Attending: INTERNAL MEDICINE
Payer: COMMERCIAL

## 2023-12-28 DIAGNOSIS — M25.572 CHRONIC PAIN OF LEFT ANKLE: ICD-10-CM

## 2023-12-28 DIAGNOSIS — S82.842D CLOSED BIMALLEOLAR FRACTURE OF LEFT ANKLE WITH ROUTINE HEALING, SUBSEQUENT ENCOUNTER: ICD-10-CM

## 2023-12-28 DIAGNOSIS — D50.0 IRON DEFICIENCY ANEMIA DUE TO CHRONIC BLOOD LOSS: ICD-10-CM

## 2023-12-28 DIAGNOSIS — K50.019 CROHN'S DISEASE OF SMALL INTESTINE WITH COMPLICATION: ICD-10-CM

## 2023-12-28 DIAGNOSIS — K50.80 CROHN'S DISEASE OF BOTH SMALL AND LARGE INTESTINE WITHOUT COMPLICATION: Primary | ICD-10-CM

## 2023-12-28 DIAGNOSIS — K50.80 CROHN'S DISEASE OF BOTH SMALL AND LARGE INTESTINE WITHOUT COMPLICATION: ICD-10-CM

## 2023-12-28 DIAGNOSIS — D50.8 IRON DEFICIENCY ANEMIA SECONDARY TO INADEQUATE DIETARY IRON INTAKE: Primary | ICD-10-CM

## 2023-12-28 DIAGNOSIS — K31.5 DUODENAL STRICTURE: ICD-10-CM

## 2023-12-28 DIAGNOSIS — E87.6 HYPOKALEMIA: ICD-10-CM

## 2023-12-28 DIAGNOSIS — I87.2 VENOUS INSUFFICIENCY: ICD-10-CM

## 2023-12-28 DIAGNOSIS — G89.29 CHRONIC PAIN OF LEFT ANKLE: ICD-10-CM

## 2023-12-28 DIAGNOSIS — R11.0 NAUSEA: ICD-10-CM

## 2023-12-28 DIAGNOSIS — Z00.01 ENCOUNTER FOR GENERAL ADULT MEDICAL EXAMINATION WITH ABNORMAL FINDINGS: ICD-10-CM

## 2023-12-28 LAB
ALBUMIN SERPL BCP-MCNC: 3.7 G/DL (ref 3.5–5.2)
ALP SERPL-CCNC: 137 U/L (ref 55–135)
ALT SERPL W/O P-5'-P-CCNC: 13 U/L (ref 10–44)
ANION GAP SERPL CALC-SCNC: 11 MMOL/L (ref 8–16)
AST SERPL-CCNC: 19 U/L (ref 10–40)
BASOPHILS # BLD AUTO: 0.09 K/UL (ref 0–0.2)
BASOPHILS NFR BLD: 1.1 % (ref 0–1.9)
BILIRUB SERPL-MCNC: 0.3 MG/DL (ref 0.1–1)
BUN SERPL-MCNC: 18 MG/DL (ref 6–20)
CALCIUM SERPL-MCNC: 9 MG/DL (ref 8.7–10.5)
CHLORIDE SERPL-SCNC: 107 MMOL/L (ref 95–110)
CO2 SERPL-SCNC: 25 MMOL/L (ref 23–29)
CREAT SERPL-MCNC: 0.9 MG/DL (ref 0.5–1.4)
DIFFERENTIAL METHOD: ABNORMAL
EOSINOPHIL # BLD AUTO: 0.2 K/UL (ref 0–0.5)
EOSINOPHIL NFR BLD: 2.8 % (ref 0–8)
ERYTHROCYTE [DISTWIDTH] IN BLOOD BY AUTOMATED COUNT: 16.1 % (ref 11.5–14.5)
EST. GFR  (NO RACE VARIABLE): >60 ML/MIN/1.73 M^2
ESTIMATED AVG GLUCOSE: 111 MG/DL (ref 68–131)
GLUCOSE SERPL-MCNC: 83 MG/DL (ref 70–110)
HBA1C MFR BLD: 5.5 % (ref 4–5.6)
HCT VFR BLD AUTO: 31.7 % (ref 37–48.5)
HGB BLD-MCNC: 10 G/DL (ref 12–16)
IMM GRANULOCYTES # BLD AUTO: 0.01 K/UL (ref 0–0.04)
IMM GRANULOCYTES NFR BLD AUTO: 0.1 % (ref 0–0.5)
LYMPHOCYTES # BLD AUTO: 1.9 K/UL (ref 1–4.8)
LYMPHOCYTES NFR BLD: 23.7 % (ref 18–48)
MCH RBC QN AUTO: 28.7 PG (ref 27–31)
MCHC RBC AUTO-ENTMCNC: 31.5 G/DL (ref 32–36)
MCV RBC AUTO: 91 FL (ref 82–98)
MONOCYTES # BLD AUTO: 0.7 K/UL (ref 0.3–1)
MONOCYTES NFR BLD: 9.4 % (ref 4–15)
NEUTROPHILS # BLD AUTO: 5 K/UL (ref 1.8–7.7)
NEUTROPHILS NFR BLD: 62.9 % (ref 38–73)
NRBC BLD-RTO: 0 /100 WBC
PLATELET # BLD AUTO: 337 K/UL (ref 150–450)
PMV BLD AUTO: 11 FL (ref 9.2–12.9)
POTASSIUM SERPL-SCNC: 3.7 MMOL/L (ref 3.5–5.1)
PROT SERPL-MCNC: 7.4 G/DL (ref 6–8.4)
RBC # BLD AUTO: 3.48 M/UL (ref 4–5.4)
SODIUM SERPL-SCNC: 143 MMOL/L (ref 136–145)
T4 FREE SERPL-MCNC: 0.76 NG/DL (ref 0.71–1.51)
TSH SERPL DL<=0.005 MIU/L-ACNC: 0.35 UIU/ML (ref 0.4–4)
WBC # BLD AUTO: 7.9 K/UL (ref 3.9–12.7)

## 2023-12-28 PROCEDURE — 36591 DRAW BLOOD OFF VENOUS DEVICE: CPT | Mod: PN

## 2023-12-28 PROCEDURE — 84439 ASSAY OF FREE THYROXINE: CPT

## 2023-12-28 PROCEDURE — 25000003 PHARM REV CODE 250: Mod: PN

## 2023-12-28 PROCEDURE — 63600175 PHARM REV CODE 636 W HCPCS: Mod: JZ,JG,PN

## 2023-12-28 PROCEDURE — 84443 ASSAY THYROID STIM HORMONE: CPT

## 2023-12-28 PROCEDURE — 83036 HEMOGLOBIN GLYCOSYLATED A1C: CPT

## 2023-12-28 PROCEDURE — 85025 COMPLETE CBC W/AUTO DIFF WBC: CPT | Mod: PN | Performed by: INTERNAL MEDICINE

## 2023-12-28 PROCEDURE — A4216 STERILE WATER/SALINE, 10 ML: HCPCS | Mod: PN

## 2023-12-28 PROCEDURE — 80053 COMPREHEN METABOLIC PANEL: CPT | Mod: PN | Performed by: INTERNAL MEDICINE

## 2023-12-28 RX ORDER — SODIUM CHLORIDE 0.9 % (FLUSH) 0.9 %
10 SYRINGE (ML) INJECTION
Status: CANCELLED | OUTPATIENT
Start: 2023-12-28

## 2023-12-28 RX ORDER — SODIUM CHLORIDE 0.9 % (FLUSH) 0.9 %
10 SYRINGE (ML) INJECTION
Status: COMPLETED | OUTPATIENT
Start: 2023-12-28 | End: 2023-12-28

## 2023-12-28 RX ORDER — HEPARIN 100 UNIT/ML
500 SYRINGE INTRAVENOUS
Status: CANCELLED | OUTPATIENT
Start: 2023-12-28

## 2023-12-28 RX ORDER — WATER FOR INJECTION,STERILE
VIAL (ML) INJECTION
Status: DISCONTINUED
Start: 2023-12-28 | End: 2023-12-28 | Stop reason: HOSPADM

## 2023-12-28 RX ORDER — HEPARIN 100 UNIT/ML
500 SYRINGE INTRAVENOUS
Status: COMPLETED | OUTPATIENT
Start: 2023-12-28 | End: 2023-12-28

## 2023-12-28 RX ADMIN — Medication 10 ML: at 04:12

## 2023-12-28 RX ADMIN — ALTEPLASE 2 MG: 2.2 INJECTION, POWDER, LYOPHILIZED, FOR SOLUTION INTRAVENOUS at 01:12

## 2023-12-28 RX ADMIN — Medication 500 UNITS: at 04:12

## 2024-01-24 ENCOUNTER — OFFICE VISIT (OUTPATIENT)
Dept: ORTHOPEDICS | Facility: CLINIC | Age: 49
End: 2024-01-24
Payer: COMMERCIAL

## 2024-01-24 VITALS — BODY MASS INDEX: 21.34 KG/M2 | HEIGHT: 64 IN | WEIGHT: 125 LBS

## 2024-01-24 DIAGNOSIS — S82.142A CLOSED FRACTURE OF LEFT TIBIAL PLATEAU, INITIAL ENCOUNTER: ICD-10-CM

## 2024-01-24 PROCEDURE — 99999 PR PBB SHADOW E&M-EST. PATIENT-LVL IV: CPT | Mod: PBBFAC,,, | Performed by: ORTHOPAEDIC SURGERY

## 2024-01-24 PROCEDURE — 27530 TREAT KNEE FRACTURE: CPT | Mod: LT,S$GLB,, | Performed by: ORTHOPAEDIC SURGERY

## 2024-01-24 PROCEDURE — 97760 ORTHOTIC MGMT&TRAING 1ST ENC: CPT | Mod: GP,S$GLB,, | Performed by: ORTHOPAEDIC SURGERY

## 2024-01-24 PROCEDURE — 3008F BODY MASS INDEX DOCD: CPT | Mod: CPTII,S$GLB,, | Performed by: ORTHOPAEDIC SURGERY

## 2024-01-24 PROCEDURE — 1159F MED LIST DOCD IN RCRD: CPT | Mod: CPTII,S$GLB,, | Performed by: ORTHOPAEDIC SURGERY

## 2024-01-24 PROCEDURE — 99204 OFFICE O/P NEW MOD 45 MIN: CPT | Mod: 57,S$GLB,, | Performed by: ORTHOPAEDIC SURGERY

## 2024-01-24 RX ORDER — OXYCODONE AND ACETAMINOPHEN 5; 325 MG/1; MG/1
1 TABLET ORAL EVERY 4 HOURS PRN
Qty: 27 TABLET | Refills: 0 | Status: SHIPPED | OUTPATIENT
Start: 2024-01-24 | End: 2024-03-04

## 2024-01-24 NOTE — PROGRESS NOTES
48 years old injured her left knee 4 days ago after a fall pain is 10 on the pain scale    Exam shows tenderness the lateral joint line, I can not detect any instability extensor mechanism is intact, does have ecchymoses posteriorly     X-rays show lateral plateau fracture with acceptable position    Assessment:  Left knee lateral tibial plateau fracture     Plan:  Knee immobilizer, okay for gentle daily range of motion, limited protected weight-bearing, follow-up in a few weeks time is a postop visit with x-rays of her left knee      We performed a custom orthotic/brace fitting, adjusting and training with the patient. The patient demonstrated understanding and proper care. This was performed for 15 minutes.

## 2024-01-29 ENCOUNTER — PATIENT MESSAGE (OUTPATIENT)
Dept: ORTHOPEDICS | Facility: CLINIC | Age: 49
End: 2024-01-29
Payer: COMMERCIAL

## 2024-01-30 NOTE — NURSING
Called pt on the phone. Duplicate message.    Pt hypotensive, asymptomatic. Easily arrousable. Notified Resident and Rapid Response. See vital flowsheet. 1600 cc of LR given. BP WNL. Pt alert. Report given to [place] DALTON

## 2024-02-05 ENCOUNTER — PATIENT MESSAGE (OUTPATIENT)
Dept: ORTHOPEDICS | Facility: CLINIC | Age: 49
End: 2024-02-05
Payer: COMMERCIAL

## 2024-02-08 ENCOUNTER — TELEPHONE (OUTPATIENT)
Dept: ORTHOPEDICS | Facility: CLINIC | Age: 49
End: 2024-02-08
Payer: COMMERCIAL

## 2024-02-08 NOTE — TELEPHONE ENCOUNTER
Contacted patient. She will fax over FMLA paperwork to the # provided. She will extended her FMLA date after the upper coming office visit.

## 2024-02-09 DIAGNOSIS — M25.562 LEFT KNEE PAIN, UNSPECIFIED CHRONICITY: Primary | ICD-10-CM

## 2024-02-19 ENCOUNTER — HOSPITAL ENCOUNTER (OUTPATIENT)
Dept: RADIOLOGY | Facility: HOSPITAL | Age: 49
Discharge: HOME | End: 2024-02-19
Attending: ORTHOPAEDIC SURGERY
Payer: COMMERCIAL

## 2024-02-19 ENCOUNTER — OFFICE VISIT (OUTPATIENT)
Dept: ORTHOPEDICS | Facility: CLINIC | Age: 49
End: 2024-02-19
Payer: COMMERCIAL

## 2024-02-19 DIAGNOSIS — M25.562 LEFT KNEE PAIN, UNSPECIFIED CHRONICITY: Primary | ICD-10-CM

## 2024-02-19 DIAGNOSIS — M25.562 LEFT KNEE PAIN, UNSPECIFIED CHRONICITY: ICD-10-CM

## 2024-02-19 PROCEDURE — 1159F MED LIST DOCD IN RCRD: CPT | Mod: CPTII,S$GLB,, | Performed by: ORTHOPAEDIC SURGERY

## 2024-02-19 PROCEDURE — 73562 X-RAY EXAM OF KNEE 3: CPT | Mod: TC,PO,LT

## 2024-02-19 PROCEDURE — 99024 POSTOP FOLLOW-UP VISIT: CPT | Mod: S$GLB,,, | Performed by: ORTHOPAEDIC SURGERY

## 2024-02-19 PROCEDURE — 73560 X-RAY EXAM OF KNEE 1 OR 2: CPT | Mod: TC,PO,RT

## 2024-02-19 PROCEDURE — 73564 X-RAY EXAM KNEE 4 OR MORE: CPT | Mod: 26,LT,, | Performed by: RADIOLOGY

## 2024-02-19 PROCEDURE — 99999 PR PBB SHADOW E&M-EST. PATIENT-LVL III: CPT | Mod: PBBFAC,,, | Performed by: ORTHOPAEDIC SURGERY

## 2024-02-19 NOTE — PROGRESS NOTES
48 years old month out from lateral tibial plateau fracture doing well pain is 1 on the pain scale today     Exam shows good motion of the knee I can not detect any instability extensor mechanism functioning well     X-rays show no change in position lateral tibial plateau fracture     Assessment:  Left knee lateral tibial plateau fracture     Plan:  Patient has already been walking with and without her knee immobilizer, we will continue to advance her slowly weaning use of assistive devices, patient not interested in physical therapy wants to try to advance on her own, follow-up in 1 months time as a postop visit with repeat x-rays of her left knee

## 2024-02-22 ENCOUNTER — PATIENT MESSAGE (OUTPATIENT)
Dept: ORTHOPEDICS | Facility: CLINIC | Age: 49
End: 2024-02-22
Payer: COMMERCIAL

## 2024-03-14 ENCOUNTER — INFUSION (OUTPATIENT)
Dept: INFUSION THERAPY | Facility: HOSPITAL | Age: 49
End: 2024-03-14
Attending: INTERNAL MEDICINE
Payer: COMMERCIAL

## 2024-03-14 DIAGNOSIS — D50.8 IRON DEFICIENCY ANEMIA SECONDARY TO INADEQUATE DIETARY IRON INTAKE: Primary | ICD-10-CM

## 2024-03-14 DIAGNOSIS — E87.6 HYPOKALEMIA: ICD-10-CM

## 2024-03-14 DIAGNOSIS — I87.2 VENOUS INSUFFICIENCY: ICD-10-CM

## 2024-03-14 DIAGNOSIS — M25.562 LEFT KNEE PAIN, UNSPECIFIED CHRONICITY: Primary | ICD-10-CM

## 2024-03-14 DIAGNOSIS — K50.019 CROHN'S DISEASE OF SMALL INTESTINE WITH COMPLICATION: Primary | ICD-10-CM

## 2024-03-14 DIAGNOSIS — K50.019 CROHN'S DISEASE OF SMALL INTESTINE WITH COMPLICATION: ICD-10-CM

## 2024-03-14 LAB
ALBUMIN SERPL BCP-MCNC: 4.1 G/DL (ref 3.5–5.2)
ALP SERPL-CCNC: 110 U/L (ref 55–135)
ALT SERPL W/O P-5'-P-CCNC: 19 U/L (ref 10–44)
ANION GAP SERPL CALC-SCNC: 10 MMOL/L (ref 8–16)
AST SERPL-CCNC: 23 U/L (ref 10–40)
BASOPHILS # BLD AUTO: 0.06 K/UL (ref 0–0.2)
BASOPHILS NFR BLD: 0.8 % (ref 0–1.9)
BILIRUB SERPL-MCNC: 0.4 MG/DL (ref 0.1–1)
BUN SERPL-MCNC: 18 MG/DL (ref 6–20)
CALCIUM SERPL-MCNC: 9.6 MG/DL (ref 8.7–10.5)
CHLORIDE SERPL-SCNC: 105 MMOL/L (ref 95–110)
CO2 SERPL-SCNC: 27 MMOL/L (ref 23–29)
CREAT SERPL-MCNC: 0.9 MG/DL (ref 0.5–1.4)
DIFFERENTIAL METHOD BLD: ABNORMAL
EOSINOPHIL # BLD AUTO: 0.1 K/UL (ref 0–0.5)
EOSINOPHIL NFR BLD: 1.7 % (ref 0–8)
ERYTHROCYTE [DISTWIDTH] IN BLOOD BY AUTOMATED COUNT: 15.9 % (ref 11.5–14.5)
EST. GFR  (NO RACE VARIABLE): >60 ML/MIN/1.73 M^2
GLUCOSE SERPL-MCNC: 88 MG/DL (ref 70–110)
HCT VFR BLD AUTO: 31.8 % (ref 37–48.5)
HGB BLD-MCNC: 9.7 G/DL (ref 12–16)
IMM GRANULOCYTES # BLD AUTO: 0.02 K/UL (ref 0–0.04)
IMM GRANULOCYTES NFR BLD AUTO: 0.3 % (ref 0–0.5)
LYMPHOCYTES # BLD AUTO: 1.6 K/UL (ref 1–4.8)
LYMPHOCYTES NFR BLD: 21 % (ref 18–48)
MCH RBC QN AUTO: 27.3 PG (ref 27–31)
MCHC RBC AUTO-ENTMCNC: 30.5 G/DL (ref 32–36)
MCV RBC AUTO: 90 FL (ref 82–98)
MONOCYTES # BLD AUTO: 0.6 K/UL (ref 0.3–1)
MONOCYTES NFR BLD: 7.2 % (ref 4–15)
NEUTROPHILS # BLD AUTO: 5.3 K/UL (ref 1.8–7.7)
NEUTROPHILS NFR BLD: 69 % (ref 38–73)
NRBC BLD-RTO: 0 /100 WBC
PLATELET # BLD AUTO: 285 K/UL (ref 150–450)
PMV BLD AUTO: 9.7 FL (ref 9.2–12.9)
POTASSIUM SERPL-SCNC: 4 MMOL/L (ref 3.5–5.1)
PROT SERPL-MCNC: 7.4 G/DL (ref 6–8.4)
RBC # BLD AUTO: 3.55 M/UL (ref 4–5.4)
SODIUM SERPL-SCNC: 142 MMOL/L (ref 136–145)
WBC # BLD AUTO: 7.73 K/UL (ref 3.9–12.7)

## 2024-03-14 PROCEDURE — 85025 COMPLETE CBC W/AUTO DIFF WBC: CPT | Mod: PN

## 2024-03-14 PROCEDURE — 36591 DRAW BLOOD OFF VENOUS DEVICE: CPT | Mod: PN

## 2024-03-14 PROCEDURE — 25000003 PHARM REV CODE 250: Mod: PN | Performed by: INTERNAL MEDICINE

## 2024-03-14 PROCEDURE — 63600175 PHARM REV CODE 636 W HCPCS: Mod: PN | Performed by: INTERNAL MEDICINE

## 2024-03-14 PROCEDURE — A4216 STERILE WATER/SALINE, 10 ML: HCPCS | Mod: PN | Performed by: INTERNAL MEDICINE

## 2024-03-14 PROCEDURE — 80053 COMPREHEN METABOLIC PANEL: CPT | Mod: PN

## 2024-03-14 RX ORDER — HEPARIN 100 UNIT/ML
500 SYRINGE INTRAVENOUS
Status: COMPLETED | OUTPATIENT
Start: 2024-03-14 | End: 2024-03-14

## 2024-03-14 RX ORDER — SODIUM CHLORIDE 0.9 % (FLUSH) 0.9 %
10 SYRINGE (ML) INJECTION
Status: COMPLETED | OUTPATIENT
Start: 2024-03-14 | End: 2024-03-14

## 2024-03-14 RX ADMIN — Medication 10 ML: at 12:03

## 2024-03-14 RX ADMIN — Medication 500 UNITS: at 12:03

## 2024-03-18 ENCOUNTER — HOSPITAL ENCOUNTER (OUTPATIENT)
Dept: RADIOLOGY | Facility: HOSPITAL | Age: 49
Discharge: HOME | End: 2024-03-18
Attending: ORTHOPAEDIC SURGERY
Payer: COMMERCIAL

## 2024-03-18 ENCOUNTER — OFFICE VISIT (OUTPATIENT)
Dept: ORTHOPEDICS | Facility: CLINIC | Age: 49
End: 2024-03-18
Payer: COMMERCIAL

## 2024-03-18 VITALS — WEIGHT: 126.13 LBS | HEIGHT: 64 IN | BODY MASS INDEX: 21.53 KG/M2

## 2024-03-18 DIAGNOSIS — S82.142A CLOSED FRACTURE OF LEFT TIBIAL PLATEAU, INITIAL ENCOUNTER: Primary | ICD-10-CM

## 2024-03-18 DIAGNOSIS — M25.562 LEFT KNEE PAIN, UNSPECIFIED CHRONICITY: ICD-10-CM

## 2024-03-18 PROCEDURE — 99024 POSTOP FOLLOW-UP VISIT: CPT | Mod: S$GLB,,, | Performed by: ORTHOPAEDIC SURGERY

## 2024-03-18 PROCEDURE — 73562 X-RAY EXAM OF KNEE 3: CPT | Mod: 26,LT,, | Performed by: RADIOLOGY

## 2024-03-18 PROCEDURE — 73560 X-RAY EXAM OF KNEE 1 OR 2: CPT | Mod: 26,59,RT, | Performed by: RADIOLOGY

## 2024-03-18 PROCEDURE — 99999 PR PBB SHADOW E&M-EST. PATIENT-LVL III: CPT | Mod: PBBFAC,,, | Performed by: ORTHOPAEDIC SURGERY

## 2024-03-18 PROCEDURE — 1159F MED LIST DOCD IN RCRD: CPT | Mod: CPTII,S$GLB,, | Performed by: ORTHOPAEDIC SURGERY

## 2024-03-18 PROCEDURE — 73562 X-RAY EXAM OF KNEE 3: CPT | Mod: TC,PO,LT

## 2024-03-18 PROCEDURE — 73560 X-RAY EXAM OF KNEE 1 OR 2: CPT | Mod: TC,PO,RT

## 2024-03-18 NOTE — PROGRESS NOTES
49 years old 2 months out from lateral tibial plateau fracture reports minimal to no pain she is already begun walking without the knee immobilizer doing fairly well     Exam shows no swelling extensor mechanism functioning well, I can not detect any instability does not have tenderness to palpation at the joint line     X-rays show maintenance of position     Assessment:  Healing lateral tibial plateau fracture     Plan:  Gradual return into activities tolerance, follow-up in a month's time as a final postop visit with x-rays of her left knee

## 2024-03-19 PROBLEM — J43.8 OTHER EMPHYSEMA: Status: ACTIVE | Noted: 2024-03-19

## 2024-03-19 PROBLEM — F10.11 NONDEPENDENT ALCOHOL ABUSE, IN REMISSION: Status: ACTIVE | Noted: 2023-07-23

## 2024-03-19 PROBLEM — Z93.1 GASTROSTOMY STATUS: Status: ACTIVE | Noted: 2024-03-19

## 2024-04-03 ENCOUNTER — PATIENT MESSAGE (OUTPATIENT)
Dept: ORTHOPEDICS | Facility: CLINIC | Age: 49
End: 2024-04-03
Payer: COMMERCIAL

## 2024-04-17 DIAGNOSIS — M25.562 LEFT KNEE PAIN: Primary | ICD-10-CM

## 2024-04-19 ENCOUNTER — OFFICE VISIT (OUTPATIENT)
Dept: ORTHOPEDICS | Facility: CLINIC | Age: 49
End: 2024-04-19
Payer: COMMERCIAL

## 2024-04-19 ENCOUNTER — HOSPITAL ENCOUNTER (OUTPATIENT)
Dept: RADIOLOGY | Facility: HOSPITAL | Age: 49
Discharge: HOME | End: 2024-04-19
Attending: ORTHOPAEDIC SURGERY
Payer: COMMERCIAL

## 2024-04-19 VITALS — HEIGHT: 64 IN | WEIGHT: 128.06 LBS | BODY MASS INDEX: 21.86 KG/M2

## 2024-04-19 DIAGNOSIS — S82.142A CLOSED FRACTURE OF LEFT TIBIAL PLATEAU, INITIAL ENCOUNTER: ICD-10-CM

## 2024-04-19 DIAGNOSIS — M25.562 LEFT KNEE PAIN: ICD-10-CM

## 2024-04-19 DIAGNOSIS — M25.562 ACUTE PAIN OF LEFT KNEE: Primary | ICD-10-CM

## 2024-04-19 PROCEDURE — 73560 X-RAY EXAM OF KNEE 1 OR 2: CPT | Mod: 26,59,RT, | Performed by: RADIOLOGY

## 2024-04-19 PROCEDURE — 1159F MED LIST DOCD IN RCRD: CPT | Mod: CPTII,S$GLB,, | Performed by: ORTHOPAEDIC SURGERY

## 2024-04-19 PROCEDURE — 73562 X-RAY EXAM OF KNEE 3: CPT | Mod: 26,LT,, | Performed by: RADIOLOGY

## 2024-04-19 PROCEDURE — 99024 POSTOP FOLLOW-UP VISIT: CPT | Mod: S$GLB,,, | Performed by: ORTHOPAEDIC SURGERY

## 2024-04-19 PROCEDURE — 73560 X-RAY EXAM OF KNEE 1 OR 2: CPT | Mod: TC,PO,RT

## 2024-04-19 PROCEDURE — 73562 X-RAY EXAM OF KNEE 3: CPT | Mod: TC,PO,LT

## 2024-04-19 PROCEDURE — 99999 PR PBB SHADOW E&M-EST. PATIENT-LVL III: CPT | Mod: PBBFAC,,, | Performed by: ORTHOPAEDIC SURGERY

## 2024-04-19 NOTE — PROGRESS NOTES
49 years old 3 months out from lateral to plateau fracture doing well reports no pain feels that she is under% wants to return to work     Exam shows full motion no instability nontender     X-rays show healing lateral tibial plateau fracture     Assessment:  Healing lateral tibial plateau fracture     Plan:  Encourage strengthening over time, activities to tolerance, follow-up as needed

## 2024-04-21 PROBLEM — S82.142D CLOSED FRACTURE OF LEFT TIBIAL PLATEAU WITH ROUTINE HEALING: Chronic | Status: ACTIVE | Noted: 2024-04-21

## 2024-04-22 PROBLEM — D50.0 ANEMIA, BLOOD LOSS: Status: ACTIVE | Noted: 2024-04-22

## 2024-06-28 PROBLEM — G47.00 INSOMNIA: Status: ACTIVE | Noted: 2024-06-28

## 2024-06-28 PROBLEM — G04.91 MYELITIS: Status: ACTIVE | Noted: 2024-06-28

## 2024-06-29 PROBLEM — N39.0 URINARY TRACT INFECTION WITHOUT HEMATURIA: Status: ACTIVE | Noted: 2024-06-29

## 2024-07-03 PROBLEM — I95.1 ORTHOSTATIC HYPOTENSION: Status: ACTIVE | Noted: 2023-07-23

## 2024-07-03 PROBLEM — E83.42 HYPOMAGNESEMIA: Status: ACTIVE | Noted: 2024-07-03

## 2024-08-20 PROBLEM — G90.3 NEUROGENIC ORTHOSTATIC HYPOTENSION: Status: ACTIVE | Noted: 2023-07-23

## 2024-08-20 PROBLEM — E86.0 DEHYDRATION: Status: ACTIVE | Noted: 2024-08-20

## 2024-08-26 ENCOUNTER — TELEPHONE (OUTPATIENT)
Dept: NEPHROLOGY | Facility: CLINIC | Age: 49
End: 2024-08-26
Payer: COMMERCIAL

## 2024-08-26 NOTE — TELEPHONE ENCOUNTER
----- Message from Papi Bass sent at 8/26/2024 11:17 AM CDT -----  Regarding: hospital f/u  Contact: patient  Type:  Sooner Appointment Request    Caller is requesting a sooner appointment.  Caller declined first available appointment listed below.  Caller will not accept being placed on the waitlist and is requesting a message be sent to doctor.    Name of Caller:  [facility] Nurse   When is the first available appointment?  01/27  Symptoms:  hospital f/u  Would the patient rather a call back or a response via [facility]? Call back   Best Call Back Number:  [phone]  Additional Information:  Pt discharged on Saturday and she needs a 2 week hospital f/u. Please call to advise. Thanks

## 2024-08-26 NOTE — TELEPHONE ENCOUNTER
Patient needs a hfu/np appointment scheduled with one of the doctors. Patient will view my chart for that appointment. I will send this message to Joyce for scheduling.

## 2024-08-31 PROBLEM — R55 SYNCOPE: Status: ACTIVE | Noted: 2024-08-31

## 2024-09-30 PROBLEM — N39.0 URINARY TRACT INFECTION WITHOUT HEMATURIA: Status: RESOLVED | Noted: 2024-06-29 | Resolved: 2024-09-30

## 2024-12-16 PROBLEM — G90.1 DYSAUTONOMIA: Status: ACTIVE | Noted: 2024-12-16

## 2024-12-27 ENCOUNTER — LAB VISIT (OUTPATIENT)
Dept: LAB | Facility: HOSPITAL | Age: 49
End: 2024-12-27
Attending: INTERNAL MEDICINE
Payer: COMMERCIAL

## 2024-12-27 ENCOUNTER — INFUSION (OUTPATIENT)
Dept: INFUSION THERAPY | Facility: HOSPITAL | Age: 49
End: 2024-12-27
Attending: INTERNAL MEDICINE
Payer: COMMERCIAL

## 2024-12-27 VITALS
WEIGHT: 129.19 LBS | RESPIRATION RATE: 18 BRPM | BODY MASS INDEX: 22.06 KG/M2 | TEMPERATURE: 98 F | HEIGHT: 64 IN | DIASTOLIC BLOOD PRESSURE: 89 MMHG | SYSTOLIC BLOOD PRESSURE: 132 MMHG | HEART RATE: 78 BPM | OXYGEN SATURATION: 99 %

## 2024-12-27 DIAGNOSIS — K50.019 CROHN'S DISEASE OF SMALL INTESTINE WITH COMPLICATION: Primary | ICD-10-CM

## 2024-12-27 DIAGNOSIS — I87.2 VENOUS INSUFFICIENCY: ICD-10-CM

## 2024-12-27 DIAGNOSIS — D50.8 IRON DEFICIENCY ANEMIA SECONDARY TO INADEQUATE DIETARY IRON INTAKE: Primary | ICD-10-CM

## 2024-12-27 DIAGNOSIS — K50.019 CROHN'S DISEASE OF SMALL INTESTINE WITH COMPLICATION: ICD-10-CM

## 2024-12-27 DIAGNOSIS — E87.6 HYPOKALEMIA: ICD-10-CM

## 2024-12-27 LAB
A1AT SERPL-MCNC: 186 MG/DL (ref 100–190)
ALBUMIN SERPL BCP-MCNC: 4 G/DL (ref 3.5–5.2)
ALP SERPL-CCNC: 139 U/L (ref 40–150)
ALT SERPL W/O P-5'-P-CCNC: 20 U/L (ref 10–44)
AST SERPL-CCNC: 21 U/L (ref 10–40)
BILIRUB DIRECT SERPL-MCNC: 0.1 MG/DL (ref 0.1–0.3)
BILIRUB SERPL-MCNC: 0.3 MG/DL (ref 0.1–1)
CERULOPLASMIN SERPL-MCNC: 29 MG/DL (ref 15–45)
CHOLEST SERPL-MCNC: 191 MG/DL (ref 120–199)
CHOLEST/HDLC SERPL: 3.7 {RATIO} (ref 2–5)
CRP SERPL-MCNC: 0.6 MG/L (ref 0–8.2)
ERYTHROCYTE [SEDIMENTATION RATE] IN BLOOD BY PHOTOMETRIC METHOD: 20 MM/HR (ref 0–36)
FERRITIN SERPL-MCNC: 14 NG/ML (ref 20–300)
GGT SERPL-CCNC: 139 U/L (ref 8–55)
HAV IGM SERPL QL IA: NORMAL
HBV CORE IGM SERPL QL IA: NORMAL
HBV SURFACE AG SERPL QL IA: NORMAL
HCV AB SERPL QL IA: NORMAL
HDLC SERPL-MCNC: 51 MG/DL (ref 40–75)
HDLC SERPL: 26.7 % (ref 20–50)
IGA SERPL-MCNC: 240 MG/DL (ref 40–350)
INR PPP: 1 (ref 0.8–1.2)
IRON SERPL-MCNC: 49 UG/DL (ref 30–160)
LDLC SERPL CALC-MCNC: 111.8 MG/DL (ref 63–159)
NONHDLC SERPL-MCNC: 140 MG/DL
PROT SERPL-MCNC: 7.3 G/DL (ref 6–8.4)
PROTHROMBIN TIME: 10.7 SEC (ref 9–12.5)
SATURATED IRON: 11 % (ref 20–50)
TOTAL IRON BINDING CAPACITY: 444 UG/DL (ref 250–450)
TRANSFERRIN SERPL-MCNC: 300 MG/DL (ref 200–375)
TRIGL SERPL-MCNC: 141 MG/DL (ref 30–150)
TSH SERPL DL<=0.005 MIU/L-ACNC: 1.1 UIU/ML (ref 0.4–4)

## 2024-12-27 PROCEDURE — 85652 RBC SED RATE AUTOMATED: CPT | Performed by: INTERNAL MEDICINE

## 2024-12-27 PROCEDURE — 82728 ASSAY OF FERRITIN: CPT | Performed by: INTERNAL MEDICINE

## 2024-12-27 PROCEDURE — 83540 ASSAY OF IRON: CPT | Performed by: INTERNAL MEDICINE

## 2024-12-27 PROCEDURE — 86140 C-REACTIVE PROTEIN: CPT | Performed by: INTERNAL MEDICINE

## 2024-12-27 PROCEDURE — 86376 MICROSOMAL ANTIBODY EACH: CPT | Performed by: INTERNAL MEDICINE

## 2024-12-27 PROCEDURE — 36591 DRAW BLOOD OFF VENOUS DEVICE: CPT | Mod: PN

## 2024-12-27 PROCEDURE — A4216 STERILE WATER/SALINE, 10 ML: HCPCS | Mod: PN

## 2024-12-27 PROCEDURE — 84443 ASSAY THYROID STIM HORMONE: CPT | Performed by: INTERNAL MEDICINE

## 2024-12-27 PROCEDURE — 86015 ACTIN ANTIBODY EACH: CPT | Performed by: INTERNAL MEDICINE

## 2024-12-27 PROCEDURE — 80074 ACUTE HEPATITIS PANEL: CPT | Performed by: INTERNAL MEDICINE

## 2024-12-27 PROCEDURE — 86364 TISS TRNSGLTMNASE EA IG CLAS: CPT | Performed by: INTERNAL MEDICINE

## 2024-12-27 PROCEDURE — 82525 ASSAY OF COPPER: CPT | Performed by: INTERNAL MEDICINE

## 2024-12-27 PROCEDURE — 86038 ANTINUCLEAR ANTIBODIES: CPT | Performed by: INTERNAL MEDICINE

## 2024-12-27 PROCEDURE — 80061 LIPID PANEL: CPT | Performed by: INTERNAL MEDICINE

## 2024-12-27 PROCEDURE — 80076 HEPATIC FUNCTION PANEL: CPT | Mod: PN | Performed by: INTERNAL MEDICINE

## 2024-12-27 PROCEDURE — 36415 COLL VENOUS BLD VENIPUNCTURE: CPT | Mod: PN | Performed by: INTERNAL MEDICINE

## 2024-12-27 PROCEDURE — 82103 ALPHA-1-ANTITRYPSIN TOTAL: CPT | Performed by: INTERNAL MEDICINE

## 2024-12-27 PROCEDURE — 63600175 PHARM REV CODE 636 W HCPCS: Mod: PN

## 2024-12-27 PROCEDURE — 82390 ASSAY OF CERULOPLASMIN: CPT | Performed by: INTERNAL MEDICINE

## 2024-12-27 PROCEDURE — 86381 MITOCHONDRIAL ANTIBODY EACH: CPT | Performed by: INTERNAL MEDICINE

## 2024-12-27 PROCEDURE — 82784 ASSAY IGA/IGD/IGG/IGM EACH: CPT | Performed by: INTERNAL MEDICINE

## 2024-12-27 PROCEDURE — 82977 ASSAY OF GGT: CPT | Performed by: INTERNAL MEDICINE

## 2024-12-27 PROCEDURE — 25000003 PHARM REV CODE 250: Mod: PN

## 2024-12-27 PROCEDURE — 85610 PROTHROMBIN TIME: CPT | Performed by: INTERNAL MEDICINE

## 2024-12-27 RX ORDER — HEPARIN 100 UNIT/ML
500 SYRINGE INTRAVENOUS
Status: COMPLETED | OUTPATIENT
Start: 2024-12-27 | End: 2024-12-27

## 2024-12-27 RX ORDER — SODIUM CHLORIDE 0.9 % (FLUSH) 0.9 %
10 SYRINGE (ML) INJECTION
OUTPATIENT
Start: 2024-12-27

## 2024-12-27 RX ORDER — HEPARIN 100 UNIT/ML
500 SYRINGE INTRAVENOUS
Status: CANCELLED | OUTPATIENT
Start: 2024-12-27

## 2024-12-27 RX ORDER — SODIUM CHLORIDE 0.9 % (FLUSH) 0.9 %
10 SYRINGE (ML) INJECTION
Status: COMPLETED | OUTPATIENT
Start: 2024-12-27 | End: 2024-12-27

## 2024-12-27 RX ORDER — HEPARIN 100 UNIT/ML
500 SYRINGE INTRAVENOUS
OUTPATIENT
Start: 2024-12-27

## 2024-12-27 RX ORDER — SODIUM CHLORIDE 0.9 % (FLUSH) 0.9 %
10 SYRINGE (ML) INJECTION
Status: CANCELLED | OUTPATIENT
Start: 2024-12-27

## 2024-12-27 RX ADMIN — Medication 10 ML: at 02:12

## 2024-12-27 RX ADMIN — Medication 500 UNITS: at 02:12

## 2024-12-27 NOTE — PLAN OF CARE
Pt to clinic for labs via SPAC. Labs drawn without difficulty and SPAC heparin locked and deaccessed. Pt ambulated out of the clinic without difficulty.

## 2024-12-30 LAB
ANA SER QL IF: NORMAL
MITOCHONDRIA AB TITR SER IF: NORMAL {TITER}
SMOOTH MUSCLE AB TITR SER IF: NORMAL {TITER}

## 2024-12-31 LAB
COPPER SERPL-MCNC: 1001 UG/L (ref 810–1990)
LKM AB SER-ACNC: 1.1 UNITS
TTG IGA SER-ACNC: 0.6 U/ML

## 2025-02-05 PROBLEM — R10.33 PERIUMBILICAL ABDOMINAL PAIN: Status: ACTIVE | Noted: 2025-02-05

## 2025-03-12 ENCOUNTER — LAB VISIT (OUTPATIENT)
Dept: LAB | Facility: HOSPITAL | Age: 50
End: 2025-03-12
Attending: INTERNAL MEDICINE
Payer: COMMERCIAL

## 2025-03-12 DIAGNOSIS — D64.9 ANEMIA, UNSPECIFIED: ICD-10-CM

## 2025-03-12 DIAGNOSIS — R94.5 NONSPECIFIC ABNORMAL RESULTS OF LIVER FUNCTION STUDY: ICD-10-CM

## 2025-03-12 DIAGNOSIS — K26.4 CHRONIC DUODENAL ULCER WITH HEMORRHAGE: ICD-10-CM

## 2025-03-12 DIAGNOSIS — K26.9 POSTPYLORIC ULCER: ICD-10-CM

## 2025-03-12 DIAGNOSIS — K31.1 ACQUIRED HYPERTROPHIC PYLORIC STENOSIS: ICD-10-CM

## 2025-03-12 DIAGNOSIS — K94.22: ICD-10-CM

## 2025-03-12 DIAGNOSIS — D62 ACUTE POSTHEMORRHAGIC ANEMIA: Primary | ICD-10-CM

## 2025-03-12 DIAGNOSIS — D62 ACUTE POSTHEMORRHAGIC ANEMIA: ICD-10-CM

## 2025-03-12 PROCEDURE — 83993 ASSAY FOR CALPROTECTIN FECAL: CPT | Performed by: INTERNAL MEDICINE

## 2025-03-13 LAB
CALPROTECTIN INTERPRETATION: NORMAL
CALPROTECTIN: 30

## 2025-09-04 RX ORDER — HEPARIN 100 UNIT/ML
500 SYRINGE INTRAVENOUS
Status: CANCELLED | OUTPATIENT
Start: 2025-09-04

## 2025-09-04 RX ORDER — SODIUM CHLORIDE 0.9 % (FLUSH) 0.9 %
10 SYRINGE (ML) INJECTION
Status: CANCELLED | OUTPATIENT
Start: 2025-09-04

## (undated) DEVICE — APPLICATOR CHLORAPREP ORN 26ML

## (undated) DEVICE — ELECTRODE REM PLYHSV RETURN 9

## (undated) DEVICE — GOWN SURGICAL X-LARGE

## (undated) DEVICE — DRAPE LAP T SHT W/ INSTR PAD

## (undated) DEVICE — CATH MALECOT 20FR 6EA/BX

## (undated) DEVICE — SEE MEDLINE ITEM 156902

## (undated) DEVICE — STAPLER GIA HANDLE STD

## (undated) DEVICE — TUBE PENROSE DRAIN 18INX5/8IN

## (undated) DEVICE — SUT MONOCRYL 4-0 PS-2

## (undated) DEVICE — Device

## (undated) DEVICE — DEVICE CARESITE LUER ACCESS

## (undated) DEVICE — SUT 2-0 VICRYL / SH (J417)

## (undated) DEVICE — SUT 2/0 30IN SILK BLK BRAI

## (undated) DEVICE — TIP YANKAUERS BULB NO VENT

## (undated) DEVICE — SUT SILK 2-0 SH 18IN BLACK

## (undated) DEVICE — GOWN POLY REINF BRTH SLV XL

## (undated) DEVICE — DRAPE LAP TIBURON 77X122IN

## (undated) DEVICE — SYR 10CC LUER LOCK

## (undated) DEVICE — CLIP LIGATION MEDIUM CLIPS 1

## (undated) DEVICE — SUT PDS II 1 TP-1 VIL

## (undated) DEVICE — TRAY MINOR GEN SURG OMC

## (undated) DEVICE — ADHESIVE DERMABOND ADVANCED

## (undated) DEVICE — SEE MEDLINE ITEM 157128

## (undated) DEVICE — SUT 3-0 VICRYL / SH (J416)

## (undated) DEVICE — KIT PREVENA PLUS

## (undated) DEVICE — CLOSURE SKIN STERI STRIP 1/2X4

## (undated) DEVICE — SEE L#120831

## (undated) DEVICE — ELECTRODE EXTENDED BLADE

## (undated) DEVICE — STAPLER SKIN PROXIMATE WIDE

## (undated) DEVICE — SUT 4/0 18IN SILK BLK BRAI

## (undated) DEVICE — SUT 4-0 12-30IN SILK

## (undated) DEVICE — LOOP VESSEL BLUE MAXI

## (undated) DEVICE — BLADE SURG #15 CARBON STEEL

## (undated) DEVICE — PENCIL ROCKER SWITCH 10FT CORD

## (undated) DEVICE — SPONGE GAUZE 16PLY 4X4

## (undated) DEVICE — TRAY CATH FOL SIL URIMTR 16FR

## (undated) DEVICE — DRESSING ABSRBNT ISLAND 3.6X8

## (undated) DEVICE — COVER MEDIPORE DRSNG 15X28CM

## (undated) DEVICE — BLADE SURG CARBON STEEL SZ11

## (undated) DEVICE — SEE MEDLINE ITEM 152622

## (undated) DEVICE — BINDER ABD 12IN MED/LG 46-62IN

## (undated) DEVICE — SUT PROLENE 3-0 30SH

## (undated) DEVICE — RELOAD 60MM ARTICULATING VAS

## (undated) DEVICE — SUT MCRYL PLUS 4-0 PS2 27IN

## (undated) DEVICE — SPONGE DERMACEA 4X4IN 12PLY

## (undated) DEVICE — TOWEL OR DISP STRL BLUE 4/PK

## (undated) DEVICE — ADHESIVE MASTISOL VIAL 48/BX

## (undated) DEVICE — CATH URETHRAL RED RUBBER 12FR

## (undated) DEVICE — SUT PROLENE 5/0 RB-1 36 IN

## (undated) DEVICE — GLOVE SURG BIOGEL LATEX SZ 7.5

## (undated) DEVICE — DRESSING TRANS 4X4 TEGADERM

## (undated) DEVICE — DRAPE C ARM 42 X 120 10/BX